# Patient Record
Sex: MALE | Race: OTHER | NOT HISPANIC OR LATINO
[De-identification: names, ages, dates, MRNs, and addresses within clinical notes are randomized per-mention and may not be internally consistent; named-entity substitution may affect disease eponyms.]

---

## 2017-08-29 PROBLEM — Z00.00 ENCOUNTER FOR PREVENTIVE HEALTH EXAMINATION: Status: ACTIVE | Noted: 2017-08-29

## 2017-09-14 ENCOUNTER — APPOINTMENT (OUTPATIENT)
Dept: CARDIOLOGY | Facility: CLINIC | Age: 62
End: 2017-09-14
Payer: MEDICAID

## 2017-09-14 ENCOUNTER — NON-APPOINTMENT (OUTPATIENT)
Age: 62
End: 2017-09-14

## 2017-09-14 VITALS
HEART RATE: 69 BPM | OXYGEN SATURATION: 97 % | WEIGHT: 218 LBS | RESPIRATION RATE: 16 BRPM | DIASTOLIC BLOOD PRESSURE: 88 MMHG | HEIGHT: 67 IN | SYSTOLIC BLOOD PRESSURE: 148 MMHG | BODY MASS INDEX: 34.21 KG/M2

## 2017-09-14 PROCEDURE — 99205 OFFICE O/P NEW HI 60 MIN: CPT

## 2017-09-14 PROCEDURE — 36415 COLL VENOUS BLD VENIPUNCTURE: CPT

## 2017-09-14 PROCEDURE — 93000 ELECTROCARDIOGRAM COMPLETE: CPT

## 2017-09-14 RX ORDER — OMEPRAZOLE 40 MG/1
40 CAPSULE, DELAYED RELEASE ORAL
Refills: 0 | Status: ACTIVE | COMMUNITY
Start: 2017-09-14

## 2017-09-14 RX ORDER — LOSARTAN POTASSIUM 100 MG/1
100 TABLET, FILM COATED ORAL DAILY
Refills: 0 | Status: ACTIVE | COMMUNITY
Start: 2017-09-14

## 2017-09-14 RX ORDER — AMLODIPINE BESYLATE 10 MG/1
10 TABLET ORAL DAILY
Refills: 0 | Status: ACTIVE | COMMUNITY
Start: 2017-09-14

## 2017-09-15 LAB
ALBUMIN SERPL ELPH-MCNC: 4.3 G/DL
ALP BLD-CCNC: 88 U/L
ALT SERPL-CCNC: 20 U/L
ANION GAP SERPL CALC-SCNC: 20 MMOL/L
AST SERPL-CCNC: 14 U/L
BASOPHILS # BLD AUTO: 0.02 K/UL
BASOPHILS NFR BLD AUTO: 0.3 %
BILIRUB SERPL-MCNC: 0.4 MG/DL
BUN SERPL-MCNC: 23 MG/DL
CALCIUM SERPL-MCNC: 10.7 MG/DL
CHLORIDE SERPL-SCNC: 95 MMOL/L
CHOLEST SERPL-MCNC: 243 MG/DL
CHOLEST/HDLC SERPL: 5.2 RATIO
CO2 SERPL-SCNC: 23 MMOL/L
CREAT SERPL-MCNC: 1.03 MG/DL
EOSINOPHIL # BLD AUTO: 0.4 K/UL
EOSINOPHIL NFR BLD AUTO: 5.6 %
GLUCOSE SERPL-MCNC: 269 MG/DL
HCT VFR BLD CALC: 39 %
HDLC SERPL-MCNC: 47 MG/DL
HGB BLD-MCNC: 12.6 G/DL
IMM GRANULOCYTES NFR BLD AUTO: 0 %
LDLC SERPL CALC-MCNC: 152 MG/DL
LYMPHOCYTES # BLD AUTO: 2.11 K/UL
LYMPHOCYTES NFR BLD AUTO: 29.6 %
MAGNESIUM SERPL-MCNC: 1.8 MG/DL
MAN DIFF?: NORMAL
MCHC RBC-ENTMCNC: 27.1 PG
MCHC RBC-ENTMCNC: 32.3 GM/DL
MCV RBC AUTO: 83.9 FL
MONOCYTES # BLD AUTO: 0.51 K/UL
MONOCYTES NFR BLD AUTO: 7.2 %
NEUTROPHILS # BLD AUTO: 4.08 K/UL
NEUTROPHILS NFR BLD AUTO: 57.3 %
NT-PROBNP SERPL-MCNC: <5 PG/ML
PLATELET # BLD AUTO: 308 K/UL
POTASSIUM SERPL-SCNC: 3.9 MMOL/L
PROT SERPL-MCNC: 8.9 G/DL
RBC # BLD: 4.65 M/UL
RBC # FLD: 13.2 %
SODIUM SERPL-SCNC: 138 MMOL/L
TRIGL SERPL-MCNC: 218 MG/DL
WBC # FLD AUTO: 7.12 K/UL

## 2017-09-22 ENCOUNTER — OTHER (OUTPATIENT)
Age: 62
End: 2017-09-22

## 2017-09-27 ENCOUNTER — OTHER (OUTPATIENT)
Age: 62
End: 2017-09-27

## 2017-09-29 ENCOUNTER — APPOINTMENT (OUTPATIENT)
Dept: CV DIAGNOSTICS | Facility: HOSPITAL | Age: 62
End: 2017-09-29

## 2017-09-29 ENCOUNTER — APPOINTMENT (OUTPATIENT)
Dept: CV DIAGNOSITCS | Facility: HOSPITAL | Age: 62
End: 2017-09-29

## 2017-10-18 ENCOUNTER — OUTPATIENT (OUTPATIENT)
Dept: OUTPATIENT SERVICES | Facility: HOSPITAL | Age: 62
LOS: 1 days | End: 2017-10-18

## 2017-10-18 ENCOUNTER — APPOINTMENT (OUTPATIENT)
Dept: CV DIAGNOSITCS | Facility: HOSPITAL | Age: 62
End: 2017-10-18

## 2017-10-18 DIAGNOSIS — I10 ESSENTIAL (PRIMARY) HYPERTENSION: ICD-10-CM

## 2017-10-19 ENCOUNTER — APPOINTMENT (OUTPATIENT)
Dept: CARDIOLOGY | Facility: CLINIC | Age: 62
End: 2017-10-19
Payer: MEDICAID

## 2017-10-19 ENCOUNTER — NON-APPOINTMENT (OUTPATIENT)
Age: 62
End: 2017-10-19

## 2017-10-19 VITALS
WEIGHT: 209 LBS | BODY MASS INDEX: 32.8 KG/M2 | HEIGHT: 67 IN | OXYGEN SATURATION: 99 % | DIASTOLIC BLOOD PRESSURE: 86 MMHG | SYSTOLIC BLOOD PRESSURE: 146 MMHG | RESPIRATION RATE: 16 BRPM | HEART RATE: 64 BPM

## 2017-10-19 PROCEDURE — 93000 ELECTROCARDIOGRAM COMPLETE: CPT

## 2017-10-19 PROCEDURE — 99214 OFFICE O/P EST MOD 30 MIN: CPT

## 2017-12-18 ENCOUNTER — APPOINTMENT (OUTPATIENT)
Dept: CARDIOLOGY | Facility: CLINIC | Age: 62
End: 2017-12-18
Payer: MEDICAID

## 2017-12-18 ENCOUNTER — NON-APPOINTMENT (OUTPATIENT)
Age: 62
End: 2017-12-18

## 2017-12-18 VITALS
WEIGHT: 212 LBS | RESPIRATION RATE: 16 BRPM | HEIGHT: 67 IN | HEART RATE: 60 BPM | OXYGEN SATURATION: 99 % | BODY MASS INDEX: 33.27 KG/M2 | DIASTOLIC BLOOD PRESSURE: 77 MMHG | SYSTOLIC BLOOD PRESSURE: 134 MMHG

## 2017-12-18 VITALS — WEIGHT: 212 LBS | BODY MASS INDEX: 33.2 KG/M2

## 2017-12-18 PROCEDURE — 99214 OFFICE O/P EST MOD 30 MIN: CPT

## 2017-12-18 PROCEDURE — 93000 ELECTROCARDIOGRAM COMPLETE: CPT

## 2017-12-18 RX ORDER — HYDROCHLOROTHIAZIDE 25 MG/1
25 TABLET ORAL DAILY
Refills: 0 | Status: DISCONTINUED | COMMUNITY
Start: 2017-09-14 | End: 2017-12-18

## 2017-12-18 RX ORDER — ASPIRIN ENTERIC COATED TABLETS 81 MG 81 MG/1
81 TABLET, DELAYED RELEASE ORAL
Qty: 30 | Refills: 1 | Status: ACTIVE | COMMUNITY
Start: 2017-12-18

## 2018-03-01 ENCOUNTER — OUTPATIENT (OUTPATIENT)
Dept: OUTPATIENT SERVICES | Facility: HOSPITAL | Age: 63
LOS: 1 days | End: 2018-03-01

## 2018-03-27 DIAGNOSIS — R69 ILLNESS, UNSPECIFIED: ICD-10-CM

## 2018-08-02 ENCOUNTER — APPOINTMENT (OUTPATIENT)
Dept: CARDIOLOGY | Facility: CLINIC | Age: 63
End: 2018-08-02
Payer: MEDICAID

## 2018-08-02 ENCOUNTER — NON-APPOINTMENT (OUTPATIENT)
Age: 63
End: 2018-08-02

## 2018-08-02 VITALS
OXYGEN SATURATION: 99 % | RESPIRATION RATE: 16 BRPM | DIASTOLIC BLOOD PRESSURE: 81 MMHG | HEART RATE: 76 BPM | SYSTOLIC BLOOD PRESSURE: 151 MMHG

## 2018-08-02 VITALS — DIASTOLIC BLOOD PRESSURE: 77 MMHG | BODY MASS INDEX: 31.64 KG/M2 | SYSTOLIC BLOOD PRESSURE: 130 MMHG | WEIGHT: 202 LBS

## 2018-08-02 DIAGNOSIS — E78.5 HYPERLIPIDEMIA, UNSPECIFIED: ICD-10-CM

## 2018-08-02 DIAGNOSIS — I10 ESSENTIAL (PRIMARY) HYPERTENSION: ICD-10-CM

## 2018-08-02 PROCEDURE — 99214 OFFICE O/P EST MOD 30 MIN: CPT

## 2018-08-02 PROCEDURE — 93000 ELECTROCARDIOGRAM COMPLETE: CPT

## 2018-08-02 RX ORDER — ATORVASTATIN CALCIUM 10 MG/1
10 TABLET, FILM COATED ORAL
Qty: 1 | Refills: 3 | Status: ACTIVE | COMMUNITY
Start: 2017-09-22 | End: 1900-01-01

## 2018-08-02 RX ORDER — HYDRALAZINE HYDROCHLORIDE 50 MG/1
50 TABLET ORAL
Qty: 60 | Refills: 4 | Status: ACTIVE | COMMUNITY
Start: 2017-09-14 | End: 1900-01-01

## 2018-08-02 RX ORDER — CYCLOBENZAPRINE HYDROCHLORIDE 5 MG/1
5 TABLET, FILM COATED ORAL
Refills: 0 | Status: ACTIVE | COMMUNITY
Start: 2018-08-02

## 2018-10-01 ENCOUNTER — OUTPATIENT (OUTPATIENT)
Dept: OUTPATIENT SERVICES | Facility: HOSPITAL | Age: 63
LOS: 1 days | End: 2018-10-01
Payer: MEDICAID

## 2018-10-01 PROCEDURE — G9001: CPT

## 2018-10-12 DIAGNOSIS — Z71.89 OTHER SPECIFIED COUNSELING: ICD-10-CM

## 2019-02-07 ENCOUNTER — APPOINTMENT (OUTPATIENT)
Dept: CARDIOLOGY | Facility: CLINIC | Age: 64
End: 2019-02-07

## 2021-06-03 ENCOUNTER — NON-APPOINTMENT (OUTPATIENT)
Age: 66
End: 2021-06-03

## 2021-06-03 ENCOUNTER — APPOINTMENT (OUTPATIENT)
Dept: OPHTHALMOLOGY | Facility: CLINIC | Age: 66
End: 2021-06-03
Payer: MEDICARE

## 2021-06-03 PROCEDURE — 92004 COMPRE OPH EXAM NEW PT 1/>: CPT

## 2021-06-03 PROCEDURE — 92133 CPTRZD OPH DX IMG PST SGM ON: CPT

## 2021-06-03 PROCEDURE — 92136 OPHTHALMIC BIOMETRY: CPT

## 2021-06-15 ENCOUNTER — APPOINTMENT (OUTPATIENT)
Dept: OPHTHALMOLOGY | Facility: AMBULATORY SURGERY CENTER | Age: 66
End: 2021-06-15
Payer: MEDICARE

## 2021-06-15 ENCOUNTER — NON-APPOINTMENT (OUTPATIENT)
Age: 66
End: 2021-06-15

## 2021-06-15 PROCEDURE — V2787: CPT

## 2021-06-15 PROCEDURE — 66984 XCAPSL CTRC RMVL W/O ECP: CPT | Mod: LT

## 2021-06-16 ENCOUNTER — APPOINTMENT (OUTPATIENT)
Dept: OPHTHALMOLOGY | Facility: CLINIC | Age: 66
End: 2021-06-16
Payer: MEDICARE

## 2021-06-16 ENCOUNTER — NON-APPOINTMENT (OUTPATIENT)
Age: 66
End: 2021-06-16

## 2021-06-16 PROCEDURE — 99024 POSTOP FOLLOW-UP VISIT: CPT

## 2021-06-22 ENCOUNTER — NON-APPOINTMENT (OUTPATIENT)
Age: 66
End: 2021-06-22

## 2021-06-22 ENCOUNTER — APPOINTMENT (OUTPATIENT)
Dept: OPHTHALMOLOGY | Facility: CLINIC | Age: 66
End: 2021-06-22
Payer: MEDICARE

## 2021-06-22 PROCEDURE — 99024 POSTOP FOLLOW-UP VISIT: CPT

## 2021-06-28 ENCOUNTER — NON-APPOINTMENT (OUTPATIENT)
Age: 66
End: 2021-06-28

## 2021-06-29 ENCOUNTER — NON-APPOINTMENT (OUTPATIENT)
Age: 66
End: 2021-06-29

## 2021-06-29 ENCOUNTER — APPOINTMENT (OUTPATIENT)
Dept: OPHTHALMOLOGY | Facility: AMBULATORY SURGERY CENTER | Age: 66
End: 2021-06-29
Payer: MEDICARE

## 2021-06-29 PROCEDURE — 66984 XCAPSL CTRC RMVL W/O ECP: CPT | Mod: RT,79

## 2021-06-30 ENCOUNTER — APPOINTMENT (OUTPATIENT)
Dept: OPHTHALMOLOGY | Facility: CLINIC | Age: 66
End: 2021-06-30
Payer: MEDICARE

## 2021-06-30 ENCOUNTER — NON-APPOINTMENT (OUTPATIENT)
Age: 66
End: 2021-06-30

## 2021-06-30 PROCEDURE — 99024 POSTOP FOLLOW-UP VISIT: CPT

## 2021-07-08 ENCOUNTER — APPOINTMENT (OUTPATIENT)
Dept: OPHTHALMOLOGY | Facility: CLINIC | Age: 66
End: 2021-07-08
Payer: MEDICARE

## 2021-07-08 ENCOUNTER — NON-APPOINTMENT (OUTPATIENT)
Age: 66
End: 2021-07-08

## 2021-07-08 PROCEDURE — 99024 POSTOP FOLLOW-UP VISIT: CPT

## 2021-08-10 ENCOUNTER — NON-APPOINTMENT (OUTPATIENT)
Age: 66
End: 2021-08-10

## 2021-08-10 ENCOUNTER — APPOINTMENT (OUTPATIENT)
Dept: OPHTHALMOLOGY | Facility: CLINIC | Age: 66
End: 2021-08-10
Payer: MEDICARE

## 2021-08-10 PROCEDURE — 92250 FUNDUS PHOTOGRAPHY W/I&R: CPT

## 2021-08-10 PROCEDURE — 99024 POSTOP FOLLOW-UP VISIT: CPT

## 2021-10-06 PROBLEM — I10 ESSENTIAL HYPERTENSION: Status: ACTIVE | Noted: 2017-09-14

## 2021-12-28 ENCOUNTER — NON-APPOINTMENT (OUTPATIENT)
Age: 66
End: 2021-12-28

## 2021-12-28 ENCOUNTER — APPOINTMENT (OUTPATIENT)
Dept: OPHTHALMOLOGY | Facility: CLINIC | Age: 66
End: 2021-12-28
Payer: COMMERCIAL

## 2021-12-28 PROCEDURE — 92012 INTRM OPH EXAM EST PATIENT: CPT

## 2021-12-28 PROCEDURE — 92133 CPTRZD OPH DX IMG PST SGM ON: CPT

## 2021-12-28 PROCEDURE — 92083 EXTENDED VISUAL FIELD XM: CPT

## 2022-05-10 ENCOUNTER — APPOINTMENT (OUTPATIENT)
Dept: OPHTHALMOLOGY | Facility: CLINIC | Age: 67
End: 2022-05-10

## 2022-10-27 ENCOUNTER — NON-APPOINTMENT (OUTPATIENT)
Age: 67
End: 2022-10-27

## 2022-10-27 ENCOUNTER — APPOINTMENT (OUTPATIENT)
Dept: OPHTHALMOLOGY | Facility: CLINIC | Age: 67
End: 2022-10-27

## 2022-10-27 PROCEDURE — 92012 INTRM OPH EXAM EST PATIENT: CPT

## 2022-10-27 PROCEDURE — 92083 EXTENDED VISUAL FIELD XM: CPT

## 2022-10-27 PROCEDURE — 92133 CPTRZD OPH DX IMG PST SGM ON: CPT

## 2023-01-30 ENCOUNTER — APPOINTMENT (OUTPATIENT)
Dept: OPHTHALMOLOGY | Facility: CLINIC | Age: 68
End: 2023-01-30

## 2023-02-09 ENCOUNTER — NON-APPOINTMENT (OUTPATIENT)
Age: 68
End: 2023-02-09

## 2023-02-09 ENCOUNTER — APPOINTMENT (OUTPATIENT)
Dept: OPHTHALMOLOGY | Facility: CLINIC | Age: 68
End: 2023-02-09
Payer: MEDICARE

## 2023-02-09 PROCEDURE — 92083 EXTENDED VISUAL FIELD XM: CPT

## 2023-02-09 PROCEDURE — 92012 INTRM OPH EXAM EST PATIENT: CPT

## 2023-02-13 ENCOUNTER — APPOINTMENT (OUTPATIENT)
Dept: OPHTHALMOLOGY | Facility: CLINIC | Age: 68
End: 2023-02-13

## 2023-02-24 ENCOUNTER — APPOINTMENT (OUTPATIENT)
Dept: OPHTHALMOLOGY | Facility: CLINIC | Age: 68
End: 2023-02-24
Payer: MEDICARE

## 2023-02-24 ENCOUNTER — NON-APPOINTMENT (OUTPATIENT)
Age: 68
End: 2023-02-24

## 2023-02-24 PROCEDURE — 92134 CPTRZ OPH DX IMG PST SGM RTA: CPT

## 2023-02-24 PROCEDURE — 92014 COMPRE OPH EXAM EST PT 1/>: CPT

## 2023-04-18 ENCOUNTER — APPOINTMENT (OUTPATIENT)
Dept: PULMONOLOGY | Facility: CLINIC | Age: 68
End: 2023-04-18
Payer: MEDICARE

## 2023-04-18 VITALS — HEART RATE: 77 BPM | SYSTOLIC BLOOD PRESSURE: 169 MMHG | OXYGEN SATURATION: 98 % | DIASTOLIC BLOOD PRESSURE: 90 MMHG

## 2023-04-18 PROCEDURE — 99203 OFFICE O/P NEW LOW 30 MIN: CPT

## 2023-04-18 NOTE — ASSESSMENT
[FreeTextEntry1] : confirm rachel diagnosis with HST will then order new cpap if appropriate\par will use old cpap in the meantime (not during HST)

## 2023-04-18 NOTE — HISTORY OF PRESENT ILLNESS
[TextBox_4] : 67M with DM, HTN, TAO on cpap\par \par current cpap he believes is set at 14, hx of severe TAO\par cpap is now about 7 years old\par has been using cpap for 20 years\par reports good sleep with current machine but believes he is due for a new one.

## 2023-05-18 ENCOUNTER — APPOINTMENT (OUTPATIENT)
Dept: PULMONOLOGY | Facility: CLINIC | Age: 68
End: 2023-05-18

## 2023-05-25 ENCOUNTER — APPOINTMENT (OUTPATIENT)
Dept: PULMONOLOGY | Facility: CLINIC | Age: 68
End: 2023-05-25
Payer: MEDICARE

## 2023-05-25 PROCEDURE — 95800 SLP STDY UNATTENDED: CPT

## 2023-05-28 PROCEDURE — 95800 SLP STDY UNATTENDED: CPT

## 2023-06-21 ENCOUNTER — APPOINTMENT (OUTPATIENT)
Dept: PULMONOLOGY | Facility: CLINIC | Age: 68
End: 2023-06-21
Payer: MEDICARE

## 2023-06-21 VITALS
HEART RATE: 91 BPM | SYSTOLIC BLOOD PRESSURE: 146 MMHG | OXYGEN SATURATION: 95 % | WEIGHT: 197 LBS | RESPIRATION RATE: 16 BRPM | DIASTOLIC BLOOD PRESSURE: 83 MMHG | BODY MASS INDEX: 30.85 KG/M2

## 2023-06-21 DIAGNOSIS — Z99.89 OBSTRUCTIVE SLEEP APNEA (ADULT) (PEDIATRIC): ICD-10-CM

## 2023-06-21 DIAGNOSIS — G47.33 OBSTRUCTIVE SLEEP APNEA (ADULT) (PEDIATRIC): ICD-10-CM

## 2023-06-21 PROCEDURE — 99213 OFFICE O/P EST LOW 20 MIN: CPT

## 2023-06-21 NOTE — ASSESSMENT
[FreeTextEntry1] : pt believes prior machine was a bipap\par will get in lab titration study to be sure.

## 2023-07-29 ENCOUNTER — INPATIENT (INPATIENT)
Facility: HOSPITAL | Age: 68
LOS: 12 days | Discharge: HOME | End: 2023-08-11
Attending: INTERNAL MEDICINE | Admitting: INTERNAL MEDICINE
Payer: MEDICARE

## 2023-07-29 VITALS
HEART RATE: 96 BPM | OXYGEN SATURATION: 100 % | DIASTOLIC BLOOD PRESSURE: 89 MMHG | RESPIRATION RATE: 14 BRPM | TEMPERATURE: 98 F | SYSTOLIC BLOOD PRESSURE: 191 MMHG

## 2023-07-29 DIAGNOSIS — R79.89 OTHER SPECIFIED ABNORMAL FINDINGS OF BLOOD CHEMISTRY: ICD-10-CM

## 2023-07-29 DIAGNOSIS — I10 ESSENTIAL (PRIMARY) HYPERTENSION: ICD-10-CM

## 2023-07-29 DIAGNOSIS — R00.0 TACHYCARDIA, UNSPECIFIED: ICD-10-CM

## 2023-07-29 DIAGNOSIS — E11.9 TYPE 2 DIABETES MELLITUS WITHOUT COMPLICATIONS: ICD-10-CM

## 2023-07-29 DIAGNOSIS — I77.1 STRICTURE OF ARTERY: ICD-10-CM

## 2023-07-29 DIAGNOSIS — E78.5 HYPERLIPIDEMIA, UNSPECIFIED: ICD-10-CM

## 2023-07-29 DIAGNOSIS — N28.89 OTHER SPECIFIED DISORDERS OF KIDNEY AND URETER: ICD-10-CM

## 2023-07-29 DIAGNOSIS — I51.7 CARDIOMEGALY: ICD-10-CM

## 2023-07-29 DIAGNOSIS — E87.20 ACIDOSIS, UNSPECIFIED: ICD-10-CM

## 2023-07-29 LAB
ALBUMIN SERPL ELPH-MCNC: 4.3 G/DL — SIGNIFICANT CHANGE UP (ref 3.3–5)
ALP SERPL-CCNC: 90 U/L — SIGNIFICANT CHANGE UP (ref 40–120)
ALT FLD-CCNC: 17 U/L — SIGNIFICANT CHANGE UP (ref 4–41)
ANION GAP SERPL CALC-SCNC: 23 MMOL/L — HIGH (ref 7–14)
APPEARANCE UR: CLEAR — SIGNIFICANT CHANGE UP
APTT BLD: 27.1 SEC — SIGNIFICANT CHANGE UP (ref 24.5–35.6)
APTT BLD: 31.8 SEC — SIGNIFICANT CHANGE UP (ref 24.5–35.6)
AST SERPL-CCNC: 14 U/L — SIGNIFICANT CHANGE UP (ref 4–40)
B-OH-BUTYR SERPL-SCNC: 0.2 MMOL/L — SIGNIFICANT CHANGE UP (ref 0–0.4)
BACTERIA # UR AUTO: NEGATIVE /HPF — SIGNIFICANT CHANGE UP
BASE EXCESS BLDV CALC-SCNC: -0.7 MMOL/L — SIGNIFICANT CHANGE UP (ref -2–3)
BASE EXCESS BLDV CALC-SCNC: -1.5 MMOL/L — SIGNIFICANT CHANGE UP (ref -2–3)
BASOPHILS # BLD AUTO: 0.04 K/UL — SIGNIFICANT CHANGE UP (ref 0–0.2)
BASOPHILS NFR BLD AUTO: 0.3 % — SIGNIFICANT CHANGE UP (ref 0–2)
BILIRUB SERPL-MCNC: 0.5 MG/DL — SIGNIFICANT CHANGE UP (ref 0.2–1.2)
BILIRUB UR-MCNC: NEGATIVE — SIGNIFICANT CHANGE UP
BLD GP AB SCN SERPL QL: NEGATIVE — SIGNIFICANT CHANGE UP
BLOOD GAS VENOUS COMPREHENSIVE RESULT: SIGNIFICANT CHANGE UP
BLOOD GAS VENOUS COMPREHENSIVE RESULT: SIGNIFICANT CHANGE UP
BUN SERPL-MCNC: 21 MG/DL — SIGNIFICANT CHANGE UP (ref 7–23)
CALCIUM SERPL-MCNC: 9.8 MG/DL — SIGNIFICANT CHANGE UP (ref 8.4–10.5)
CAST: 2 /LPF — SIGNIFICANT CHANGE UP (ref 0–4)
CHLORIDE BLDV-SCNC: 100 MMOL/L — SIGNIFICANT CHANGE UP (ref 96–108)
CHLORIDE BLDV-SCNC: 99 MMOL/L — SIGNIFICANT CHANGE UP (ref 96–108)
CHLORIDE SERPL-SCNC: 94 MMOL/L — LOW (ref 98–107)
CO2 BLDV-SCNC: 27.2 MMOL/L — HIGH (ref 22–26)
CO2 BLDV-SCNC: 27.8 MMOL/L — HIGH (ref 22–26)
CO2 SERPL-SCNC: 22 MMOL/L — SIGNIFICANT CHANGE UP (ref 22–31)
COLOR SPEC: YELLOW — SIGNIFICANT CHANGE UP
CREAT SERPL-MCNC: 0.83 MG/DL — SIGNIFICANT CHANGE UP (ref 0.5–1.3)
DIFF PNL FLD: NEGATIVE — SIGNIFICANT CHANGE UP
EGFR: 95 ML/MIN/1.73M2 — SIGNIFICANT CHANGE UP
EOSINOPHIL # BLD AUTO: 0.28 K/UL — SIGNIFICANT CHANGE UP (ref 0–0.5)
EOSINOPHIL NFR BLD AUTO: 2.2 % — SIGNIFICANT CHANGE UP (ref 0–6)
GAS PNL BLDV: 134 MMOL/L — LOW (ref 136–145)
GAS PNL BLDV: 134 MMOL/L — LOW (ref 136–145)
GAS PNL BLDV: SIGNIFICANT CHANGE UP
GLUCOSE BLDC GLUCOMTR-MCNC: 296 MG/DL — HIGH (ref 70–99)
GLUCOSE BLDV-MCNC: 297 MG/DL — HIGH (ref 70–99)
GLUCOSE BLDV-MCNC: 364 MG/DL — HIGH (ref 70–99)
GLUCOSE SERPL-MCNC: 329 MG/DL — HIGH (ref 70–99)
GLUCOSE UR QL: >=1000 MG/DL
HCO3 BLDV-SCNC: 26 MMOL/L — SIGNIFICANT CHANGE UP (ref 22–29)
HCO3 BLDV-SCNC: 26 MMOL/L — SIGNIFICANT CHANGE UP (ref 22–29)
HCT VFR BLD CALC: 40.4 % — SIGNIFICANT CHANGE UP (ref 39–50)
HCT VFR BLDA CALC: 37 % — LOW (ref 39–51)
HCT VFR BLDA CALC: 40 % — SIGNIFICANT CHANGE UP (ref 39–51)
HGB BLD CALC-MCNC: 12.4 G/DL — LOW (ref 12.6–17.4)
HGB BLD CALC-MCNC: 13.3 G/DL — SIGNIFICANT CHANGE UP (ref 12.6–17.4)
HGB BLD-MCNC: 12.9 G/DL — LOW (ref 13–17)
IANC: 6.84 K/UL — SIGNIFICANT CHANGE UP (ref 1.8–7.4)
IMM GRANULOCYTES NFR BLD AUTO: 0.5 % — SIGNIFICANT CHANGE UP (ref 0–0.9)
INR BLD: 0.91 RATIO — SIGNIFICANT CHANGE UP (ref 0.85–1.18)
INR BLD: 1 RATIO — SIGNIFICANT CHANGE UP (ref 0.85–1.18)
KETONES UR-MCNC: 15 MG/DL
LACTATE BLDV-MCNC: 3.8 MMOL/L — HIGH (ref 0.5–2)
LACTATE BLDV-MCNC: 4.2 MMOL/L — CRITICAL HIGH (ref 0.5–2)
LEUKOCYTE ESTERASE UR-ACNC: NEGATIVE — SIGNIFICANT CHANGE UP
LYMPHOCYTES # BLD AUTO: 33.3 % — SIGNIFICANT CHANGE UP (ref 13–44)
LYMPHOCYTES # BLD AUTO: 4.17 K/UL — HIGH (ref 1–3.3)
MCHC RBC-ENTMCNC: 28.5 PG — SIGNIFICANT CHANGE UP (ref 27–34)
MCHC RBC-ENTMCNC: 31.9 GM/DL — LOW (ref 32–36)
MCV RBC AUTO: 89.2 FL — SIGNIFICANT CHANGE UP (ref 80–100)
MONOCYTES # BLD AUTO: 1.14 K/UL — HIGH (ref 0–0.9)
MONOCYTES NFR BLD AUTO: 9.1 % — SIGNIFICANT CHANGE UP (ref 2–14)
NEUTROPHILS # BLD AUTO: 6.84 K/UL — SIGNIFICANT CHANGE UP (ref 1.8–7.4)
NEUTROPHILS NFR BLD AUTO: 54.6 % — SIGNIFICANT CHANGE UP (ref 43–77)
NITRITE UR-MCNC: NEGATIVE — SIGNIFICANT CHANGE UP
NRBC # BLD: 0 /100 WBCS — SIGNIFICANT CHANGE UP (ref 0–0)
NRBC # FLD: 0 K/UL — SIGNIFICANT CHANGE UP (ref 0–0)
PCO2 BLDV: 52 MMHG — SIGNIFICANT CHANGE UP (ref 42–55)
PCO2 BLDV: 52 MMHG — SIGNIFICANT CHANGE UP (ref 42–55)
PH BLDV: 7.3 — LOW (ref 7.32–7.43)
PH BLDV: 7.31 — LOW (ref 7.32–7.43)
PH UR: 6 — SIGNIFICANT CHANGE UP (ref 5–8)
PLATELET # BLD AUTO: 401 K/UL — HIGH (ref 150–400)
PO2 BLDV: 48 MMHG — HIGH (ref 25–45)
PO2 BLDV: 51 MMHG — HIGH (ref 25–45)
POTASSIUM BLDV-SCNC: 3.9 MMOL/L — SIGNIFICANT CHANGE UP (ref 3.5–5.1)
POTASSIUM BLDV-SCNC: 4.3 MMOL/L — SIGNIFICANT CHANGE UP (ref 3.5–5.1)
POTASSIUM SERPL-MCNC: 4 MMOL/L — SIGNIFICANT CHANGE UP (ref 3.5–5.3)
POTASSIUM SERPL-SCNC: 4 MMOL/L — SIGNIFICANT CHANGE UP (ref 3.5–5.3)
PROT SERPL-MCNC: 8.4 G/DL — HIGH (ref 6–8.3)
PROT UR-MCNC: 30 MG/DL
PROTHROM AB SERPL-ACNC: 10.2 SEC — SIGNIFICANT CHANGE UP (ref 9.5–13)
PROTHROM AB SERPL-ACNC: 11.2 SEC — SIGNIFICANT CHANGE UP (ref 9.5–13)
RBC # BLD: 4.53 M/UL — SIGNIFICANT CHANGE UP (ref 4.2–5.8)
RBC # FLD: 12.4 % — SIGNIFICANT CHANGE UP (ref 10.3–14.5)
RBC CASTS # UR COMP ASSIST: 1 /HPF — SIGNIFICANT CHANGE UP (ref 0–4)
RH IG SCN BLD-IMP: NEGATIVE — SIGNIFICANT CHANGE UP
SAO2 % BLDV: 75.9 % — SIGNIFICANT CHANGE UP (ref 67–88)
SAO2 % BLDV: 76.5 % — SIGNIFICANT CHANGE UP (ref 67–88)
SODIUM SERPL-SCNC: 139 MMOL/L — SIGNIFICANT CHANGE UP (ref 135–145)
SP GR SPEC: 1.06 — HIGH (ref 1–1.03)
SQUAMOUS # UR AUTO: 0 /HPF — SIGNIFICANT CHANGE UP (ref 0–5)
TROPONIN T, HIGH SENSITIVITY RESULT: 10 NG/L — SIGNIFICANT CHANGE UP
TROPONIN T, HIGH SENSITIVITY RESULT: 11 NG/L — SIGNIFICANT CHANGE UP
UROBILINOGEN FLD QL: 0.2 MG/DL — SIGNIFICANT CHANGE UP (ref 0.2–1)
WBC # BLD: 12.53 K/UL — HIGH (ref 3.8–10.5)
WBC # FLD AUTO: 12.53 K/UL — HIGH (ref 3.8–10.5)
WBC UR QL: 0 /HPF — SIGNIFICANT CHANGE UP (ref 0–5)

## 2023-07-29 PROCEDURE — 99497 ADVNCD CARE PLAN 30 MIN: CPT | Mod: 25

## 2023-07-29 PROCEDURE — 99285 EMERGENCY DEPT VISIT HI MDM: CPT

## 2023-07-29 PROCEDURE — 71045 X-RAY EXAM CHEST 1 VIEW: CPT | Mod: 26

## 2023-07-29 PROCEDURE — 74174 CTA ABD&PLVS W/CONTRAST: CPT | Mod: 26,MA

## 2023-07-29 PROCEDURE — 71275 CT ANGIOGRAPHY CHEST: CPT | Mod: 26,MA

## 2023-07-29 PROCEDURE — 99223 1ST HOSP IP/OBS HIGH 75: CPT

## 2023-07-29 PROCEDURE — 93970 EXTREMITY STUDY: CPT | Mod: 26

## 2023-07-29 RX ORDER — ONDANSETRON 8 MG/1
4 TABLET, FILM COATED ORAL EVERY 8 HOURS
Refills: 0 | Status: DISCONTINUED | OUTPATIENT
Start: 2023-07-29 | End: 2023-08-02

## 2023-07-29 RX ORDER — SODIUM CHLORIDE 9 MG/ML
1000 INJECTION, SOLUTION INTRAVENOUS
Refills: 0 | Status: DISCONTINUED | OUTPATIENT
Start: 2023-07-29 | End: 2023-08-02

## 2023-07-29 RX ORDER — LANOLIN ALCOHOL/MO/W.PET/CERES
3 CREAM (GRAM) TOPICAL AT BEDTIME
Refills: 0 | Status: DISCONTINUED | OUTPATIENT
Start: 2023-07-29 | End: 2023-07-29

## 2023-07-29 RX ORDER — DEXTROSE 50 % IN WATER 50 %
15 SYRINGE (ML) INTRAVENOUS ONCE
Refills: 0 | Status: DISCONTINUED | OUTPATIENT
Start: 2023-07-29 | End: 2023-08-02

## 2023-07-29 RX ORDER — GLUCAGON INJECTION, SOLUTION 0.5 MG/.1ML
1 INJECTION, SOLUTION SUBCUTANEOUS ONCE
Refills: 0 | Status: DISCONTINUED | OUTPATIENT
Start: 2023-07-29 | End: 2023-08-02

## 2023-07-29 RX ORDER — HEPARIN SODIUM 5000 [USP'U]/ML
3500 INJECTION INTRAVENOUS; SUBCUTANEOUS EVERY 6 HOURS
Refills: 0 | Status: DISCONTINUED | OUTPATIENT
Start: 2023-07-29 | End: 2023-07-30

## 2023-07-29 RX ORDER — SODIUM CHLORIDE 9 MG/ML
500 INJECTION, SOLUTION INTRAVENOUS ONCE
Refills: 0 | Status: COMPLETED | OUTPATIENT
Start: 2023-07-29 | End: 2023-07-29

## 2023-07-29 RX ORDER — LANOLIN ALCOHOL/MO/W.PET/CERES
9 CREAM (GRAM) TOPICAL AT BEDTIME
Refills: 0 | Status: DISCONTINUED | OUTPATIENT
Start: 2023-07-29 | End: 2023-08-11

## 2023-07-29 RX ORDER — MORPHINE SULFATE 50 MG/1
4 CAPSULE, EXTENDED RELEASE ORAL ONCE
Refills: 0 | Status: DISCONTINUED | OUTPATIENT
Start: 2023-07-29 | End: 2023-07-29

## 2023-07-29 RX ORDER — DEXTROSE 50 % IN WATER 50 %
25 SYRINGE (ML) INTRAVENOUS ONCE
Refills: 0 | Status: DISCONTINUED | OUTPATIENT
Start: 2023-07-29 | End: 2023-08-11

## 2023-07-29 RX ORDER — HEPARIN SODIUM 5000 [USP'U]/ML
INJECTION INTRAVENOUS; SUBCUTANEOUS
Qty: 25000 | Refills: 0 | Status: DISCONTINUED | OUTPATIENT
Start: 2023-07-29 | End: 2023-07-30

## 2023-07-29 RX ORDER — HYDROMORPHONE HYDROCHLORIDE 2 MG/ML
0.5 INJECTION INTRAMUSCULAR; INTRAVENOUS; SUBCUTANEOUS EVERY 4 HOURS
Refills: 0 | Status: DISCONTINUED | OUTPATIENT
Start: 2023-07-29 | End: 2023-08-02

## 2023-07-29 RX ORDER — HYDRALAZINE HCL 50 MG
50 TABLET ORAL
Refills: 0 | Status: DISCONTINUED | OUTPATIENT
Start: 2023-07-29 | End: 2023-08-02

## 2023-07-29 RX ORDER — FENTANYL CITRATE 50 UG/ML
50 INJECTION INTRAVENOUS ONCE
Refills: 0 | Status: DISCONTINUED | OUTPATIENT
Start: 2023-07-29 | End: 2023-07-29

## 2023-07-29 RX ORDER — DEXTROSE 50 % IN WATER 50 %
25 SYRINGE (ML) INTRAVENOUS ONCE
Refills: 0 | Status: DISCONTINUED | OUTPATIENT
Start: 2023-07-29 | End: 2023-08-02

## 2023-07-29 RX ORDER — MORPHINE SULFATE 50 MG/1
6 CAPSULE, EXTENDED RELEASE ORAL ONCE
Refills: 0 | Status: DISCONTINUED | OUTPATIENT
Start: 2023-07-29 | End: 2023-07-29

## 2023-07-29 RX ORDER — ACETAMINOPHEN 500 MG
650 TABLET ORAL EVERY 6 HOURS
Refills: 0 | Status: DISCONTINUED | OUTPATIENT
Start: 2023-07-29 | End: 2023-08-11

## 2023-07-29 RX ORDER — SODIUM CHLORIDE 9 MG/ML
1000 INJECTION, SOLUTION INTRAVENOUS ONCE
Refills: 0 | Status: COMPLETED | OUTPATIENT
Start: 2023-07-29 | End: 2023-07-29

## 2023-07-29 RX ORDER — ATORVASTATIN CALCIUM 80 MG/1
10 TABLET, FILM COATED ORAL AT BEDTIME
Refills: 0 | Status: DISCONTINUED | OUTPATIENT
Start: 2023-07-29 | End: 2023-08-11

## 2023-07-29 RX ORDER — ASPIRIN/CALCIUM CARB/MAGNESIUM 324 MG
162 TABLET ORAL ONCE
Refills: 0 | Status: COMPLETED | OUTPATIENT
Start: 2023-07-29 | End: 2023-07-29

## 2023-07-29 RX ORDER — HEPARIN SODIUM 5000 [USP'U]/ML
7000 INJECTION INTRAVENOUS; SUBCUTANEOUS EVERY 6 HOURS
Refills: 0 | Status: DISCONTINUED | OUTPATIENT
Start: 2023-07-29 | End: 2023-07-30

## 2023-07-29 RX ORDER — INSULIN LISPRO 100/ML
VIAL (ML) SUBCUTANEOUS
Refills: 0 | Status: DISCONTINUED | OUTPATIENT
Start: 2023-07-29 | End: 2023-08-03

## 2023-07-29 RX ORDER — ASPIRIN/CALCIUM CARB/MAGNESIUM 324 MG
81 TABLET ORAL DAILY
Refills: 0 | Status: DISCONTINUED | OUTPATIENT
Start: 2023-07-29 | End: 2023-08-11

## 2023-07-29 RX ORDER — LOSARTAN POTASSIUM 100 MG/1
100 TABLET, FILM COATED ORAL DAILY
Refills: 0 | Status: DISCONTINUED | OUTPATIENT
Start: 2023-07-29 | End: 2023-08-02

## 2023-07-29 RX ORDER — HYDROMORPHONE HYDROCHLORIDE 2 MG/ML
0.5 INJECTION INTRAMUSCULAR; INTRAVENOUS; SUBCUTANEOUS ONCE
Refills: 0 | Status: DISCONTINUED | OUTPATIENT
Start: 2023-07-29 | End: 2023-07-29

## 2023-07-29 RX ORDER — DEXTROSE 50 % IN WATER 50 %
12.5 SYRINGE (ML) INTRAVENOUS ONCE
Refills: 0 | Status: DISCONTINUED | OUTPATIENT
Start: 2023-07-29 | End: 2023-08-02

## 2023-07-29 RX ORDER — AMLODIPINE BESYLATE 2.5 MG/1
5 TABLET ORAL DAILY
Refills: 0 | Status: DISCONTINUED | OUTPATIENT
Start: 2023-07-29 | End: 2023-08-02

## 2023-07-29 RX ORDER — FAMOTIDINE 10 MG/ML
20 INJECTION INTRAVENOUS ONCE
Refills: 0 | Status: COMPLETED | OUTPATIENT
Start: 2023-07-29 | End: 2023-07-29

## 2023-07-29 RX ORDER — INSULIN LISPRO 100/ML
VIAL (ML) SUBCUTANEOUS AT BEDTIME
Refills: 0 | Status: DISCONTINUED | OUTPATIENT
Start: 2023-07-29 | End: 2023-08-03

## 2023-07-29 RX ORDER — CYCLOBENZAPRINE HYDROCHLORIDE 10 MG/1
10 TABLET, FILM COATED ORAL AT BEDTIME
Refills: 0 | Status: DISCONTINUED | OUTPATIENT
Start: 2023-07-29 | End: 2023-08-11

## 2023-07-29 RX ORDER — HYDROMORPHONE HYDROCHLORIDE 2 MG/ML
1 INJECTION INTRAMUSCULAR; INTRAVENOUS; SUBCUTANEOUS ONCE
Refills: 0 | Status: DISCONTINUED | OUTPATIENT
Start: 2023-07-29 | End: 2023-07-29

## 2023-07-29 RX ORDER — NITROGLYCERIN 6.5 MG
0.4 CAPSULE, EXTENDED RELEASE ORAL
Refills: 0 | Status: DISCONTINUED | OUTPATIENT
Start: 2023-07-29 | End: 2023-08-02

## 2023-07-29 RX ADMIN — MORPHINE SULFATE 4 MILLIGRAM(S): 50 CAPSULE, EXTENDED RELEASE ORAL at 10:40

## 2023-07-29 RX ADMIN — Medication 650 MILLIGRAM(S): at 20:57

## 2023-07-29 RX ADMIN — Medication 9 MILLIGRAM(S): at 22:03

## 2023-07-29 RX ADMIN — MORPHINE SULFATE 6 MILLIGRAM(S): 50 CAPSULE, EXTENDED RELEASE ORAL at 11:48

## 2023-07-29 RX ADMIN — HEPARIN SODIUM 1600 UNIT(S)/HR: 5000 INJECTION INTRAVENOUS; SUBCUTANEOUS at 18:21

## 2023-07-29 RX ADMIN — Medication 162 MILLIGRAM(S): at 10:46

## 2023-07-29 RX ADMIN — FAMOTIDINE 20 MILLIGRAM(S): 10 INJECTION INTRAVENOUS at 12:38

## 2023-07-29 RX ADMIN — Medication 0.4 MILLIGRAM(S): at 10:00

## 2023-07-29 RX ADMIN — CYCLOBENZAPRINE HYDROCHLORIDE 10 MILLIGRAM(S): 10 TABLET, FILM COATED ORAL at 22:01

## 2023-07-29 RX ADMIN — HYDROMORPHONE HYDROCHLORIDE 0.5 MILLIGRAM(S): 2 INJECTION INTRAMUSCULAR; INTRAVENOUS; SUBCUTANEOUS at 17:33

## 2023-07-29 RX ADMIN — MORPHINE SULFATE 4 MILLIGRAM(S): 50 CAPSULE, EXTENDED RELEASE ORAL at 10:55

## 2023-07-29 RX ADMIN — SODIUM CHLORIDE 500 MILLILITER(S): 9 INJECTION, SOLUTION INTRAVENOUS at 13:55

## 2023-07-29 RX ADMIN — SODIUM CHLORIDE 1000 MILLILITER(S): 9 INJECTION, SOLUTION INTRAVENOUS at 12:39

## 2023-07-29 RX ADMIN — MORPHINE SULFATE 6 MILLIGRAM(S): 50 CAPSULE, EXTENDED RELEASE ORAL at 12:00

## 2023-07-29 RX ADMIN — Medication 30 MILLILITER(S): at 12:38

## 2023-07-29 RX ADMIN — SODIUM CHLORIDE 1000 MILLILITER(S): 9 INJECTION, SOLUTION INTRAVENOUS at 13:55

## 2023-07-29 RX ADMIN — SODIUM CHLORIDE 500 MILLILITER(S): 9 INJECTION, SOLUTION INTRAVENOUS at 11:49

## 2023-07-29 RX ADMIN — FENTANYL CITRATE 50 MICROGRAM(S): 50 INJECTION INTRAVENOUS at 11:05

## 2023-07-29 RX ADMIN — ATORVASTATIN CALCIUM 10 MILLIGRAM(S): 80 TABLET, FILM COATED ORAL at 22:01

## 2023-07-29 RX ADMIN — FENTANYL CITRATE 50 MICROGRAM(S): 50 INJECTION INTRAVENOUS at 12:00

## 2023-07-29 RX ADMIN — HYDROMORPHONE HYDROCHLORIDE 0.5 MILLIGRAM(S): 2 INJECTION INTRAMUSCULAR; INTRAVENOUS; SUBCUTANEOUS at 19:00

## 2023-07-29 NOTE — H&P ADULT - NSHPLABSRESULTS_GEN_ALL_CORE
12.9   12.53 )-----------( 401      ( 2023 10:49 )             40.4     139  |  94<L>  |  21  ----------------------------<  329<H>       4.0   |  22  |  0.83    Ca    9.8      2023 10:49    TPro  8.4<H>  /  Alb  4.3  /  TBili  0.5  /  DBili  x   /  AST  14  /  ALT  17  /  AlkPhos  90      PT/INR: 11.2/1.00 (23 @ 14:00)  PTT: 27.1 (23 @ 14:00)  PT/INR: 10.2/0.91 (23 @ 10:53)  PTT: 31.8 (23 @ 10:53)      14:00 - VBG - pH: 7.31  | pCO2: 52    | pO2: 48    | Lactate: 3.8    10:53 - VBG - pH: 7.30  | pCO2: 52    | pO2: 51    | Lactate: 4.2            hs Troponin, T - 10 ng/L (23 @ 14:59)  hs Troponin, T - 11 ng/L (23 @ 10:49)      Pro-BNP: <36 (23 @ 10:49)          Urinalysis Basic - ( 2023 13:15 )  Color: Yellow / Appearance: Clear / S.059 / pH: 6.0  Gluc: >=1000 mg/dL / Ketone: 15 mg/dL  / Bili: Negative / Urobili: 0.2 mg/dL   Blood: Negative / Protein: 30 mg/dL / Nitrite: Negative   Leuk Esterase: Negative / RBC: 1 /HPF / WBC 0 /HPF   Sq Epi: x / Non Sq Epi: x / Bacteria: Negative /HPF        EKG interpreted by myself: NSR  images interpreted by radiology:   Bilateral lower extremity doppler: No evidence of deep venous thrombosis in either lower extremity  CT C/A/P: 1. No aortic dissection or aortic intramural hematoma.  2. 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  3. Small left-sided pleural effusion with compressive atelectasis of the basilar segments of the left lower lobe. Patchy groundglass opacities within the left lower lobe and inferior segment of the lingula and developing pneumonia versus atelectasis cannot be excluded. Central airways are patent. Follow-up imaging to confirm resolution.  4. Mild dilatation of the main pulmonary artery and pulmonary hypertension cannot be excluded. Echocardiographic correlation.  5. Solid exophytic left renal mass and primary renal neoplasm cannot be excluded. Additional indeterminate right renal lesion measuring 1.8 x 1.4 cm. Recommend correlation with prior imaging and additional imaging to include a contrast-enhanced MRI and or CT utilizing renal mass protocol suggested for more complete characterization.  6. Cardiomegaly with left ventricular dilatation with some concentric hypertrophy. Echocardiographic correlation.

## 2023-07-29 NOTE — H&P ADULT - PROBLEM SELECTOR PLAN 4
New  Imaging: Cardiomegaly with left ventricular dilatation with some concentric hypertrophy.   ECHO ordered

## 2023-07-29 NOTE — H&P ADULT - PROBLEM SELECTOR PLAN 1
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 Steven Carlos outpatient for MALS evaluation   Contacted Surgery to see if can d/c heparin drip-> no need for heparin drip at this time  Pain regimen: percocet 1 tab for moderate pain, 2 tab for severe pain, dilaudid 0.5MG IV for breakthrough pain

## 2023-07-29 NOTE — CONSULT NOTE ADULT - ASSESSMENT
Patient is a 67 year old M w/ PMHx of HTN, HLD, DM, TAO who presents to the ED with acute onset severe, sharp, stabbing, intermittent substernal chest pain with radiation to the left shoulder and back starting last evening. In the ED, patient initially hypertensive to 190s, tachycardic to 120s. Labs remarkable for 4.2. W/u unremarkable for ACS. CTa C/A/P w/ focal high-grade stenosis at the proximal celiac axis, adjacent to the median arcuate ligament. Surgery consulted for evaluation.     Recommendations:   - No acute surgical intervention   - Patient w/ celiac artery stenosis, c/f median arcuate ligament syndrome      - Low suspicion for mesenteric ischemia. No food fear. Distal celiac artery patent. SMA and BEN patent.   - Please have patient follow up with Dr. Steven Carlos outpatient for MALS evaluation   - PO challenge  - Dispo per ED    Plan discussed with Dr. Evan PIERRE Team Surgery   [id number]

## 2023-07-29 NOTE — H&P ADULT - ASSESSMENT
67 yr old male presenting with celiac artery stenosis, c/f median arcuate ligament syndrome; renal mass

## 2023-07-29 NOTE — ED PROVIDER NOTE - ATTENDING CONTRIBUTION TO CARE
67-year-old male with past medical's of hypertension, hyperlipidemia, diabetes, presented with complaints of sudden onset, severe, sharp, chest pain with associated diaphoresis and shortness of breath that woke him from sleep around 2 AM.  Pain has been 10 out of 10.  Patient is noted to be diaphoretic.  EKG without signs of STEMI, EKG was repeated with no acute changes.  Patient states that pain report radiates to his back.  Blood pressures with greater than 20 difference diastolic bilaterally.  Pulses intact. No abdominal ttp. Lungs ctab.  Denies any numbness, tingling or focal weakness.  Plan for CTA chest, abdomen, pelvis, to assess if dissection.  CTA is negative for dissection, shows stenosis of celiac artery surgery consulted.  Of note, patient also had recent travel following.  Pain is not implored, continued shortness of breath, chest pain, tachycardia, will obtain DVT study.  Likely start heparin and admit, consider repeat CTA or VQ scan while inpatient.  Differential includes ACS, PE or dissection, PE, mesenteric ischemia.

## 2023-07-29 NOTE — ED ADULT NURSE REASSESSMENT NOTE - NS ED NURSE REASSESS COMMENT FT1
pt 68y/o male c/o left sided CP radiating left rig upper abd, left upper back x last night, pt AAOx4, pt sinus tachycardic on cardiac monitoring, abd rounded firm moving all extremities no pedal edema pt diaphoretic appears uncomfortable c/o pain 10/10 at this time pt medicated morphine 4mg, nitro .4 sublingual, fentanyl 50mcgs, pt on fall precautions oxygen nasal cannula 2L, pt had CT r/o disection, CXR EKG pt pending re eval. as per pt and wife pt drinks everyday small drink as per wife, hx HTN, HLD, DM, KNA. F Jennifer rn

## 2023-07-29 NOTE — H&P ADULT - PROBLEM SELECTOR PLAN 5
Chronic moderate exacerbation  /88  Continue losartan 100mg daily, amlodipine 5mg daily, hydralazine 50mg BID  Monitor

## 2023-07-29 NOTE — CONSULT NOTE ADULT - SUBJECTIVE AND OBJECTIVE BOX
GENERAL SURGERY CONSULT NOTE    HPI:  Patient is a 67 year old M w/ PMHx of HTN, HLD, DM, TAO who presents to the ED with acute onset severe, sharp, stabbing, intermittent substernal chest pain with radiation to the left shoulder and back starting last evening.  Patient reports he was sleeping when he was awoken by pain.  Reports 10 out of 10 pain.  Also endorsing diaphoresis.  Patient on aspirin at home but did not take any today.  Reporting pain associated shortness of breath.  Denies fever or chills.  Denies NVD.  No LOC or syncopal episodes.  Reports no changes in urination or bowel movements.  Reports pain has been constant since this morning without any relief.  Denies any recent travel, or recent sick contacts. Patient reports pain is worse with movement.  Denies any history of MIs or stents.    In the ED, patient initially hypertensive to 190s, tachycardic to 120s. Labs remarkable for 4.2. CTa C/A/P w/ focal high-grade stenosis at the proximal celiac axis, adjacent to the median arcuate ligament. Surgery consulted for evaluation.       PAST MEDICAL HISTORY:  Hypertension    Diabetes mellitus    HLD (hyperlipidemia)        PAST SURGICAL HISTORY:  No significant past surgical history        MEDICATIONS:  nitroglycerin   SubLingual 0.4 milliGRAM(s) SubLingual every 5 minutes PRN      ALLERGIES:  No Known Allergies      VITALS & I/Os:  Vital Signs Last 24 Hrs  T(C): 37.1 (2023 13:12), Max: 37.7 (2023 12:00)  T(F): 98.8 (2023 13:12), Max: 99.9 (2023 12:00)  HR: 122 (2023 13:12) (96 - 123)  BP: 144/76 (2023 13:12) (103/66 - 191/89)  BP(mean): --  RR: 23 (2023 13:12) (14 - 28)  SpO2: 97% (2023 13:12) (93% - 100%)    Parameters below as of 2023 13:12  Patient On (Oxygen Delivery Method): nasal cannula        I&O's Summary      PHYSICAL EXAM:  General: No acute distress  Respiratory: on NC  Cardiovascular: RRR  Abdominal: Distended (baseline). Soft, nontender. No rebound or guarding. No organomegaly, no palpable mass.  Extremities: Warm    LABS:                        12.9   12.53 )-----------( 401      ( 2023 10:49 )             40.4         139  |  94<L>  |  21  ----------------------------<  329<H>  4.0   |  22  |  0.83    Ca    9.8      2023 10:49    TPro  8.4<H>  /  Alb  4.3  /  TBili  0.5  /  DBili  x   /  AST  14  /  ALT  17  /  AlkPhos  90      Lactate:   @ 14:00  3.8   @ 10:53  4.2    PT/INR - ( 2023 14:00 )   PT: 11.2 sec;   INR: 1.00 ratio         PTT - ( 2023 14:00 )  PTT:27.1 sec          Urinalysis Basic - ( 2023 13:15 )    Color: Yellow / Appearance: Clear / S.059 / pH: x  Gluc: x / Ketone: 15 mg/dL  / Bili: Negative / Urobili: 0.2 mg/dL   Blood: x / Protein: 30 mg/dL / Nitrite: Negative   Leuk Esterase: Negative / RBC: 1 /HPF / WBC 0 /HPF   Sq Epi: x / Non Sq Epi: 0 /HPF / Bacteria: Negative /HPF        IMAGING:

## 2023-07-29 NOTE — H&P ADULT - PROBLEM SELECTOR PLAN 2
New  CT A/P: 5. Solid exophytic left renal mass and primary renal neoplasm cannot be excluded. Additional indeterminate right renal lesion measuring 1.8 x 1.4 cm.   MRI abdomen with IV contrast ordered

## 2023-07-29 NOTE — ED ADULT NURSE REASSESSMENT NOTE - NS ED NURSE REASSESS COMMENT FT1
pt in room 16. A&Ox4, amb at baseline. Vitally stable except for mild oral temp. Pt aware and to provide pt with PO tylenol. Endorses symptom improvement r/t chest pain and breathing while on 2L NC. Denies pain, n/v, headache, dizziness, numbness/tignling to hands/feet. resp even unlabored, abd soft, pedal pulses 2+ bilaterally safety maintained pt in room 16. A&Ox4, amb at baseline. Vitally stable except for mild oral temp. Pt aware and to provide pt with PO tylenol. Endorses symptom improvement r/t chest pain and breathing while on 2L NC. Heparin drip running at 16ml/hr. Denies issues with IV site related to heparin drip. Denies pain, n/v, headache, dizziness, numbness/tignling to hands/feet. resp even unlabored, abd soft, pedal pulses 2+ bilaterally safety maintained

## 2023-07-29 NOTE — ED PROVIDER NOTE - CARE PLAN
1 Principal Discharge DX:	Stenosis of celiac artery  Assessment and plan of treatment:	cannot rule out PE

## 2023-07-29 NOTE — ED PROVIDER NOTE - PHYSICAL EXAMINATION
GEN: Patient awake alert. +acute distress, diaphoresis, writhing with intermittent pain.  HEENT: normocephalic, atraumatic, EOMI, no scleral icterus, moist MM  CARDIAC: sinus tach, S1, S2, no murmur, rubs or gallops. No peripheral edema. Pulses equal b/l.   PULM: Decreased breath sounds over left base. Remaining lung exam clear to auscultation b/l in other fields. +tachypnea. No retractions or accessory muscle use.   ABD: +distended and tense. Nontender. No rebound no guarding, no CVA tenderness. +BS x 4. Tympanitic on percussion in LUQ, dull in RUQ.   MSK: Moving all extremities, no edema. 5/5 strength and full ROM in all extremities.     NEURO: A&Ox3, no focal neurological deficits/weakness, CN 2-12 grossly intact.  SKIN: warm, dry, no rash. No jaundice.

## 2023-07-29 NOTE — ED PROVIDER NOTE - PROGRESS NOTE DETAILS
CT angio demonstrates no acute aortic dissection or developing hematoma.  Patient with a lactate greater than 4 getting fluids.  CT also demonstrating high-grade stenosis of the proximal celiac axis concerning for acute mesenteric ischemia.  Surgery consulted for eval.  Incidental finding of a left renal mass cannot rule out renal neoplasm.  Patient reassessed after pain control with 10 mg of   morphine and 50 of fentanyl, stable and content not requiring further analgesia.  Given lactate and temperature sepsis work-up sent;  possible evolving pneumonia source.    Darin Rush MD, PGY1 Repeat trop with decrease from prior, no notable positive delta. Surgery saw patient will f/u for final recs. Likely TBA to [facility]. Will start heparin in setting of inability to fully rule out PE. Likely warrants V/Q scan when inpatient.    Darin Rush MD, PGY1 Patient endorsing worsening pain, given 0.5mg of dilauded. Based on hx cannot rule out PE; pain pleuritic in nature and presenting in sinus tach. Given higher early suspicion for aortic dissection unable to order dedicated CTA PE study given previous contrast load. Will treat empirically with heparin gtt and admit.    Darin Rush MD, PGY1

## 2023-07-29 NOTE — ED ADULT NURSE REASSESSMENT NOTE - NS ED NURSE REASSESS COMMENT FT1
pt remains in ED NPO labs send blood cultures send pt had CT pt remains on cardiac monitoring fall precautions oxygen nasal cannula pain is better 5/10 pt pending re eval

## 2023-07-29 NOTE — ED PROVIDER NOTE - CLINICAL SUMMARY MEDICAL DECISION MAKING FREE TEXT BOX
67-year-old male past medical history of hypertension, DM on metformin, hyperlipidemia presenting with acute onset severe, sharp, stabbing substernal chest pain with radiation to the left shoulder and back. 67-year-old male past medical history of hypertension, DM on metformin, hyperlipidemia presenting with acute onset severe, sharp, stabbing substernal chest pain with radiation to the left shoulder. Patient did not take aspirin at home.  Initial presentation in the ED hypertensive to 190 systolic and tachycardic in acute distress.  Exam notable for decreased breath sounds at the left base; otherwise equal b/l no rales or wheeze.  Neurologically intact, normal motor and sensory in the upper and lower extremities.  Pulses equal bilaterally.  Differential includes ACS, aortic dissection, pneumonia, pancreatitis, PE.  Patient borderline febrile with a rectal to 99.9.  Cannot rule out infectious etiology.  EKG shows sinus tach.  No obvious ST/T ischemic changes.  Stat chest x-ray showed possible widened mediastinum.  Will obtain stat CBC, CMP, troponin, lipase, BNP, coags, CT angio for dissection rule out.  To reassess; disposition likely admit.

## 2023-07-29 NOTE — H&P ADULT - NSHPPHYSICALEXAM_GEN_ALL_CORE
PHYSICAL EXAM:  GENERAL: NAD, comfortable at bedside   HEAD:  Atraumatic, Normocephalic  EYES: EOMI, PERRL, conjunctiva and sclera clear  NECK: Supple, No JVD  CHEST/LUNG: Clear to auscultation bilaterally; No wheezes, rales or rhonchi  HEART: Regular rate and rhythm; No murmurs, rubs, or gallops, (+)S1, S2  ABDOMEN: Soft, tenderness to palpation of LUQ, Nondistended; Normal Bowel sounds   MSK: tenderness to palpation of left axilla, left trapezius and left scapula   EXTREMITIES:  2+ Peripheral Pulses, No clubbing, cyanosis, or edema  PSYCH: normal mood and affect  NEUROLOGY: AAOx3, non-focal  SKIN: No rashes or lesions

## 2023-07-29 NOTE — H&P ADULT - PROBLEM SELECTOR PLAN 3
New  LA 4.2->3.8  Specific gravity 1.059  Pt likely slightly dry  s/p 1.5L INF  Encourage PO intake  Hold metformin

## 2023-07-29 NOTE — ED PROVIDER NOTE - IV ALTEPLASE EXCL REL HIDDEN
"[facility] Note     Patient Name: Adrian Salamanca  Encounter Date: 6/28/23    Have you been cared for by a [place] [address] Dermatologist in the last 3 years and, if so, which description applies to you? NO  I am considered a \"new\" patient and must complete all patient intake questions  I am FEMALE/of child-bearing potential     REVIEW OF SYSTEMS:  Have you recently had or currently have any of the following? · Recent fever or chills? No  · Any non-healing wound? No  · Are you pregnant or planning to become pregnant? No  · Are you currently or planning to be nursing or breast feeding? No   PAST MEDICAL HISTORY:  Have you personally ever had or currently have any of the following? If \"YES,\" then please provide more detail  · Skin cancer (such as Melanoma, Basal Cell Carcinoma, Squamous Cell Carcinoma? No  · Tuberculosis, HIV/AIDS, Hepatitis B or C: No  · Systemic Immunosuppression such as Diabetes, Biologic or Immunotherapy, Chemotherapy, Organ Transplantation, Bone Marrow Transplantation No  · Radiation Treatment No   FAMILY HISTORY:  Any \"first degree relatives\" (parent, brother, sister, or child) with the following? • Skin Cancer, Pancreatic or Other Cancer? YES, Dad had skin cancer, type unknown, Mom had skin cancer and cervical cancer   PATIENT EXPERIENCE:    • Do you want the Dermatologist to perform a COMPLETE skin exam today including a clinical examination under the \"bra and underwear\" areas? Yes  • If necessary, do we have your permission to call and leave a detailed message on your Preferred Phone number that includes your specific medical information?   Yes      No Known Allergies   Current Outpatient Medications:   •  aspirin 81 MG tablet, Take 1 tablet by mouth daily for 30 days, Disp: 30 tablet, Rfl: 0  •  betamethasone valerate (VALISONE) 0 1 % cream, Apply topically 2 (two) times a day, Disp: 30 g, Rfl: 3  •  buPROPion (WELLBUTRIN XL) 150 mg 24 hr tablet, Take 1 tablet (150 mg " total) by mouth every morning, Disp: 90 tablet, Rfl: 3  •  calcium citrate-vitamin D (CITRACAL+D) 315-200 MG-UNIT per tablet, Take by mouth daily , Disp: , Rfl:   •  eszopiclone (LUNESTA) 3 MG tablet, TAKE 1 TABLET BY MOUTH AT BEDTIME, Disp: 90 tablet, Rfl: 3  •  levothyroxine 112 mcg tablet, Take 1 tablet (112 mcg total) by mouth daily, Disp: 90 tablet, Rfl: 3  •  lisinopril (ZESTRIL) 10 mg tablet, TAKE 1 TABLET DAILY, Disp: 90 tablet, Rfl: 3  •  mometasone (ELOCON) 0 1 % ointment, Apply nightly to affected area 2-3x a week, Disp: 45 g, Rfl: 1  •  Multiple Vitamins-Minerals (CENTRUM WOMEN) TABS, Take by mouth Goes between bariatric multi and Centrum, Disp: , Rfl:   •  PARoxetine (PAXIL) 10 mg tablet, Take 1 tablet (10 mg total) by mouth daily, Disp: 90 tablet, Rfl: 3  •  PARoxetine (PAXIL) 20 mg tablet, TAKE 1 TABLET DAILY, Disp: 90 tablet, Rfl: 3  •  rizatriptan (MAXALT) 10 mg tablet, TAKE 1 TABLET DAILY AS NEEDED FOR MIGRAINE, Disp: 18 tablet, Rfl: 7  •  simvastatin (ZOCOR) 40 mg tablet, TAKE 1 TABLET EVERY EVENING, Disp: 90 tablet, Rfl: 3          • Whom besides the patient is providing clinical information about today's encounter?   o NO ADDITIONAL HISTORIAN (patient alone provided history)    Physical Exam and Assessment/Plan by Diagnosis:    SCREENING FOR SKIN CANCER     Physical Exam:  • Anatomic Location Affected:  Upper and lower extremities, trunk, and back  • Morphological Description:  Normal skin appearance  The patient does have scattered monomorphic waxy grayish-tan epidermal papules/very small plaques but I saw no nevi or suspicious skin lesions  • Pertinent Positives:  • Pertinent Negatives: Additional History of Present Condition:  New patient, 75year old female, here for a general skin exam  Patient has no personal history of skin cancer, she is positive for a family history of skin cancer from both mom and dad   She does have a history or uterus cancer, whom she follows up with oncology for  Her oncologist noticed a few moles on her back and was referred to our office for examination       Assessment and Plan:  Based on a thorough discussion of this condition and the management approach to it (including a comprehensive discussion of the known risks, side effects and potential benefits of treatment), the patient (family) agrees to implement the following specific plan:  • SPF 30+ when outside with reapplication every 2-3 hours  • Skin checks recommended annually  • Please follow up sooner with any new or changing skin issues  • Normal skin appearance today      Scribe Attestation    I,:  Bull Rockwell am acting as a scribe while in the presence of the attending physician :       I,:  Murtaza Matos MD personally performed the services described in this documentation    as scribed in my presence : show

## 2023-07-29 NOTE — ED PROVIDER NOTE - PATIENT'S SEXUAL ORIENTATION
Follow up with breast clinic. Return if discharge from breast or if pain not controlled with oral medications. Follow up with Dr. Shivam Shi [phone], [address] [place] [id number]. Bring your records from [facility] with you. Return if discharge from breast or if pain not controlled with oral medications. Heterosexual

## 2023-07-29 NOTE — H&P ADULT - NSHPREVIEWOFSYSTEMS_GEN_ALL_CORE
REVIEW OF SYSTEMS:    CONSTITUTIONAL: No weakness, fevers or chills  EYES/ENT: No visual changes;  No dysphagia; No sore throat; No rhinorrhea; No sinus pain/pressure  NECK: No pain or stiffness  RESPIRATORY: No cough, wheezing, hemoptysis; No shortness of breath  CARDIOVASCULAR: No chest pain or palpitations; No lower extremity edema  GASTROINTESTINAL: +left sided abdominal and left axillary/back pain. No nausea, vomiting, or hematemesis; No diarrhea or constipation. No melena or hematochezia.  GENITOURINARY: No dysuria, frequency or hematuria  NEUROLOGICAL: No numbness or weakness  MSK: ambulates without assistance   SKIN: No itching, burning, rashes, or lesions   All other review of systems is negative unless indicated above.

## 2023-07-29 NOTE — H&P ADULT - PROBLEM SELECTOR PLAN 6
Chronic moderate exacerbation    Hold metformin 1g BID  LDCS with diabetic diet  A1c and lipid panel in AM

## 2023-07-29 NOTE — ED ADULT NURSE REASSESSMENT NOTE - PERIPHERAL VASCULAR
- - -
I will START or STAY ON the medications listed below when I get home from the hospital:    ora-gel  -- 1  mucous membrane , As Needed  -- Indication: For Pain Mouth    acetaminophen 325 mg oral tablet  -- 2 tab(s) by mouth every 6 hours, As needed, Temp greater or equal to 38C (100.4F), Mild Pain (1 - 3)  -- Indication: For Mod pain    aspirin 81 mg oral delayed release tablet  -- 1 tab(s) by mouth once a day  -- Indication: For Cardio-Protective    sertraline 50 mg oral tablet  -- 1 tab(s) by mouth once a day  -- Indication: For Mood    metoprolol tartrate 50 mg oral tablet  -- 1 tab(s) by mouth 2 times a day  -- Indication: For HTN    hydrocortisone 1% topical cream  -- 1 application on skin 2 times a day  -- Indication: For Rash    Multiple Vitamins oral tablet  -- 1 tab(s) by mouth once a day  -- Indication: For Supplement    folic acid 1 mg oral tablet  -- 1 tab(s) by mouth once a day  -- Indication: For Supplement    thiamine 100 mg oral tablet  -- 1 tab(s) by mouth once a day  -- Indication: For Supplement

## 2023-07-29 NOTE — ED PROVIDER NOTE - SEVERE SEPSIS CRITERIA MET YN (MLM)
Health Maintenance Due   Topic Date Due   • Pneumococcal Vaccine 65+ (2 of 2 - PPSV23) 08/01/2019       Patient is due for topics as listed above but is not proceeding with Immunization(s) Pneumococcal at this time.            Sepsis Criteria were met:

## 2023-07-29 NOTE — H&P ADULT - HISTORY OF PRESENT ILLNESS
67 yr old male with a pmh of HTN, HLD, T2DM on metformin, TAO on BiPAP who presents to the ED with acute, 10/10, sharp, intermittent stabbing left sided abdominal and left axillary/back pain. Pt reports the pain woke him up from his sleep and he took some melatonin to go back to sleep. When he woke up in the morning he still had the pain and it was not easing therefore he decided to present to the ED for further evaluation.  Reports difficulty in catching his breathe 2/2 pain.   Denies  headache, dizziness, chest pain, palpitations, SOB, joint pain, diarrhea/constipation, urinary symptoms.    Vitals: T 99.9, , /88, RR 24 satting 98% RA

## 2023-07-29 NOTE — ED ADULT TRIAGE NOTE - CHIEF COMPLAINT QUOTE
pt c/o left arm/ chest pain , started last night, pain relieved with Meloxicam, awoke with worsening pain. + sob, pain 10/10, hx htn, dm

## 2023-07-29 NOTE — ED PROVIDER NOTE - OBJECTIVE STATEMENT
67-year-old male past medical history of hypertension, DM on metformin, hyperlipidemia presenting with acute onset severe, sharp, stabbing substernal chest pain with radiation to the left shoulder and back.  Patient reports he was sleeping when he was awoken by pain.  Reports 10 out of 10 pain.  Also endorsing diaphoresis.  Patient on aspirin at home but did not take any today.  Reporting pain associated shortness of breath.  Denies fever or chills.  Denies NVD.  No LOC or syncopal episodes.  Reports no changes in urination or bowel movements.  Reports pain has been constant since this morning without any relief.  Denies any recent travel, or recent sick contacts. Patient reports pain is worse with movement.  Denies any history of MIs or stents.

## 2023-07-29 NOTE — ED ADULT NURSE NOTE - NSFALLUNIVINTERV_ED_ALL_ED
Bed/Stretcher in lowest position, wheels locked, appropriate side rails in place/Call bell, personal items and telephone in reach/Instruct patient to call for assistance before getting out of bed/chair/stretcher/Non-slip footwear applied when patient is off stretcher/Antoine to call system/Physically safe environment - no spills, clutter or unnecessary equipment/Purposeful proactive rounding/Room/bathroom lighting operational, light cord in reach

## 2023-07-29 NOTE — ED ADULT NURSE REASSESSMENT NOTE - NS ED NURSE REASSESS COMMENT FT1
Facilitator RN- Patient received in stretcher. Unable to sit still due to pain. AOX4. Respirations even and unlabored. Spontaneous movement of all extremities noted. Presents to ER c/o CP starting last night worsening this AM. Patient noted to be diaphoretic rating 10/10 L sided radiating to back CP. IV placed. Labs drawn. Medicated by primary RN MD at bedside for eval. Placed on cardiac monitor. Pending further interventions. Comfort and safety maintained. All current care needs met. Care plan continued Thomas MARSHALL

## 2023-07-30 LAB
A1C WITH ESTIMATED AVERAGE GLUCOSE RESULT: 7.8 % — HIGH (ref 4–5.6)
ANION GAP SERPL CALC-SCNC: 13 MMOL/L — SIGNIFICANT CHANGE UP (ref 7–14)
APTT BLD: 55.1 SEC — HIGH (ref 24.5–35.6)
BASOPHILS # BLD AUTO: 0.02 K/UL — SIGNIFICANT CHANGE UP (ref 0–0.2)
BASOPHILS NFR BLD AUTO: 0.1 % — SIGNIFICANT CHANGE UP (ref 0–2)
BUN SERPL-MCNC: 18 MG/DL — SIGNIFICANT CHANGE UP (ref 7–23)
CALCIUM SERPL-MCNC: 9 MG/DL — SIGNIFICANT CHANGE UP (ref 8.4–10.5)
CHLORIDE SERPL-SCNC: 97 MMOL/L — LOW (ref 98–107)
CHOLEST SERPL-MCNC: 101 MG/DL — SIGNIFICANT CHANGE UP
CO2 SERPL-SCNC: 22 MMOL/L — SIGNIFICANT CHANGE UP (ref 22–31)
CREAT SERPL-MCNC: 0.73 MG/DL — SIGNIFICANT CHANGE UP (ref 0.5–1.3)
EGFR: 99 ML/MIN/1.73M2 — SIGNIFICANT CHANGE UP
EOSINOPHIL # BLD AUTO: 0.03 K/UL — SIGNIFICANT CHANGE UP (ref 0–0.5)
EOSINOPHIL NFR BLD AUTO: 0.2 % — SIGNIFICANT CHANGE UP (ref 0–6)
ESTIMATED AVERAGE GLUCOSE: 177 — SIGNIFICANT CHANGE UP
GLUCOSE BLDC GLUCOMTR-MCNC: 228 MG/DL — HIGH (ref 70–99)
GLUCOSE BLDC GLUCOMTR-MCNC: 246 MG/DL — HIGH (ref 70–99)
GLUCOSE BLDC GLUCOMTR-MCNC: 252 MG/DL — HIGH (ref 70–99)
GLUCOSE BLDC GLUCOMTR-MCNC: 263 MG/DL — HIGH (ref 70–99)
GLUCOSE BLDC GLUCOMTR-MCNC: 282 MG/DL — HIGH (ref 70–99)
GLUCOSE BLDC GLUCOMTR-MCNC: 319 MG/DL — HIGH (ref 70–99)
GLUCOSE SERPL-MCNC: 255 MG/DL — HIGH (ref 70–99)
HCT VFR BLD CALC: 36 % — LOW (ref 39–50)
HCT VFR BLD CALC: 39.4 % — SIGNIFICANT CHANGE UP (ref 39–50)
HCV AB S/CO SERPL IA: 0.05 S/CO — SIGNIFICANT CHANGE UP (ref 0–0.99)
HCV AB SERPL-IMP: SIGNIFICANT CHANGE UP
HDLC SERPL-MCNC: 50 MG/DL — SIGNIFICANT CHANGE UP
HGB BLD-MCNC: 11.6 G/DL — LOW (ref 13–17)
HGB BLD-MCNC: 12.5 G/DL — LOW (ref 13–17)
IANC: 13.73 K/UL — HIGH (ref 1.8–7.4)
IMM GRANULOCYTES NFR BLD AUTO: 0.4 % — SIGNIFICANT CHANGE UP (ref 0–0.9)
LIPID PNL WITH DIRECT LDL SERPL: 27 MG/DL — SIGNIFICANT CHANGE UP
LYMPHOCYTES # BLD AUTO: 0.84 K/UL — LOW (ref 1–3.3)
LYMPHOCYTES # BLD AUTO: 5 % — LOW (ref 13–44)
MCHC RBC-ENTMCNC: 28.3 PG — SIGNIFICANT CHANGE UP (ref 27–34)
MCHC RBC-ENTMCNC: 28.3 PG — SIGNIFICANT CHANGE UP (ref 27–34)
MCHC RBC-ENTMCNC: 31.7 GM/DL — LOW (ref 32–36)
MCHC RBC-ENTMCNC: 32.2 GM/DL — SIGNIFICANT CHANGE UP (ref 32–36)
MCV RBC AUTO: 87.8 FL — SIGNIFICANT CHANGE UP (ref 80–100)
MCV RBC AUTO: 89.3 FL — SIGNIFICANT CHANGE UP (ref 80–100)
MONOCYTES # BLD AUTO: 1.96 K/UL — HIGH (ref 0–0.9)
MONOCYTES NFR BLD AUTO: 11.8 % — SIGNIFICANT CHANGE UP (ref 2–14)
NEUTROPHILS # BLD AUTO: 13.73 K/UL — HIGH (ref 1.8–7.4)
NEUTROPHILS NFR BLD AUTO: 82.5 % — HIGH (ref 43–77)
NON HDL CHOLESTEROL: 51 MG/DL — SIGNIFICANT CHANGE UP
NRBC # BLD: 0 /100 WBCS — SIGNIFICANT CHANGE UP (ref 0–0)
NRBC # BLD: 0 /100 WBCS — SIGNIFICANT CHANGE UP (ref 0–0)
NRBC # FLD: 0 K/UL — SIGNIFICANT CHANGE UP (ref 0–0)
NRBC # FLD: 0 K/UL — SIGNIFICANT CHANGE UP (ref 0–0)
PLATELET # BLD AUTO: 313 K/UL — SIGNIFICANT CHANGE UP (ref 150–400)
PLATELET # BLD AUTO: 332 K/UL — SIGNIFICANT CHANGE UP (ref 150–400)
POTASSIUM SERPL-MCNC: 4.2 MMOL/L — SIGNIFICANT CHANGE UP (ref 3.5–5.3)
POTASSIUM SERPL-SCNC: 4.2 MMOL/L — SIGNIFICANT CHANGE UP (ref 3.5–5.3)
RBC # BLD: 4.1 M/UL — LOW (ref 4.2–5.8)
RBC # BLD: 4.41 M/UL — SIGNIFICANT CHANGE UP (ref 4.2–5.8)
RBC # FLD: 12.5 % — SIGNIFICANT CHANGE UP (ref 10.3–14.5)
RBC # FLD: 12.6 % — SIGNIFICANT CHANGE UP (ref 10.3–14.5)
SODIUM SERPL-SCNC: 132 MMOL/L — LOW (ref 135–145)
TRIGL SERPL-MCNC: 122 MG/DL — SIGNIFICANT CHANGE UP
WBC # BLD: 12.9 K/UL — HIGH (ref 3.8–10.5)
WBC # BLD: 16.64 K/UL — HIGH (ref 3.8–10.5)
WBC # FLD AUTO: 12.9 K/UL — HIGH (ref 3.8–10.5)
WBC # FLD AUTO: 16.64 K/UL — HIGH (ref 3.8–10.5)

## 2023-07-30 PROCEDURE — 71045 X-RAY EXAM CHEST 1 VIEW: CPT | Mod: 26

## 2023-07-30 RX ORDER — LEVALBUTEROL 1.25 MG/.5ML
0.63 SOLUTION, CONCENTRATE RESPIRATORY (INHALATION) EVERY 6 HOURS
Refills: 0 | Status: DISCONTINUED | OUTPATIENT
Start: 2023-07-30 | End: 2023-08-03

## 2023-07-30 RX ORDER — LATANOPROST 0.05 MG/ML
1 SOLUTION/ DROPS OPHTHALMIC; TOPICAL AT BEDTIME
Refills: 0 | Status: DISCONTINUED | OUTPATIENT
Start: 2023-07-30 | End: 2023-08-11

## 2023-07-30 RX ORDER — AZITHROMYCIN 500 MG/1
500 TABLET, FILM COATED ORAL DAILY
Refills: 0 | Status: DISCONTINUED | OUTPATIENT
Start: 2023-07-30 | End: 2023-07-31

## 2023-07-30 RX ORDER — CEFTRIAXONE 500 MG/1
1000 INJECTION, POWDER, FOR SOLUTION INTRAMUSCULAR; INTRAVENOUS EVERY 24 HOURS
Refills: 0 | Status: DISCONTINUED | OUTPATIENT
Start: 2023-07-30 | End: 2023-07-31

## 2023-07-30 RX ORDER — SENNA PLUS 8.6 MG/1
2 TABLET ORAL AT BEDTIME
Refills: 0 | Status: DISCONTINUED | OUTPATIENT
Start: 2023-07-30 | End: 2023-08-11

## 2023-07-30 RX ORDER — ACETAMINOPHEN 500 MG
1000 TABLET ORAL ONCE
Refills: 0 | Status: COMPLETED | OUTPATIENT
Start: 2023-07-30 | End: 2023-07-30

## 2023-07-30 RX ADMIN — AMLODIPINE BESYLATE 5 MILLIGRAM(S): 2.5 TABLET ORAL at 05:20

## 2023-07-30 RX ADMIN — SENNA PLUS 2 TABLET(S): 8.6 TABLET ORAL at 23:15

## 2023-07-30 RX ADMIN — Medication 3: at 17:24

## 2023-07-30 RX ADMIN — Medication 9 MILLIGRAM(S): at 23:14

## 2023-07-30 RX ADMIN — Medication 400 MILLIGRAM(S): at 20:20

## 2023-07-30 RX ADMIN — LEVALBUTEROL 0.63 MILLIGRAM(S): 1.25 SOLUTION, CONCENTRATE RESPIRATORY (INHALATION) at 21:42

## 2023-07-30 RX ADMIN — Medication 50 MILLIGRAM(S): at 05:20

## 2023-07-30 RX ADMIN — Medication 3: at 12:10

## 2023-07-30 RX ADMIN — Medication 50 MILLIGRAM(S): at 17:23

## 2023-07-30 RX ADMIN — LATANOPROST 1 DROP(S): 0.05 SOLUTION/ DROPS OPHTHALMIC; TOPICAL at 23:24

## 2023-07-30 RX ADMIN — Medication 1000 MILLIGRAM(S): at 20:35

## 2023-07-30 RX ADMIN — Medication 3: at 09:03

## 2023-07-30 RX ADMIN — CYCLOBENZAPRINE HYDROCHLORIDE 10 MILLIGRAM(S): 10 TABLET, FILM COATED ORAL at 23:15

## 2023-07-30 RX ADMIN — LOSARTAN POTASSIUM 100 MILLIGRAM(S): 100 TABLET, FILM COATED ORAL at 05:21

## 2023-07-30 RX ADMIN — CEFTRIAXONE 100 MILLIGRAM(S): 500 INJECTION, POWDER, FOR SOLUTION INTRAMUSCULAR; INTRAVENOUS at 23:20

## 2023-07-30 RX ADMIN — ATORVASTATIN CALCIUM 10 MILLIGRAM(S): 80 TABLET, FILM COATED ORAL at 23:14

## 2023-07-30 RX ADMIN — Medication 100 MILLIGRAM(S): at 23:17

## 2023-07-30 RX ADMIN — Medication 81 MILLIGRAM(S): at 11:10

## 2023-07-30 NOTE — PATIENT PROFILE ADULT - OVER THE PAST TWO WEEKS HAVE YOU FELT DOWN, DEPRESSED OR HOPELESS?
Pt would like to speak with you regarding her upcoming tilt table procedure on 10/27/17. Pt can be reached at [phone].       Thank you,  Virginie Viera no

## 2023-07-30 NOTE — CONSULT NOTE ADULT - ASSESSMENT
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

## 2023-07-30 NOTE — CONSULT NOTE ADULT - SUBJECTIVE AND OBJECTIVE BOX
Didier Cotton MD  Interventional Cardiology / Advance Heart Failure and Cardiac Transplant Specialist  [place] Office : [address] [place] [address] [place] [address]  Tel:   [place] Office : [address] [facility] [place] [address]  Tel: [phone]         HISTORY OF PRESENTING ILLNESS:  HPI:  67 yr old male with a pmh of HTN, HLD, T2DM on metformin, TAO on BiPAP who presents to the ED with acute, 10/10, sharp, intermittent stabbing left sided abdominal and left axillary/back pain. Pt reports the pain woke him up from his sleep and he took some melatonin to go back to sleep. When he woke up in the morning he still had the pain and it was not easing therefore he decided to present to the ED for further evaluation.  Reports difficulty in catching his breathe 2/2 pain.   Denies  headache, dizziness, chest pain, palpitations, SOB, joint pain, diarrhea/constipation, urinary symptoms.    Vitals: T 99.9, , /88, RR 24 satting 98% RA  Pt denies any h/o heart disease, complains of epigastric and left flank pain when he moves or bends, no SOB     PAST MEDICAL & SURGICAL HISTORY:  Hypertension      Diabetes mellitus      HLD (hyperlipidemia)      No significant past surgical history          SOCIAL HISTORY: Substance Use (street drugs): ( x ) never used  (  ) other:    FAMILY HISTORY:  FH: breast cancer (Sibling)       MEDICATIONS:  amLODIPine   Tablet 5 milliGRAM(s) Oral daily  aspirin  chewable 81 milliGRAM(s) Oral daily  hydrALAZINE 50 milliGRAM(s) Oral two times a day  losartan 100 milliGRAM(s) Oral daily  nitroglycerin     SubLingual 0.4 milliGRAM(s) SubLingual every 5 minutes PRN  acetaminophen     Tablet .. 650 milliGRAM(s) Oral every 6 hours PRN  cyclobenzaprine 10 milliGRAM(s) Oral at bedtime  HYDROmorphone  Injectable 0.5 milliGRAM(s) IV Push every 4 hours PRN  melatonin 9 milliGRAM(s) Oral at bedtime  ondansetron Injectable 4 milliGRAM(s) IV Push every 8 hours PRN  oxycodone    5 mG/acetaminophen 325 mG 1 Tablet(s) Oral every 6 hours PRN  oxycodone    5 mG/acetaminophen 325 mG 2 Tablet(s) Oral every 4 hours PRN    aluminum hydroxide/magnesium hydroxide/simethicone Suspension 30 milliLiter(s) Oral every 4 hours PRN    atorvastatin 10 milliGRAM(s) Oral at bedtime  dextrose 50% Injectable 12.5 Gram(s) IV Push once  dextrose 50% Injectable 25 Gram(s) IV Push once  dextrose 50% Injectable 25 Gram(s) IV Push once  dextrose Oral Gel 15 Gram(s) Oral once PRN  glucagon  Injectable 1 milliGRAM(s) IntraMuscular once  insulin lispro (ADMELOG) corrective regimen sliding scale   SubCutaneous three times a day before meals  insulin lispro (ADMELOG) corrective regimen sliding scale   SubCutaneous at bedtime    dextrose 5%. 1000 milliLiter(s) IV Continuous <Continuous>  dextrose 5%. 1000 milliLiter(s) IV Continuous <Continuous>      FAMILY HISTORY:  FH: breast cancer (Sibling)          Allergies    No Known Allergies    Intolerances    	      PHYSICAL EXAM:  T(C): 37.2 (07-30-23 @ 05:17), Max: 37.6 (07-29-23 @ 20:41)  HR: 91 (07-30-23 @ 07:45) (88 - 122)  BP: 117/75 (07-30-23 @ 05:17) (116/78 - 149/88)  RR: 20 (07-30-23 @ 05:17) (18 - 25)  SpO2: 96% (07-30-23 @ 07:45) (96% - 100%)  Wt(kg): --  I&O's Summary      GENERAL: NAD   EYES: EOMI, PERRLA, conjunctiva and sclera clear  ENMT: No tonsillar erythema, exudates, or enlargement; Moist mucous membranes, Good dentition, No lesions  Cardiovascular: Normal S1 S2, No JVD, No murmurs, No edema  Respiratory: b/l wheezing   Gastrointestinal:  Soft, Non-tender, + BS	  Extremities: no edema      LABS:	 	    CARDIAC MARKERS:                                  12.5   16.64 )-----------( 332      ( 30 Jul 2023 07:16 )             39.4     07-30    132<L>  |  97<L>  |  18  ----------------------------<  255<H>  4.2   |  22  |  0.73    Ca    9.0      30 Jul 2023 07:16    TPro  8.4<H>  /  Alb  4.3  /  TBili  0.5  /  DBili  x   /  AST  14  /  ALT  17  /  AlkPhos  90  07-29    proBNP:   Lipid Profile:   HgA1c:   TSH:     Consultant(s) Notes Reviewed:  [x ] YES  [ ] NO    Care Discussed with Consultants/Other Providers [ x] YES  [ ] NO    Imaging Personally Reviewed independently:  [x] YES  [ ] NO    All labs, radiologic studies, vitals, orders and medications list reviewed. Patient is seen and examined at bedside. Case discussed with medical team.    ASSESSMENT/PLAN:

## 2023-07-30 NOTE — PATIENT PROFILE ADULT - NSPROMEDSADMININFO_GEN_A_NUR
no concerns Advancement Flap (Double) Text: The defect edges were debeveled with a #15 scalpel blade.  Given the location of the defect and the proximity to free margins a double advancement flap was deemed most appropriate.  Using a sterile surgical marker, the appropriate advancement flaps were drawn incorporating the defect and placing the expected incisions within the relaxed skin tension lines where possible.    The area thus outlined was incised deep to adipose tissue with a #15 scalpel blade.  The skin margins were undermined to an appropriate distance in all directions utilizing iris scissors.

## 2023-07-30 NOTE — PATIENT PROFILE ADULT - FALL HARM RISK - HARM RISK INTERVENTIONS

## 2023-07-30 NOTE — CHART NOTE - NSCHARTNOTEFT_GEN_A_CORE
Notified by RN for pts HR sustaining in 130s on tele monitor. Patient seen and assessed at  bedside, family at bedside as well. Pt appears comfortable, NAD noted, c/o productive cough, whitish- greening phlegm, also c/o Left sided / LUQ pain ( not new). HR increases on tele monitor with coughing spells. Tele monitor reviewed ST upto 130-140s, On lung exam diffused bilateral wheezing L> R, with diminished BS wali. Stat Nebulizer Xopenex ordered, placed on 2LNC prn, CXR ordered, already started on Abx AZX and CTX per Attg for possible PNA given leukocytosis, prn Tylenol, Robitussin, Nebs ordered, will c/t monitor closely.  Vital Signs Last 24 Hrs  T(F): 97.1 (30 Jul 2023 17:00), Max: 99 (30 Jul 2023 00:50)  HR: 127 (30 Jul 2023 21:42) (88 - 130)  BP: 132/87 (30 Jul 2023 17:00) (117/75 - 142/96)  RR: 18 (30 Jul 2023 17:00) (18 - 25)  SpO2: 94% (30 Jul 2023 21:42) (94% - 99%) Notified by RN for pts HR sustaining in 130s on tele monitor. Patient seen and assessed at  bedside, family at bedside as well. Pt appears comfortable, NAD noted, c/o productive cough, whitish- greening phlegm, also c/o Left sided / LUQ pain ( not new). HR increases on tele monitor with coughing spells. Tele monitor reviewed ST upto 130-140s, On lung exam diffused bilateral wheezing L> R, with diminished BS wali. Stat Nebulizer Xopenex ordered, placed on 2LNC prn, CXR ordered, already started on Abx AZX and CTX per Attg for possible PNA given leukocytosis, prn Tylenol, Robitussin, Nebs ordered, pulm to consult in am,  will c/t monitor closely.  Vital Signs Last 24 Hrs  T(F): 97.1 (30 Jul 2023 17:00), Max: 99 (30 Jul 2023 00:50)  HR: 127 (30 Jul 2023 21:42) (88 - 130)  BP: 132/87 (30 Jul 2023 17:00) (117/75 - 142/96)  RR: 18 (30 Jul 2023 17:00) (18 - 25)  SpO2: 94% (30 Jul 2023 21:42) (94% - 99%) Notified by RN for pts HR sustaining in 130s on tele monitor. Patient seen and assessed at  bedside, family at bedside as well. Pt appears comfortable, NAD noted, c/o productive cough, whitish- greening phlegm, also c/o Left sided / LUQ pain ( not new). HR increases on tele monitor with coughing spells. Tele monitor reviewed ST upto 130-140s, On lung exam diffused bilateral wheezing L> R, with diminished BS wali. Stat Nebulizer Xopenex ordered, placed on 2LNC prn, CXR ordered, already started on Abx AZX and CTX per Attg for possible PNA given leukocytosis, prn Tylenol, Robitussin, Nebs ordered, pulm to consult in am,  will c/t monitor closely.  Vital Signs Last 24 Hrs  T(F): 97.1 (30 Jul 2023 17:00), Max: 99 (30 Jul 2023 00:50)  HR: 127 (30 Jul 2023 21:42) (88 - 130)  BP: 132/87 (30 Jul 2023 17:00) (117/75 - 142/96)  RR: 18 (30 Jul 2023 17:00) (18 - 25)  SpO2: 94% (30 Jul 2023 21:42) (94% - 99%)    Addendum:    CXR - prelim ;  complete white out of the left hemithorax likely  secondary to worsening left pleural effusion.  - case d/w attg - rec pulm to be called for eval - Pulm fellow on call notified of above, recommend to c/t nebs treatment, Chest PT, incentive spirometer, and will see pt in am.  Acapella prn w/ nebs, aggressive chest PT, c/t monitor.

## 2023-07-30 NOTE — PROGRESS NOTE ADULT - SUBJECTIVE AND OBJECTIVE BOX
SARAH FRIAS  67y  Male      Patient is a 67y old  Male who presents with a chief complaint of celiac artery stenosis, c/f median arcuate ligament syndrome; renal mass (30 Jul 2023 11:48)  Patient was seen and examined.chart reviewed,.Admitted with cough,chest discomfort,abs painx 1-2 days,  low grade temp?  no sob,no cp at present  abd  pain mainly LLQ/ AREA? CT Angio abd result noted    REVIEW OF SYSTEMS:  CONSTITUTIONAL: No fever  RESPIRATORY: No cough, hemoptysis or shortness of breath  CARDIOVASCULAR: No chest pain, palpitations, dizziness, or leg swelling  GASTROINTESTINAL: No abdominal pain. nausea, vomiting, hematemesis  GENITOURINARY: No dysuria, frequency, hematuria   NEUROLOGICAL: No headaches, no dizziness  MUSCULOSKELETAL: No joint pain or swelling;     INTERVAL HPI/OVERNIGHT EVENTS:  T(C): 36.2 (07-30-23 @ 17:00), Max: 37.6 (07-29-23 @ 20:41)  HR: 112 (07-30-23 @ 17:00) (88 - 112)  BP: 132/87 (07-30-23 @ 17:00) (116/78 - 142/96)  RR: 18 (07-30-23 @ 17:00) (18 - 25)  SpO2: 97% (07-30-23 @ 17:00) (96% - 99%)  Wt(kg): --  I&O's Summary    T(C): 36.2 (07-30-23 @ 17:00), Max: 37.6 (07-29-23 @ 20:41)  HR: 112 (07-30-23 @ 17:00) (88 - 112)  BP: 132/87 (07-30-23 @ 17:00) (116/78 - 142/96)  RR: 18 (07-30-23 @ 17:00) (18 - 25)  SpO2: 97% (07-30-23 @ 17:00) (96% - 99%)  Wt(kg): --Vital Signs Last 24 Hrs  T(C): 36.2 (30 Jul 2023 17:00), Max: 37.6 (29 Jul 2023 20:41)  T(F): 97.1 (30 Jul 2023 17:00), Max: 99.6 (29 Jul 2023 20:41)  HR: 112 (30 Jul 2023 17:00) (88 - 112)  BP: 132/87 (30 Jul 2023 17:00) (116/78 - 142/96)  BP(mean): --  RR: 18 (30 Jul 2023 17:00) (18 - 25)  SpO2: 97% (30 Jul 2023 17:00) (96% - 99%)    Parameters below as of 30 Jul 2023 17:00  Patient On (Oxygen Delivery Method): nasal cannula  O2 Flow (L/min): 2      LABS:                        12.5   16.64 )-----------( 332      ( 30 Jul 2023 07:16 )             39.4     07-30    132<L>  |  97<L>  |  18  ----------------------------<  255<H>  4.2   |  22  |  0.73    Ca    9.0      30 Jul 2023 07:16    TPro  8.4<H>  /  Alb  4.3  /  TBili  0.5  /  DBili  x   /  AST  14  /  ALT  17  /  AlkPhos  90  07-29    PT/INR - ( 29 Jul 2023 14:00 )   PT: 11.2 sec;   INR: 1.00 ratio         PTT - ( 30 Jul 2023 00:05 )  PTT:55.1 sec  Urinalysis Basic - ( 30 Jul 2023 07:16 )    Color: x / Appearance: x / SG: x / pH: x  Gluc: 255 mg/dL / Ketone: x  / Bili: x / Urobili: x   Blood: x / Protein: x / Nitrite: x   Leuk Esterase: x / RBC: x / WBC x   Sq Epi: x / Non Sq Epi: x / Bacteria: x      CAPILLARY BLOOD GLUCOSE      POCT Blood Glucose.: 252 mg/dL (30 Jul 2023 16:59)  POCT Blood Glucose.: 282 mg/dL (30 Jul 2023 11:48)  POCT Blood Glucose.: 263 mg/dL (30 Jul 2023 09:02)  POCT Blood Glucose.: 228 mg/dL (30 Jul 2023 07:32)  POCT Blood Glucose.: 319 mg/dL (30 Jul 2023 00:55)  POCT Blood Glucose.: 296 mg/dL (29 Jul 2023 20:21)        Urinalysis Basic - ( 30 Jul 2023 07:16 )    Color: x / Appearance: x / SG: x / pH: x  Gluc: 255 mg/dL / Ketone: x  / Bili: x / Urobili: x   Blood: x / Protein: x / Nitrite: x   Leuk Esterase: x / RBC: x / WBC x   Sq Epi: x / Non Sq Epi: x / Bacteria: x        PAST MEDICAL & SURGICAL HISTORY:  Hypertension      Diabetes mellitus      HLD (hyperlipidemia)      No significant past surgical history          MEDICATIONS  (STANDING):  amLODIPine   Tablet 5 milliGRAM(s) Oral daily  aspirin  chewable 81 milliGRAM(s) Oral daily  atorvastatin 10 milliGRAM(s) Oral at bedtime  cyclobenzaprine 10 milliGRAM(s) Oral at bedtime  dextrose 5%. 1000 milliLiter(s) (50 mL/Hr) IV Continuous <Continuous>  dextrose 5%. 1000 milliLiter(s) (100 mL/Hr) IV Continuous <Continuous>  dextrose 50% Injectable 12.5 Gram(s) IV Push once  dextrose 50% Injectable 25 Gram(s) IV Push once  dextrose 50% Injectable 25 Gram(s) IV Push once  glucagon  Injectable 1 milliGRAM(s) IntraMuscular once  hydrALAZINE 50 milliGRAM(s) Oral two times a day  insulin lispro (ADMELOG) corrective regimen sliding scale   SubCutaneous three times a day before meals  insulin lispro (ADMELOG) corrective regimen sliding scale   SubCutaneous at bedtime  latanoprost 0.005% Ophthalmic Solution 1 Drop(s) Both EYES at bedtime  losartan 100 milliGRAM(s) Oral daily  melatonin 9 milliGRAM(s) Oral at bedtime    MEDICATIONS  (PRN):  acetaminophen     Tablet .. 650 milliGRAM(s) Oral every 6 hours PRN Temp greater or equal to 38C (100.4F), Mild Pain (1 - 3)  aluminum hydroxide/magnesium hydroxide/simethicone Suspension 30 milliLiter(s) Oral every 4 hours PRN Dyspepsia  dextrose Oral Gel 15 Gram(s) Oral once PRN Blood Glucose LESS THAN 70 milliGRAM(s)/deciliter  HYDROmorphone  Injectable 0.5 milliGRAM(s) IV Push every 4 hours PRN breakthrough pain  nitroglycerin     SubLingual 0.4 milliGRAM(s) SubLingual every 5 minutes PRN Chest Pain  ondansetron Injectable 4 milliGRAM(s) IV Push every 8 hours PRN Nausea and/or Vomiting  oxycodone    5 mG/acetaminophen 325 mG 1 Tablet(s) Oral every 6 hours PRN Moderate Pain (4 - 6)  oxycodone    5 mG/acetaminophen 325 mG 2 Tablet(s) Oral every 4 hours PRN Severe Pain (7 - 10)        RADIOLOGY & ADDITIONAL TESTS:    Imaging Personally Reviewed:  [ ] YES  [ ] NO    Consultant(s) Notes Reviewed:  [x ] YES  [ ] NO    PHYSICAL EXAM:  GENERAL: Alert and awake lying in bed in no distress  HEAD:  Atraumatic, Normocephalic  EYES: EOMI, MADALYN, conjunctiva and sclera clear  NECK: Supple, No JVD, Normal thyroid  NERVOUS SYSTEM:  Alert & Oriented X3, Motor and sensory systems are intact,   CHEST/LUNG: Bilateral clear breath sounds, no rhochi, no wheezing, no crepitations,  HEART: Regular rate and rhythm; No murmurs, rubs, or gallops  ABDOMEN: Soft, Nontender, Nondistended; Bowel sounds present  EXTREMITIES:   Peripheral Pulses are palpable, no  edema        Care Discussed with Consultants/Other Providers [x ] YES  [ ] NO      Code Status: [] Full Code [] DNR [] DNI [] Goals of Care:   Disposition: [] ICU [] Stroke Unit [] RCU []PCU []Floor [] Discharge Home         [id number] HighP

## 2023-07-31 DIAGNOSIS — J90 PLEURAL EFFUSION, NOT ELSEWHERE CLASSIFIED: ICD-10-CM

## 2023-07-31 LAB
ANION GAP SERPL CALC-SCNC: 15 MMOL/L — HIGH (ref 7–14)
B PERT DNA SPEC QL NAA+PROBE: SIGNIFICANT CHANGE UP
B PERT+PARAPERT DNA PNL SPEC NAA+PROBE: SIGNIFICANT CHANGE UP
BORDETELLA PARAPERTUSSIS (RAPRVP): SIGNIFICANT CHANGE UP
BUN SERPL-MCNC: 24 MG/DL — HIGH (ref 7–23)
C PNEUM DNA SPEC QL NAA+PROBE: SIGNIFICANT CHANGE UP
CALCIUM SERPL-MCNC: 9.2 MG/DL — SIGNIFICANT CHANGE UP (ref 8.4–10.5)
CHLORIDE SERPL-SCNC: 95 MMOL/L — LOW (ref 98–107)
CO2 SERPL-SCNC: 20 MMOL/L — LOW (ref 22–31)
CREAT SERPL-MCNC: 0.96 MG/DL — SIGNIFICANT CHANGE UP (ref 0.5–1.3)
EGFR: 86 ML/MIN/1.73M2 — SIGNIFICANT CHANGE UP
FLUAV SUBTYP SPEC NAA+PROBE: SIGNIFICANT CHANGE UP
FLUBV RNA SPEC QL NAA+PROBE: SIGNIFICANT CHANGE UP
GLUCOSE BLDC GLUCOMTR-MCNC: 285 MG/DL — HIGH (ref 70–99)
GLUCOSE BLDC GLUCOMTR-MCNC: 303 MG/DL — HIGH (ref 70–99)
GLUCOSE BLDC GLUCOMTR-MCNC: 311 MG/DL — HIGH (ref 70–99)
GLUCOSE BLDC GLUCOMTR-MCNC: 325 MG/DL — HIGH (ref 70–99)
GLUCOSE BLDC GLUCOMTR-MCNC: 347 MG/DL — HIGH (ref 70–99)
GLUCOSE BLDC GLUCOMTR-MCNC: 360 MG/DL — HIGH (ref 70–99)
GLUCOSE SERPL-MCNC: 297 MG/DL — HIGH (ref 70–99)
HADV DNA SPEC QL NAA+PROBE: SIGNIFICANT CHANGE UP
HCOV 229E RNA SPEC QL NAA+PROBE: SIGNIFICANT CHANGE UP
HCOV HKU1 RNA SPEC QL NAA+PROBE: SIGNIFICANT CHANGE UP
HCOV NL63 RNA SPEC QL NAA+PROBE: SIGNIFICANT CHANGE UP
HCOV OC43 RNA SPEC QL NAA+PROBE: SIGNIFICANT CHANGE UP
HCT VFR BLD CALC: 38.2 % — LOW (ref 39–50)
HGB BLD-MCNC: 12.3 G/DL — LOW (ref 13–17)
HMPV RNA SPEC QL NAA+PROBE: SIGNIFICANT CHANGE UP
HPIV1 RNA SPEC QL NAA+PROBE: SIGNIFICANT CHANGE UP
HPIV2 RNA SPEC QL NAA+PROBE: SIGNIFICANT CHANGE UP
HPIV3 RNA SPEC QL NAA+PROBE: SIGNIFICANT CHANGE UP
HPIV4 RNA SPEC QL NAA+PROBE: SIGNIFICANT CHANGE UP
LACTATE SERPL-SCNC: 4.3 MMOL/L — CRITICAL HIGH (ref 0.5–2)
M PNEUMO DNA SPEC QL NAA+PROBE: SIGNIFICANT CHANGE UP
MAGNESIUM SERPL-MCNC: 2 MG/DL — SIGNIFICANT CHANGE UP (ref 1.6–2.6)
MCHC RBC-ENTMCNC: 28.8 PG — SIGNIFICANT CHANGE UP (ref 27–34)
MCHC RBC-ENTMCNC: 32.2 GM/DL — SIGNIFICANT CHANGE UP (ref 32–36)
MCV RBC AUTO: 89.5 FL — SIGNIFICANT CHANGE UP (ref 80–100)
NRBC # BLD: 0 /100 WBCS — SIGNIFICANT CHANGE UP (ref 0–0)
NRBC # FLD: 0 K/UL — SIGNIFICANT CHANGE UP (ref 0–0)
PHOSPHATE SERPL-MCNC: 3.3 MG/DL — SIGNIFICANT CHANGE UP (ref 2.5–4.5)
PLATELET # BLD AUTO: 370 K/UL — SIGNIFICANT CHANGE UP (ref 150–400)
POTASSIUM SERPL-MCNC: 4.3 MMOL/L — SIGNIFICANT CHANGE UP (ref 3.5–5.3)
POTASSIUM SERPL-SCNC: 4.3 MMOL/L — SIGNIFICANT CHANGE UP (ref 3.5–5.3)
RAPID RVP RESULT: SIGNIFICANT CHANGE UP
RBC # BLD: 4.27 M/UL — SIGNIFICANT CHANGE UP (ref 4.2–5.8)
RBC # FLD: 13.1 % — SIGNIFICANT CHANGE UP (ref 10.3–14.5)
RSV RNA SPEC QL NAA+PROBE: SIGNIFICANT CHANGE UP
RV+EV RNA SPEC QL NAA+PROBE: SIGNIFICANT CHANGE UP
SARS-COV-2 RNA SPEC QL NAA+PROBE: SIGNIFICANT CHANGE UP
SODIUM SERPL-SCNC: 130 MMOL/L — LOW (ref 135–145)
WBC # BLD: 21.26 K/UL — HIGH (ref 3.8–10.5)
WBC # FLD AUTO: 21.26 K/UL — HIGH (ref 3.8–10.5)

## 2023-07-31 PROCEDURE — 71250 CT THORAX DX C-: CPT | Mod: 26

## 2023-07-31 PROCEDURE — 99223 1ST HOSP IP/OBS HIGH 75: CPT | Mod: GC

## 2023-07-31 PROCEDURE — 93306 TTE W/DOPPLER COMPLETE: CPT | Mod: 26

## 2023-07-31 PROCEDURE — 99223 1ST HOSP IP/OBS HIGH 75: CPT

## 2023-07-31 PROCEDURE — 76604 US EXAM CHEST: CPT | Mod: 26,GC

## 2023-07-31 RX ORDER — PIPERACILLIN AND TAZOBACTAM 4; .5 G/20ML; G/20ML
3.38 INJECTION, POWDER, LYOPHILIZED, FOR SOLUTION INTRAVENOUS ONCE
Refills: 0 | Status: COMPLETED | OUTPATIENT
Start: 2023-07-31 | End: 2023-07-31

## 2023-07-31 RX ORDER — AZITHROMYCIN 500 MG/1
500 TABLET, FILM COATED ORAL EVERY 24 HOURS
Refills: 0 | Status: DISCONTINUED | OUTPATIENT
Start: 2023-07-31 | End: 2023-08-02

## 2023-07-31 RX ORDER — PIPERACILLIN AND TAZOBACTAM 4; .5 G/20ML; G/20ML
3.38 INJECTION, POWDER, LYOPHILIZED, FOR SOLUTION INTRAVENOUS EVERY 8 HOURS
Refills: 0 | Status: DISCONTINUED | OUTPATIENT
Start: 2023-07-31 | End: 2023-08-02

## 2023-07-31 RX ADMIN — LEVALBUTEROL 0.63 MILLIGRAM(S): 1.25 SOLUTION, CONCENTRATE RESPIRATORY (INHALATION) at 10:29

## 2023-07-31 RX ADMIN — LOSARTAN POTASSIUM 100 MILLIGRAM(S): 100 TABLET, FILM COATED ORAL at 06:26

## 2023-07-31 RX ADMIN — ATORVASTATIN CALCIUM 10 MILLIGRAM(S): 80 TABLET, FILM COATED ORAL at 21:33

## 2023-07-31 RX ADMIN — AZITHROMYCIN 255 MILLIGRAM(S): 500 TABLET, FILM COATED ORAL at 17:26

## 2023-07-31 RX ADMIN — Medication 9 MILLIGRAM(S): at 21:32

## 2023-07-31 RX ADMIN — LEVALBUTEROL 0.63 MILLIGRAM(S): 1.25 SOLUTION, CONCENTRATE RESPIRATORY (INHALATION) at 23:07

## 2023-07-31 RX ADMIN — PIPERACILLIN AND TAZOBACTAM 200 GRAM(S): 4; .5 INJECTION, POWDER, LYOPHILIZED, FOR SOLUTION INTRAVENOUS at 11:38

## 2023-07-31 RX ADMIN — Medication 4: at 17:26

## 2023-07-31 RX ADMIN — SENNA PLUS 2 TABLET(S): 8.6 TABLET ORAL at 21:33

## 2023-07-31 RX ADMIN — Medication 100 MILLIGRAM(S): at 06:25

## 2023-07-31 RX ADMIN — LATANOPROST 1 DROP(S): 0.05 SOLUTION/ DROPS OPHTHALMIC; TOPICAL at 21:33

## 2023-07-31 RX ADMIN — Medication 3: at 22:06

## 2023-07-31 RX ADMIN — Medication 4: at 11:38

## 2023-07-31 RX ADMIN — PIPERACILLIN AND TAZOBACTAM 25 GRAM(S): 4; .5 INJECTION, POWDER, LYOPHILIZED, FOR SOLUTION INTRAVENOUS at 17:26

## 2023-07-31 RX ADMIN — Medication 50 MILLIGRAM(S): at 17:27

## 2023-07-31 RX ADMIN — HYDROMORPHONE HYDROCHLORIDE 0.5 MILLIGRAM(S): 2 INJECTION INTRAMUSCULAR; INTRAVENOUS; SUBCUTANEOUS at 14:35

## 2023-07-31 RX ADMIN — CYCLOBENZAPRINE HYDROCHLORIDE 10 MILLIGRAM(S): 10 TABLET, FILM COATED ORAL at 21:32

## 2023-07-31 RX ADMIN — AMLODIPINE BESYLATE 5 MILLIGRAM(S): 2.5 TABLET ORAL at 06:26

## 2023-07-31 RX ADMIN — Medication 50 MILLIGRAM(S): at 06:26

## 2023-07-31 RX ADMIN — HYDROMORPHONE HYDROCHLORIDE 0.5 MILLIGRAM(S): 2 INJECTION INTRAMUSCULAR; INTRAVENOUS; SUBCUTANEOUS at 14:50

## 2023-07-31 RX ADMIN — Medication 100 MILLIGRAM(S): at 20:57

## 2023-07-31 RX ADMIN — Medication 3: at 07:44

## 2023-07-31 RX ADMIN — Medication 81 MILLIGRAM(S): at 17:27

## 2023-07-31 NOTE — PROGRESS NOTE ADULT - SUBJECTIVE AND OBJECTIVE BOX
iDdier Cotton MD  Interventional Cardiology / Advance Heart Failure and Cardiac Transplant Specialist  [place] Office : [address] [place] [id number]  Tel:   [place] Office : [address] [facility] [address]  Tel: [phone]      Subjective/Overnight events: Pt is lying in bed no acute distress	  MEDICATIONS:  amLODIPine   Tablet 5 milliGRAM(s) Oral daily  aspirin  chewable 81 milliGRAM(s) Oral daily  hydrALAZINE 50 milliGRAM(s) Oral two times a day  losartan 100 milliGRAM(s) Oral daily  nitroglycerin     SubLingual 0.4 milliGRAM(s) SubLingual every 5 minutes PRN    piperacillin/tazobactam IVPB. 3.375 Gram(s) IV Intermittent once  piperacillin/tazobactam IVPB.- 3.375 Gram(s) IV Intermittent once  piperacillin/tazobactam IVPB.. 3.375 Gram(s) IV Intermittent every 8 hours    guaiFENesin Oral Liquid (Sugar-Free) 100 milliGRAM(s) Oral every 6 hours PRN  levalbuterol Inhalation 0.63 milliGRAM(s) Inhalation every 6 hours PRN    acetaminophen     Tablet .. 650 milliGRAM(s) Oral every 6 hours PRN  cyclobenzaprine 10 milliGRAM(s) Oral at bedtime  HYDROmorphone  Injectable 0.5 milliGRAM(s) IV Push every 4 hours PRN  melatonin 9 milliGRAM(s) Oral at bedtime  ondansetron Injectable 4 milliGRAM(s) IV Push every 8 hours PRN  oxycodone    5 mG/acetaminophen 325 mG 1 Tablet(s) Oral every 6 hours PRN  oxycodone    5 mG/acetaminophen 325 mG 2 Tablet(s) Oral every 4 hours PRN    aluminum hydroxide/magnesium hydroxide/simethicone Suspension 30 milliLiter(s) Oral every 4 hours PRN  senna 2 Tablet(s) Oral at bedtime    atorvastatin 10 milliGRAM(s) Oral at bedtime  dextrose 50% Injectable 12.5 Gram(s) IV Push once  dextrose 50% Injectable 25 Gram(s) IV Push once  dextrose 50% Injectable 25 Gram(s) IV Push once  dextrose Oral Gel 15 Gram(s) Oral once PRN  glucagon  Injectable 1 milliGRAM(s) IntraMuscular once  insulin lispro (ADMELOG) corrective regimen sliding scale   SubCutaneous three times a day before meals  insulin lispro (ADMELOG) corrective regimen sliding scale   SubCutaneous at bedtime    dextrose 5%. 1000 milliLiter(s) IV Continuous <Continuous>  dextrose 5%. 1000 milliLiter(s) IV Continuous <Continuous>  latanoprost 0.005% Ophthalmic Solution 1 Drop(s) Both EYES at bedtime      PAST MEDICAL/SURGICAL HISTORY  PAST MEDICAL & SURGICAL HISTORY:  Hypertension      Diabetes mellitus      HLD (hyperlipidemia)      No significant past surgical history          SOCIAL HISTORY: Substance Use (street drugs): ( x ) never used  (  ) other:    FAMILY HISTORY:  FH: breast cancer (Sibling)      PHYSICAL EXAM:  T(C): 37 (07-31-23 @ 06:20), Max: 37 (07-31-23 @ 06:20)  HR: 118 (07-31-23 @ 10:36) (92 - 135)  BP: 140/89 (07-31-23 @ 06:20) (128/86 - 140/89)  RR: 18 (07-31-23 @ 06:20) (18 - 19)  SpO2: 94% (07-31-23 @ 10:36) (94% - 97%)  Wt(kg): --  I&O's Summary        GENERAL: NAD   EYES: conjunctiva and sclera clear  ENMT: No tonsillar erythema, exudates, or enlargement;  Cardiovascular: Normal S1 S2, No JVD, No murmurs, No edema  Respiratory: b/l wheezing   Gastrointestinal:  Soft, Non-tender, + BS	  Extremities: no edema                                12.3   21.26 )-----------( 370      ( 31 Jul 2023 07:10 )             38.2     07-31    130<L>  |  95<L>  |  24<H>  ----------------------------<  297<H>  4.3   |  20<L>  |  0.96    Ca    9.2      31 Jul 2023 07:10  Phos  3.3     07-31  Mg     2.00     07-31      proBNP:   Lipid Profile:   HgA1c:   TSH:     Consultant(s) Notes Reviewed:  [x ] YES  [ ] NO    Care Discussed with Consultants/Other Providers [ x] YES  [ ] NO    Imaging Personally Reviewed independently:  [x] YES  [ ] NO    All labs, radiologic studies, vitals, orders and medications list reviewed. Patient is seen and examined at bedside. Case discussed with medical team.         no

## 2023-07-31 NOTE — CHART NOTE - NSCHARTNOTEFT_GEN_A_CORE
: Inez Leary    INDICATION: Pleural effusion    PROCEDURE:  [ ] LIMITED ECHO  [X ] LIMITED CHEST  [ ] LIMITED RETROPERITONEAL  [ ] LIMITED ABDOMINAL  [ ] LIMITED DVT  [ ] NEEDLE GUIDANCE VASCULAR  [ ] NEEDLE GUIDANCE THORACENTESIS  [ ] NEEDLE GUIDANCE PARACENTESIS  [ ] NEEDLE GUIDANCE PERICARDIOCENTESIS  [ ] OTHER    FINDINGS:  Loculated left sided pleural effusion with largest pockets posterior and anterior. Multiseptated with heterogenous sediment.    INTERPRETATION:  Complex pleural effusion.    Images stored on [employer]. : Inez Leary    INDICATION: Pleural effusion, left    PROCEDURE:  [X] LIMITED CHEST    FINDINGS:  Loculated left sided pleural effusion with largest pockets posterior and anterior. Multiseptated with heterogenous sediment.    INTERPRETATION:  Complex pleural effusion.    Images stored on [employer].    Attending Attestation:    I was present during the key portions of the procedure and immediately available during the entire procedure.    Estuardo Leary MD  Attending  Pulmonary & Critical Care Medicine

## 2023-07-31 NOTE — PROGRESS NOTE ADULT - ASSESSMENT
67 yr old male with a pmh of HTN, HLD, T2DM on metformin, TAO on BiPAP who presents to the ED with acute, 10/10, sharp, intermittent stabbing left sided abdominal and left axillary/back pain.    EKG - NSR poor Rwave progression     1) Epigastric pain   - EKG shows NSR poor R wave progression   -ct chest 7/29 showed Cardiomegaly with left ventricular dilatation with some concentric   hypertrophy. 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

## 2023-07-31 NOTE — CONSULT NOTE ADULT - SUBJECTIVE AND OBJECTIVE BOX
HPI:  67 yr old male with a pmh of HTN, HLD, T2DM on metformin, TAO on BiPAP who presents to the ED with acute, 10/10, sharp, intermittent stabbing left sided abdominal and left axillary/back pain.     The pain woke him up on Friday night into Saturday. . When he woke up in the morning he still had the pain and it was not easing therefore he decided to present to the ED for further evaluation.  Reports difficulty in catching his breathe 2/2 pain. He noted some cough.     He had leukocytosis.     A CT showed a small left effusion, a ? renal mass, and a stricture of celiac artery.         PAST MEDICAL & SURGICAL HISTORY:  Hypertension      Diabetes mellitus      HLD (hyperlipidemia)      No significant past surgical history          Allergies    No Known Allergies    Intolerances        ANTIMICROBIALS:  piperacillin/tazobactam IVPB.- 3.375 once  piperacillin/tazobactam IVPB.. 3.375 every 8 hours      OTHER MEDS:  acetaminophen     Tablet .. 650 milliGRAM(s) Oral every 6 hours PRN  aluminum hydroxide/magnesium hydroxide/simethicone Suspension 30 milliLiter(s) Oral every 4 hours PRN  amLODIPine   Tablet 5 milliGRAM(s) Oral daily  aspirin  chewable 81 milliGRAM(s) Oral daily  atorvastatin 10 milliGRAM(s) Oral at bedtime  cyclobenzaprine 10 milliGRAM(s) Oral at bedtime  dextrose 5%. 1000 milliLiter(s) IV Continuous <Continuous>  dextrose 5%. 1000 milliLiter(s) IV Continuous <Continuous>  dextrose 50% Injectable 12.5 Gram(s) IV Push once  dextrose 50% Injectable 25 Gram(s) IV Push once  dextrose 50% Injectable 25 Gram(s) IV Push once  dextrose Oral Gel 15 Gram(s) Oral once PRN  glucagon  Injectable 1 milliGRAM(s) IntraMuscular once  guaiFENesin Oral Liquid (Sugar-Free) 100 milliGRAM(s) Oral every 6 hours PRN  hydrALAZINE 50 milliGRAM(s) Oral two times a day  HYDROmorphone  Injectable 0.5 milliGRAM(s) IV Push every 4 hours PRN  insulin lispro (ADMELOG) corrective regimen sliding scale   SubCutaneous three times a day before meals  insulin lispro (ADMELOG) corrective regimen sliding scale   SubCutaneous at bedtime  latanoprost 0.005% Ophthalmic Solution 1 Drop(s) Both EYES at bedtime  levalbuterol Inhalation 0.63 milliGRAM(s) Inhalation every 6 hours PRN  losartan 100 milliGRAM(s) Oral daily  melatonin 9 milliGRAM(s) Oral at bedtime  nitroglycerin     SubLingual 0.4 milliGRAM(s) SubLingual every 5 minutes PRN  ondansetron Injectable 4 milliGRAM(s) IV Push every 8 hours PRN  oxycodone    5 mG/acetaminophen 325 mG 1 Tablet(s) Oral every 6 hours PRN  oxycodone    5 mG/acetaminophen 325 mG 2 Tablet(s) Oral every 4 hours PRN  senna 2 Tablet(s) Oral at bedtime      SOCIAL HISTORY:    FAMILY HISTORY:  FH: breast cancer (Sibling)        REVIEW OF SYSTEMS  [  ] ROS unobtainable because:    [  ] All other systems negative except as noted below:	    Constitutional:  [ ] fever [ ] chills  [ ] weight loss  [ ] weakness  Skin:  [ ] rash [ ] phlebitis	  Eyes: [ ] icterus [ ] pain  [ ] discharge	  ENMT: [ ] sore throat  [ ] thrush [ ] ulcers [ ] exudates  Respiratory: [ ] dyspnea [ ] hemoptysis [ ] cough [ ] sputum	  Cardiovascular:  [ ] chest pain [ ] palpitations [ ] edema	  Gastrointestinal:  [ ] nausea [ ] vomiting [ ] diarrhea [ ] constipation [ ] pain	  Genitourinary:  [ ] dysuria [ ] frequency [ ] hematuria [ ] discharge [ ] flank pain  [ ] incontinence  Musculoskeletal:  [ ] myalgias [ ] arthralgias [ ] arthritis  [ ] back pain  Neurological:  [ ] headache [ ] seizures  [ ] confusion/altered mental status  Psychiatric:  [ ] anxiety [ ] depression	  Hematology/Lymphatics:  [ ] lymphadenopathy  Endocrine:  [ ] adrenal [ ] thyroid  Allergic/Immunologic:	 [ ] transplant [ ] seasonal    PHYSICAL EXAM:  General: [ ] non-toxic  HEAD/EYES: [ ] PERRL [ ] white sclera [ ] icterus  ENT:  [ ] normal [ ] supple [ ] thrush [ ] pharyngeal exudate  Cardiovascular:   [ ] murmur [ ] normal [ ] PPM/AICD  Respiratory:  [ ] clear to ausculation bilaterally  GI:  [ ] soft, non-tender, normal bowel sounds  :  [ ] horne [ ] no CVA tenderness   Musculoskeletal:  [ ] no synovitis  Neurologic:  [ ] non-focal exam   Skin:  [ ] no rash  Lymph: [ ] no lymphadenopathy  Psychiatric:  [ ] appropriate affect [ ] alert & oriented  Lines:  [ ] no phlebitis [ ] central line          Drug Dosing Weight  Height (cm): 170.2 (29 Jul 2023 15:00)  Weight (kg): 90.492 (29 Jul 2023 15:00)  BMI (kg/m2): 31.2 (29 Jul 2023 15:00)  BSA (m2): 2.02 (29 Jul 2023 15:00)    Vital Signs Last 24 Hrs  T(F): 98.6 (07-31-23 @ 06:20), Max: 99.9 (07-29-23 @ 12:00)    Vital Signs Last 24 Hrs  HR: 118 (07-31-23 @ 10:36) (105 - 135)  BP: 140/89 (07-31-23 @ 06:20) (128/86 - 140/89)  RR: 18 (07-31-23 @ 06:20)  SpO2: 94% (07-31-23 @ 10:36) (94% - 97%)  Wt(kg): --                          12.3   21.26 )-----------( 370      ( 31 Jul 2023 07:10 )             38.2       07-31    130<L>  |  95<L>  |  24<H>  ----------------------------<  297<H>  4.3   |  20<L>  |  0.96    Ca    9.2      31 Jul 2023 07:10  Phos  3.3     07-31  Mg     2.00     07-31        Urinalysis Basic - ( 31 Jul 2023 07:10 )    Color: x / Appearance: x / SG: x / pH: x  Gluc: 297 mg/dL / Ketone: x  / Bili: x / Urobili: x   Blood: x / Protein: x / Nitrite: x   Leuk Esterase: x / RBC: x / WBC x   Sq Epi: x / Non Sq Epi: x / Bacteria: x        MICROBIOLOGY:    RADIOLOGY:  < from: CT Angio Abdomen and Pelvis w/ IV Cont (07.29.23 @ 11:13) >    ACC: [id number] EXAM:  CT ANGIO ABD PELV (W)AW IC   ORDERED BY: NEETA BOWEN     ACC: [id number] EXAM:  CT ANGIO CHEST AORTA WAWIC   ORDERED BY: NEETA BOWEN     PROCEDURE DATE:  07/29/2023          INTERPRETATION:  INDICATION: Evaluate for dissection. Left chest and arm   pain starting last night. Shortness of breath and chest pain.    TECHNIQUE: CT angiogram of the chest was obtained after the intravenous   administration of 90 cc administered mL of Omnipaque 350 (accession   [id number]), IV contrast documented in unlinked concurrent exam (accession   [id number]), 10 cc discarded discarded. Three-dimensional maximum intensity   projection images were generated.    COMPARISON: None.    PROCEDURE:  CT Angiography of the Chest, Abdomen and Pelvis.  Precontrast imaging was performed through the chest followed by arterial   phase imaging of the chest, abdomen and pelvis.  Sagittal and coronal reformats were performed as well as 3D (MIP)   reconstructions.    FINDINGS:  AORTIC ANGIOGRAPHY:  On the precontrast series, there is no intramural hematoma or penetrating   atherosclerotic ulcer in the thoracic aorta. Following IV contrast   injection, there is no dissection flap identified. The aorta is normal in   size. There is a 3 vessel aortic arch. Focal high-grade stenosis at the   proximal celiac axis, adjacent to the median arcuate ligament (602:82).   The SMA, bilateral renal arteries and BEN are patent. The bilateral   common iliac arteries and their branches are patent. Atherosclerotic   disease noted within the aorta.    CHEST:  LUNGS/PLEURA AND LARGE AIRWAYS: No endobronchial lesion. Small left   pleural effusion and partial atelectasis of the left lower lobe. Central   airways are patent.  HEART: Cardiomegaly with left ventricular dilatation and concentric   hypertrophy. No pericardial effusion.  MEDIASTINUM AND ELIAN: No lymphadenopathy. There is mild dilatation of the   main pulmonary artery measuring 3.5 cm. Mild pulmonary hypertension   cannot be excluded.  CHEST WALL AND LOWERNECK: Mild symmetric gynecomastia    ABDOMEN AND PELVIS:  LIVER: Within normal limits.  BILE DUCTS: Normal caliber.  GALLBLADDER: Within normal limits.  SPLEEN: Within normal limits.  PANCREAS: Within normal limits.  ADRENALS: Within normal limits.  KIDNEYS/URETERS: No hydronephrosis. Intracortex mid to lower pole   exophytic solid lesion measures 7.3 x 5.6 cm which which demonstrates   heterogeneous more lower central attenuation possibly centrally necrotic.   Indeterminate right lower pole lateral cortex renal low-attenuation   structure measures 1.8 x 1.4 cm (302:155). Additional subcentimeter   hypodensities bilaterally that are too small to characterize.    BLADDER: Minimally distended.  REPRODUCTIVE ORGANS: Prostate is enlarged.    BOWEL: No bowel obstruction. Appendix is not visualized. No evidence of   inflammation in the pericecal region. Colonic diverticulosis, without   diverticulitis.  PERITONEUM: No ascites.  RETROPERITONEUM/LYMPH NODES: No lymphadenopathy.  ABDOMINAL WALL: Postsurgical changes. Small fat-containing bilateral   inguinal hernias.  BONES: Degenerative changes.    IMPRESSION:    1. No aortic dissection or aortic intramural hematoma.      2. 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    3. Small left-sided pleural effusion with compressive atelectasis of the   basilar segments of the left lower lobe. Patchy groundglass opacities   within the left lower lobe and inferior segment of the lingula and   developing pneumonia versus atelectasis cannot be excluded. Central   airways are patent. Follow-up imaging to confirm resolution.    4. Mild dilatation of the main pulmonary artery and pulmonary   hypertension cannot be excluded. Echocardiographic correlation.      5. Solid exophytic left renal mass and primary renal neoplasm cannot be   excluded. Additional indeterminate right renal lesion measuring 1.8 x 1.4   cm. Recommend correlation with prior imaging and additional imaging to   include a contrast-enhanced MRI and or CT utilizing renal mass protocol   suggested for more complete characterization.      6. Cardiomegaly with left ventricular dilatation with some concentric   hypertrophy. Echocardiographic correlation.    < end of copied text >     HPI:  67 yr old male with a pmh of HTN, HLD, T2DM on metformin, TAO on BiPAP who presents to the ED with acute, 10/10, sharp, intermittent stabbing left sided abdominal and left axillary/back pain.     The pain woke him up on Friday night into Saturday. . When he woke up in the morning he still had the pain and it was not easing therefore he decided to present to the ED for further evaluation.  Reports difficulty in catching his breathe 2/2 pain. He noted some cough.     He had leukocytosis.     A CT showed a small left effusion, a ? renal mass, and a stricture of celiac artery.   He is currenty on 3 l NC.    He feels sob, notes increased cough      PAST MEDICAL & SURGICAL HISTORY:  Hypertension      Diabetes mellitus      HLD (hyperlipidemia)      No significant past surgical history          Allergies    No Known Allergies    Intolerances        ANTIMICROBIALS:  piperacillin/tazobactam IVPB.- 3.375 once  piperacillin/tazobactam IVPB.. 3.375 every 8 hours      OTHER MEDS:  acetaminophen     Tablet .. 650 milliGRAM(s) Oral every 6 hours PRN  aluminum hydroxide/magnesium hydroxide/simethicone Suspension 30 milliLiter(s) Oral every 4 hours PRN  amLODIPine   Tablet 5 milliGRAM(s) Oral daily  aspirin  chewable 81 milliGRAM(s) Oral daily  atorvastatin 10 milliGRAM(s) Oral at bedtime  cyclobenzaprine 10 milliGRAM(s) Oral at bedtime  dextrose 5%. 1000 milliLiter(s) IV Continuous <Continuous>  dextrose 5%. 1000 milliLiter(s) IV Continuous <Continuous>  dextrose 50% Injectable 12.5 Gram(s) IV Push once  dextrose 50% Injectable 25 Gram(s) IV Push once  dextrose 50% Injectable 25 Gram(s) IV Push once  dextrose Oral Gel 15 Gram(s) Oral once PRN  glucagon  Injectable 1 milliGRAM(s) IntraMuscular once  guaiFENesin Oral Liquid (Sugar-Free) 100 milliGRAM(s) Oral every 6 hours PRN  hydrALAZINE 50 milliGRAM(s) Oral two times a day  HYDROmorphone  Injectable 0.5 milliGRAM(s) IV Push every 4 hours PRN  insulin lispro (ADMELOG) corrective regimen sliding scale   SubCutaneous three times a day before meals  insulin lispro (ADMELOG) corrective regimen sliding scale   SubCutaneous at bedtime  latanoprost 0.005% Ophthalmic Solution 1 Drop(s) Both EYES at bedtime  levalbuterol Inhalation 0.63 milliGRAM(s) Inhalation every 6 hours PRN  losartan 100 milliGRAM(s) Oral daily  melatonin 9 milliGRAM(s) Oral at bedtime  nitroglycerin     SubLingual 0.4 milliGRAM(s) SubLingual every 5 minutes PRN  ondansetron Injectable 4 milliGRAM(s) IV Push every 8 hours PRN  oxycodone    5 mG/acetaminophen 325 mG 1 Tablet(s) Oral every 6 hours PRN  oxycodone    5 mG/acetaminophen 325 mG 2 Tablet(s) Oral every 4 hours PRN  senna 2 Tablet(s) Oral at bedtime      SOCIAL HISTORY:  Born in [facility]  Moved to [place] at 10 years old  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  no tobacco    FAMILY HISTORY:  FH: breast cancer (Sibling)        REVIEW OF SYSTEMS  [  ] ROS unobtainable because:    [x  ] All other systems negative except as noted below:	    Constitutional:  [ ] fever [ ] chills  [ ] weight loss  [x ] weakness  Skin:  [ ] rash [ ] phlebitis	  Eyes: [ ] icterus [ ] pain  [ ] discharge	  ENMT: [ ] sore throat  [ ] thrush [ ] ulcers [ ] exudates  Respiratory: [x ] dyspnea [ ] hemoptysis [x ] cough [ ] sputum	  Cardiovascular:  [x ] chest pain [ ] palpitations [ ] edema	  Gastrointestinal:  [ ] nausea [ ] vomiting [ ] diarrhea [ ] constipation [ ] pain	  Genitourinary:  [ ] dysuria [ ] frequency [ ] hematuria [ ] discharge [ ] flank pain  [ ] incontinence  Musculoskeletal:  [ ] myalgias [ ] arthralgias [ ] arthritis  [ ] back pain  Neurological:  [ ] headache [ ] seizures  [ ] confusion/altered mental status  Psychiatric:  [ ] anxiety [ ] depression	  Hematology/Lymphatics:  [ ] lymphadenopathy  Endocrine:  [ ] adrenal [ ] thyroid  Allergic/Immunologic:	 [ ] transplant [ ] seasonal    PHYSICAL EXAM:  General: [x ] non-toxic  HEAD/EYES: [ ] PERRL [x ] white sclera [ ] icterus  ENT:  [ ] normal [x ] supple [ ] thrush [ ] pharyngeal exudate  Cardiovascular:   [ ] murmur [x ] normal [ ] PPM/AICD  Respiratory:  [x ] decreased BS left side  GI:  [x ] soft, non-tender, normal bowel sounds  :  [ ] horne [ x] no CVA tenderness   Musculoskeletal:  [ ] no synovitis  Neurologic:  [x ] non-focal exam   Skin:  [x ] no rash  Lymph: x[ ] no lymphadenopathy  Psychiatric:  [x ] appropriate affect [ ] alert & oriented  Lines:  [ x] no phlebitis [ ] central line          Drug Dosing Weight  Height (cm): 170.2 (29 Jul 2023 15:00)  Weight (kg): 90.492 (29 Jul 2023 15:00)  BMI (kg/m2): 31.2 (29 Jul 2023 15:00)  BSA (m2): 2.02 (29 Jul 2023 15:00)    Vital Signs Last 24 Hrs  T(F): 98.6 (07-31-23 @ 06:20), Max: 99.9 (07-29-23 @ 12:00)    Vital Signs Last 24 Hrs  HR: 118 (07-31-23 @ 10:36) (105 - 135)  BP: 140/89 (07-31-23 @ 06:20) (128/86 - 140/89)  RR: 18 (07-31-23 @ 06:20)  SpO2: 94% (07-31-23 @ 10:36) (94% - 97%)  Wt(kg): --                          12.3   21.26 )-----------( 370      ( 31 Jul 2023 07:10 )             38.2       07-31    130<L>  |  95<L>  |  24<H>  ----------------------------<  297<H>  4.3   |  20<L>  |  0.96    Ca    9.2      31 Jul 2023 07:10  Phos  3.3     07-31  Mg     2.00     07-31        Urinalysis Basic - ( 31 Jul 2023 07:10 )    Color: x / Appearance: x / SG: x / pH: x  Gluc: 297 mg/dL / Ketone: x  / Bili: x / Urobili: x   Blood: x / Protein: x / Nitrite: x   Leuk Esterase: x / RBC: x / WBC x   Sq Epi: x / Non Sq Epi: x / Bacteria: x        MICROBIOLOGY:    RADIOLOGY:  < from: CT Angio Abdomen and Pelvis w/ IV Cont (07.29.23 @ 11:13) >    ACC: [id number] EXAM:  CT ANGIO ABD PELV (W)AW IC   ORDERED BY: NEETA BOWEN     ACC: [id number] EXAM:  CT ANGIO CHEST AORTA WAWIC   ORDERED BY: NEETA BOWEN     PROCEDURE DATE:  07/29/2023          INTERPRETATION:  INDICATION: Evaluate for dissection. Left chest and arm   pain starting last night. Shortness of breath and chest pain.    TECHNIQUE: CT angiogram of the chest was obtained after the intravenous   administration of 90 cc administered mL of Omnipaque 350 (accession   [id number]), IV contrast documented in unlinked concurrent exam (accession   [id number]), 10 cc discarded discarded. Three-dimensional maximum intensity   projection images were generated.    COMPARISON: None.    PROCEDURE:  CT Angiography of the Chest, Abdomen and Pelvis.  Precontrast imaging was performed through the chest followed by arterial   phase imaging of the chest, abdomen and pelvis.  Sagittal and coronal reformats were performed as well as 3D (MIP)   reconstructions.    FINDINGS:  AORTIC ANGIOGRAPHY:  On the precontrast series, there is no intramural hematoma or penetrating   atherosclerotic ulcer in the thoracic aorta. Following IV contrast   injection, there is no dissection flap identified. The aorta is normal in   size. There is a 3 vessel aortic arch. Focal high-grade stenosis at the   proximal celiac axis, adjacent to the median arcuate ligament (602:82).   The SMA, bilateral renal arteries and BEN are patent. The bilateral   common iliac arteries and their branches are patent. Atherosclerotic   disease noted within the aorta.    CHEST:  LUNGS/PLEURA AND LARGE AIRWAYS: No endobronchial lesion. Small left   pleural effusion and partial atelectasis of the left lower lobe. Central   airways are patent.  HEART: Cardiomegaly with left ventricular dilatation and concentric   hypertrophy. No pericardial effusion.  MEDIASTINUM AND ELIAN: No lymphadenopathy. There is mild dilatation of the   main pulmonary artery measuring 3.5 cm. Mild pulmonary hypertension   cannot be excluded.  CHEST WALL AND LOWERNECK: Mild symmetric gynecomastia    ABDOMEN AND PELVIS:  LIVER: Within normal limits.  BILE DUCTS: Normal caliber.  GALLBLADDER: Within normal limits.  SPLEEN: Within normal limits.  PANCREAS: Within normal limits.  ADRENALS: Within normal limits.  KIDNEYS/URETERS: No hydronephrosis. Intracortex mid to lower pole   exophytic solid lesion measures 7.3 x 5.6 cm which which demonstrates   heterogeneous more lower central attenuation possibly centrally necrotic.   Indeterminate right lower pole lateral cortex renal low-attenuation   structure measures 1.8 x 1.4 cm (302:155). Additional subcentimeter   hypodensities bilaterally that are too small to characterize.    BLADDER: Minimally distended.  REPRODUCTIVE ORGANS: Prostate is enlarged.    BOWEL: No bowel obstruction. Appendix is not visualized. No evidence of   inflammation in the pericecal region. Colonic diverticulosis, without   diverticulitis.  PERITONEUM: No ascites.  RETROPERITONEUM/LYMPH NODES: No lymphadenopathy.  ABDOMINAL WALL: Postsurgical changes. Small fat-containing bilateral   inguinal hernias.  BONES: Degenerative changes.    IMPRESSION:    1. No aortic dissection or aortic intramural hematoma.      2. 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    3. Small left-sided pleural effusion with compressive atelectasis of the   basilar segments of the left lower lobe. Patchy groundglass opacities   within the left lower lobe and inferior segment of the lingula and   developing pneumonia versus atelectasis cannot be excluded. Central   airways are patent. Follow-up imaging to confirm resolution.    4. Mild dilatation of the main pulmonary artery and pulmonary   hypertension cannot be excluded. Echocardiographic correlation.      5. Solid exophytic left renal mass and primary renal neoplasm cannot be   excluded. Additional indeterminate right renal lesion measuring 1.8 x 1.4   cm. Recommend correlation with prior imaging and additional imaging to   include a contrast-enhanced MRI and or CT utilizing renal mass protocol   suggested for more complete characterization.      6. Cardiomegaly with left ventricular dilatation with some concentric   hypertrophy. Echocardiographic correlation.    < end of copied text >    < from: Xray Chest 1 View- PORTABLE-Urgent (Xray Chest 1 View- PORTABLE-Urgent .) (07.30.23 @ 20:40) >  ACC: [id number] EXAM:  XR CHEST PORTABLE URGENT 1V   ORDERED BY: JORDYN VILLARREAL     PROCEDURE DATE:  07/30/2023          INTERPRETATION:  EXAMINATION: XR CHEST URGENT    CLINICAL INDICATION: wheezing    TECHNIQUE: Single frontal, portable view ofthe chest was obtained.    COMPARISON: Chest CT/x-ray 7/29/2023.    FINDINGS:    The heart is not accurately assessed in this AP projection.  There is complete white out of the left hemithorax, likely secondary to   worsening left-sided pleural effusion.  No pneumothorax.  The right lung is clear.  No acute bony abnormality.    IMPRESSION:  Complete white out of the left hemithorax, likely secondary to worsening   left-sided pleural effusion.    < end of copied text >

## 2023-07-31 NOTE — CONSULT NOTE ADULT - ASSESSMENT
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 Interval CXR from admission showing worsening opacification of left hemithorax.    #Left pleural effusion:  - bedside POCUS showing complex loculated left sided pleural effusion with septations and sediment  - may represent emypema vs malignant effusion?  - likely cause of patients left sided pleuritic chest pain    Recommendations:  - would recommend at least diagnostic thoracentesis for further evaluation of effusion; if appears grossly infected/purulent on initial tap, would place chest tube for drainage and possible MIST protocol  - patient refusing any intervention at this time--> spoke at length with patient & wife regarding importance of diagnostic evaluation and risks associated with untreated complex effusion; also spoke at length with patient's family member who is a neurologist who agrees with pursuit of thoracentesis   - patient would like for MR abdomen to be performed prior to evaluation of effusion; discussed that if there is a renal mass which appears cancerous, will likely need tap as well for further staging  (although effusion is rare in RCC)  - c/w abx for now  - will reassess tomorrow for intervention    Discussed with Dr. 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 Interval CXR from admission showing worsening opacification of left hemithorax.    #Left pleural effusion:  - bedside POCUS showing complex loculated left sided pleural effusion with septations and sediment  - may represent infectious process vs malignant effusion  - likely cause of patients left sided pleuritic chest pain    Recommendations:  - would recommend at least diagnostic thoracentesis for further evaluation of effusion; if appears grossly infected/purulent on initial tap, would place chest tube for drainage and possible MIST protocol  - patient refusing any intervention at this time--> spoke at length with patient & wife regarding importance of diagnostic evaluation and risks associated with untreated complex effusion; also spoke at length with patient's family member who is a neurologist who agrees with pursuit of thoracentesis   - c/w abx for now  - will reassess tomorrow for intervention    Discussed with Dr. Leary

## 2023-07-31 NOTE — PROGRESS NOTE ADULT - SUBJECTIVE AND OBJECTIVE BOX
SARAH FRIAS  Vital Signs Last 24 Hrs  T(C): 36.7 (31 Jul 2023 15:16), Max: 37 (31 Jul 2023 06:20)  T(F): 98 (31 Jul 2023 15:16), Max: 98.6 (31 Jul 2023 06:20)  HR: 115 (31 Jul 2023 15:31) (105 - 135)  BP: 133/91 (31 Jul 2023 15:16) (128/86 - 140/89)  BP(mean): --  RR: 17 (31 Jul 2023 15:16) (17 - 19)  SpO2: 98% (31 Jul 2023 15:31) (94% - 98%)    Parameters below as of 31 Jul 2023 15:31  Patient On (Oxygen Delivery Method): nasal cannula  O2 Flow (L/min): 2  67y  Male    DATE OF SERVICE: 07-31-23 @ 17:53  Patient is a 67y old  Male who presents with a chief complaint of celiac artery stenosis, c/f median arcuate ligament syndrome; renal mass (30 Jul 2023 11:48)  Patient was seen and examined.chart reviewed,.Admitted with cough,chest discomfort,abs painx 1-2 days,  Left sided CP and dyspnea reported. Chest X Ray shows worsening in left sided effusion.    REVIEW OF SYSTEMS:  CONSTITUTIONAL: No fever  RESPIRATORY: No cough, hemoptysis +shortness of breath  CARDIOVASCULAR: No palpitations, dizziness, or leg swelling  GASTROINTESTINAL: No nausea, vomiting, hematemesis  GENITOURINARY: No dysuria, frequency, hematuria   NEUROLOGICAL: No headaches, no dizziness  MUSCULOSKELETAL: No joint pain or swelling;     INTERVAL HPI/OVERNIGHT EVENTS:      LABS:                        12.5   16.64 )-----------( 332      ( 30 Jul 2023 07:16 )             39.4     07-30    132<L>  |  97<L>  |  18  ----------------------------<  255<H>  4.2   |  22  |  0.73    Ca    9.0      30 Jul 2023 07:16    TPro  8.4<H>  /  Alb  4.3  /  TBili  0.5  /  DBili  x   /  AST  14  /  ALT  17  /  AlkPhos  90  07-29    PT/INR - ( 29 Jul 2023 14:00 )   PT: 11.2 sec;   INR: 1.00 ratio         PTT - ( 30 Jul 2023 00:05 )  PTT:55.1 sec  Urinalysis Basic - ( 30 Jul 2023 07:16 )    Color: x / Appearance: x / SG: x / pH: x  Gluc: 255 mg/dL / Ketone: x  / Bili: x / Urobili: x   Blood: x / Protein: x / Nitrite: x   Leuk Esterase: x / RBC: x / WBC x   Sq Epi: x / Non Sq Epi: x / Bacteria: x      CAPILLARY BLOOD GLUCOSE      POCT Blood Glucose.: 252 mg/dL (30 Jul 2023 16:59)  POCT Blood Glucose.: 282 mg/dL (30 Jul 2023 11:48)  POCT Blood Glucose.: 263 mg/dL (30 Jul 2023 09:02)  POCT Blood Glucose.: 228 mg/dL (30 Jul 2023 07:32)  POCT Blood Glucose.: 319 mg/dL (30 Jul 2023 00:55)  POCT Blood Glucose.: 296 mg/dL (29 Jul 2023 20:21)        Urinalysis Basic - ( 30 Jul 2023 07:16 )    Color: x / Appearance: x / SG: x / pH: x  Gluc: 255 mg/dL / Ketone: x  / Bili: x / Urobili: x   Blood: x / Protein: x / Nitrite: x   Leuk Esterase: x / RBC: x / WBC x   Sq Epi: x / Non Sq Epi: x / Bacteria: x        PAST MEDICAL & SURGICAL HISTORY:  Hypertension      Diabetes mellitus      HLD (hyperlipidemia)      No significant past surgical history          MEDICATIONS  (STANDING):  amLODIPine   Tablet 5 milliGRAM(s) Oral daily  aspirin  chewable 81 milliGRAM(s) Oral daily  atorvastatin 10 milliGRAM(s) Oral at bedtime  cyclobenzaprine 10 milliGRAM(s) Oral at bedtime  dextrose 5%. 1000 milliLiter(s) (50 mL/Hr) IV Continuous <Continuous>  dextrose 5%. 1000 milliLiter(s) (100 mL/Hr) IV Continuous <Continuous>  dextrose 50% Injectable 12.5 Gram(s) IV Push once  dextrose 50% Injectable 25 Gram(s) IV Push once  dextrose 50% Injectable 25 Gram(s) IV Push once  glucagon  Injectable 1 milliGRAM(s) IntraMuscular once  hydrALAZINE 50 milliGRAM(s) Oral two times a day  insulin lispro (ADMELOG) corrective regimen sliding scale   SubCutaneous three times a day before meals  insulin lispro (ADMELOG) corrective regimen sliding scale   SubCutaneous at bedtime  latanoprost 0.005% Ophthalmic Solution 1 Drop(s) Both EYES at bedtime  losartan 100 milliGRAM(s) Oral daily  melatonin 9 milliGRAM(s) Oral at bedtime    MEDICATIONS  (PRN):  acetaminophen     Tablet .. 650 milliGRAM(s) Oral every 6 hours PRN Temp greater or equal to 38C (100.4F), Mild Pain (1 - 3)  aluminum hydroxide/magnesium hydroxide/simethicone Suspension 30 milliLiter(s) Oral every 4 hours PRN Dyspepsia  dextrose Oral Gel 15 Gram(s) Oral once PRN Blood Glucose LESS THAN 70 milliGRAM(s)/deciliter  HYDROmorphone  Injectable 0.5 milliGRAM(s) IV Push every 4 hours PRN breakthrough pain  nitroglycerin     SubLingual 0.4 milliGRAM(s) SubLingual every 5 minutes PRN Chest Pain  ondansetron Injectable 4 milliGRAM(s) IV Push every 8 hours PRN Nausea and/or Vomiting  oxycodone    5 mG/acetaminophen 325 mG 1 Tablet(s) Oral every 6 hours PRN Moderate Pain (4 - 6)  oxycodone    5 mG/acetaminophen 325 mG 2 Tablet(s) Oral every 4 hours PRN Severe Pain (7 - 10)        RADIOLOGY & ADDITIONAL TESTS:    Imaging Personally Reviewed:  [ ] YES  [ ] NO    Consultant(s) Notes Reviewed:  [x ] YES  [ ] NO    PHYSICAL EXAM:  GENERAL: Alert and awake lying in bed in no distress  HEAD:  Atraumatic, Normocephalic  EYES: EOMI, MADALYN, conjunctiva and sclera clear  NECK: Supple, No JVD, Normal thyroid  NERVOUS SYSTEM:  Alert & Oriented X3, Motor and sensory systems are intact,   CHEST/LUNG: Bilateral clear breath sounds, no rhochi, no wheezing, no crepitations,  HEART: Regular rate and rhythm; No murmurs, rubs, or gallops  ABDOMEN: Soft, Nontender, Nondistended; Bowel sounds present  EXTREMITIES:   Peripheral Pulses are palpable, no  edema        Care Discussed with Consultants/Other Providers [x ] YES  [ ] NO      Code Status: [] Full Code [] DNR [] DNI [] Goals of Care:   Disposition: [] ICU [] Stroke Unit [] RCU []PCU []Floor [] Discharge Home         [id number] HighP

## 2023-07-31 NOTE — CHART NOTE - NSCHARTNOTEFT_GEN_A_CORE
Pulmonary consulted for worsening cough, wheezing, and CXR findings of L lung whiteout. Patient appears clinically stable at this time on chart review, saturating well on 2L NC. Tachycardic only with coughing spells.     Recommend:  - chest PT  - duonebs prn  - antibiotics  - c/w nocturnal PAP for TAO/OHS  - will need pocus to eval if this represents rapid worsening of pleural effusion vs mucus plugging/atelectasis  - repeat CT chest when able  - patient will be seen as a full consult tomorrow

## 2023-07-31 NOTE — CONSULT NOTE ADULT - SUBJECTIVE AND OBJECTIVE BOX
CHIEF COMPLAINT:    HPI:  67M with PMHx of HTN, HLD, T2DM on metformin, TAO on BiPAP who presents to the ED with acute, 10/10, sharp, intermittent stabbing left sided abdominal and left axillary/back pain. Pt reports the pain woke him up from his sleep and he took some melatonin to go back to sleep. When he woke up in the morning he still had the pain and it was not easing therefore he decided to present to the ED for further evaluation.  Reports difficulty in catching his breathe 2/2 pain.   Denies  headache, dizziness, chest pain, palpitations, SOB, joint pain, diarrhea/constipation, urinary symptoms.    Vitals: T 99.9, , /88, RR 24 satting 98% RA    PAST MEDICAL & SURGICAL HISTORY:  Hypertension      Diabetes mellitus      HLD (hyperlipidemia)      No significant past surgical history          FAMILY HISTORY:  FH: breast cancer (Sibling)        SOCIAL HISTORY:  Smoking: [ ] Never Smoked [ X] Former Smoker (few years during college)[ ] Current Smoker  (__ packs x ___ years)  Substance Use: [ ] Never Used [ ] Used ____  EtOH Use:  Marital Status: [ ] Single [ ]  [ ]  [ ]   Sexual History:   Occupation:  Recent Travel:  Country of Birth:  Advance Directives:    Allergies    No Known Allergies    Intolerances        HOME MEDICATIONS:    REVIEW OF SYSTEMS:  Constitutional: [X ] negative [ ] fevers [ ] chills [ ] weight loss [ ] weight gain  HEENT: [ X] negative [ ] dry eyes [ ] eye irritation [ ] postnasal drip [ ] nasal congestion  CV: [X ] negative  [ ] chest pain [ ] orthopnea [ ] palpitations [ ] murmur  Resp: [ ] negative [ ] cough [ X] shortness of breath [ ] dyspnea [ ] wheezing [ ] sputum [ ] hemoptysis  GI: [ X] negative [ ] nausea [ ] vomiting [ ] diarrhea [ ] constipation [ ] abd pain [ ] dysphagia   : [X ] negative [ ] dysuria [ ] nocturia [ ] hematuria [ ] increased urinary frequency  Musculoskeletal: [ ] negative [ ] back pain [ ] myalgias [ ] arthralgias [ ] fracture  Skin: [ ] negative [ ] rash [ ] itch  Neurological: [ ] negative [ ] headache [ ] dizziness [ ] syncope [ ] weakness [ ] numbness  Psychiatric: [ ] negative [ ] anxiety [ ] depression  Endocrine: [ ] negative [ ] diabetes [ ] thyroid problem  Hematologic/Lymphatic: [ ] negative [ ] anemia [ ] bleeding problem  Allergic/Immunologic: [ ] negative [ ] itchy eyes [ ] nasal discharge [ ] hives [ ] angioedema  [ ] All other systems negative  [ ] Unable to assess ROS because ________    OBJECTIVE:  ICU Vital Signs Last 24 Hrs  T(C): 36.7 (31 Jul 2023 15:16), Max: 37 (31 Jul 2023 06:20)  T(F): 98 (31 Jul 2023 15:16), Max: 98.6 (31 Jul 2023 06:20)  HR: 115 (31 Jul 2023 15:31) (105 - 135)  BP: 133/91 (31 Jul 2023 15:16) (128/86 - 140/89)  BP(mean): --  ABP: --  ABP(mean): --  RR: 17 (31 Jul 2023 15:16) (17 - 19)  SpO2: 98% (31 Jul 2023 15:31) (94% - 98%)    O2 Parameters below as of 31 Jul 2023 15:31  Patient On (Oxygen Delivery Method): nasal cannula  O2 Flow (L/min): 2            CAPILLARY BLOOD GLUCOSE      POCT Blood Glucose.: 311 mg/dL (31 Jul 2023 17:02)      PHYSICAL EXAM:  General: well appearing NAD  HEENT: no icterus  Lymph Nodes: no palpable cervical las  Neck: supple  Respiratory: clear to auscultation bilaterally left sided chest pain on inspiration   Cardiovascular: s1/s2,rrr  Abdomen: soft, nontender  Extremities: no LE edema  Skin: no rashes  Neurological: AxOx3  Psychiatry: normal mood and affect    HOSPITAL MEDICATIONS:  aspirin  chewable 81 milliGRAM(s) Oral daily    azithromycin  IVPB 500 milliGRAM(s) IV Intermittent every 24 hours  piperacillin/tazobactam IVPB.. 3.375 Gram(s) IV Intermittent every 8 hours    amLODIPine   Tablet 5 milliGRAM(s) Oral daily  hydrALAZINE 50 milliGRAM(s) Oral two times a day  losartan 100 milliGRAM(s) Oral daily  nitroglycerin     SubLingual 0.4 milliGRAM(s) SubLingual every 5 minutes PRN    atorvastatin 10 milliGRAM(s) Oral at bedtime  dextrose 50% Injectable 12.5 Gram(s) IV Push once  dextrose 50% Injectable 25 Gram(s) IV Push once  dextrose 50% Injectable 25 Gram(s) IV Push once  dextrose Oral Gel 15 Gram(s) Oral once PRN  glucagon  Injectable 1 milliGRAM(s) IntraMuscular once  insulin lispro (ADMELOG) corrective regimen sliding scale   SubCutaneous three times a day before meals  insulin lispro (ADMELOG) corrective regimen sliding scale   SubCutaneous at bedtime    guaiFENesin Oral Liquid (Sugar-Free) 100 milliGRAM(s) Oral every 6 hours PRN  levalbuterol Inhalation 0.63 milliGRAM(s) Inhalation every 6 hours PRN    acetaminophen     Tablet .. 650 milliGRAM(s) Oral every 6 hours PRN  cyclobenzaprine 10 milliGRAM(s) Oral at bedtime  HYDROmorphone  Injectable 0.5 milliGRAM(s) IV Push every 4 hours PRN  melatonin 9 milliGRAM(s) Oral at bedtime  ondansetron Injectable 4 milliGRAM(s) IV Push every 8 hours PRN  oxycodone    5 mG/acetaminophen 325 mG 1 Tablet(s) Oral every 6 hours PRN  oxycodone    5 mG/acetaminophen 325 mG 2 Tablet(s) Oral every 4 hours PRN    aluminum hydroxide/magnesium hydroxide/simethicone Suspension 30 milliLiter(s) Oral every 4 hours PRN  senna 2 Tablet(s) Oral at bedtime        dextrose 5%. 1000 milliLiter(s) IV Continuous <Continuous>  dextrose 5%. 1000 milliLiter(s) IV Continuous <Continuous>      latanoprost 0.005% Ophthalmic Solution 1 Drop(s) Both EYES at bedtime        LABS:                        12.3   21.26 )-----------( 370      ( 31 Jul 2023 07:10 )             38.2     Hgb Trend: 12.3<--, 12.5<--, 11.6<--, 12.9<--  07-31    130<L>  |  95<L>  |  24<H>  ----------------------------<  297<H>  4.3   |  20<L>  |  0.96    Ca    9.2      31 Jul 2023 07:10  Phos  3.3     07-31  Mg     2.00     07-31      Creatinine Trend: 0.96<--, 0.73<--, 0.83<--  PTT - ( 30 Jul 2023 00:05 )  PTT:55.1 sec  Urinalysis Basic - ( 31 Jul 2023 07:10 )    Color: x / Appearance: x / SG: x / pH: x  Gluc: 297 mg/dL / Ketone: x  / Bili: x / Urobili: x   Blood: x / Protein: x / Nitrite: x   Leuk Esterase: x / RBC: x / WBC x   Sq Epi: x / Non Sq Epi: x / Bacteria: x            MICROBIOLOGY:     RADIOLOGY:  [ ] Reviewed and interpreted by me    PULMONARY FUNCTION TESTS:    EKG: CHIEF COMPLAINT:  Abdominal and back pain    HPI:  67M with PMHx of HTN, HLD, T2DM on metformin, TAO on BiPAP who presents to the ED with acute, 10/10, sharp, intermittent stabbing left sided abdominal and left axillary/back pain. Pt reports the pain woke him up from his sleep and he took some melatonin to go back to sleep. When he woke up in the morning he still had the pain and it was not easing therefore he decided to present to the ED for further evaluation. Reports difficulty in catching his breathe 2/2 pain. Denies headache, dizziness, chest pain, palpitations, SOB, joint pain, diarrhea/constipation, urinary symptoms.      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   Recent Travel:  Country of Birth:  Advance Directives:    Allergies    No Known Allergies    Intolerances        HOME MEDICATIONS:  amLODIPine 5 mg oral tablet: 1 tab(s) orally once a day (29 Jul 2023 22:00)  aspirin 81 mg oral capsule: 1 tab(s) orally once a day (29 Jul 2023 21:47)  atorvastatin 10 mg oral tablet: 1 tab(s) orally once a day (29 Jul 2023 21:47)  cyclobenzaprine 10 mg oral tablet: 1 tab(s) orally once a day (at bedtime) (29 Jul 2023 21:47)  hydrALAZINE 50 mg oral tablet: 1 tab(s) orally 2 times a day (29 Jul 2023 21:47)  losartan 100 mg oral tablet: 1 tab(s) orally once a day (29 Jul 2023 21:47)  melatonin 10 mg oral tablet: 1 tab(s) orally once a day (at bedtime) (29 Jul 2023 21:47)  metFORMIN 1000 mg oral tablet: 1 tab(s) orally 2 times a day (29 Jul 2023 21:47)      REVIEW OF SYSTEMS:  Constitutional: [X ] negative [ ] fevers [ ] chills [ ] weight loss [ ] weight gain  HEENT: [ X] negative [ ] dry eyes [ ] eye irritation [ ] postnasal drip [ ] nasal congestion  CV: [X ] negative  [ ] chest pain [ ] orthopnea [ ] palpitations [ ] murmur  Resp: [ ] negative [ ] cough [ X] shortness of breath [ ] dyspnea [ ] wheezing [ ] sputum [ ] hemoptysis  GI: [ X] negative [ ] nausea [ ] vomiting [ ] diarrhea [ ] constipation [ ] abd pain [ ] dysphagia   : [X ] negative [ ] dysuria [ ] nocturia [ ] hematuria [ ] increased urinary frequency  Musculoskeletal: [ ] negative [x] back pain [ ] myalgias [ ] arthralgias [ ] fracture  Skin: [x] negative [ ] rash [ ] itch  Neurological: [x] negative [ ] headache [ ] dizziness [ ] syncope [ ] weakness [ ] numbness  Psychiatric: [x] negative [ ] anxiety [ ] depression  Endocrine: [ ] negative [x] diabetes [ ] thyroid problem  Hematologic/Lymphatic: [x] negative [ ] anemia [ ] bleeding problem  Allergic/Immunologic: [x] negative [ ] itchy eyes [ ] nasal discharge [ ] hives [ ] angioedema  [ ] All other systems negative  [ ] Unable to assess ROS because ________    OBJECTIVE:  ICU Vital Signs Last 24 Hrs  T(C): 36.7 (31 Jul 2023 15:16), Max: 37 (31 Jul 2023 06:20)  T(F): 98 (31 Jul 2023 15:16), Max: 98.6 (31 Jul 2023 06:20)  HR: 115 (31 Jul 2023 15:31) (105 - 135)  BP: 133/91 (31 Jul 2023 15:16) (128/86 - 140/89)  BP(mean): --  ABP: --  ABP(mean): --  RR: 17 (31 Jul 2023 15:16) (17 - 19)  SpO2: 98% (31 Jul 2023 15:31) (94% - 98%)    O2 Parameters below as of 31 Jul 2023 15:31  Patient On (Oxygen Delivery Method): nasal cannula  O2 Flow (L/min): 2            CAPILLARY BLOOD GLUCOSE      POCT Blood Glucose.: 311 mg/dL (31 Jul 2023 17:02)      PHYSICAL EXAM:  General: well appearing NAD  HEENT: no icterus  Lymph Nodes: no palpable cervical las  Neck: supple  Respiratory: Absent BS left. Clear on right. No wheezing. No use off accessory muscles.  Cardiovascular: S1/S2, no murmur, no edema  Abdomen: soft, nontender  Extremities: no LE edema  Skin: no rashes  Neurological: AxOx3  Psychiatry: normal mood and affect    HOSPITAL MEDICATIONS:  aspirin  chewable 81 milliGRAM(s) Oral daily    azithromycin  IVPB 500 milliGRAM(s) IV Intermittent every 24 hours  piperacillin/tazobactam IVPB.. 3.375 Gram(s) IV Intermittent every 8 hours    amLODIPine   Tablet 5 milliGRAM(s) Oral daily  hydrALAZINE 50 milliGRAM(s) Oral two times a day  losartan 100 milliGRAM(s) Oral daily  nitroglycerin     SubLingual 0.4 milliGRAM(s) SubLingual every 5 minutes PRN    atorvastatin 10 milliGRAM(s) Oral at bedtime  dextrose 50% Injectable 12.5 Gram(s) IV Push once  dextrose 50% Injectable 25 Gram(s) IV Push once  dextrose 50% Injectable 25 Gram(s) IV Push once  dextrose Oral Gel 15 Gram(s) Oral once PRN  glucagon  Injectable 1 milliGRAM(s) IntraMuscular once  insulin lispro (ADMELOG) corrective regimen sliding scale   SubCutaneous three times a day before meals  insulin lispro (ADMELOG) corrective regimen sliding scale   SubCutaneous at bedtime    guaiFENesin Oral Liquid (Sugar-Free) 100 milliGRAM(s) Oral every 6 hours PRN  levalbuterol Inhalation 0.63 milliGRAM(s) Inhalation every 6 hours PRN    acetaminophen     Tablet .. 650 milliGRAM(s) Oral every 6 hours PRN  cyclobenzaprine 10 milliGRAM(s) Oral at bedtime  HYDROmorphone  Injectable 0.5 milliGRAM(s) IV Push every 4 hours PRN  melatonin 9 milliGRAM(s) Oral at bedtime  ondansetron Injectable 4 milliGRAM(s) IV Push every 8 hours PRN  oxycodone    5 mG/acetaminophen 325 mG 1 Tablet(s) Oral every 6 hours PRN  oxycodone    5 mG/acetaminophen 325 mG 2 Tablet(s) Oral every 4 hours PRN    aluminum hydroxide/magnesium hydroxide/simethicone Suspension 30 milliLiter(s) Oral every 4 hours PRN  senna 2 Tablet(s) Oral at bedtime        dextrose 5%. 1000 milliLiter(s) IV Continuous <Continuous>  dextrose 5%. 1000 milliLiter(s) IV Continuous <Continuous>      latanoprost 0.005% Ophthalmic Solution 1 Drop(s) Both EYES at bedtime        LABS:                        12.3   21.26 )-----------( 370      ( 31 Jul 2023 07:10 )             38.2     Hgb Trend: 12.3<--, 12.5<--, 11.6<--, 12.9<--  07-31    130<L>  |  95<L>  |  24<H>  ----------------------------<  297<H>  4.3   |  20<L>  |  0.96    Ca    9.2      31 Jul 2023 07:10  Phos  3.3     07-31  Mg     2.00     07-31      Creatinine Trend: 0.96<--, 0.73<--, 0.83<--  PTT - ( 30 Jul 2023 00:05 )  PTT:55.1 sec  Urinalysis Basic - ( 31 Jul 2023 07:10 )    Color: x / Appearance: x / SG: x / pH: x  Gluc: 297 mg/dL / Ketone: x  / Bili: x / Urobili: x   Blood: x / Protein: x / Nitrite: x   Leuk Esterase: x / RBC: x / WBC x   Sq Epi: x / Non Sq Epi: x / Bacteria: x            MICROBIOLOGY:     RADIOLOGY:  [x] Reviewed and interpreted by me  CT Chest 7/21/23 - complete collapse of left lung, possible post-obstructive process. left pleural effusion.    PULMONARY FUNCTION TESTS:    EKG:

## 2023-07-31 NOTE — CONSULT NOTE ADULT - ASSESSMENT
1) Shortness of breath with cough  Ct with left sided effusion and possible pneumonia  Repeat Chest X ray with white out of left lung       2) Renal Mass-  Solid exophytic left renal mass and primary renal neoplasm cannot be   excluded. Additional indeterminate right renal lesion measuring 1.8 x 1.4   cm. Urology evaluation .     3) Leukocytosis-   Concern for left sided Pulmonary infection    4) Elevated lactate   67 year old male with htn and DM presented with left sided chest pain.  Imaging with left sided opacities and ? worsening left sided effusion.  Associated leukocytosis.     1) Shortness of breath with cough  Ct with left sided effusion and possible pneumonia  Repeat Chest X ray with white out of left lung     Continue zosyn to cover pneumonia and effusion    Pulmonary evaluation of white out/ ? enlarging effusion in progress  Resume azithromycin pending urine legionella.       2) Renal Mass-  Solid exophytic left renal mass and primary renal neoplasm cannot be   excluded. Additional indeterminate right renal lesion measuring 1.8 x 1.4   cm. Urology evaluation .     3) Leukocytosis-   Concern for left sided Pulmonary infection    4) Elevated lactate  blood cultures testing  continue to trend    5) Abnormal imaging  stenosis of celiac axis  Surgery following

## 2023-08-01 ENCOUNTER — TRANSCRIPTION ENCOUNTER (OUTPATIENT)
Age: 68
End: 2023-08-01

## 2023-08-01 DIAGNOSIS — J90 PLEURAL EFFUSION, NOT ELSEWHERE CLASSIFIED: ICD-10-CM

## 2023-08-01 LAB
ALBUMIN FLD-MCNC: 3.1 G/DL — SIGNIFICANT CHANGE UP
ALBUMIN SERPL ELPH-MCNC: 3.3 G/DL — SIGNIFICANT CHANGE UP (ref 3.3–5)
ALP SERPL-CCNC: 84 U/L — SIGNIFICANT CHANGE UP (ref 40–120)
ALT FLD-CCNC: 24 U/L — SIGNIFICANT CHANGE UP (ref 4–41)
ANION GAP SERPL CALC-SCNC: 16 MMOL/L — HIGH (ref 7–14)
AST SERPL-CCNC: 17 U/L — SIGNIFICANT CHANGE UP (ref 4–40)
B PERT IGG+IGM PNL SER: ABNORMAL
BILIRUB SERPL-MCNC: 0.9 MG/DL — SIGNIFICANT CHANGE UP (ref 0.2–1.2)
BUN SERPL-MCNC: 31 MG/DL — HIGH (ref 7–23)
CALCIUM SERPL-MCNC: 9.2 MG/DL — SIGNIFICANT CHANGE UP (ref 8.4–10.5)
CHLORIDE SERPL-SCNC: 93 MMOL/L — LOW (ref 98–107)
CHOLEST FLD-MCNC: 92 MG/DL — SIGNIFICANT CHANGE UP
CO2 SERPL-SCNC: 21 MMOL/L — LOW (ref 22–31)
COLOR FLD: YELLOW
CREAT SERPL-MCNC: 0.97 MG/DL — SIGNIFICANT CHANGE UP (ref 0.5–1.3)
EGFR: 85 ML/MIN/1.73M2 — SIGNIFICANT CHANGE UP
EOSINOPHIL # FLD: 0 % — SIGNIFICANT CHANGE UP
FLUID INTAKE SUBSTANCE CLASS: SIGNIFICANT CHANGE UP
FOLATE+VIT B12 SERBLD-IMP: 0 % — SIGNIFICANT CHANGE UP
GLUCOSE BLDC GLUCOMTR-MCNC: 238 MG/DL — HIGH (ref 70–99)
GLUCOSE BLDC GLUCOMTR-MCNC: 258 MG/DL — HIGH (ref 70–99)
GLUCOSE BLDC GLUCOMTR-MCNC: 262 MG/DL — HIGH (ref 70–99)
GLUCOSE BLDC GLUCOMTR-MCNC: 293 MG/DL — HIGH (ref 70–99)
GLUCOSE FLD-MCNC: 19 MG/DL — SIGNIFICANT CHANGE UP
GLUCOSE SERPL-MCNC: 274 MG/DL — HIGH (ref 70–99)
GRAM STN FLD: SIGNIFICANT CHANGE UP
HCT VFR BLD CALC: 36.3 % — LOW (ref 39–50)
HGB BLD-MCNC: 11.6 G/DL — LOW (ref 13–17)
LACTATE SERPL-SCNC: 2.3 MMOL/L — HIGH (ref 0.5–2)
LDH SERPL L TO P-CCNC: 1671 U/L — SIGNIFICANT CHANGE UP
LYMPHOCYTES # FLD: 12 % — SIGNIFICANT CHANGE UP
MCHC RBC-ENTMCNC: 28.5 PG — SIGNIFICANT CHANGE UP (ref 27–34)
MCHC RBC-ENTMCNC: 32 GM/DL — SIGNIFICANT CHANGE UP (ref 32–36)
MCV RBC AUTO: 89.2 FL — SIGNIFICANT CHANGE UP (ref 80–100)
MESOTHL CELL # FLD: 0 % — SIGNIFICANT CHANGE UP
MONOS+MACROS # FLD: 5 % — SIGNIFICANT CHANGE UP
NEUTROPHILS-BODY FLUID: 83 % — SIGNIFICANT CHANGE UP
NRBC # BLD: 0 /100 WBCS — SIGNIFICANT CHANGE UP (ref 0–0)
NRBC # FLD: 0 K/UL — SIGNIFICANT CHANGE UP (ref 0–0)
OTHER CELLS FLD MANUAL: 0 % — SIGNIFICANT CHANGE UP
PH FLD: 6.2 — SIGNIFICANT CHANGE UP
PLATELET # BLD AUTO: 367 K/UL — SIGNIFICANT CHANGE UP (ref 150–400)
POTASSIUM SERPL-MCNC: 3.9 MMOL/L — SIGNIFICANT CHANGE UP (ref 3.5–5.3)
POTASSIUM SERPL-SCNC: 3.9 MMOL/L — SIGNIFICANT CHANGE UP (ref 3.5–5.3)
PROT FLD-MCNC: 5.8 G/DL — SIGNIFICANT CHANGE UP
PROT SERPL-MCNC: 7.8 G/DL — SIGNIFICANT CHANGE UP (ref 6–8.3)
RBC # BLD: 4.07 M/UL — LOW (ref 4.2–5.8)
RBC # FLD: 13 % — SIGNIFICANT CHANGE UP (ref 10.3–14.5)
RCV VOL RI: 9000 CELLS/UL — HIGH (ref 0–5)
SODIUM SERPL-SCNC: 130 MMOL/L — LOW (ref 135–145)
SPECIMEN SOURCE: SIGNIFICANT CHANGE UP
TOTAL CELLS COUNTED, BODY FLUID: 100 CELLS — SIGNIFICANT CHANGE UP
TOTAL NUCLEATED CELL COUNT, BODY FLUID: HIGH CELLS/UL (ref 0–5)
TRIGL FLD-MCNC: 96 MG/DL — SIGNIFICANT CHANGE UP
TUBE TYPE: SIGNIFICANT CHANGE UP
WBC # BLD: 23.66 K/UL — HIGH (ref 3.8–10.5)
WBC # FLD AUTO: 23.66 K/UL — HIGH (ref 3.8–10.5)

## 2023-08-01 PROCEDURE — 76604 US EXAM CHEST: CPT | Mod: 26,GC

## 2023-08-01 PROCEDURE — 88305 TISSUE EXAM BY PATHOLOGIST: CPT | Mod: 26

## 2023-08-01 PROCEDURE — 71045 X-RAY EXAM CHEST 1 VIEW: CPT | Mod: 26

## 2023-08-01 PROCEDURE — 88112 CYTOPATH CELL ENHANCE TECH: CPT | Mod: 26

## 2023-08-01 PROCEDURE — 99232 SBSQ HOSP IP/OBS MODERATE 35: CPT

## 2023-08-01 PROCEDURE — 99233 SBSQ HOSP IP/OBS HIGH 50: CPT | Mod: GC,25

## 2023-08-01 PROCEDURE — 32555 ASPIRATE PLEURA W/ IMAGING: CPT | Mod: LT,GC

## 2023-08-01 PROCEDURE — 71045 X-RAY EXAM CHEST 1 VIEW: CPT | Mod: 26,77

## 2023-08-01 RX ORDER — INSULIN GLARGINE 100 [IU]/ML
8 INJECTION, SOLUTION SUBCUTANEOUS AT BEDTIME
Refills: 0 | Status: DISCONTINUED | OUTPATIENT
Start: 2023-08-01 | End: 2023-08-01

## 2023-08-01 RX ORDER — POLYETHYLENE GLYCOL 3350 17 G/17G
17 POWDER, FOR SOLUTION ORAL ONCE
Refills: 0 | Status: COMPLETED | OUTPATIENT
Start: 2023-08-01 | End: 2023-08-01

## 2023-08-01 RX ORDER — INSULIN GLARGINE 100 [IU]/ML
6 INJECTION, SOLUTION SUBCUTANEOUS AT BEDTIME
Refills: 0 | Status: DISCONTINUED | OUTPATIENT
Start: 2023-08-01 | End: 2023-08-03

## 2023-08-01 RX ADMIN — Medication 50 MILLIGRAM(S): at 18:02

## 2023-08-01 RX ADMIN — POLYETHYLENE GLYCOL 3350 17 GRAM(S): 17 POWDER, FOR SOLUTION ORAL at 18:01

## 2023-08-01 RX ADMIN — Medication 50 MILLIGRAM(S): at 05:01

## 2023-08-01 RX ADMIN — INSULIN GLARGINE 6 UNIT(S): 100 INJECTION, SOLUTION SUBCUTANEOUS at 22:19

## 2023-08-01 RX ADMIN — ATORVASTATIN CALCIUM 10 MILLIGRAM(S): 80 TABLET, FILM COATED ORAL at 22:05

## 2023-08-01 RX ADMIN — Medication 2: at 18:00

## 2023-08-01 RX ADMIN — Medication 81 MILLIGRAM(S): at 12:16

## 2023-08-01 RX ADMIN — PIPERACILLIN AND TAZOBACTAM 25 GRAM(S): 4; .5 INJECTION, POWDER, LYOPHILIZED, FOR SOLUTION INTRAVENOUS at 17:57

## 2023-08-01 RX ADMIN — Medication 9 MILLIGRAM(S): at 22:06

## 2023-08-01 RX ADMIN — Medication 1: at 22:20

## 2023-08-01 RX ADMIN — HYDROMORPHONE HYDROCHLORIDE 0.5 MILLIGRAM(S): 2 INJECTION INTRAMUSCULAR; INTRAVENOUS; SUBCUTANEOUS at 10:30

## 2023-08-01 RX ADMIN — PIPERACILLIN AND TAZOBACTAM 25 GRAM(S): 4; .5 INJECTION, POWDER, LYOPHILIZED, FOR SOLUTION INTRAVENOUS at 01:57

## 2023-08-01 RX ADMIN — CYCLOBENZAPRINE HYDROCHLORIDE 10 MILLIGRAM(S): 10 TABLET, FILM COATED ORAL at 22:04

## 2023-08-01 RX ADMIN — LATANOPROST 1 DROP(S): 0.05 SOLUTION/ DROPS OPHTHALMIC; TOPICAL at 22:18

## 2023-08-01 RX ADMIN — AMLODIPINE BESYLATE 5 MILLIGRAM(S): 2.5 TABLET ORAL at 05:01

## 2023-08-01 RX ADMIN — Medication 3: at 12:15

## 2023-08-01 RX ADMIN — PIPERACILLIN AND TAZOBACTAM 25 GRAM(S): 4; .5 INJECTION, POWDER, LYOPHILIZED, FOR SOLUTION INTRAVENOUS at 08:34

## 2023-08-01 RX ADMIN — LOSARTAN POTASSIUM 100 MILLIGRAM(S): 100 TABLET, FILM COATED ORAL at 05:01

## 2023-08-01 RX ADMIN — Medication 3: at 08:33

## 2023-08-01 RX ADMIN — AZITHROMYCIN 255 MILLIGRAM(S): 500 TABLET, FILM COATED ORAL at 18:01

## 2023-08-01 RX ADMIN — HYDROMORPHONE HYDROCHLORIDE 0.5 MILLIGRAM(S): 2 INJECTION INTRAMUSCULAR; INTRAVENOUS; SUBCUTANEOUS at 10:45

## 2023-08-01 NOTE — PROVIDER CONTACT NOTE (CRITICAL VALUE NOTIFICATION) - ASSESSMENT
Afebrile resting in bed
Pt is AAOx4. Expiratory wheezing on 2L nasal cannula. Denies chest pain, lightheadedness, dizziness at this time. Sinus tachycardia on telemetry monitoring. Infrequent productive cough with white, clear sputum noted.

## 2023-08-01 NOTE — PROVIDER CONTACT NOTE (CRITICAL VALUE NOTIFICATION) - BACKGROUND
67 yr old male presenting with celiac artery stenosis, c/f median arcuate ligament syndrome; renal mass and increased SOB.

## 2023-08-01 NOTE — CONSULT NOTE ADULT - TIME BILLING
high complexity of this case
chart and data review, clinical assessment, and coordination of care. This excludes any time spent on separate procedures or teaching.

## 2023-08-01 NOTE — PROVIDER CONTACT NOTE (CRITICAL VALUE NOTIFICATION) - TEST AND RESULT REPORTED:
Body Fluid Culture- gram stain shows polymorphonuclear leukocytes seen in gram positive cocci impairs seen by cyto centrifuge
Lactate 4.3

## 2023-08-01 NOTE — PROGRESS NOTE ADULT - SUBJECTIVE AND OBJECTIVE BOX
Follow Up:      Inverval History/ROS: Patient is a 67y old  Male who presents with a chief complaint of celiac artery stenosis, c/f median arcuate ligament syndrome; renal mass (01 Aug 2023 15:50  No fever  S/p thoracentesis  Plan for vats    Allergies    No Known Allergies    Intolerances        ANTIMICROBIALS:  azithromycin  IVPB 500 every 24 hours  piperacillin/tazobactam IVPB.. 3.375 every 8 hours      OTHER MEDS:  acetaminophen     Tablet .. 650 milliGRAM(s) Oral every 6 hours PRN  aluminum hydroxide/magnesium hydroxide/simethicone Suspension 30 milliLiter(s) Oral every 4 hours PRN  amLODIPine   Tablet 5 milliGRAM(s) Oral daily  aspirin  chewable 81 milliGRAM(s) Oral daily  atorvastatin 10 milliGRAM(s) Oral at bedtime  bisacodyl 5 milliGRAM(s) Oral every 12 hours PRN  cyclobenzaprine 10 milliGRAM(s) Oral at bedtime  dextrose 5%. 1000 milliLiter(s) IV Continuous <Continuous>  dextrose 5%. 1000 milliLiter(s) IV Continuous <Continuous>  dextrose 50% Injectable 25 Gram(s) IV Push once  dextrose 50% Injectable 12.5 Gram(s) IV Push once  dextrose 50% Injectable 25 Gram(s) IV Push once  dextrose Oral Gel 15 Gram(s) Oral once PRN  glucagon  Injectable 1 milliGRAM(s) IntraMuscular once  guaiFENesin Oral Liquid (Sugar-Free) 100 milliGRAM(s) Oral every 6 hours PRN  hydrALAZINE 50 milliGRAM(s) Oral two times a day  HYDROmorphone  Injectable 0.5 milliGRAM(s) IV Push every 4 hours PRN  insulin glargine Injectable (LANTUS) 6 Unit(s) SubCutaneous at bedtime  insulin lispro (ADMELOG) corrective regimen sliding scale   SubCutaneous three times a day before meals  insulin lispro (ADMELOG) corrective regimen sliding scale   SubCutaneous at bedtime  latanoprost 0.005% Ophthalmic Solution 1 Drop(s) Both EYES at bedtime  levalbuterol Inhalation 0.63 milliGRAM(s) Inhalation every 6 hours PRN  losartan 100 milliGRAM(s) Oral daily  melatonin 9 milliGRAM(s) Oral at bedtime  nitroglycerin     SubLingual 0.4 milliGRAM(s) SubLingual every 5 minutes PRN  ondansetron Injectable 4 milliGRAM(s) IV Push every 8 hours PRN  oxycodone    5 mG/acetaminophen 325 mG 1 Tablet(s) Oral every 6 hours PRN  oxycodone    5 mG/acetaminophen 325 mG 2 Tablet(s) Oral every 4 hours PRN  senna 2 Tablet(s) Oral at bedtime      Vital Signs Last 24 Hrs  T(C): 37.1 (01 Aug 2023 11:52), Max: 37.1 (01 Aug 2023 11:52)  T(F): 98.7 (01 Aug 2023 11:52), Max: 98.7 (01 Aug 2023 11:52)  HR: 102 (01 Aug 2023 18:00) (101 - 120)  BP: 135/72 (01 Aug 2023 18:00) (106/68 - 135/72)  BP(mean): --  RR: 18 (01 Aug 2023 18:00) (18 - 19)  SpO2: 97% (01 Aug 2023 18:00) (95% - 98%)    Parameters below as of 01 Aug 2023 18:00  Patient On (Oxygen Delivery Method): nasal cannula  O2 Flow (L/min): 2      PHYSICAL EXAM:  General: [x ] non-toxic  HEAD/EYES: [ ] PERRL [x ] white sclera [ ] icterus  ENT:  [ ] normal [x ] supple [ ] thrush [ ] pharyngeal exudate  Cardiovascular:   [ ] murmur [x ] normal [ ] PPM/AICD  Respiratory:  [x ] decrease bs left  GI:  [ xx] soft, non-tender, normal bowel sounds  :  [ ] horne [ ] no CVA tenderness   Musculoskeletal:  [ ] no synovitis  Neurologic:  [ ] non-focal exam   Skin:  [ x] no rash  Lymph: [ ] no lymphadenopathy  Psychiatric:  [ ] appropriate affect [ ] alert & oriented  Lines:  [x ] no phlebitis [ ] central line                                11.6   23.66 )-----------( 367      ( 01 Aug 2023 06:36 )             36.3       08-01    130<L>  |  93<L>  |  31<H>  ----------------------------<  274<H>  3.9   |  21<L>  |  0.97    Ca    9.2      01 Aug 2023 06:36  Phos  3.3     07-31  Mg     2.00     07-31    TPro  7.8  /  Alb  3.3  /  TBili  0.9  /  DBili  x   /  AST  17  /  ALT  24  /  AlkPhos  84  08-01      Urinalysis Basic - ( 01 Aug 2023 06:36 )    Color: x / Appearance: x / SG: x / pH: x  Gluc: 274 mg/dL / Ketone: x  / Bili: x / Urobili: x   Blood: x / Protein: x / Nitrite: x   Leuk Esterase: x / RBC: x / WBC x   Sq Epi: x / Non Sq Epi: x / Bacteria: x        MICROBIOLOGY:Culture Results:   No growth at 24 hours (07-31-23 @ 10:57)  Culture Results:   No growth at 24 hours (07-31-23 @ 07:10)  Culture Results:   No growth at 72 Hours (07-29-23 @ 13:15)  Culture Results:   No growth at 72 Hours (07-29-23 @ 13:00)      RADIOLOGY:

## 2023-08-01 NOTE — CHART NOTE - NSCHARTNOTEFT_GEN_A_CORE
Pigtail Catheter Attempt     Site and location was prepped and draped. Ultrasound was used for guidance. Site was numbed using 1% lidocaine. Pigtail catheter needle was introduced into likely location of effusion without withdrawal of fluid. Attempts were made to aspirate pleural fluid, but were unsuccessful. The procedure was aborted.     Plan for flexible bronchoscopy, LVATS, drainage of effusion/empyema tomorrow (8/2) with Dr. Orellana.

## 2023-08-01 NOTE — PROGRESS NOTE ADULT - ASSESSMENT
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 Interval CXR from admission showing worsening opacification of left hemithorax.    #Left pleural effusion:  - bedside POCUS showing complex loculated left sided pleural effusion with septations and sediment  - may represent infectious process vs malignant effusion  - likely cause of patients left sided pleuritic chest pain    Recommendations:  - s/p thoracentesis on left with serous opaque yellow fluid aspirated--> only able to remove 300 cc of fluid  - there remains additional loculated effusion with anterior pocket very medial to the sternum--> given location of anterior pocket, recommend CT surgery eval for intervention (chest tube vs vats)  - prelim studies with low glucose and high neutrophil count concerning for empyema (with possible component of underlying chronic effusion); pending gram stain and culture  - c/w abx for now   - patient with repeat CT showing near complete left sided atelectasis suspect from mucous plug  - may require eventual bronchoscopic evaluation    Discussed with Dr. Leary

## 2023-08-01 NOTE — PROGRESS NOTE ADULT - SUBJECTIVE AND OBJECTIVE BOX
DATE OF SERVICE: 08-01-23 @ 22:23    Patient is a 67y old  Male who presents with a chief complaint of celiac artery stenosis, c/f median arcuate ligament syndrome; renal mass (30 Jul 2023 11:48)  Patient was seen and examined.chart reviewed,.Admitted with cough,chest discomfort,abs painx 1-2 days,  Left sided CP and dyspnea reported. Chest X Ray shows worsening in left sided effusion. Awaiting thoracocentesis    REVIEW OF SYSTEMS:  CONSTITUTIONAL: No fever  RESPIRATORY: No cough, hemoptysis +shortness of breath  CARDIOVASCULAR: No palpitations, dizziness, or leg swelling  GASTROINTESTINAL: No nausea, vomiting, hematemesis  GENITOURINARY: No dysuria, frequency, hematuria   NEUROLOGICAL: No headaches, no dizziness  MUSCULOSKELETAL: No joint pain or swelling;     INTERVAL HPI/OVERNIGHT EVENTS:      LABS:                        12.5   16.64 )-----------( 332      ( 30 Jul 2023 07:16 )             39.4     07-30    132<L>  |  97<L>  |  18  ----------------------------<  255<H>  4.2   |  22  |  0.73    Ca    9.0      30 Jul 2023 07:16    TPro  8.4<H>  /  Alb  4.3  /  TBili  0.5  /  DBili  x   /  AST  14  /  ALT  17  /  AlkPhos  90  07-29    PT/INR - ( 29 Jul 2023 14:00 )   PT: 11.2 sec;   INR: 1.00 ratio         PTT - ( 30 Jul 2023 00:05 )  PTT:55.1 sec  Urinalysis Basic - ( 30 Jul 2023 07:16 )    Color: x / Appearance: x / SG: x / pH: x  Gluc: 255 mg/dL / Ketone: x  / Bili: x / Urobili: x   Blood: x / Protein: x / Nitrite: x   Leuk Esterase: x / RBC: x / WBC x   Sq Epi: x / Non Sq Epi: x / Bacteria: x      CAPILLARY BLOOD GLUCOSE      POCT Blood Glucose.: 252 mg/dL (30 Jul 2023 16:59)  POCT Blood Glucose.: 282 mg/dL (30 Jul 2023 11:48)  POCT Blood Glucose.: 263 mg/dL (30 Jul 2023 09:02)  POCT Blood Glucose.: 228 mg/dL (30 Jul 2023 07:32)  POCT Blood Glucose.: 319 mg/dL (30 Jul 2023 00:55)  POCT Blood Glucose.: 296 mg/dL (29 Jul 2023 20:21)        Urinalysis Basic - ( 30 Jul 2023 07:16 )    Color: x / Appearance: x / SG: x / pH: x  Gluc: 255 mg/dL / Ketone: x  / Bili: x / Urobili: x   Blood: x / Protein: x / Nitrite: x   Leuk Esterase: x / RBC: x / WBC x   Sq Epi: x / Non Sq Epi: x / Bacteria: x        PAST MEDICAL & SURGICAL HISTORY:  Hypertension      Diabetes mellitus      HLD (hyperlipidemia)      No significant past surgical history          MEDICATIONS  (STANDING):  amLODIPine   Tablet 5 milliGRAM(s) Oral daily  aspirin  chewable 81 milliGRAM(s) Oral daily  atorvastatin 10 milliGRAM(s) Oral at bedtime  cyclobenzaprine 10 milliGRAM(s) Oral at bedtime  dextrose 5%. 1000 milliLiter(s) (50 mL/Hr) IV Continuous <Continuous>  dextrose 5%. 1000 milliLiter(s) (100 mL/Hr) IV Continuous <Continuous>  dextrose 50% Injectable 12.5 Gram(s) IV Push once  dextrose 50% Injectable 25 Gram(s) IV Push once  dextrose 50% Injectable 25 Gram(s) IV Push once  glucagon  Injectable 1 milliGRAM(s) IntraMuscular once  hydrALAZINE 50 milliGRAM(s) Oral two times a day  insulin lispro (ADMELOG) corrective regimen sliding scale   SubCutaneous three times a day before meals  insulin lispro (ADMELOG) corrective regimen sliding scale   SubCutaneous at bedtime  latanoprost 0.005% Ophthalmic Solution 1 Drop(s) Both EYES at bedtime  losartan 100 milliGRAM(s) Oral daily  melatonin 9 milliGRAM(s) Oral at bedtime    MEDICATIONS  (PRN):  acetaminophen     Tablet .. 650 milliGRAM(s) Oral every 6 hours PRN Temp greater or equal to 38C (100.4F), Mild Pain (1 - 3)  aluminum hydroxide/magnesium hydroxide/simethicone Suspension 30 milliLiter(s) Oral every 4 hours PRN Dyspepsia  dextrose Oral Gel 15 Gram(s) Oral once PRN Blood Glucose LESS THAN 70 milliGRAM(s)/deciliter  HYDROmorphone  Injectable 0.5 milliGRAM(s) IV Push every 4 hours PRN breakthrough pain  nitroglycerin     SubLingual 0.4 milliGRAM(s) SubLingual every 5 minutes PRN Chest Pain  ondansetron Injectable 4 milliGRAM(s) IV Push every 8 hours PRN Nausea and/or Vomiting  oxycodone    5 mG/acetaminophen 325 mG 1 Tablet(s) Oral every 6 hours PRN Moderate Pain (4 - 6)  oxycodone    5 mG/acetaminophen 325 mG 2 Tablet(s) Oral every 4 hours PRN Severe Pain (7 - 10)        RADIOLOGY & ADDITIONAL TESTS:    Imaging Personally Reviewed:  [ ] YES  [ ] NO    Consultant(s) Notes Reviewed:  [x ] YES  [ ] NO    PHYSICAL EXAM:  GENERAL: Alert and awake lying in bed in no distress  HEAD:  Atraumatic, Normocephalic  EYES: EOMI, MADALYN, conjunctiva and sclera clear  NECK: Supple, No JVD, Normal thyroid  NERVOUS SYSTEM:  Alert & Oriented X3, Motor and sensory systems are intact,   CHEST/LUNG: Bilateral clear breath sounds, no rhochi, no wheezing, no crepitations,  HEART: Regular rate and rhythm; No murmurs, rubs, or gallops  ABDOMEN: Soft, Nontender, Nondistended; Bowel sounds present  EXTREMITIES:   Peripheral Pulses are palpable, no  edema        Care Discussed with Consultants/Other Providers [x ] YES  [ ] NO      Code Status: [] Full Code [] DNR [] DNI [] Goals of Care:   Disposition: [] ICU [] Stroke Unit [] RCU []PCU []Floor [] Discharge Home         [id number] HighP

## 2023-08-01 NOTE — CHART NOTE - NSCHARTNOTEFT_GEN_A_CORE
attempted L PTC placement at bedside, procedure aborted - refer to procedure note  urgent portable CXR ordered now, will follow  d/w Dr Orellana  pt booked for FB, LVATS, drainage of empyema tomorrow  please document medical optimization to proceed to OR  NPO p MN  valid type and screen  valid coagulation profile    May MARRERO [id number]

## 2023-08-01 NOTE — PROGRESS NOTE ADULT - ATTENDING COMMENTS
67 year old male with DM2, HTN, TAO on BiPAP here with new left pleural effusion and left renal mass. Developed complete opacification of left hemithorax, not present on initial CT. Suspect possible post-obstructive process contributing. There is complexity within the left pleural space on bedside ultrasound. Differential includes infectious process vs malignant effusion.     s/p thoracentesis at bedside today. only able to drain approx 300 mL.  seen by CT surgery. plan for bronchoscopy and VATS tomorrow.  follow up pleural fluid studies  continue empiric antibiotics  continue BiPAP qHS for TAO 67 year old male with DM2, HTN, TAO on BiPAP here with new left pleural effusion and left renal mass. Developed complete opacification of left hemithorax, not present on initial CT. Suspect possible post-obstructive process contributing. There is complexity within the left pleural space on bedside ultrasound. Differential include empyema vs malignant effusion.     s/p thoracentesis at bedside today. only able to drain approx 300 mL.  seen by CT surgery. plan for bronchoscopy and VATS tomorrow.  follow up pleural fluid studies  continue empiric antibiotics  continue BiPAP qHS for TAO

## 2023-08-01 NOTE — CONSULT NOTE ADULT - ATTENDING COMMENTS
Patient with chest pain, cta identified stenotic celiac axis adjacent to median arcuate ligament. Current symptoms unlikely related to celiac axis stenosis likely incidental finding for workup of chest pain. Celiac is open with SMA and BEN open, no signs of abdominal ischemia.  recommend  f/u as outpatient for workup for MALS  no acute surgical intervention    I have personally interviewed and examined this patient, reviewed pertinent labs and imaging, and discussed the case with colleagues, residents, and physician assistants on B Team rounds.    The active care issues are:  1. r/o MALS    The Acute Care Surgery (B Team) Attending Group Practice:  Dr. China Rubio, Dr. Mark Reyes, Dr. Michael Manzo, Dr. Humble Gordon,     urgent issues - spectra [id number]  nonurgent issues - [phone]  patient appointments or afterhours - [phone]
patient with loculated left pleural effusion.    1. needs drainage.    2. if unsuccessful percutaneously would suggest decortication in the OR.   3. NPO after MN  4. iv abx
67 year old male with DM2, HTN, TAO on CPAP here with new left pleural effusion and left renal mass. Developed complete opacification of left hemithorax, not present on initial CT. Suspect post-obstructive process contributing. There is complexity within the left pleural space on bedside ultrasound. Differential includes infectious process vs malignant effusion.     Discussed diagnostic and therapeutic drainage however patient refused despite extensive discussion. He agreed to re-visit the issue tomorrow.   Airway clearance. Bonchodilators. Add 3% nebulized saline Q12H.   Antibiotics as per ID.  May require bronchoscopy if no improvement.   Repeat CXR in AM.   Will continue to follow.

## 2023-08-01 NOTE — PROGRESS NOTE ADULT - ASSESSMENT
67 year old male with htn and DM presented with left sided chest pain.  Imaging with left sided opacities and ? worsening left sided effusion.  Associated leukocytosis.     1) Shortness of breath with cough  S/p thoracentesis  Concern for empyema/underlying pneumonia    Thoracic surgery evaluating- plan for vats  Continue zosyn/ azithromycin  Stop azithromycin if urine legionella is negative      2) Renal Mass-  Solid exophytic left renal mass and primary renal neoplasm cannot be   excluded. Additional indeterminate right renal lesion measuring 1.8 x 1.4   cm. Urology evaluation .     3) Leukocytosis-   Concern for left sided Pulmonary infection    4) Elevated lactate  blood cultures without growth to date  continue to trend    5) Abnormal imaging  stenosis of celiac axis  Surgery following

## 2023-08-01 NOTE — CHART NOTE - NSCHARTNOTEFT_GEN_A_CORE
: Long    INDICATION: Hyp    PROCEDURE:  [ ] LIMITED ECHO  [ ] LIMITED CHEST  [ ] LIMITED RETROPERITONEAL  [ ] LIMITED ABDOMINAL  [ ] LIMITED DVT  [ ] NEEDLE GUIDANCE VASCULAR  [ ] NEEDLE GUIDANCE THORACENTESIS  [ ] NEEDLE GUIDANCE PARACENTESIS  [ ] NEEDLE GUIDANCE PERICARDIOCENTESIS  [ ] OTHER    FINDINGS:      INTERPRETATION:    Images stored on [employer]. : Inez Leary    INDICATION: Pleural effusions    PROCEDURE:  [ ] LIMITED ECHO  [ X] LIMITED CHEST  [ ] LIMITED RETROPERITONEAL  [ ] LIMITED ABDOMINAL  [ ] LIMITED DVT  [ ] NEEDLE GUIDANCE VASCULAR  [ ] NEEDLE GUIDANCE THORACENTESIS  [ ] NEEDLE GUIDANCE PARACENTESIS  [ ] NEEDLE GUIDANCE PERICARDIOCENTESIS  [ ] OTHER    FINDINGS:  Complex left sided pleural effusion with loculation in the posterior and anterior chest. S/p drainage of posterior pocket with anterior pocket remaining with septations.    INTERPRETATION:  S/p thoracentesis of posterior pocket. CT eval for anterior pocket as location proxmial to midline and apex of the lung.    Images stored on [employer]. : Jayson    INDICATION: Pleural effusion, left    PROCEDURE:    [X] LIMITED CHEST      FINDINGS:  Complex left sided pleural effusion with loculation anteriorly and posteriorly. a  Following drainage of posterior pocket with thoracentesis, anterior pocket persists  Anterior pleural space appears complex with multiple septations and debris present.    INTERPRETATION:  Complex left sided effusion. Differential includes empyema and malignant effusion.    Images stored on [employer].    Attending Attestation:    I was present during the key portions of the procedure and immediately available during the entire procedure.    Estuardo Leary MD  Attending  Pulmonary & Critical Care Medicine

## 2023-08-01 NOTE — PROVIDER CONTACT NOTE (CRITICAL VALUE NOTIFICATION) - ACTION/TREATMENT ORDERED:
Blood cultures sents, VS monitored
MARYSOL Connolly made aware. No interventions given at this time.

## 2023-08-01 NOTE — PROGRESS NOTE ADULT - ASSESSMENT
67 yr old male with a pmh of HTN, HLD, T2DM on metformin, TAO on BiPAP who presents to the ED with acute, 10/10, sharp, intermittent stabbing left sided abdominal and left axillary/back pain.    EKG - NSR poor Rwave progression     1) Epigastric pain   - EKG shows NSR poor R wave progression   -ct chest 7/29 showed Cardiomegaly with left ventricular dilatation with some concentric   hypertrophy. would get 2d echo to assess LV function  -CT shows celiac artery stenosis and renal mass, pt to get MRI     2) HTN   - cont hydral, losartan and amlodipine     3) Cough/SOB  - RVP-  neg  - CXR  complete white out of the left hemithorax likely secondary to worsening left pleural effusion  -started on abx  -CT Chest The lobar bronchi of the left lung are opacified. There is complete atelectasis of the left lung with a large left pleural effusion. Redemonstrated solid exophytic mass of left kidney. thoracic surgery on board, planned for pigtail placement   -f/u pulm recs

## 2023-08-01 NOTE — CONSULT NOTE ADULT - SUBJECTIVE AND OBJECTIVE BOX
HPI:  67 yr old male with a pmh of HTN, HLD, T2DM on metformin, TAO on BiPAP who presents to the ED with acute, 10/10, sharp, intermittent stabbing left sided abdominal and left axillary/back pain. Pt reports the pain woke him up from his sleep and he took some melatonin to go back to sleep. When he woke up in the morning he still had the pain and it was not easing therefore he decided to present to the ED for further evaluation. Reports difficulty in catching his breathe 2/2 pain. Denies  headache, dizziness, chest pain, palpitations, SOB, joint pain, diarrhea/constipation, urinary symptoms. CT scan on the 29th showed small pleural effusion. Repeat yesterday showed large effusion with complete atelectasis of the "L" lung and mediastinal shift. Pulmonary did a POCUS showing loculated effusion with multiseptations. They completed a thoracentesis (8/1) withdrawing 200cc of fluid which came back low glucose and high LDH.     Vitals: T 99.9, , /88, RR 24 satting 98% on 3L NC (29 Jul 2023 21:52)    PAST MEDICAL & SURGICAL HISTORY:  Hypertension  Diabetes mellitus  HLD (hyperlipidemia)  No significant past surgical history    REVIEW OF SYSTEMS (negative except noted in HPI)    MEDICATIONS  (STANDING):  amLODIPine   Tablet 5 milliGRAM(s) Oral daily  aspirin  chewable 81 milliGRAM(s) Oral daily  atorvastatin 10 milliGRAM(s) Oral at bedtime  azithromycin  IVPB 500 milliGRAM(s) IV Intermittent every 24 hours  cyclobenzaprine 10 milliGRAM(s) Oral at bedtime  dextrose 5%. 1000 milliLiter(s) (50 mL/Hr) IV Continuous <Continuous>  dextrose 5%. 1000 milliLiter(s) (100 mL/Hr) IV Continuous <Continuous>  dextrose 50% Injectable 12.5 Gram(s) IV Push once  dextrose 50% Injectable 25 Gram(s) IV Push once  dextrose 50% Injectable 25 Gram(s) IV Push once  glucagon  Injectable 1 milliGRAM(s) IntraMuscular once  hydrALAZINE 50 milliGRAM(s) Oral two times a day  insulin glargine Injectable (LANTUS) 8 Unit(s) SubCutaneous at bedtime  insulin lispro (ADMELOG) corrective regimen sliding scale   SubCutaneous three times a day before meals  insulin lispro (ADMELOG) corrective regimen sliding scale   SubCutaneous at bedtime  latanoprost 0.005% Ophthalmic Solution 1 Drop(s) Both EYES at bedtime  losartan 100 milliGRAM(s) Oral daily  melatonin 9 milliGRAM(s) Oral at bedtime  piperacillin/tazobactam IVPB.. 3.375 Gram(s) IV Intermittent every 8 hours  polyethylene glycol 3350 17 Gram(s) Oral once  senna 2 Tablet(s) Oral at bedtime    MEDICATIONS  (PRN):  acetaminophen     Tablet .. 650 milliGRAM(s) Oral every 6 hours PRN Temp greater or equal to 38C (100.4F), Mild Pain (1 - 3)  aluminum hydroxide/magnesium hydroxide/simethicone Suspension 30 milliLiter(s) Oral every 4 hours PRN Dyspepsia  bisacodyl 5 milliGRAM(s) Oral every 12 hours PRN Constipation  dextrose Oral Gel 15 Gram(s) Oral once PRN Blood Glucose LESS THAN 70 milliGRAM(s)/deciliter  guaiFENesin Oral Liquid (Sugar-Free) 100 milliGRAM(s) Oral every 6 hours PRN Cough  HYDROmorphone  Injectable 0.5 milliGRAM(s) IV Push every 4 hours PRN breakthrough pain  levalbuterol Inhalation 0.63 milliGRAM(s) Inhalation every 6 hours PRN SOB/Wheezing  nitroglycerin     SubLingual 0.4 milliGRAM(s) SubLingual every 5 minutes PRN Chest Pain  ondansetron Injectable 4 milliGRAM(s) IV Push every 8 hours PRN Nausea and/or Vomiting  oxycodone    5 mG/acetaminophen 325 mG 1 Tablet(s) Oral every 6 hours PRN Moderate Pain (4 - 6)  oxycodone    5 mG/acetaminophen 325 mG 2 Tablet(s) Oral every 4 hours PRN Severe Pain (7 - 10)      Allergies  No Known Allergies    SOCIAL HISTORY:  Smoking Hx: denies  Etoh Hx: denies  IVDA Hx: denies    FAMILY HISTORY:  FH: breast cancer (Sibling)    Vital Signs Last 24 Hrs  T(C): 37.1 (01 Aug 2023 11:52), Max: 37.1 (01 Aug 2023 11:52)  T(F): 98.7 (01 Aug 2023 11:52), Max: 98.7 (01 Aug 2023 11:52)  HR: 105 (01 Aug 2023 11:52) (105 - 120)  BP: 106/78 (01 Aug 2023 11:52) (106/68 - 133/91)  BP(mean): --  RR: 19 (01 Aug 2023 11:52) (17 - 19)  SpO2: 96% (01 Aug 2023 11:52) (94% - 98%)  Parameters below as of 01 Aug 2023 11:52  Patient On (Oxygen Delivery Method): nasal cannula  O2 Flow (L/min): 2    General: WN/WD NAD  Neurology: Awake, nonfocal, COPPOLA x 4  Eyes: Scleras clear, Gross vision intact  ENT: Gross hearing intact, grossly patent pharynx, no stridor  Neck: Neck supple, trachea midline, No JVD,   Respiratory: Respirations unlabored. Bilateral chest rise.   CV: RRR  Abdominal: Soft, NT, ND   Extremities: No edema  Skin: No Rashes, Hematoma, Ecchymosis  Psych: Oriented x 3, normal affect    LABS:                        11.6   23.66 )-----------( 367      ( 01 Aug 2023 06:36 )             36.3     08-01    130<L>  |  93<L>  |  31<H>  ----------------------------<  274<H>  3.9   |  21<L>  |  0.97    Ca    9.2      01 Aug 2023 06:36  Phos  3.3     07-31  Mg     2.00     07-31    TPro  7.8  /  Alb  3.3  /  TBili  0.9  /  DBili  x   /  AST  17  /  ALT  24  /  AlkPhos  84  08-01      Urinalysis Basic - ( 01 Aug 2023 06:36 )    Color: x / Appearance: x / SG: x / pH: x  Gluc: 274 mg/dL / Ketone: x  / Bili: x / Urobili: x   Blood: x / Protein: x / Nitrite: x   Leuk Esterase: x / RBC: x / WBC x   Sq Epi: x / Non Sq Epi: x / Bacteria: x    RADIOLOGY & ADDITIONAL STUDIES:    ASSESSMENT:   67 yr old male with a pmh of HTN, HLD, T2DM on metformin, TAO on BiPAP who presents to the ED with acute, 10/10, sharp, intermittent stabbing left sided abdominal and left axillary/back pain. Pt reports the pain woke him up from his sleep and he took some melatonin to go back to sleep. When he woke up in the morning he still had the pain and it was not easing therefore he decided to present to the ED for further evaluation. Reports difficulty in catching his breathe 2/2 pain. Denies  headache, dizziness, chest pain, palpitations, SOB, joint pain, diarrhea/constipation, urinary symptoms. CT scan on the 29th showed small pleural effusion. Repeat yesterday showed large effusion with complete atelectasis of the "L" lung and mediastinal shift. Patient also tachycardic with elevated wbc count and increased O2 requirements. Pulmonary did a POCUS showing loculated effusion with multiseptations. They completed a thoracentesis (8/1) withdrawing 200cc of fluid which came back low glucose and high LDH.     We were consulted for management of "L" pleural effusion    Plan:   -Pigtail catheter placement for drainage of effusion  -CT to waterseal    -Monitor fluid output  -Will follow     Plan discussed with Dr. Orellana

## 2023-08-01 NOTE — PROCEDURAL SAFETY CHECKLIST WITH OR WITHOUT SEDATION - NSPTSPECIFCONCERNSD_GEN_ALL_CORE
no
no
Complex Repair And Bilobe Flap Text: The defect edges were debeveled with a #15 scalpel blade.  The primary defect was closed partially with a complex linear closure.  Given the location of the remaining defect, shape of the defect and the proximity to free margins a bilobe flap was deemed most appropriate for complete closure of the defect.  Using a sterile surgical marker, an appropriate advancement flap was drawn incorporating the defect and placing the expected incisions within the relaxed skin tension lines where possible.    The area thus outlined was incised deep to adipose tissue with a #15 scalpel blade.  The skin margins were undermined to an appropriate distance in all directions utilizing iris scissors.

## 2023-08-01 NOTE — PROGRESS NOTE ADULT - SUBJECTIVE AND OBJECTIVE BOX
Didier Cotton MD  Interventional Cardiology / Advance Heart Failure and Cardiac Transplant Specialist  [place] Office : [phone] [place] [id number]  Tel:   [place] Office : [address] [facility] [address]  Tel: [phone]      Subjective/Overnight events: Pt is lying in bed not in distress  	  MEDICATIONS:  amLODIPine   Tablet 5 milliGRAM(s) Oral daily  aspirin  chewable 81 milliGRAM(s) Oral daily  hydrALAZINE 50 milliGRAM(s) Oral two times a day  losartan 100 milliGRAM(s) Oral daily  nitroglycerin     SubLingual 0.4 milliGRAM(s) SubLingual every 5 minutes PRN    azithromycin  IVPB 500 milliGRAM(s) IV Intermittent every 24 hours  piperacillin/tazobactam IVPB.. 3.375 Gram(s) IV Intermittent every 8 hours    guaiFENesin Oral Liquid (Sugar-Free) 100 milliGRAM(s) Oral every 6 hours PRN  levalbuterol Inhalation 0.63 milliGRAM(s) Inhalation every 6 hours PRN    acetaminophen     Tablet .. 650 milliGRAM(s) Oral every 6 hours PRN  cyclobenzaprine 10 milliGRAM(s) Oral at bedtime  HYDROmorphone  Injectable 0.5 milliGRAM(s) IV Push every 4 hours PRN  melatonin 9 milliGRAM(s) Oral at bedtime  ondansetron Injectable 4 milliGRAM(s) IV Push every 8 hours PRN  oxycodone    5 mG/acetaminophen 325 mG 1 Tablet(s) Oral every 6 hours PRN  oxycodone    5 mG/acetaminophen 325 mG 2 Tablet(s) Oral every 4 hours PRN    aluminum hydroxide/magnesium hydroxide/simethicone Suspension 30 milliLiter(s) Oral every 4 hours PRN  bisacodyl 5 milliGRAM(s) Oral every 12 hours PRN  polyethylene glycol 3350 17 Gram(s) Oral once  senna 2 Tablet(s) Oral at bedtime    atorvastatin 10 milliGRAM(s) Oral at bedtime  dextrose 50% Injectable 12.5 Gram(s) IV Push once  dextrose 50% Injectable 25 Gram(s) IV Push once  dextrose 50% Injectable 25 Gram(s) IV Push once  dextrose Oral Gel 15 Gram(s) Oral once PRN  glucagon  Injectable 1 milliGRAM(s) IntraMuscular once  insulin glargine Injectable (LANTUS) 8 Unit(s) SubCutaneous at bedtime  insulin lispro (ADMELOG) corrective regimen sliding scale   SubCutaneous three times a day before meals  insulin lispro (ADMELOG) corrective regimen sliding scale   SubCutaneous at bedtime    dextrose 5%. 1000 milliLiter(s) IV Continuous <Continuous>  dextrose 5%. 1000 milliLiter(s) IV Continuous <Continuous>  latanoprost 0.005% Ophthalmic Solution 1 Drop(s) Both EYES at bedtime      PAST MEDICAL/SURGICAL HISTORY  PAST MEDICAL & SURGICAL HISTORY:  Hypertension      Diabetes mellitus      HLD (hyperlipidemia)      No significant past surgical history          SOCIAL HISTORY: Substance Use (street drugs): ( x ) never used  (  ) other:    FAMILY HISTORY:  FH: breast cancer (Sibling)        PHYSICAL EXAM:  T(C): 37.1 (08-01-23 @ 11:52), Max: 37.1 (08-01-23 @ 11:52)  HR: 101 (08-01-23 @ 15:41) (101 - 120)  BP: 106/78 (08-01-23 @ 11:52) (106/68 - 127/93)  RR: 19 (08-01-23 @ 11:52) (18 - 19)  SpO2: 95% (08-01-23 @ 15:41) (95% - 98%)  Wt(kg): --  I&O's Summary        GENERAL: NAD   EYES: conjunctiva and sclera clear  ENMT: No tonsillar erythema, exudates, or enlargement;  Cardiovascular: Normal S1 S2, No JVD, No murmurs, No edema  Respiratory: diminished  Gastrointestinal:  Soft, Non-tender, + BS	  Extremities: no edema                 11.6   23.66 )-----------( 367      ( 01 Aug 2023 06:36 )             36.3     08-01    130<L>  |  93<L>  |  31<H>  ----------------------------<  274<H>  3.9   |  21<L>  |  0.97    Ca    9.2      01 Aug 2023 06:36  Phos  3.3     07-31  Mg     2.00     07-31    TPro  7.8  /  Alb  3.3  /  TBili  0.9  /  DBili  x   /  AST  17  /  ALT  24  /  AlkPhos  84  08-01    proBNP:   Lipid Profile:   HgA1c:   TSH:     Consultant(s) Notes Reviewed:  [x ] YES  [ ] NO    Care Discussed with Consultants/Other Providers [ x] YES  [ ] NO    Imaging Personally Reviewed independently:  [x] YES  [ ] NO    All labs, radiologic studies, vitals, orders and medications list reviewed. Patient is seen and examined at bedside. Case discussed with medical team.

## 2023-08-01 NOTE — PROGRESS NOTE ADULT - SUBJECTIVE AND OBJECTIVE BOX
CHIEF COMPLAINT: ches tpain    Interval Events:    REVIEW OF SYSTEMS:  Constitutional: [ ] negative [ ] fevers [ ] chills [ ] weight loss [ ] weight gain  HEENT: [ ] negative [ ] dry eyes [ ] eye irritation [ ] postnasal drip [ ] nasal congestion  CV: [ ] negative  [ ] chest pain [ ] orthopnea [ ] palpitations [ ] murmur  Resp: [ ] negative [ ] cough [ ] shortness of breath [ ] dyspnea [ ] wheezing [ ] sputum [ ] hemoptysis  GI: [ ] negative [ ] nausea [ ] vomiting [ ] diarrhea [ ] constipation [ ] abd pain [ ] dysphagia   : [ ] negative [ ] dysuria [ ] nocturia [ ] hematuria [ ] increased urinary frequency  Musculoskeletal: [ ] negative [ ] back pain [ ] myalgias [ ] arthralgias [ ] fracture  Skin: [ ] negative [ ] rash [ ] itch  Neurological: [ ] negative [ ] headache [ ] dizziness [ ] syncope [ ] weakness [ ] numbness  Psychiatric: [ ] negative [ ] anxiety [ ] depression  Endocrine: [ ] negative [ ] diabetes [ ] thyroid problem  Hematologic/Lymphatic: [ ] negative [ ] anemia [ ] bleeding problem  Allergic/Immunologic: [ ] negative [ ] itchy eyes [ ] nasal discharge [ ] hives [ ] angioedema  [ ] All other systems negative  [ ] Unable to assess ROS because ________    OBJECTIVE:  ICU Vital Signs Last 24 Hrs  T(C): 36.4 (01 Aug 2023 05:00), Max: 36.7 (31 Jul 2023 14:35)  T(F): 97.6 (01 Aug 2023 05:00), Max: 98 (31 Jul 2023 14:35)  HR: 111 (01 Aug 2023 05:00) (110 - 120)  BP: 127/93 (01 Aug 2023 05:00) (106/68 - 138/94)  BP(mean): --  ABP: --  ABP(mean): --  RR: 18 (01 Aug 2023 05:00) (17 - 19)  SpO2: 98% (01 Aug 2023 05:00) (94% - 98%)    O2 Parameters below as of 01 Aug 2023 05:00  Patient On (Oxygen Delivery Method): nasal cannula  O2 Flow (L/min): 2            CAPILLARY BLOOD GLUCOSE      POCT Blood Glucose.: 360 mg/dL (31 Jul 2023 21:53)      PHYSICAL EXAM:  General:   HEENT:   Lymph Nodes:  Neck:   Respiratory:   Cardiovascular:   Abdomen:   Extremities:   Skin:   Neurological:  Psychiatry:    HOSPITAL MEDICATIONS:  aspirin  chewable 81 milliGRAM(s) Oral daily    azithromycin  IVPB 500 milliGRAM(s) IV Intermittent every 24 hours  piperacillin/tazobactam IVPB.. 3.375 Gram(s) IV Intermittent every 8 hours    amLODIPine   Tablet 5 milliGRAM(s) Oral daily  hydrALAZINE 50 milliGRAM(s) Oral two times a day  losartan 100 milliGRAM(s) Oral daily  nitroglycerin     SubLingual 0.4 milliGRAM(s) SubLingual every 5 minutes PRN    atorvastatin 10 milliGRAM(s) Oral at bedtime  dextrose 50% Injectable 25 Gram(s) IV Push once  dextrose 50% Injectable 12.5 Gram(s) IV Push once  dextrose 50% Injectable 25 Gram(s) IV Push once  dextrose Oral Gel 15 Gram(s) Oral once PRN  glucagon  Injectable 1 milliGRAM(s) IntraMuscular once  insulin lispro (ADMELOG) corrective regimen sliding scale   SubCutaneous three times a day before meals  insulin lispro (ADMELOG) corrective regimen sliding scale   SubCutaneous at bedtime    guaiFENesin Oral Liquid (Sugar-Free) 100 milliGRAM(s) Oral every 6 hours PRN  levalbuterol Inhalation 0.63 milliGRAM(s) Inhalation every 6 hours PRN    acetaminophen     Tablet .. 650 milliGRAM(s) Oral every 6 hours PRN  cyclobenzaprine 10 milliGRAM(s) Oral at bedtime  HYDROmorphone  Injectable 0.5 milliGRAM(s) IV Push every 4 hours PRN  melatonin 9 milliGRAM(s) Oral at bedtime  ondansetron Injectable 4 milliGRAM(s) IV Push every 8 hours PRN  oxycodone    5 mG/acetaminophen 325 mG 1 Tablet(s) Oral every 6 hours PRN  oxycodone    5 mG/acetaminophen 325 mG 2 Tablet(s) Oral every 4 hours PRN    aluminum hydroxide/magnesium hydroxide/simethicone Suspension 30 milliLiter(s) Oral every 4 hours PRN  senna 2 Tablet(s) Oral at bedtime        dextrose 5%. 1000 milliLiter(s) IV Continuous <Continuous>  dextrose 5%. 1000 milliLiter(s) IV Continuous <Continuous>      latanoprost 0.005% Ophthalmic Solution 1 Drop(s) Both EYES at bedtime        LABS:                        12.3   21.26 )-----------( 370      ( 31 Jul 2023 07:10 )             38.2     Hgb Trend: 12.3<--, 12.5<--, 11.6<--, 12.9<--  07-31    130<L>  |  95<L>  |  24<H>  ----------------------------<  297<H>  4.3   |  20<L>  |  0.96    Ca    9.2      31 Jul 2023 07:10  Phos  3.3     07-31  Mg     2.00     07-31      Creatinine Trend: 0.96<--, 0.73<--, 0.83<--    Urinalysis Basic - ( 31 Jul 2023 07:10 )    Color: x / Appearance: x / SG: x / pH: x  Gluc: 297 mg/dL / Ketone: x  / Bili: x / Urobili: x   Blood: x / Protein: x / Nitrite: x   Leuk Esterase: x / RBC: x / WBC x   Sq Epi: x / Non Sq Epi: x / Bacteria: x            MICROBIOLOGY:     RADIOLOGY:  [ ] Reviewed and interpreted by me    PULMONARY FUNCTION TESTS:    EKG: CHIEF COMPLAINT: chest pain    Interval Events: Patient seen and examined at bedside. Amenable to thoracentesis and possible pigtail catheter placement today. Reports his pain has improved.     REVIEW OF SYSTEMS:  Constitutional: X[ ] negative [ ] fevers [ ] chills [ ] weight loss [ ] weight gain  HEENT: [X ] negative [ ] dry eyes [ ] eye irritation [ ] postnasal drip [ ] nasal congestion  CV: X[ ] negative  [ ] chest pain [ ] orthopnea [ ] palpitations [ ] murmur  Resp: [ ] negative [ X] cough [ ] shortness of breath [ ] dyspnea [ ] wheezing [ ] sputum [ ] hemoptysis  GI: [ X] negative [ ] nausea [ ] vomiting [ ] diarrhea [ ] constipation [ ] abd pain [ ] dysphagia   : [X ] negative [ ] dysuria [ ] nocturia [ ] hematuria [ ] increased urinary frequency  Musculoskeletal: [X ] negative [ ] back pain [ ] myalgias [ ] arthralgias [ ] fracture  Skin: [ ] negative [ ] rash [ ] itch  Neurological: [ ] negative [ ] headache [ ] dizziness [ ] syncope [ ] weakness [ ] numbness  Psychiatric: [ ] negative [ ] anxiety [ ] depression  Endocrine: [ ] negative [ ] diabetes [ ] thyroid problem  Hematologic/Lymphatic: [ ] negative [ ] anemia [ ] bleeding problem  Allergic/Immunologic: [ ] negative [ ] itchy eyes [ ] nasal discharge [ ] hives [ ] angioedema  [ ] All other systems negative  [ ] Unable to assess ROS because ________    OBJECTIVE:  ICU Vital Signs Last 24 Hrs  T(C): 36.4 (01 Aug 2023 05:00), Max: 36.7 (31 Jul 2023 14:35)  T(F): 97.6 (01 Aug 2023 05:00), Max: 98 (31 Jul 2023 14:35)  HR: 111 (01 Aug 2023 05:00) (110 - 120)  BP: 127/93 (01 Aug 2023 05:00) (106/68 - 138/94)  BP(mean): --  ABP: --  ABP(mean): --  RR: 18 (01 Aug 2023 05:00) (17 - 19)  SpO2: 98% (01 Aug 2023 05:00) (94% - 98%)    O2 Parameters below as of 01 Aug 2023 05:00  Patient On (Oxygen Delivery Method): nasal cannula  O2 Flow (L/min): 2            CAPILLARY BLOOD GLUCOSE      POCT Blood Glucose.: 360 mg/dL (31 Jul 2023 21:53)      PHYSICAL EXAM:  General: well appearing NAD  HEENT: no icterus  Neck: supple  Respiratory: Absent BS left. Clear on right. No wheezing. No use off accessory muscles.  Cardiovascular: S1/S2, no murmur, no edema  Abdomen: soft, nontender  Extremities: no LE edema  Skin: no rashes  Neurological: AxOx3  Psychiatry: normal mood and affect    HOSPITAL MEDICATIONS:  aspirin  chewable 81 milliGRAM(s) Oral daily    azithromycin  IVPB 500 milliGRAM(s) IV Intermittent every 24 hours  piperacillin/tazobactam IVPB.. 3.375 Gram(s) IV Intermittent every 8 hours    amLODIPine   Tablet 5 milliGRAM(s) Oral daily  hydrALAZINE 50 milliGRAM(s) Oral two times a day  losartan 100 milliGRAM(s) Oral daily  nitroglycerin     SubLingual 0.4 milliGRAM(s) SubLingual every 5 minutes PRN    atorvastatin 10 milliGRAM(s) Oral at bedtime  dextrose 50% Injectable 25 Gram(s) IV Push once  dextrose 50% Injectable 12.5 Gram(s) IV Push once  dextrose 50% Injectable 25 Gram(s) IV Push once  dextrose Oral Gel 15 Gram(s) Oral once PRN  glucagon  Injectable 1 milliGRAM(s) IntraMuscular once  insulin lispro (ADMELOG) corrective regimen sliding scale   SubCutaneous three times a day before meals  insulin lispro (ADMELOG) corrective regimen sliding scale   SubCutaneous at bedtime    guaiFENesin Oral Liquid (Sugar-Free) 100 milliGRAM(s) Oral every 6 hours PRN  levalbuterol Inhalation 0.63 milliGRAM(s) Inhalation every 6 hours PRN    acetaminophen     Tablet .. 650 milliGRAM(s) Oral every 6 hours PRN  cyclobenzaprine 10 milliGRAM(s) Oral at bedtime  HYDROmorphone  Injectable 0.5 milliGRAM(s) IV Push every 4 hours PRN  melatonin 9 milliGRAM(s) Oral at bedtime  ondansetron Injectable 4 milliGRAM(s) IV Push every 8 hours PRN  oxycodone    5 mG/acetaminophen 325 mG 1 Tablet(s) Oral every 6 hours PRN  oxycodone    5 mG/acetaminophen 325 mG 2 Tablet(s) Oral every 4 hours PRN    aluminum hydroxide/magnesium hydroxide/simethicone Suspension 30 milliLiter(s) Oral every 4 hours PRN  senna 2 Tablet(s) Oral at bedtime        dextrose 5%. 1000 milliLiter(s) IV Continuous <Continuous>  dextrose 5%. 1000 milliLiter(s) IV Continuous <Continuous>      latanoprost 0.005% Ophthalmic Solution 1 Drop(s) Both EYES at bedtime        LABS:                        12.3   21.26 )-----------( 370      ( 31 Jul 2023 07:10 )             38.2     Hgb Trend: 12.3<--, 12.5<--, 11.6<--, 12.9<--  07-31    130<L>  |  95<L>  |  24<H>  ----------------------------<  297<H>  4.3   |  20<L>  |  0.96    Ca    9.2      31 Jul 2023 07:10  Phos  3.3     07-31  Mg     2.00     07-31      Creatinine Trend: 0.96<--, 0.73<--, 0.83<--    Urinalysis Basic - ( 31 Jul 2023 07:10 )    Color: x / Appearance: x / SG: x / pH: x  Gluc: 297 mg/dL / Ketone: x  / Bili: x / Urobili: x   Blood: x / Protein: x / Nitrite: x   Leuk Esterase: x / RBC: x / WBC x   Sq Epi: x / Non Sq Epi: x / Bacteria: x            MICROBIOLOGY:     RADIOLOGY:  [ ] Reviewed and interpreted by me    PULMONARY FUNCTION TESTS:    EKG: CHIEF COMPLAINT: chest pain    Interval Events: Patient seen and examined at bedside. Amenable to thoracentesis and possible pigtail catheter placement today. Reports his pain has improved.     REVIEW OF SYSTEMS:  Constitutional: X[ ] negative [ ] fevers [ ] chills [ ] weight loss [ ] weight gain  HEENT: [X ] negative [ ] dry eyes [ ] eye irritation [ ] postnasal drip [ ] nasal congestion  CV: [X ] negative  [ ] chest pain [ ] orthopnea [ ] palpitations [ ] murmur  Resp: [ ] negative [ X] cough [ ] shortness of breath [ ] dyspnea [ ] wheezing [ ] sputum [ ] hemoptysis  GI: [ X] negative [ ] nausea [ ] vomiting [ ] diarrhea [ ] constipation [ ] abd pain [ ] dysphagia   : [X ] negative [ ] dysuria [ ] nocturia [ ] hematuria [ ] increased urinary frequency  Musculoskeletal: [X ] negative [ ] back pain [ ] myalgias [ ] arthralgias [ ] fracture  Skin: [ ] negative [ ] rash [ ] itch  Neurological: [ ] negative [ ] headache [ ] dizziness [ ] syncope [ ] weakness [ ] numbness  Psychiatric: [ ] negative [ ] anxiety [ ] depression  Endocrine: [ ] negative [ ] diabetes [ ] thyroid problem  Hematologic/Lymphatic: [ ] negative [ ] anemia [ ] bleeding problem  Allergic/Immunologic: [ ] negative [ ] itchy eyes [ ] nasal discharge [ ] hives [ ] angioedema  [ ] All other systems negative  [ ] Unable to assess ROS because ________    OBJECTIVE:  ICU Vital Signs Last 24 Hrs  T(C): 36.4 (01 Aug 2023 05:00), Max: 36.7 (31 Jul 2023 14:35)  T(F): 97.6 (01 Aug 2023 05:00), Max: 98 (31 Jul 2023 14:35)  HR: 111 (01 Aug 2023 05:00) (110 - 120)  BP: 127/93 (01 Aug 2023 05:00) (106/68 - 138/94)  BP(mean): --  ABP: --  ABP(mean): --  RR: 18 (01 Aug 2023 05:00) (17 - 19)  SpO2: 98% (01 Aug 2023 05:00) (94% - 98%)    O2 Parameters below as of 01 Aug 2023 05:00  Patient On (Oxygen Delivery Method): nasal cannula  O2 Flow (L/min): 2            CAPILLARY BLOOD GLUCOSE      POCT Blood Glucose.: 360 mg/dL (31 Jul 2023 21:53)      PHYSICAL EXAM:  General: well appearing NAD  HEENT: no icterus  Neck: supple  Respiratory: Absent BS left. Clear on right. No wheezing. No use off accessory muscles.  Cardiovascular: S1/S2, no murmur, no edema  Abdomen: soft, nontender  Extremities: no LE edema  Skin: no rashes  Neurological: AxOx3  Psychiatry: normal mood and affect    HOSPITAL MEDICATIONS:  aspirin  chewable 81 milliGRAM(s) Oral daily    azithromycin  IVPB 500 milliGRAM(s) IV Intermittent every 24 hours  piperacillin/tazobactam IVPB.. 3.375 Gram(s) IV Intermittent every 8 hours    amLODIPine   Tablet 5 milliGRAM(s) Oral daily  hydrALAZINE 50 milliGRAM(s) Oral two times a day  losartan 100 milliGRAM(s) Oral daily  nitroglycerin     SubLingual 0.4 milliGRAM(s) SubLingual every 5 minutes PRN    atorvastatin 10 milliGRAM(s) Oral at bedtime  dextrose 50% Injectable 25 Gram(s) IV Push once  dextrose 50% Injectable 12.5 Gram(s) IV Push once  dextrose 50% Injectable 25 Gram(s) IV Push once  dextrose Oral Gel 15 Gram(s) Oral once PRN  glucagon  Injectable 1 milliGRAM(s) IntraMuscular once  insulin lispro (ADMELOG) corrective regimen sliding scale   SubCutaneous three times a day before meals  insulin lispro (ADMELOG) corrective regimen sliding scale   SubCutaneous at bedtime    guaiFENesin Oral Liquid (Sugar-Free) 100 milliGRAM(s) Oral every 6 hours PRN  levalbuterol Inhalation 0.63 milliGRAM(s) Inhalation every 6 hours PRN    acetaminophen     Tablet .. 650 milliGRAM(s) Oral every 6 hours PRN  cyclobenzaprine 10 milliGRAM(s) Oral at bedtime  HYDROmorphone  Injectable 0.5 milliGRAM(s) IV Push every 4 hours PRN  melatonin 9 milliGRAM(s) Oral at bedtime  ondansetron Injectable 4 milliGRAM(s) IV Push every 8 hours PRN  oxycodone    5 mG/acetaminophen 325 mG 1 Tablet(s) Oral every 6 hours PRN  oxycodone    5 mG/acetaminophen 325 mG 2 Tablet(s) Oral every 4 hours PRN    aluminum hydroxide/magnesium hydroxide/simethicone Suspension 30 milliLiter(s) Oral every 4 hours PRN  senna 2 Tablet(s) Oral at bedtime        dextrose 5%. 1000 milliLiter(s) IV Continuous <Continuous>  dextrose 5%. 1000 milliLiter(s) IV Continuous <Continuous>      latanoprost 0.005% Ophthalmic Solution 1 Drop(s) Both EYES at bedtime        LABS:                        12.3   21.26 )-----------( 370      ( 31 Jul 2023 07:10 )             38.2     Hgb Trend: 12.3<--, 12.5<--, 11.6<--, 12.9<--  07-31    130<L>  |  95<L>  |  24<H>  ----------------------------<  297<H>  4.3   |  20<L>  |  0.96    Ca    9.2      31 Jul 2023 07:10  Phos  3.3     07-31  Mg     2.00     07-31      Creatinine Trend: 0.96<--, 0.73<--, 0.83<--    Urinalysis Basic - ( 31 Jul 2023 07:10 )    Color: x / Appearance: x / SG: x / pH: x  Gluc: 297 mg/dL / Ketone: x  / Bili: x / Urobili: x   Blood: x / Protein: x / Nitrite: x   Leuk Esterase: x / RBC: x / WBC x   Sq Epi: x / Non Sq Epi: x / Bacteria: x            MICROBIOLOGY:     RADIOLOGY:  [x] Reviewed and interpreted by me  CXR 8/1/23 persistent white out of left hemithorax

## 2023-08-02 LAB
ANION GAP SERPL CALC-SCNC: 14 MMOL/L — SIGNIFICANT CHANGE UP (ref 7–14)
APTT BLD: 32.2 SEC — SIGNIFICANT CHANGE UP (ref 24.5–35.6)
BLD GP AB SCN SERPL QL: NEGATIVE — SIGNIFICANT CHANGE UP
BUN SERPL-MCNC: 28 MG/DL — HIGH (ref 7–23)
CALCIUM SERPL-MCNC: 9.2 MG/DL — SIGNIFICANT CHANGE UP (ref 8.4–10.5)
CHLORIDE SERPL-SCNC: 90 MMOL/L — LOW (ref 98–107)
CHLORIDE UR-SCNC: <20 MMOL/L — SIGNIFICANT CHANGE UP
CO2 SERPL-SCNC: 24 MMOL/L — SIGNIFICANT CHANGE UP (ref 22–31)
CREAT SERPL-MCNC: 0.84 MG/DL — SIGNIFICANT CHANGE UP (ref 0.5–1.3)
EGFR: 95 ML/MIN/1.73M2 — SIGNIFICANT CHANGE UP
GLUCOSE BLDC GLUCOMTR-MCNC: 201 MG/DL — HIGH (ref 70–99)
GLUCOSE BLDC GLUCOMTR-MCNC: 208 MG/DL — HIGH (ref 70–99)
GLUCOSE BLDC GLUCOMTR-MCNC: 252 MG/DL — HIGH (ref 70–99)
GLUCOSE BLDC GLUCOMTR-MCNC: 272 MG/DL — HIGH (ref 70–99)
GLUCOSE BLDC GLUCOMTR-MCNC: 387 MG/DL — HIGH (ref 70–99)
GLUCOSE SERPL-MCNC: 202 MG/DL — HIGH (ref 70–99)
HCT VFR BLD CALC: 37.4 % — LOW (ref 39–50)
HGB BLD-MCNC: 11.8 G/DL — LOW (ref 13–17)
INR BLD: 1.07 RATIO — SIGNIFICANT CHANGE UP (ref 0.85–1.18)
LACTATE SERPL-SCNC: 1.6 MMOL/L — SIGNIFICANT CHANGE UP (ref 0.5–2)
MAGNESIUM SERPL-MCNC: 2.6 MG/DL — SIGNIFICANT CHANGE UP (ref 1.6–2.6)
MCHC RBC-ENTMCNC: 28.7 PG — SIGNIFICANT CHANGE UP (ref 27–34)
MCHC RBC-ENTMCNC: 31.6 GM/DL — LOW (ref 32–36)
MCV RBC AUTO: 91 FL — SIGNIFICANT CHANGE UP (ref 80–100)
NRBC # BLD: 0 /100 WBCS — SIGNIFICANT CHANGE UP (ref 0–0)
NRBC # FLD: 0 K/UL — SIGNIFICANT CHANGE UP (ref 0–0)
PHOSPHATE SERPL-MCNC: 2.9 MG/DL — SIGNIFICANT CHANGE UP (ref 2.5–4.5)
PLATELET # BLD AUTO: 348 K/UL — SIGNIFICANT CHANGE UP (ref 150–400)
POTASSIUM SERPL-MCNC: 4 MMOL/L — SIGNIFICANT CHANGE UP (ref 3.5–5.3)
POTASSIUM SERPL-SCNC: 4 MMOL/L — SIGNIFICANT CHANGE UP (ref 3.5–5.3)
POTASSIUM UR-SCNC: 40.5 MMOL/L — SIGNIFICANT CHANGE UP
PROTHROM AB SERPL-ACNC: 12 SEC — SIGNIFICANT CHANGE UP (ref 9.5–13)
RBC # BLD: 4.11 M/UL — LOW (ref 4.2–5.8)
RBC # FLD: 13.3 % — SIGNIFICANT CHANGE UP (ref 10.3–14.5)
RH IG SCN BLD-IMP: NEGATIVE — SIGNIFICANT CHANGE UP
SODIUM SERPL-SCNC: 128 MMOL/L — LOW (ref 135–145)
SODIUM UR-SCNC: <20 MMOL/L — SIGNIFICANT CHANGE UP
WBC # BLD: 17.01 K/UL — HIGH (ref 3.8–10.5)
WBC # FLD AUTO: 17.01 K/UL — HIGH (ref 3.8–10.5)

## 2023-08-02 PROCEDURE — 88312 SPECIAL STAINS GROUP 1: CPT | Mod: 26

## 2023-08-02 PROCEDURE — 71045 X-RAY EXAM CHEST 1 VIEW: CPT | Mod: 26

## 2023-08-02 PROCEDURE — 99232 SBSQ HOSP IP/OBS MODERATE 35: CPT

## 2023-08-02 PROCEDURE — 88112 CYTOPATH CELL ENHANCE TECH: CPT | Mod: 26

## 2023-08-02 PROCEDURE — 88305 TISSUE EXAM BY PATHOLOGIST: CPT | Mod: 26,59

## 2023-08-02 PROCEDURE — 88305 TISSUE EXAM BY PATHOLOGIST: CPT | Mod: 26

## 2023-08-02 PROCEDURE — 99233 SBSQ HOSP IP/OBS HIGH 50: CPT

## 2023-08-02 PROCEDURE — 74182 MRI ABDOMEN W/CONTRAST: CPT | Mod: 26

## 2023-08-02 PROCEDURE — 99223 1ST HOSP IP/OBS HIGH 75: CPT | Mod: 57

## 2023-08-02 PROCEDURE — 88312 SPECIAL STAINS GROUP 1: CPT | Mod: 26,59

## 2023-08-02 PROCEDURE — 99232 SBSQ HOSP IP/OBS MODERATE 35: CPT | Mod: GC

## 2023-08-02 PROCEDURE — 32652 THORACOSCOPY REM TOTL CORTEX: CPT | Mod: LT

## 2023-08-02 DEVICE — CHEST DRAIN THORACIC ARGYLE PVC 24FR STRAIGHT: Type: IMPLANTABLE DEVICE | Site: LEFT | Status: FUNCTIONAL

## 2023-08-02 DEVICE — CHEST DRAIN THORACIC ARGYLE PVC 24FR RIGHT ANGLE: Type: IMPLANTABLE DEVICE | Site: LEFT | Status: FUNCTIONAL

## 2023-08-02 RX ORDER — HYDROMORPHONE HYDROCHLORIDE 2 MG/ML
0.5 INJECTION INTRAMUSCULAR; INTRAVENOUS; SUBCUTANEOUS ONCE
Refills: 0 | Status: DISCONTINUED | OUTPATIENT
Start: 2023-08-02 | End: 2023-08-02

## 2023-08-02 RX ORDER — METRONIDAZOLE 500 MG
500 TABLET ORAL
Refills: 0 | Status: DISCONTINUED | OUTPATIENT
Start: 2023-08-02 | End: 2023-08-10

## 2023-08-02 RX ORDER — SODIUM CHLORIDE 9 MG/ML
500 INJECTION, SOLUTION INTRAVENOUS ONCE
Refills: 0 | Status: COMPLETED | OUTPATIENT
Start: 2023-08-02 | End: 2023-08-02

## 2023-08-02 RX ORDER — SODIUM CHLORIDE 9 MG/ML
1000 INJECTION INTRAMUSCULAR; INTRAVENOUS; SUBCUTANEOUS
Refills: 0 | Status: DISCONTINUED | OUTPATIENT
Start: 2023-08-02 | End: 2023-08-02

## 2023-08-02 RX ORDER — HYDROMORPHONE HYDROCHLORIDE 2 MG/ML
30 INJECTION INTRAMUSCULAR; INTRAVENOUS; SUBCUTANEOUS
Refills: 0 | Status: DISCONTINUED | OUTPATIENT
Start: 2023-08-02 | End: 2023-08-05

## 2023-08-02 RX ORDER — NALOXONE HYDROCHLORIDE 4 MG/.1ML
0.1 SPRAY NASAL
Refills: 0 | Status: DISCONTINUED | OUTPATIENT
Start: 2023-08-02 | End: 2023-08-11

## 2023-08-02 RX ORDER — HEPARIN SODIUM 5000 [USP'U]/ML
5000 INJECTION INTRAVENOUS; SUBCUTANEOUS EVERY 8 HOURS
Refills: 0 | Status: DISCONTINUED | OUTPATIENT
Start: 2023-08-02 | End: 2023-08-11

## 2023-08-02 RX ORDER — POLYETHYLENE GLYCOL 3350 17 G/17G
17 POWDER, FOR SOLUTION ORAL DAILY
Refills: 0 | Status: DISCONTINUED | OUTPATIENT
Start: 2023-08-02 | End: 2023-08-04

## 2023-08-02 RX ORDER — CEFTRIAXONE 500 MG/1
2000 INJECTION, POWDER, FOR SOLUTION INTRAMUSCULAR; INTRAVENOUS EVERY 24 HOURS
Refills: 0 | Status: DISCONTINUED | OUTPATIENT
Start: 2023-08-02 | End: 2023-08-10

## 2023-08-02 RX ORDER — SODIUM CHLORIDE 9 MG/ML
1000 INJECTION INTRAMUSCULAR; INTRAVENOUS; SUBCUTANEOUS
Refills: 0 | Status: DISCONTINUED | OUTPATIENT
Start: 2023-08-02 | End: 2023-08-05

## 2023-08-02 RX ORDER — HYDRALAZINE HCL 50 MG
50 TABLET ORAL
Refills: 0 | Status: DISCONTINUED | OUTPATIENT
Start: 2023-08-02 | End: 2023-08-02

## 2023-08-02 RX ORDER — ONDANSETRON 8 MG/1
4 TABLET, FILM COATED ORAL EVERY 6 HOURS
Refills: 0 | Status: DISCONTINUED | OUTPATIENT
Start: 2023-08-02 | End: 2023-08-11

## 2023-08-02 RX ORDER — FENTANYL CITRATE 50 UG/ML
50 INJECTION INTRAVENOUS ONCE
Refills: 0 | Status: DISCONTINUED | OUTPATIENT
Start: 2023-08-02 | End: 2023-08-02

## 2023-08-02 RX ADMIN — PIPERACILLIN AND TAZOBACTAM 25 GRAM(S): 4; .5 INJECTION, POWDER, LYOPHILIZED, FOR SOLUTION INTRAVENOUS at 08:34

## 2023-08-02 RX ADMIN — Medication 9 MILLIGRAM(S): at 23:10

## 2023-08-02 RX ADMIN — Medication 50 MILLIGRAM(S): at 05:43

## 2023-08-02 RX ADMIN — FENTANYL CITRATE 50 MICROGRAM(S): 50 INJECTION INTRAVENOUS at 19:50

## 2023-08-02 RX ADMIN — LOSARTAN POTASSIUM 100 MILLIGRAM(S): 100 TABLET, FILM COATED ORAL at 05:43

## 2023-08-02 RX ADMIN — Medication 81 MILLIGRAM(S): at 11:48

## 2023-08-02 RX ADMIN — FENTANYL CITRATE 50 MICROGRAM(S): 50 INJECTION INTRAVENOUS at 20:00

## 2023-08-02 RX ADMIN — Medication 500 MILLIGRAM(S): at 23:10

## 2023-08-02 RX ADMIN — Medication 1: at 22:35

## 2023-08-02 RX ADMIN — Medication 2: at 08:31

## 2023-08-02 RX ADMIN — HYDROMORPHONE HYDROCHLORIDE 30 MILLILITER(S): 2 INJECTION INTRAMUSCULAR; INTRAVENOUS; SUBCUTANEOUS at 23:18

## 2023-08-02 RX ADMIN — HYDROMORPHONE HYDROCHLORIDE 0.5 MILLIGRAM(S): 2 INJECTION INTRAMUSCULAR; INTRAVENOUS; SUBCUTANEOUS at 21:00

## 2023-08-02 RX ADMIN — Medication 3: at 11:48

## 2023-08-02 RX ADMIN — SODIUM CHLORIDE 40 MILLILITER(S): 9 INJECTION INTRAMUSCULAR; INTRAVENOUS; SUBCUTANEOUS at 12:51

## 2023-08-02 RX ADMIN — HYDROMORPHONE HYDROCHLORIDE 30 MILLILITER(S): 2 INJECTION INTRAMUSCULAR; INTRAVENOUS; SUBCUTANEOUS at 21:11

## 2023-08-02 RX ADMIN — SODIUM CHLORIDE 500 MILLILITER(S): 9 INJECTION, SOLUTION INTRAVENOUS at 19:53

## 2023-08-02 RX ADMIN — CEFTRIAXONE 100 MILLIGRAM(S): 500 INJECTION, POWDER, FOR SOLUTION INTRAMUSCULAR; INTRAVENOUS at 09:29

## 2023-08-02 RX ADMIN — PIPERACILLIN AND TAZOBACTAM 25 GRAM(S): 4; .5 INJECTION, POWDER, LYOPHILIZED, FOR SOLUTION INTRAVENOUS at 01:18

## 2023-08-02 RX ADMIN — AMLODIPINE BESYLATE 5 MILLIGRAM(S): 2.5 TABLET ORAL at 05:42

## 2023-08-02 RX ADMIN — ATORVASTATIN CALCIUM 10 MILLIGRAM(S): 80 TABLET, FILM COATED ORAL at 23:11

## 2023-08-02 RX ADMIN — CYCLOBENZAPRINE HYDROCHLORIDE 10 MILLIGRAM(S): 10 TABLET, FILM COATED ORAL at 22:36

## 2023-08-02 RX ADMIN — HEPARIN SODIUM 5000 UNIT(S): 5000 INJECTION INTRAVENOUS; SUBCUTANEOUS at 22:36

## 2023-08-02 RX ADMIN — HYDROMORPHONE HYDROCHLORIDE 0.5 MILLIGRAM(S): 2 INJECTION INTRAMUSCULAR; INTRAVENOUS; SUBCUTANEOUS at 20:19

## 2023-08-02 NOTE — PRE-OP CHECKLIST - SELECT TESTS ORDERED
BMP/CBC/PT/PTT/INR/Type and Cross/Type and Screen/EKG 208 @1443/BMP/CBC/PT/PTT/INR/Type and Cross/Type and Screen/EKG/POCT Blood Glucose

## 2023-08-02 NOTE — PROGRESS NOTE ADULT - SUBJECTIVE AND OBJECTIVE BOX
Didier Cotton MD  Interventional Cardiology / Advance Heart Failure and Cardiac Transplant Specialist  [place] Office : [phone] [place] [id number]  Tel:   [place] Office : [address] [facility] [address]  Tel: [phone]      Subjective/Overnight events: Pt is lying in bed not in distress  	  MEDICATIONS:  amLODIPine   Tablet 5 milliGRAM(s) Oral daily  aspirin  chewable 81 milliGRAM(s) Oral daily  hydrALAZINE 50 milliGRAM(s) Oral two times a day  losartan 100 milliGRAM(s) Oral daily  nitroglycerin     SubLingual 0.4 milliGRAM(s) SubLingual every 5 minutes PRN    cefTRIAXone   IVPB 2000 milliGRAM(s) IV Intermittent every 24 hours  metroNIDAZOLE    Tablet 500 milliGRAM(s) Oral two times a day    guaiFENesin Oral Liquid (Sugar-Free) 100 milliGRAM(s) Oral every 6 hours PRN  levalbuterol Inhalation 0.63 milliGRAM(s) Inhalation every 6 hours PRN    acetaminophen     Tablet .. 650 milliGRAM(s) Oral every 6 hours PRN  cyclobenzaprine 10 milliGRAM(s) Oral at bedtime  HYDROmorphone  Injectable 0.5 milliGRAM(s) IV Push every 4 hours PRN  melatonin 9 milliGRAM(s) Oral at bedtime  ondansetron Injectable 4 milliGRAM(s) IV Push every 8 hours PRN  oxycodone    5 mG/acetaminophen 325 mG 1 Tablet(s) Oral every 6 hours PRN  oxycodone    5 mG/acetaminophen 325 mG 2 Tablet(s) Oral every 4 hours PRN    aluminum hydroxide/magnesium hydroxide/simethicone Suspension 30 milliLiter(s) Oral every 4 hours PRN  bisacodyl 5 milliGRAM(s) Oral every 12 hours PRN  senna 2 Tablet(s) Oral at bedtime    atorvastatin 10 milliGRAM(s) Oral at bedtime  dextrose 50% Injectable 12.5 Gram(s) IV Push once  dextrose 50% Injectable 25 Gram(s) IV Push once  dextrose 50% Injectable 25 Gram(s) IV Push once  dextrose Oral Gel 15 Gram(s) Oral once PRN  glucagon  Injectable 1 milliGRAM(s) IntraMuscular once  insulin glargine Injectable (LANTUS) 6 Unit(s) SubCutaneous at bedtime  insulin lispro (ADMELOG) corrective regimen sliding scale   SubCutaneous three times a day before meals  insulin lispro (ADMELOG) corrective regimen sliding scale   SubCutaneous at bedtime    dextrose 5%. 1000 milliLiter(s) IV Continuous <Continuous>  dextrose 5%. 1000 milliLiter(s) IV Continuous <Continuous>  latanoprost 0.005% Ophthalmic Solution 1 Drop(s) Both EYES at bedtime      PAST MEDICAL/SURGICAL HISTORY  PAST MEDICAL & SURGICAL HISTORY:  Hypertension      Diabetes mellitus      HLD (hyperlipidemia)      No significant past surgical history          SOCIAL HISTORY: Substance Use (street drugs): ( x ) never used  (  ) other:    FAMILY HISTORY:  FH: breast cancer (Sibling)          PHYSICAL EXAM:  T(C): 36.6 (08-02-23 @ 05:40), Max: 37.1 (08-01-23 @ 11:52)  HR: 100 (08-02-23 @ 07:52) (93 - 111)  BP: 138/79 (08-02-23 @ 05:40) (106/78 - 138/79)  RR: 18 (08-02-23 @ 05:40) (17 - 19)  SpO2: 95% (08-02-23 @ 07:52) (94% - 97%)  Wt(kg): --  I&O's Summary          GENERAL: NAD   EYES: conjunctiva and sclera clear  ENMT: No tonsillar erythema, exudates, or enlargement;  Cardiovascular: Normal S1 S2, No JVD, No murmurs, No edema  Respiratory: diminished  Gastrointestinal:  Soft, Non-tender, + BS	  Extremities: no edema                              11.8   17.01 )-----------( 348      ( 02 Aug 2023 06:22 )             37.4     08-02    128<L>  |  90<L>  |  28<H>  ----------------------------<  202<H>  4.0   |  24  |  0.84    Ca    9.2      02 Aug 2023 06:22  Phos  2.9     08-02  Mg     2.60     08-02    TPro  7.8  /  Alb  3.3  /  TBili  0.9  /  DBili  x   /  AST  17  /  ALT  24  /  AlkPhos  84  08-01    proBNP:   Lipid Profile:   HgA1c:   TSH:     Consultant(s) Notes Reviewed:  [x ] YES  [ ] NO    Care Discussed with Consultants/Other Providers [ x] YES  [ ] NO    Imaging Personally Reviewed independently:  [x] YES  [ ] NO    All labs, radiologic studies, vitals, orders and medications list reviewed. Patient is seen and examined at bedside. Case discussed with medical team.

## 2023-08-02 NOTE — BRIEF OPERATIVE NOTE - NSICDXBRIEFPROCEDURE_GEN_ALL_CORE_FT
PROCEDURES:  Bronchoscopy, flexible, adult 02-Aug-2023 19:03:54  Bijal Woody  Drainage of empyema of left lung with video-assisted thoracoscopic surgery (VATS) 02-Aug-2023 19:04:10  Bijal Woody  Bronchoscopy with decortication of left lung using video-assisted thoracoscopic surgery (VATS) 02-Aug-2023 19:04:20  Bijal Woody

## 2023-08-02 NOTE — PROGRESS NOTE ADULT - SUBJECTIVE AND OBJECTIVE BOX
DATE OF SERVICE: 08-02-23 @ 14:15    Patient is a 67y old  Male who presents with a chief complaint of celiac artery stenosis, c/f median arcuate ligament syndrome; renal mass   Left sided CP and dyspnea reported. Chest X Ray shows worsening in left sided effusion. AwaitingVATS    REVIEW OF SYSTEMS:  CONSTITUTIONAL: No fever  RESPIRATORY: No cough, hemoptysis +shortness of breath  CARDIOVASCULAR: No palpitations, dizziness, or leg swelling  GASTROINTESTINAL: No nausea, vomiting, hematemesis  GENITOURINARY: No dysuria, frequency, hematuria   NEUROLOGICAL: No headaches, no dizziness  MUSCULOSKELETAL: No joint pain or swelling;     INTERVAL HPI/OVERNIGHT EVENTS:      LABS:                        12.5   16.64 )-----------( 332      ( 30 Jul 2023 07:16 )             39.4     07-30    132<L>  |  97<L>  |  18  ----------------------------<  255<H>  4.2   |  22  |  0.73    Ca    9.0      30 Jul 2023 07:16    TPro  8.4<H>  /  Alb  4.3  /  TBili  0.5  /  DBili  x   /  AST  14  /  ALT  17  /  AlkPhos  90  07-29    PT/INR - ( 29 Jul 2023 14:00 )   PT: 11.2 sec;   INR: 1.00 ratio         PTT - ( 30 Jul 2023 00:05 )  PTT:55.1 sec  Urinalysis Basic - ( 30 Jul 2023 07:16 )    Color: x / Appearance: x / SG: x / pH: x  Gluc: 255 mg/dL / Ketone: x  / Bili: x / Urobili: x   Blood: x / Protein: x / Nitrite: x   Leuk Esterase: x / RBC: x / WBC x   Sq Epi: x / Non Sq Epi: x / Bacteria: x      CAPILLARY BLOOD GLUCOSE      POCT Blood Glucose.: 252 mg/dL (30 Jul 2023 16:59)  POCT Blood Glucose.: 282 mg/dL (30 Jul 2023 11:48)  POCT Blood Glucose.: 263 mg/dL (30 Jul 2023 09:02)  POCT Blood Glucose.: 228 mg/dL (30 Jul 2023 07:32)  POCT Blood Glucose.: 319 mg/dL (30 Jul 2023 00:55)  POCT Blood Glucose.: 296 mg/dL (29 Jul 2023 20:21)        Urinalysis Basic - ( 30 Jul 2023 07:16 )    Color: x / Appearance: x / SG: x / pH: x  Gluc: 255 mg/dL / Ketone: x  / Bili: x / Urobili: x   Blood: x / Protein: x / Nitrite: x   Leuk Esterase: x / RBC: x / WBC x   Sq Epi: x / Non Sq Epi: x / Bacteria: x        PAST MEDICAL & SURGICAL HISTORY:  Hypertension      Diabetes mellitus      HLD (hyperlipidemia)      No significant past surgical history          MEDICATIONS  (STANDING):  amLODIPine   Tablet 5 milliGRAM(s) Oral daily  aspirin  chewable 81 milliGRAM(s) Oral daily  atorvastatin 10 milliGRAM(s) Oral at bedtime  cyclobenzaprine 10 milliGRAM(s) Oral at bedtime  dextrose 5%. 1000 milliLiter(s) (50 mL/Hr) IV Continuous <Continuous>  dextrose 5%. 1000 milliLiter(s) (100 mL/Hr) IV Continuous <Continuous>  dextrose 50% Injectable 12.5 Gram(s) IV Push once  dextrose 50% Injectable 25 Gram(s) IV Push once  dextrose 50% Injectable 25 Gram(s) IV Push once  glucagon  Injectable 1 milliGRAM(s) IntraMuscular once  hydrALAZINE 50 milliGRAM(s) Oral two times a day  insulin lispro (ADMELOG) corrective regimen sliding scale   SubCutaneous three times a day before meals  insulin lispro (ADMELOG) corrective regimen sliding scale   SubCutaneous at bedtime  latanoprost 0.005% Ophthalmic Solution 1 Drop(s) Both EYES at bedtime  losartan 100 milliGRAM(s) Oral daily  melatonin 9 milliGRAM(s) Oral at bedtime    MEDICATIONS  (PRN):  acetaminophen     Tablet .. 650 milliGRAM(s) Oral every 6 hours PRN Temp greater or equal to 38C (100.4F), Mild Pain (1 - 3)  aluminum hydroxide/magnesium hydroxide/simethicone Suspension 30 milliLiter(s) Oral every 4 hours PRN Dyspepsia  dextrose Oral Gel 15 Gram(s) Oral once PRN Blood Glucose LESS THAN 70 milliGRAM(s)/deciliter  HYDROmorphone  Injectable 0.5 milliGRAM(s) IV Push every 4 hours PRN breakthrough pain  nitroglycerin     SubLingual 0.4 milliGRAM(s) SubLingual every 5 minutes PRN Chest Pain  ondansetron Injectable 4 milliGRAM(s) IV Push every 8 hours PRN Nausea and/or Vomiting  oxycodone    5 mG/acetaminophen 325 mG 1 Tablet(s) Oral every 6 hours PRN Moderate Pain (4 - 6)  oxycodone    5 mG/acetaminophen 325 mG 2 Tablet(s) Oral every 4 hours PRN Severe Pain (7 - 10)        RADIOLOGY & ADDITIONAL TESTS:    Imaging Personally Reviewed:  [ ] YES  [ ] NO    Consultant(s) Notes Reviewed:  [x ] YES  [ ] NO    PHYSICAL EXAM:  GENERAL: Alert and awake lying in bed in no distress  HEAD:  Atraumatic, Normocephalic  EYES: EOMI, MADALYN, conjunctiva and sclera clear  NECK: Supple, No JVD, Normal thyroid  NERVOUS SYSTEM:  Alert & Oriented X3, Motor and sensory systems are intact,   CHEST/LUNG: Bilateral clear breath sounds, no rhochi, no wheezing, no crepitations,  HEART: Regular rate and rhythm; No murmurs, rubs, or gallops  ABDOMEN: Soft, Nontender, Nondistended; Bowel sounds present  EXTREMITIES:   Peripheral Pulses are palpable, no  edema        Care Discussed with Consultants/Other Providers [x ] YES  [ ] NO      Code Status: [] Full Code [] DNR [] DNI [] Goals of Care:   Disposition: [] ICU [] Stroke Unit [] RCU []PCU []Floor [] Discharge Home         JANETT HighFACP

## 2023-08-02 NOTE — CHART NOTE - NSCHARTNOTEFT_GEN_A_CORE
Vital Signs Last 24 Hrs  T(C): 37.2 (02 Aug 2023 12:11), Max: 37.2 (02 Aug 2023 12:11)  T(F): 98.9 (02 Aug 2023 12:11), Max: 98.9 (02 Aug 2023 12:11)  HR: 96 (02 Aug 2023 12:11) (93 - 111)  BP: 118/67 (02 Aug 2023 12:11) (118/67 - 138/79)  BP(mean): --  RR: 18 (02 Aug 2023 12:11) (17 - 18)  SpO2: 100% (02 Aug 2023 12:11) (94% - 100%)    Parameters below as of 02 Aug 2023 05:40  Patient On (Oxygen Delivery Method): nasal cannula  O2 Flow (L/min): 2                        11.8   17.01 )-----------( 348      ( 02 Aug 2023 06:22 )             37.4     MRI Abdomen:  IMPRESSION:  A left renal solid exophytic lesion with MR findings suggestive of   papillary renal cell carcinoma. No evidence of vascular, hilar, or   adjacent organ invasion.  A right renal interpolar 0.2 cm proteinaceous/hemorrhagic cyst.  No lymphadenopathy.  Mild duodenitis.  A 0.3 cm pancreatic tail cystic lesion without pancreatic ductal dilatation.  Partially visualized large left pleural effusion.    Above lab/MRI results and case discussed with Dr. Cantu, hold off GI consult, as discussed writer called Urology consult and spoke with Resident Sami for evaluation. Gentle IVF ordered, urine studies ordered.  Spoke with Thoraric surgery PA> plan for OR later this afternoon (add-on case), discussed plan of  care with patient and RN

## 2023-08-02 NOTE — PROGRESS NOTE ADULT - ASSESSMENT
67 year old male with htn and DM presented with left sided chest pain.  Imaging with left sided opacities and ? worsening left sided effusion.  Associated leukocytosis.     1) Shortness of breath with cough  S/p thoracentesis  Concern for empyema/underlying pneumonia    Thoracic surgery evaluating- plan for vats  Gram stain with GPC- liekly strep species  Change zosyn to ceftriaxone/ flagyl (better coverage for strep)       2) Renal Mass-  Solid exophytic left renal mass and primary renal neoplasm cannot be   excluded. Additional indeterminate right renal lesion measuring 1.8 x 1.4   cm. Urology evaluation .     3) Leukocytosis-   Concern for left sided Pulmonary infection    4) Elevated lactate  blood cultures without growth to date  continue to trend    5) Abnormal imaging  stenosis of celiac axis  Surgery following   no

## 2023-08-02 NOTE — PROGRESS NOTE ADULT - SUBJECTIVE AND OBJECTIVE BOX
Follow Up:      Inverval History/ROS: Patient is a 67y old  Male who presents with a chief complaint of celiac artery stenosis, c/f median arcuate ligament syndrome; renal mass (02 Aug 2023 14:14)    No fever  Plan for OR today    Allergies    No Known Allergies    Intolerances        ANTIMICROBIALS:  cefTRIAXone   IVPB 2000 every 24 hours  metroNIDAZOLE    Tablet 500 two times a day      OTHER MEDS:  acetaminophen     Tablet .. 650 milliGRAM(s) Oral every 6 hours PRN  aluminum hydroxide/magnesium hydroxide/simethicone Suspension 30 milliLiter(s) Oral every 4 hours PRN  amLODIPine   Tablet 5 milliGRAM(s) Oral daily  aspirin  chewable 81 milliGRAM(s) Oral daily  atorvastatin 10 milliGRAM(s) Oral at bedtime  bisacodyl 5 milliGRAM(s) Oral every 12 hours PRN  cyclobenzaprine 10 milliGRAM(s) Oral at bedtime  dextrose 5%. 1000 milliLiter(s) IV Continuous <Continuous>  dextrose 5%. 1000 milliLiter(s) IV Continuous <Continuous>  dextrose 50% Injectable 25 Gram(s) IV Push once  dextrose 50% Injectable 12.5 Gram(s) IV Push once  dextrose 50% Injectable 25 Gram(s) IV Push once  dextrose Oral Gel 15 Gram(s) Oral once PRN  glucagon  Injectable 1 milliGRAM(s) IntraMuscular once  guaiFENesin Oral Liquid (Sugar-Free) 100 milliGRAM(s) Oral every 6 hours PRN  hydrALAZINE 50 milliGRAM(s) Oral two times a day  HYDROmorphone  Injectable 0.5 milliGRAM(s) IV Push every 4 hours PRN  insulin glargine Injectable (LANTUS) 6 Unit(s) SubCutaneous at bedtime  insulin lispro (ADMELOG) corrective regimen sliding scale   SubCutaneous three times a day before meals  insulin lispro (ADMELOG) corrective regimen sliding scale   SubCutaneous at bedtime  latanoprost 0.005% Ophthalmic Solution 1 Drop(s) Both EYES at bedtime  levalbuterol Inhalation 0.63 milliGRAM(s) Inhalation every 6 hours PRN  losartan 100 milliGRAM(s) Oral daily  melatonin 9 milliGRAM(s) Oral at bedtime  nitroglycerin     SubLingual 0.4 milliGRAM(s) SubLingual every 5 minutes PRN  ondansetron Injectable 4 milliGRAM(s) IV Push every 8 hours PRN  oxycodone    5 mG/acetaminophen 325 mG 1 Tablet(s) Oral every 6 hours PRN  oxycodone    5 mG/acetaminophen 325 mG 2 Tablet(s) Oral every 4 hours PRN  senna 2 Tablet(s) Oral at bedtime  sodium chloride 0.9%. 1000 milliLiter(s) IV Continuous <Continuous>      Vital Signs Last 24 Hrs  T(C): 36.8 (02 Aug 2023 15:14), Max: 37.2 (02 Aug 2023 12:11)  T(F): 98.3 (02 Aug 2023 15:14), Max: 98.9 (02 Aug 2023 12:11)  HR: 99 (02 Aug 2023 15:14) (93 - 111)  BP: 130/78 (02 Aug 2023 15:14) (118/67 - 138/79)  BP(mean): --  RR: 20 (02 Aug 2023 15:14) (17 - 20)  SpO2: 100% (02 Aug 2023 15:14) (94% - 100%)    Parameters below as of 02 Aug 2023 15:14    O2 Flow (L/min): 2      PHYSICAL EXAM:  General: [x ] non-toxic  HEAD/EYES: [ ] PERRL [x ] white sclera [ ] icterus  ENT:  [ ] normal [x] supple [ ] thrush [ ] pharyngeal exudate  Cardiovascular:   [ ] murmur [ x] normal [ ] PPM/AICD  Respiratory:  [x ] decreased BS left  GI:  [x] soft, non-tender, normal bowel sounds  :  [ ] horne [x ] no CVA tenderness   Musculoskeletal:  [ ] no synovitis  Neurologic:  [ ] non-focal exam   Skin:  [x ] no rash  Lymph: [x ] no lymphadenopathy  Psychiatric:  [ ] appropriate affect [ ] alert & oriented  Lines:  x[ ] no phlebitis [ ] central line                                11.8   17.01 )-----------( 348      ( 02 Aug 2023 06:22 )             37.4       08-02    128<L>  |  90<L>  |  28<H>  ----------------------------<  202<H>  4.0   |  24  |  0.84    Ca    9.2      02 Aug 2023 06:22  Phos  2.9     08-02  Mg     2.60     08-02    TPro  7.8  /  Alb  3.3  /  TBili  0.9  /  DBili  x   /  AST  17  /  ALT  24  /  AlkPhos  84  08-01      Urinalysis Basic - ( 02 Aug 2023 06:22 )    Color: x / Appearance: x / SG: x / pH: x  Gluc: 202 mg/dL / Ketone: x  / Bili: x / Urobili: x   Blood: x / Protein: x / Nitrite: x   Leuk Esterase: x / RBC: x / WBC x   Sq Epi: x / Non Sq Epi: x / Bacteria: x        MICROBIOLOGY:Culture Results:   No growth to date. Culture reincubated for full 48 hours (08-01-23 @ 11:42)  Culture Results:   Testing in progress (08-01-23 @ 11:42)  Culture Results:   No growth at 24 hours (07-31-23 @ 10:57)  Culture Results:   No growth at 48 Hours (07-31-23 @ 07:10)  Culture Results:   No growth at 72 Hours (07-29-23 @ 13:15)  Culture Results:   No growth at 72 Hours (07-29-23 @ 13:00)      RADIOLOGY:

## 2023-08-02 NOTE — PROGRESS NOTE ADULT - SUBJECTIVE AND OBJECTIVE BOX
CHIEF COMPLAINT: FOLLOW UP IN ICU FOR POSTOPERATIVE CARE OF PATIENT WHO IS S/P DECORTICATION      PROCEDURES: Left uniport VATS empyema drainage; partial decortication; fluid irrigation and drainage 02-Aug-2023       ISSUES:   Gram positive bacteria empyema  Hyponatremia  L Kidney mass  Post op pain  Chest tube in place  HTN  HLD  TAO on CPAP 14  DM2  Obesity (BMI 31)    INTERVAL EVENTS:   OR today. Extubated in OR. Transferred to CTICU.      HISTORY:   Patient reports moderate pain at chest wall incision sites which is worse with coughing and deep breathing without associated fever or dyspnea. Pain is improved with use of pain meds.     PHYSICAL EXAM:   Gen: Comfortable, No acute distress  Eyes: Sclera white, Conjunctiva normal, Eyelids normal, Pupils symmetrical   ENT: Mucous membranes moist,  ,  ,    Neck: Trachea midline,  ,  ,  ,  ,  ,    CV: Rate regular, Rhythm regular,  ,  ,    Resp: Breath sounds clear, No accessory muscles use, Two L chest tubes,  ,    Abd: Soft, Non-distended, Non-tender,   ,  ,  ,    Skin: Warm, No peripheral edema of lower extremities,  ,    : No horne  Neuro: Moving all 4 extremities,    Psych: A&Ox3      ASSESSMENT AND PLAN:     NEURO:  Post-operative Pain - Stable. Pain control with PCA and Tylenol IV PRN.          RESPIRATORY:  Hypoxia - Wean nasal cannula for goal O2sat above 92. Obtain CXR. Incentive spirometry. Chest PT and frequent suctioning. Continue bronchodilators. OOB to chair & ambulate w/ assistance. Continuous pulse oximetry for support & to prevent decompensation.         TAO - stable. Continue CPAP. Monitor nocturnal O2sat.         CARDIOVASCULAR:  Hemodynamically stable - Not on pressors. Continue hemodynamic monitoring.  Telemetry (medical test) - Reviewed by me today independently. Normal sinus rhythm.  HTN - stable. Hold home antihypertensives for now.         RENAL:  Stable - Monitor IOs and electrolytes. Keep K above 4.0 and Mg above 2.0.     Hyponatremia - Secondary to hypovolemia based off urine chemistries. Worsening.   - Give NS IVF. Monitor Na.   - Avoid overcorrection. Goal to correct no more than 10 every 24 hours.        GASTROINTESTINAL:  GI prophylaxis not indicated  Zofran and Reglan IV PRN for nausea  Regular consistency diet             HEMATOLOGIC:  No signs of active bleeding. Monitor Hgb in CBC in AM  DVT prophylaxis with heparin subQ and SCDs.               INFECTIOUS DISEASE:  Gram positive Bacteria Left Empyema - improved s/p decortication.   - Continue Ceftriaxone and Flagyl.  - F/U OR cultures   - Chest tube – Pleurevac regulated suctioning. Monitor chest tube output.  - ID consult recs    ENDOCRINE:  DM2 – Stable. Monitor glucose fingersticks for goal 120-180. Insulin sliding scale. Carb control diet.         ONCOLOGY:  L Renal mass - stable. outpatient oncology appointment.         Pertinent clinical, laboratory, radiographic, hemodynamic, echocardiographic, respiratory data, microbiologic data and chart were reviewed by myself and analyzed frequently throughout the course of the day and night by myself.    Plan discussed at length with the CTICU staff and Attending CT Surgeon -   Dr Marko Orellana.      Patient's status was discussed with patient and family at bedside.       	       ________________________________________________      _________________________  VITAL SIGNS:  Vital Signs Last 24 Hrs  T(C): 36.6 (02 Aug 2023 23:30), Max: 37.2 (02 Aug 2023 12:11)  T(F): 97.8 (02 Aug 2023 23:30), Max: 98.9 (02 Aug 2023 12:11)  HR: 92 (03 Aug 2023 01:00) (89 - 110)  BP: 122/87 (03 Aug 2023 01:00) (86/53 - 138/79)  BP(mean): 96 (03 Aug 2023 01:00) (56 - 98)  RR: 20 (03 Aug 2023 01:00) (18 - 29)  SpO2: 96% (03 Aug 2023 01:00) (94% - 100%)    Parameters below as of 03 Aug 2023 01:00  Patient On (Oxygen Delivery Method): nasal cannula  O2 Flow (L/min): 2    I/Os:   I&O's Detail    02 Aug 2023 07:01  -  03 Aug 2023 01:12  --------------------------------------------------------  IN:    Lactated Ringers Bolus: 500 mL    Oral Fluid: 250 mL    sodium chloride 0.9%: 30 mL  Total IN: 780 mL    OUT:    Chest Tube (mL): 15 mL    Voided (mL): 300 mL  Total OUT: 315 mL    Total NET: 465 mL              MEDICATIONS:  MEDICATIONS  (STANDING):  aspirin  chewable 81 milliGRAM(s) Oral daily  atorvastatin 10 milliGRAM(s) Oral at bedtime  cefTRIAXone   IVPB 2000 milliGRAM(s) IV Intermittent every 24 hours  cyclobenzaprine 10 milliGRAM(s) Oral at bedtime  dextrose 50% Injectable 25 Gram(s) IV Push once  heparin   Injectable 5000 Unit(s) SubCutaneous every 8 hours  HYDROmorphone PCA (1 mG/mL) 30 milliLiter(s) PCA Continuous PCA Continuous  insulin glargine Injectable (LANTUS) 6 Unit(s) SubCutaneous at bedtime  insulin lispro (ADMELOG) corrective regimen sliding scale   SubCutaneous three times a day before meals  insulin lispro (ADMELOG) corrective regimen sliding scale   SubCutaneous at bedtime  latanoprost 0.005% Ophthalmic Solution 1 Drop(s) Both EYES at bedtime  melatonin 9 milliGRAM(s) Oral at bedtime  metroNIDAZOLE    Tablet 500 milliGRAM(s) Oral two times a day  polyethylene glycol 3350 17 Gram(s) Oral daily  senna 2 Tablet(s) Oral at bedtime  sodium chloride 0.9%. 1000 milliLiter(s) (30 mL/Hr) IV Continuous <Continuous>    MEDICATIONS  (PRN):  acetaminophen     Tablet .. 650 milliGRAM(s) Oral every 6 hours PRN Temp greater or equal to 38C (100.4F), Mild Pain (1 - 3)  aluminum hydroxide/magnesium hydroxide/simethicone Suspension 30 milliLiter(s) Oral every 4 hours PRN Dyspepsia  guaiFENesin Oral Liquid (Sugar-Free) 100 milliGRAM(s) Oral every 6 hours PRN Cough  levalbuterol Inhalation 0.63 milliGRAM(s) Inhalation every 6 hours PRN SOB/Wheezing  naloxone Injectable 0.1 milliGRAM(s) IV Push every 3 minutes PRN For ANY of the following changes in patient status:  A. RR LESS THAN 10 breaths per minute, B. Oxygen saturation LESS THAN 90%, C. Sedation score of 6  ondansetron Injectable 4 milliGRAM(s) IV Push every 6 hours PRN Nausea      LABS:  Laboratory data was independently reviewed by me today.                           11.8   17.01 )-----------( 348      ( 02 Aug 2023 06:22 )             37.4     08-02    128<L>  |  90<L>  |  28<H>  ----------------------------<  202<H>  4.0   |  24  |  0.84    Ca    9.2      02 Aug 2023 06:22  Phos  2.9     08-02  Mg     2.60     08-02    TPro  7.8  /  Alb  3.3  /  TBili  0.9  /  DBili  x   /  AST  17  /  ALT  24  /  AlkPhos  84  08-01    LIVER FUNCTIONS - ( 01 Aug 2023 06:36 )  Alb: 3.3 g/dL / Pro: 7.8 g/dL / ALK PHOS: 84 U/L / ALT: 24 U/L / AST: 17 U/L / GGT: x           PT/INR - ( 02 Aug 2023 06:22 )   PT: 12.0 sec;   INR: 1.07 ratio         PTT - ( 02 Aug 2023 06:22 )  PTT:32.2 sec    Urinalysis Basic - ( 02 Aug 2023 06:22 )    Color: x / Appearance: x / SG: x / pH: x  Gluc: 202 mg/dL / Ketone: x  / Bili: x / Urobili: x   Blood: x / Protein: x / Nitrite: x   Leuk Esterase: x / RBC: x / WBC x   Sq Epi: x / Non Sq Epi: x / Bacteria: x        RADIOLOGY:   Radiology images were independently reviewed by me today. Reports were reviewed by me today.    Post-op CXR 8/3/23 - Improved L pleural effusion. Chest tubes x2 in place. No pneumothorax.     Xray Chest 1 View- PORTABLE-Urgent:   ACC: [id number] EXAM:  XR CHEST PORTABLE URGENT 1V   ORDERED BY: NARESH GARCIA     PROCEDURE DATE:  08/01/2023          INTERPRETATION:  EXAMINATION: XR CHEST URGENT    CLINICAL INDICATION: attempted PTC placement    TECHNIQUE: Single frontal, portable view of the chest was obtained.    COMPARISON: Multiple radiographs of the chest, most recently same day   prior.    FINDINGS:    The heart is not accurately assessed in this AP projection.  Similar complete whiteout of the left lung secondary to pleural   effusion/atelectasis.  The right lung is clear.  There is no pneumothorax  No acute bony abnormality.    IMPRESSION:  Similar complete white out of the left lung, secondary to pleural   effusion/atelectasis, which is better characterized on CT chest 7/29/2023.    --- End of Report ---          MAMADOU CAREY MD; Resident Radiologist  This document has been electronically signed.  SAM REA MD; Attending Radiologist  This document has been electronically signed. Aug  2 2023  8:08AM (08-01-23 @ 19:11)  Xray Chest 1 View- PORTABLE-Urgent:   ACC: [id number] EXAM:  XR CHEST PORTABLE URGENT 1V   ORDERED BY: FARZAD OSHEA     PROCEDURE DATE:  08/01/2023          INTERPRETATION:  EXAMINATION: XR CHEST URGENT    CLINICAL INDICATION: r/o pneumothorax    TECHNIQUE: Single frontal, portable view of the chest was obtained.    COMPARISON: Chest CT 7/31/2023    FINDINGS:    The heart is not accurately assessed in this AP projection.  Similar complete white out of the left lung, secondary to pleural   effusion/atelectasis.  The right lung is clear.  No pneumothorax.  No acute bony abnormality.    IMPRESSION:  Similar complete white out of the left lung secondary to pleural   effusion/atelectasis, better characterized on CT, unchanged.  No pneumothorax.    --- End of Report ---          MAMADOU CAREY MD; Resident Radiologist  This document has been electronically signed.  MARIO DAMON DO; Attending Radiologist  This document has been electronically signed. Aug  1 2023  2:51PM (08-01-23 @ 13:09)  Xray Chest 1 View- PORTABLE-Urgent:   ACC: [id number] EXAM:  XR CHEST PORTABLE URGENT 1V   ORDERED BY: FARZAD OSHEA     PROCEDURE DATE:  08/01/2023          INTERPRETATION:  EXAMINATION: XR CHEST URGENT    CLINICAL INDICATION: pleural effusion    TECHNIQUE: Single frontal, portable view of the chest was obtained.    COMPARISON: Chest CT 7/31/2023 Multiple radiographs of the chest, most   recently 7/30/2023.    FINDINGS:  Chest x-ray 8/1/2023 10:00 AM  The heart is not accurately assessed in this AP projection.  Similar complete white out of the left hemithorax, likely secondary to   left-sided pleural effusion.  Right lung is clear.  No right-sided pleural effusion or pneumothorax.  No acute bony abnormality.    IMPRESSION:  Similar complete white out of the left hemithorax, secondary to   left-sided pleural effusion/atelectasis, better characterized on CT of   the prior day, unchanged.    --- End of Report ---          MAMADOU CAREY MD; Resident Radiologist  This document has been electronically signed.  MARIO DAMON DO; Attending Radiologist  This document has been electronically signed. Aug  1 2023  2:02PM (08-01-23 @ 10:32)     CHIEF COMPLAINT: FOLLOW UP IN ICU FOR POSTOPERATIVE CARE OF PATIENT WHO IS S/P DECORTICATION      PROCEDURES: Left uniport VATS empyema drainage; partial decortication; fluid irrigation and drainage 02-Aug-2023       ISSUES:   Gram positive bacteria empyema  Hyponatremia  L Kidney mass  Post op pain  Chest tube in place  HTN  HLD  TAO on CPAP 14  DM2  Obesity (BMI 31)  Hyponatremia    INTERVAL EVENTS:   OR today. Extubated in OR. Transferred to CTICU.      HISTORY:   Patient reports moderate pain at chest wall incision sites which is worse with coughing and deep breathing without associated fever or dyspnea. Pain is improved with use of pain meds.     PHYSICAL EXAM:   Gen: Comfortable, No acute distress  Eyes: Sclera white, Conjunctiva normal, Eyelids normal, Pupils symmetrical   ENT: Mucous membranes moist,  ,  ,    Neck: Trachea midline,  ,  ,  ,  ,  ,    CV: Rate regular, Rhythm regular,  ,  ,    Resp: Breath sounds clear, No accessory muscles use, Two L chest tubes,  ,    Abd: Soft, Non-distended, Non-tender,   ,  ,  ,    Skin: Warm, No peripheral edema of lower extremities,  ,    : No horne  Neuro: Moving all 4 extremities,    Psych: A&Ox3      ASSESSMENT AND PLAN:     NEURO:  Post-operative Pain - Stable. Pain control with PCA and Tylenol IV PRN.          RESPIRATORY:  Hypoxia - Wean nasal cannula for goal O2sat above 92. Obtain CXR. Incentive spirometry. Chest PT and frequent suctioning. Continue bronchodilators. OOB to chair & ambulate w/ assistance. Continuous pulse oximetry for support & to prevent decompensation.         TAO - stable. Continue CPAP. Monitor nocturnal O2sat.         CARDIOVASCULAR:  Hemodynamically stable - Not on pressors. Continue hemodynamic monitoring.  Telemetry (medical test) - Reviewed by me today independently. Normal sinus rhythm.  HTN - stable. Hold home antihypertensives for now.         RENAL:  Stable - Monitor IOs and electrolytes. Keep K above 4.0 and Mg above 2.0.     Hyponatremia - Secondary to hypovolemia based off urine chemistries. Worsening.   - Give NS IVF. Monitor Na.   - Avoid overcorrection. Goal to correct no more than 10 every 24 hours.        GASTROINTESTINAL:  GI prophylaxis not indicated  Zofran and Reglan IV PRN for nausea  Regular consistency diet             HEMATOLOGIC:  No signs of active bleeding. Monitor Hgb in CBC in AM  DVT prophylaxis with heparin subQ and SCDs.               INFECTIOUS DISEASE:  Gram positive Bacteria Left Empyema - improved s/p decortication.   - Continue Ceftriaxone and Flagyl.  - F/U OR cultures   - Chest tube – Pleurevac regulated suctioning. Monitor chest tube output.  - ID consult recs    ENDOCRINE:  DM2 – Stable. Monitor glucose fingersticks for goal 120-180. Insulin sliding scale. Carb control diet.         ONCOLOGY:  L Renal mass - stable. outpatient oncology appointment.         Pertinent clinical, laboratory, radiographic, hemodynamic, echocardiographic, respiratory data, microbiologic data and chart were reviewed by myself and analyzed frequently throughout the course of the day and night by myself.    Plan discussed at length with the CTICU staff and Attending CT Surgeon -   Dr Marko Orellana.      Patient's status was discussed with patient and family at bedside.       	       ________________________________________________      _________________________  VITAL SIGNS:  Vital Signs Last 24 Hrs  T(C): 36.6 (02 Aug 2023 23:30), Max: 37.2 (02 Aug 2023 12:11)  T(F): 97.8 (02 Aug 2023 23:30), Max: 98.9 (02 Aug 2023 12:11)  HR: 92 (03 Aug 2023 01:00) (89 - 110)  BP: 122/87 (03 Aug 2023 01:00) (86/53 - 138/79)  BP(mean): 96 (03 Aug 2023 01:00) (56 - 98)  RR: 20 (03 Aug 2023 01:00) (18 - 29)  SpO2: 96% (03 Aug 2023 01:00) (94% - 100%)    Parameters below as of 03 Aug 2023 01:00  Patient On (Oxygen Delivery Method): nasal cannula  O2 Flow (L/min): 2    I/Os:   I&O's Detail    02 Aug 2023 07:01  -  03 Aug 2023 01:12  --------------------------------------------------------  IN:    Lactated Ringers Bolus: 500 mL    Oral Fluid: 250 mL    sodium chloride 0.9%: 30 mL  Total IN: 780 mL    OUT:    Chest Tube (mL): 15 mL    Voided (mL): 300 mL  Total OUT: 315 mL    Total NET: 465 mL              MEDICATIONS:  MEDICATIONS  (STANDING):  aspirin  chewable 81 milliGRAM(s) Oral daily  atorvastatin 10 milliGRAM(s) Oral at bedtime  cefTRIAXone   IVPB 2000 milliGRAM(s) IV Intermittent every 24 hours  cyclobenzaprine 10 milliGRAM(s) Oral at bedtime  dextrose 50% Injectable 25 Gram(s) IV Push once  heparin   Injectable 5000 Unit(s) SubCutaneous every 8 hours  HYDROmorphone PCA (1 mG/mL) 30 milliLiter(s) PCA Continuous PCA Continuous  insulin glargine Injectable (LANTUS) 6 Unit(s) SubCutaneous at bedtime  insulin lispro (ADMELOG) corrective regimen sliding scale   SubCutaneous three times a day before meals  insulin lispro (ADMELOG) corrective regimen sliding scale   SubCutaneous at bedtime  latanoprost 0.005% Ophthalmic Solution 1 Drop(s) Both EYES at bedtime  melatonin 9 milliGRAM(s) Oral at bedtime  metroNIDAZOLE    Tablet 500 milliGRAM(s) Oral two times a day  polyethylene glycol 3350 17 Gram(s) Oral daily  senna 2 Tablet(s) Oral at bedtime  sodium chloride 0.9%. 1000 milliLiter(s) (30 mL/Hr) IV Continuous <Continuous>    MEDICATIONS  (PRN):  acetaminophen     Tablet .. 650 milliGRAM(s) Oral every 6 hours PRN Temp greater or equal to 38C (100.4F), Mild Pain (1 - 3)  aluminum hydroxide/magnesium hydroxide/simethicone Suspension 30 milliLiter(s) Oral every 4 hours PRN Dyspepsia  guaiFENesin Oral Liquid (Sugar-Free) 100 milliGRAM(s) Oral every 6 hours PRN Cough  levalbuterol Inhalation 0.63 milliGRAM(s) Inhalation every 6 hours PRN SOB/Wheezing  naloxone Injectable 0.1 milliGRAM(s) IV Push every 3 minutes PRN For ANY of the following changes in patient status:  A. RR LESS THAN 10 breaths per minute, B. Oxygen saturation LESS THAN 90%, C. Sedation score of 6  ondansetron Injectable 4 milliGRAM(s) IV Push every 6 hours PRN Nausea      LABS:  Laboratory data was independently reviewed by me today.                           11.8   17.01 )-----------( 348      ( 02 Aug 2023 06:22 )             37.4     08-02    128<L>  |  90<L>  |  28<H>  ----------------------------<  202<H>  4.0   |  24  |  0.84    Ca    9.2      02 Aug 2023 06:22  Phos  2.9     08-02  Mg     2.60     08-02    TPro  7.8  /  Alb  3.3  /  TBili  0.9  /  DBili  x   /  AST  17  /  ALT  24  /  AlkPhos  84  08-01    LIVER FUNCTIONS - ( 01 Aug 2023 06:36 )  Alb: 3.3 g/dL / Pro: 7.8 g/dL / ALK PHOS: 84 U/L / ALT: 24 U/L / AST: 17 U/L / GGT: x           PT/INR - ( 02 Aug 2023 06:22 )   PT: 12.0 sec;   INR: 1.07 ratio         PTT - ( 02 Aug 2023 06:22 )  PTT:32.2 sec    Urinalysis Basic - ( 02 Aug 2023 06:22 )    Color: x / Appearance: x / SG: x / pH: x  Gluc: 202 mg/dL / Ketone: x  / Bili: x / Urobili: x   Blood: x / Protein: x / Nitrite: x   Leuk Esterase: x / RBC: x / WBC x   Sq Epi: x / Non Sq Epi: x / Bacteria: x        RADIOLOGY:   Radiology images were independently reviewed by me today. Reports were reviewed by me today.    Post-op CXR 8/3/23 - Improved L pleural effusion. Chest tubes x2 in place. No pneumothorax.     Xray Chest 1 View- PORTABLE-Urgent:   ACC: [id number] EXAM:  XR CHEST PORTABLE URGENT 1V   ORDERED BY: NARESH GARCIA     PROCEDURE DATE:  08/01/2023          INTERPRETATION:  EXAMINATION: XR CHEST URGENT    CLINICAL INDICATION: attempted PTC placement    TECHNIQUE: Single frontal, portable view of the chest was obtained.    COMPARISON: Multiple radiographs of the chest, most recently same day   prior.    FINDINGS:    The heart is not accurately assessed in this AP projection.  Similar complete whiteout of the left lung secondary to pleural   effusion/atelectasis.  The right lung is clear.  There is no pneumothorax  No acute bony abnormality.    IMPRESSION:  Similar complete white out of the left lung, secondary to pleural   effusion/atelectasis, which is better characterized on CT chest 7/29/2023.    --- End of Report ---          MAMADOU CAREY MD; Resident Radiologist  This document has been electronically signed.  SAM REA MD; Attending Radiologist  This document has been electronically signed. Aug  2 2023  8:08AM (08-01-23 @ 19:11)  Xray Chest 1 View- PORTABLE-Urgent:   ACC: [id number] EXAM:  XR CHEST PORTABLE URGENT 1V   ORDERED BY: FARZAD OSHEA     PROCEDURE DATE:  08/01/2023          INTERPRETATION:  EXAMINATION: XR CHEST URGENT    CLINICAL INDICATION: r/o pneumothorax    TECHNIQUE: Single frontal, portable view of the chest was obtained.    COMPARISON: Chest CT 7/31/2023    FINDINGS:    The heart is not accurately assessed in this AP projection.  Similar complete white out of the left lung, secondary to pleural   effusion/atelectasis.  The right lung is clear.  No pneumothorax.  No acute bony abnormality.    IMPRESSION:  Similar complete white out of the left lung secondary to pleural   effusion/atelectasis, better characterized on CT, unchanged.  No pneumothorax.    --- End of Report ---          MAMADOU CAREY MD; Resident Radiologist  This document has been electronically signed.  MARIO DAMON DO; Attending Radiologist  This document has been electronically signed. Aug  1 2023  2:51PM (08-01-23 @ 13:09)  Xray Chest 1 View- PORTABLE-Urgent:   ACC: [id number] EXAM:  XR CHEST PORTABLE URGENT 1V   ORDERED BY: FARZAD OSHEA     PROCEDURE DATE:  08/01/2023          INTERPRETATION:  EXAMINATION: XR CHEST URGENT    CLINICAL INDICATION: pleural effusion    TECHNIQUE: Single frontal, portable view of the chest was obtained.    COMPARISON: Chest CT 7/31/2023 Multiple radiographs of the chest, most   recently 7/30/2023.    FINDINGS:  Chest x-ray 8/1/2023 10:00 AM  The heart is not accurately assessed in this AP projection.  Similar complete white out of the left hemithorax, likely secondary to   left-sided pleural effusion.  Right lung is clear.  No right-sided pleural effusion or pneumothorax.  No acute bony abnormality.    IMPRESSION:  Similar complete white out of the left hemithorax, secondary to   left-sided pleural effusion/atelectasis, better characterized on CT of   the prior day, unchanged.    --- End of Report ---          AMMADOU CAREY MD; Resident Radiologist  This document has been electronically signed.  MARIO DAMON DO; Attending Radiologist  This document has been electronically signed. Aug  1 2023  2:02PM (08-01-23 @ 10:32)

## 2023-08-02 NOTE — PROGRESS NOTE ADULT - SUBJECTIVE AND OBJECTIVE BOX
CHIEF COMPLAINT: chest pain    Interval Events: Patient seen and examined at bedside. Reports he feels okay today. Nervous about his procedure.     REVIEW OF SYSTEMS:  Constitutional: [X ] negative [ ] fevers [ ] chills [ ] weight loss [ ] weight gain  HEENT: [ X] negative [ ] dry eyes [ ] eye irritation [ ] postnasal drip [ ] nasal congestion  CV: [X ] negative  [ ] chest pain [ ] orthopnea [ ] palpitations [ ] murmur  Resp: [ X] negative [ ] cough [ ] shortness of breath [ ] dyspnea [ ] wheezing [ ] sputum [ ] hemoptysis  GI: [ X] negative [ ] nausea [ ] vomiting [ ] diarrhea [ ] constipation [ ] abd pain [ ] dysphagia   : [X] negative [ ] dysuria [ ] nocturia [ ] hematuria [ ] increased urinary frequency  Musculoskeletal: [ ] negative [ ] back pain [ ] myalgias [ ] arthralgias [ ] fracture  Skin: [ ] negative [ ] rash [ ] itch  Neurological: [ ] negative [ ] headache [ ] dizziness [ ] syncope [ ] weakness [ ] numbness  Psychiatric: [ ] negative [ ] anxiety [ ] depression  Endocrine: [ ] negative [ ] diabetes [ ] thyroid problem  Hematologic/Lymphatic: [ ] negative [ ] anemia [ ] bleeding problem  Allergic/Immunologic: [ ] negative [ ] itchy eyes [ ] nasal discharge [ ] hives [ ] angioedema  [ ] All other systems negative  [ ] Unable to assess ROS because ________    OBJECTIVE:  ICU Vital Signs Last 24 Hrs  T(C): 36.8 (02 Aug 2023 15:14), Max: 37.2 (02 Aug 2023 12:11)  T(F): 98.3 (02 Aug 2023 15:14), Max: 98.9 (02 Aug 2023 12:11)  HR: 100 (02 Aug 2023 15:39) (93 - 111)  BP: 130/78 (02 Aug 2023 15:14) (118/67 - 138/79)  BP(mean): --  ABP: --  ABP(mean): --  RR: 20 (02 Aug 2023 15:14) (17 - 20)  SpO2: 95% (02 Aug 2023 15:39) (94% - 100%)    O2 Parameters below as of 02 Aug 2023 15:14    O2 Flow (L/min): 2            CAPILLARY BLOOD GLUCOSE      POCT Blood Glucose.: 208 mg/dL (02 Aug 2023 14:43)      PHYSICAL EXAM:  General: well appearing NAD  HEENT: no icterus  Neck: supple  Respiratory: Absent BS left. Clear on right. No wheezing. No use off accessory muscles.  Cardiovascular: S1/S2, no murmur, no edema  Abdomen: soft, nontender  Extremities: no LE edema  Skin: no rashes  Neurological: AxOx3  Psychiatry: normal mood and affect        HOSPITAL MEDICATIONS:  aspirin  chewable 81 milliGRAM(s) Oral daily    cefTRIAXone   IVPB 2000 milliGRAM(s) IV Intermittent every 24 hours  metroNIDAZOLE    Tablet 500 milliGRAM(s) Oral two times a day    amLODIPine   Tablet 5 milliGRAM(s) Oral daily  hydrALAZINE 50 milliGRAM(s) Oral two times a day  losartan 100 milliGRAM(s) Oral daily  nitroglycerin     SubLingual 0.4 milliGRAM(s) SubLingual every 5 minutes PRN    atorvastatin 10 milliGRAM(s) Oral at bedtime  dextrose 50% Injectable 25 Gram(s) IV Push once  dextrose 50% Injectable 12.5 Gram(s) IV Push once  dextrose 50% Injectable 25 Gram(s) IV Push once  dextrose Oral Gel 15 Gram(s) Oral once PRN  glucagon  Injectable 1 milliGRAM(s) IntraMuscular once  insulin glargine Injectable (LANTUS) 6 Unit(s) SubCutaneous at bedtime  insulin lispro (ADMELOG) corrective regimen sliding scale   SubCutaneous three times a day before meals  insulin lispro (ADMELOG) corrective regimen sliding scale   SubCutaneous at bedtime    guaiFENesin Oral Liquid (Sugar-Free) 100 milliGRAM(s) Oral every 6 hours PRN  levalbuterol Inhalation 0.63 milliGRAM(s) Inhalation every 6 hours PRN    acetaminophen     Tablet .. 650 milliGRAM(s) Oral every 6 hours PRN  cyclobenzaprine 10 milliGRAM(s) Oral at bedtime  HYDROmorphone  Injectable 0.5 milliGRAM(s) IV Push every 4 hours PRN  melatonin 9 milliGRAM(s) Oral at bedtime  ondansetron Injectable 4 milliGRAM(s) IV Push every 8 hours PRN  oxycodone    5 mG/acetaminophen 325 mG 1 Tablet(s) Oral every 6 hours PRN  oxycodone    5 mG/acetaminophen 325 mG 2 Tablet(s) Oral every 4 hours PRN    aluminum hydroxide/magnesium hydroxide/simethicone Suspension 30 milliLiter(s) Oral every 4 hours PRN  bisacodyl 5 milliGRAM(s) Oral every 12 hours PRN  senna 2 Tablet(s) Oral at bedtime        dextrose 5%. 1000 milliLiter(s) IV Continuous <Continuous>  dextrose 5%. 1000 milliLiter(s) IV Continuous <Continuous>  sodium chloride 0.9%. 1000 milliLiter(s) IV Continuous <Continuous>      latanoprost 0.005% Ophthalmic Solution 1 Drop(s) Both EYES at bedtime        LABS:                        11.8   17.01 )-----------( 348      ( 02 Aug 2023 06:22 )             37.4     Hgb Trend: 11.8<--, 11.6<--, 12.3<--, 12.5<--, 11.6<--  08-02    128<L>  |  90<L>  |  28<H>  ----------------------------<  202<H>  4.0   |  24  |  0.84    Ca    9.2      02 Aug 2023 06:22  Phos  2.9     08-02  Mg     2.60     08-02    TPro  7.8  /  Alb  3.3  /  TBili  0.9  /  DBili  x   /  AST  17  /  ALT  24  /  AlkPhos  84  08-01    Creatinine Trend: 0.84<--, 0.97<--, 0.96<--, 0.73<--, 0.83<--  PT/INR - ( 02 Aug 2023 06:22 )   PT: 12.0 sec;   INR: 1.07 ratio         PTT - ( 02 Aug 2023 06:22 )  PTT:32.2 sec  Urinalysis Basic - ( 02 Aug 2023 06:22 )    Color: x / Appearance: x / SG: x / pH: x  Gluc: 202 mg/dL / Ketone: x  / Bili: x / Urobili: x   Blood: x / Protein: x / Nitrite: x   Leuk Esterase: x / RBC: x / WBC x   Sq Epi: x / Non Sq Epi: x / Bacteria: x            MICROBIOLOGY:     RADIOLOGY:  [ ] Reviewed and interpreted by me    PULMONARY FUNCTION TESTS:    EKG:

## 2023-08-02 NOTE — CONSULT NOTE ADULT - SUBJECTIVE AND OBJECTIVE BOX
HPI    67 yr old male with a pmh of HTN, HLD, T2DM on metformin, TAO on BiPAP who presents to the ED with acute, 10/10, sharp, intermittent stabbing left sided abdominal and left axillary/back pain. Pt reports the pain woke him up from his sleep and he took some melatonin to go back to sleep. When he woke up in the morning he still had the pain and it was not easing therefore he decided to present to the ED for further evaluation. Reports difficulty in catching his breathe 2/2 pain. Denies  headache, dizziness, chest pain, palpitations, SOB, joint pain, diarrhea/constipation, urinary symptoms. CT scan on the 29th showed small pleural effusion. Repeat yesterday showed large effusion with complete atelectasis of the "L" lung and mediastinal shift. Pulmonary did a POCUS showing loculated effusion with multiseptations. They completed a thoracentesis (8/1) withdrawing 200cc of fluid which came back low glucose and high LDH.     Urology consulted for incidental renal mass left exophytic 6 cm. Patient denies family hx of renal cancer. Denies flank pain and hematuria. He has not been told previously that he has a renal mass.       MRI shows  A left renal solid exophytic lesion measuring 6.4 x 6.0 cm demonstrating T1 and T2 hypointensity with central mild T1 and T2 hyperintensity. The lesion demonstrates mild enhancement and diffusion restriction. The mass demonstrates no vascular, hilar, or adjacent organ invasion.  A right interpolar 1.2 cm lesion with T2 hyperintense and T1 hyperintense signal without enhancement, representing hemorrhagic/proteinaceous cyst. Additional bilateral renal subcentimeter simple cysts. No hydronephrosis.    PAST MEDICAL & SURGICAL HISTORY:  Hypertension      Diabetes mellitus      HLD (hyperlipidemia)      No significant past surgical history          MEDICATIONS  (STANDING):  amLODIPine   Tablet 5 milliGRAM(s) Oral daily  aspirin  chewable 81 milliGRAM(s) Oral daily  atorvastatin 10 milliGRAM(s) Oral at bedtime  cefTRIAXone   IVPB 2000 milliGRAM(s) IV Intermittent every 24 hours  cyclobenzaprine 10 milliGRAM(s) Oral at bedtime  dextrose 5%. 1000 milliLiter(s) (50 mL/Hr) IV Continuous <Continuous>  dextrose 5%. 1000 milliLiter(s) (100 mL/Hr) IV Continuous <Continuous>  dextrose 50% Injectable 12.5 Gram(s) IV Push once  dextrose 50% Injectable 25 Gram(s) IV Push once  dextrose 50% Injectable 25 Gram(s) IV Push once  glucagon  Injectable 1 milliGRAM(s) IntraMuscular once  hydrALAZINE 50 milliGRAM(s) Oral two times a day  insulin glargine Injectable (LANTUS) 6 Unit(s) SubCutaneous at bedtime  insulin lispro (ADMELOG) corrective regimen sliding scale   SubCutaneous three times a day before meals  insulin lispro (ADMELOG) corrective regimen sliding scale   SubCutaneous at bedtime  latanoprost 0.005% Ophthalmic Solution 1 Drop(s) Both EYES at bedtime  losartan 100 milliGRAM(s) Oral daily  melatonin 9 milliGRAM(s) Oral at bedtime  metroNIDAZOLE    Tablet 500 milliGRAM(s) Oral two times a day  senna 2 Tablet(s) Oral at bedtime  sodium chloride 0.9%. 1000 milliLiter(s) (40 mL/Hr) IV Continuous <Continuous>    MEDICATIONS  (PRN):  acetaminophen     Tablet .. 650 milliGRAM(s) Oral every 6 hours PRN Temp greater or equal to 38C (100.4F), Mild Pain (1 - 3)  aluminum hydroxide/magnesium hydroxide/simethicone Suspension 30 milliLiter(s) Oral every 4 hours PRN Dyspepsia  bisacodyl 5 milliGRAM(s) Oral every 12 hours PRN Constipation  dextrose Oral Gel 15 Gram(s) Oral once PRN Blood Glucose LESS THAN 70 milliGRAM(s)/deciliter  guaiFENesin Oral Liquid (Sugar-Free) 100 milliGRAM(s) Oral every 6 hours PRN Cough  HYDROmorphone  Injectable 0.5 milliGRAM(s) IV Push every 4 hours PRN breakthrough pain  levalbuterol Inhalation 0.63 milliGRAM(s) Inhalation every 6 hours PRN SOB/Wheezing  nitroglycerin     SubLingual 0.4 milliGRAM(s) SubLingual every 5 minutes PRN Chest Pain  ondansetron Injectable 4 milliGRAM(s) IV Push every 8 hours PRN Nausea and/or Vomiting  oxycodone    5 mG/acetaminophen 325 mG 2 Tablet(s) Oral every 4 hours PRN Severe Pain (7 - 10)  oxycodone    5 mG/acetaminophen 325 mG 1 Tablet(s) Oral every 6 hours PRN Moderate Pain (4 - 6)      FAMILY HISTORY:  FH: breast cancer (Sibling)        Allergies    No Known Allergies    Intolerances        SOCIAL HISTORY:    REVIEW OF SYSTEMS:   Otherwise negative as stated in HPI    Physical Exam  Vital signs  T(C): 36.8 (08-02-23 @ 15:14), Max: 37.2 (08-02-23 @ 12:11)  HR: 99 (08-02-23 @ 15:14)  BP: 130/78 (08-02-23 @ 15:14)  SpO2: 100% (08-02-23 @ 15:14)  Wt(kg): --    Output      Gen:  NAD    Pulm:  No respiratory distress  	  CV:  RRR    GI:  S/ND/NT          LABS:      08-02 @ 06:22    WBC 17.01 / Hct 37.4  / SCr 0.84     08-01 @ 06:36    WBC 23.66 / Hct 36.3  / SCr 0.97     08-02    128<L>  |  90<L>  |  28<H>  ----------------------------<  202<H>  4.0   |  24  |  0.84    Ca    9.2      02 Aug 2023 06:22  Phos  2.9     08-02  Mg     2.60     08-02    TPro  7.8  /  Alb  3.3  /  TBili  0.9  /  DBili  x   /  AST  17  /  ALT  24  /  AlkPhos  84  08-01    PT/INR - ( 02 Aug 2023 06:22 )   PT: 12.0 sec;   INR: 1.07 ratio         PTT - ( 02 Aug 2023 06:22 )  PTT:32.2 sec  Urinalysis Basic - ( 02 Aug 2023 06:22 )    Color: x / Appearance: x / SG: x / pH: x  Gluc: 202 mg/dL / Ketone: x  / Bili: x / Urobili: x   Blood: x / Protein: x / Nitrite: x   Leuk Esterase: x / RBC: x / WBC x   Sq Epi: x / Non Sq Epi: x / Bacteria: x        Urine Cx:  Blood Cx:    RADIOLOGY:

## 2023-08-02 NOTE — CHART NOTE - NSCHARTNOTEFT_GEN_A_CORE
Patient added-on for OR today with Dr. Orellana.  Discussed with ACP.  Per IM note from yesterday evening, patient medically optimized.  Dr. Orellana aware. Thoracic to follow.

## 2023-08-02 NOTE — CONSULT NOTE ADULT - ASSESSMENT
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 Urology consulted for incidental left renal mass    - Imaging reviewed: 6.4 cm exophytic left renal mass   - f/up pleural fluid studies   - no urgent intervention   - will consider bx vs surgical excision pending patients hospital course and recovery     Seen and examined with Dr. Desai

## 2023-08-02 NOTE — PACU DISCHARGE NOTE - NS MD DISCHARGE NOTE DISCHARGE
"[facility]  Adult Nutrition  Progress Note    SUMMARY     Recommendations    Recommendation/Intervention:   1. Rec Cardiac diet with texture per SLP.   2. Encourage PO intake.     Goals: >85% of EEN, EPN  Nutrition Goal Status: progressing towards goal       Reason for Assessment    Reason for Assessment: RD follow-up  Diagnosis:  (Embolic stroke involving right middle cerebral artery)  Relevent Medical History: HTN, HLD, CHF, CAD   Interdisciplinary Rounds: did not attend     General Information Comments: Eating 75% on pureed diet. Texture unable to be advanced per SLP. + for substance abuse. Off unit for stress test.     Nutrition Discharge Planning: Cardiac diet    Nutrition Prescription Ordered    Current Diet Order: Pureed     Evaluation of Received Nutrients/Fluid Intake    Energy Calories Required: meeting needs  Protein Required: meeting needs  I/O: -2.3L x 24 hrs   Fluid Required: meeting needs     % Intake of Estimated Energy Needs: 75 - 100 %  % Meal Intake: 75%     Nutrition Risk Screen     Nutrition Risk Screen: no indicators present    Nutrition/Diet History    Patient Reported Diet/Restrictions/Preferences: general  Food Preferences: No cultural or Restoration preferences.  Factors Affecting Nutritional Intake: difficulty/impaired swallowing     Labs/Tests/Procedures/Meds       Pertinent Labs Reviewed: reviewed  Pertinent Labs Comments: Cr 1.5, GFR 59, Mg 1.4, Alb 3.3, t-bili 1.2  Pertinent Medications Reviewed: reviewed  Pertinent Medications Comments: statin, folic acid, heparin, furosemide, senna, thiamine     Physical Findings    Overall Physical Appearance: nourished  Oral/Mouth Cavity: WDL  Skin:  (Abrasion posterior parietal region)    Anthropometrics    Temp: 98.2 °F (36.8 °C)     Height: 5' 7.79" (172.2 cm)  Weight Method: Bed Scale  Weight: 70.3 kg (154 lb 15.7 oz)  Ideal Body Weight (IBW), Male: 152.8 lb     % Ideal Body Weight, Male (lb): 101.43 lb     BMI (Calculated): " 23.8  BMI Grade: 18.5-24.9 - normal       Estimated/Assessed Needs    Weight Used For Calorie Calculations: 70.3 kg (154 lb 15.7 oz)   Height (cm): 172.7 cm  Energy Calorie Requirements (kcal): 1880kcals  Energy Need Method: Laguna-St Jeor (x1.25(PAL))  RMR (Laguna-St. Jeor Equation): 1504.25  Weight Used For Protein Calculations: 70.3 kg (154 lb 15.7 oz)  Protein Requirements: 70-84 g (1.0-1.2g/kg)  Fluid Need Method: RDA Method (or per MD)  RDA Method (mL): 1880       Assessment and Plan    * Embolic stroke involving right middle cerebral artery    Nutrition Problem  Swallowing difficulty     Related to (etiology):   Stroke     Signs and Symptoms (as evidenced by):   SLP findings     Interventions/Recommendations (treatment strategy):  See recs     Nutrition Diagnosis Status:   Continues                 Monitor and Evaluation    Food and Nutrient Intake: food and beverage intake  Food and Nutrient Adminstration: diet order     Physical Activity and Function: nutrition-related ADLs and IADLs  Anthropometric Measurements: weight, weight change  Biochemical Data, Medical Tests and Procedures: electrolyte and renal panel, gastrointestinal profile, glucose/endocrine profile, inflammatory profile, lipid profile  Nutrition-Focused Physical Findings: overall appearance, skin    Nutrition Risk    Level of Risk:  (1x/ week)    Nutrition Follow-Up    RD Follow-up?: Yes     ICU

## 2023-08-02 NOTE — PROGRESS NOTE ADULT - TIME BILLING
chart and data review, clinical assessment, and coordination of care. This excludes any time spent on separate procedures or teaching.
chart and data review, clinical assessment, and coordination of care. This excludes any time spent on separate procedures or teaching.
-R/O PNEUMONIA-Leucocytosis,cough,low grade temp.CT anhio-small lt.pleural effusion,grounf glass opacity-Empric antibiotics,pulm.consult  -d/w ACP
-R/O PNEUMONIA-Leucocytosis,cough,low grade temp.CT anhio-small lt.pleural effusion,grounf glass opacity-Empric antibiotics,pulm.consult  -d/w ACP

## 2023-08-02 NOTE — PROGRESS NOTE ADULT - ASSESSMENT
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 Interval CXR from admission showing worsening opacification of left hemithorax.    #Left pleural effusion:  - bedside POCUS showing complex loculated left sided pleural effusion with septations and sediment  - may represent infectious process vs malignant effusion  - likely cause of patients left sided pleuritic chest pain  - s/p thoracentesis with 300 cc removal--> unable to remove further fluid loculated effusion; CT surg consulted who attempted to place pigtail but unable to at bedside and pending VATs today    Recommendations:  - fluid studies from thoracentesis consistent with empyema (exudative effusion with low glucose, low pH and gram+ cocci--> likely strep)  - abx per ID; changed to ceftriaxone and will require prolonged antibiotics  - as empyema, ultimately requires evacuation of infected space and pending VATS today with CT surgery and possible bronchoscopy as well given complete atelectasis of left lung  - follow up further fluid studies including cytopath      Discussed with Dr. Leary

## 2023-08-02 NOTE — PROGRESS NOTE ADULT - ATTENDING COMMENTS
67 year old male with DM2, HTN, TAO on BiPAP here with new left pleural effusion and left renal mass. Developed complete opacification of left hemithorax, not present on initial CT. Suspect possible post-obstructive process contributing. There is complexity within the left pleural space on bedside ultrasound. Concern for empyema vs malignant effusion.     s/p thoracentesis at bedside today. only able to drain approx 300 mL.  seen by CT surgery. underwent VATS today  follow up pleural fluid studies - gram stain positive for GPC in pairs  follow up cytopathology  continue antibiotics as per ID  continue BiPAP qHS for TAO

## 2023-08-03 LAB
ANION GAP SERPL CALC-SCNC: 10 MMOL/L — SIGNIFICANT CHANGE UP (ref 7–14)
BUN SERPL-MCNC: 27 MG/DL — HIGH (ref 7–23)
CALCIUM SERPL-MCNC: 8.3 MG/DL — LOW (ref 8.4–10.5)
CHLORIDE SERPL-SCNC: 92 MMOL/L — LOW (ref 98–107)
CO2 SERPL-SCNC: 25 MMOL/L — SIGNIFICANT CHANGE UP (ref 22–31)
CREAT SERPL-MCNC: 0.88 MG/DL — SIGNIFICANT CHANGE UP (ref 0.5–1.3)
CULTURE RESULTS: SIGNIFICANT CHANGE UP
CULTURE RESULTS: SIGNIFICANT CHANGE UP
EGFR: 94 ML/MIN/1.73M2 — SIGNIFICANT CHANGE UP
GLUCOSE BLDC GLUCOMTR-MCNC: 200 MG/DL — HIGH (ref 70–99)
GLUCOSE BLDC GLUCOMTR-MCNC: 244 MG/DL — HIGH (ref 70–99)
GLUCOSE BLDC GLUCOMTR-MCNC: 246 MG/DL — HIGH (ref 70–99)
GLUCOSE BLDC GLUCOMTR-MCNC: 256 MG/DL — HIGH (ref 70–99)
GLUCOSE BLDC GLUCOMTR-MCNC: 306 MG/DL — HIGH (ref 70–99)
GLUCOSE SERPL-MCNC: 303 MG/DL — HIGH (ref 70–99)
GRAM STN FLD: SIGNIFICANT CHANGE UP
HCT VFR BLD CALC: 29.8 % — LOW (ref 39–50)
HGB BLD-MCNC: 9.6 G/DL — LOW (ref 13–17)
MAGNESIUM SERPL-MCNC: 2.5 MG/DL — SIGNIFICANT CHANGE UP (ref 1.6–2.6)
MCHC RBC-ENTMCNC: 28.5 PG — SIGNIFICANT CHANGE UP (ref 27–34)
MCHC RBC-ENTMCNC: 32.2 GM/DL — SIGNIFICANT CHANGE UP (ref 32–36)
MCV RBC AUTO: 88.4 FL — SIGNIFICANT CHANGE UP (ref 80–100)
NIGHT BLUE STAIN TISS: SIGNIFICANT CHANGE UP
NRBC # BLD: 0 /100 WBCS — SIGNIFICANT CHANGE UP (ref 0–0)
NRBC # FLD: 0 K/UL — SIGNIFICANT CHANGE UP (ref 0–0)
PHOSPHATE SERPL-MCNC: 3.5 MG/DL — SIGNIFICANT CHANGE UP (ref 2.5–4.5)
PLATELET # BLD AUTO: 367 K/UL — SIGNIFICANT CHANGE UP (ref 150–400)
POTASSIUM SERPL-MCNC: 4.6 MMOL/L — SIGNIFICANT CHANGE UP (ref 3.5–5.3)
POTASSIUM SERPL-SCNC: 4.6 MMOL/L — SIGNIFICANT CHANGE UP (ref 3.5–5.3)
RBC # BLD: 3.37 M/UL — LOW (ref 4.2–5.8)
RBC # FLD: 13.5 % — SIGNIFICANT CHANGE UP (ref 10.3–14.5)
SODIUM SERPL-SCNC: 127 MMOL/L — LOW (ref 135–145)
SPECIMEN SOURCE: SIGNIFICANT CHANGE UP
WBC # BLD: 17.73 K/UL — HIGH (ref 3.8–10.5)
WBC # FLD AUTO: 17.73 K/UL — HIGH (ref 3.8–10.5)

## 2023-08-03 PROCEDURE — 99233 SBSQ HOSP IP/OBS HIGH 50: CPT

## 2023-08-03 PROCEDURE — 71045 X-RAY EXAM CHEST 1 VIEW: CPT | Mod: 26

## 2023-08-03 RX ORDER — INSULIN LISPRO 100/ML
VIAL (ML) SUBCUTANEOUS AT BEDTIME
Refills: 0 | Status: DISCONTINUED | OUTPATIENT
Start: 2023-08-03 | End: 2023-08-11

## 2023-08-03 RX ORDER — LACTULOSE 10 G/15ML
10 SOLUTION ORAL EVERY 8 HOURS
Refills: 0 | Status: DISCONTINUED | OUTPATIENT
Start: 2023-08-03 | End: 2023-08-07

## 2023-08-03 RX ORDER — LIDOCAINE 4 G/100G
1 CREAM TOPICAL DAILY
Refills: 0 | Status: DISCONTINUED | OUTPATIENT
Start: 2023-08-03 | End: 2023-08-11

## 2023-08-03 RX ORDER — INSULIN LISPRO 100/ML
5 VIAL (ML) SUBCUTANEOUS ONCE
Refills: 0 | Status: COMPLETED | OUTPATIENT
Start: 2023-08-03 | End: 2023-08-03

## 2023-08-03 RX ORDER — INSULIN GLARGINE 100 [IU]/ML
10 INJECTION, SOLUTION SUBCUTANEOUS AT BEDTIME
Refills: 0 | Status: DISCONTINUED | OUTPATIENT
Start: 2023-08-03 | End: 2023-08-03

## 2023-08-03 RX ORDER — SODIUM CHLORIDE 9 MG/ML
4 INJECTION INTRAMUSCULAR; INTRAVENOUS; SUBCUTANEOUS EVERY 6 HOURS
Refills: 0 | Status: DISCONTINUED | OUTPATIENT
Start: 2023-08-03 | End: 2023-08-11

## 2023-08-03 RX ORDER — SODIUM CHLORIDE 9 MG/ML
1000 INJECTION, SOLUTION INTRAVENOUS
Refills: 0 | Status: DISCONTINUED | OUTPATIENT
Start: 2023-08-03 | End: 2023-08-03

## 2023-08-03 RX ORDER — INSULIN LISPRO 100/ML
5 VIAL (ML) SUBCUTANEOUS
Refills: 0 | Status: DISCONTINUED | OUTPATIENT
Start: 2023-08-03 | End: 2023-08-04

## 2023-08-03 RX ORDER — DEXTROSE 50 % IN WATER 50 %
15 SYRINGE (ML) INTRAVENOUS ONCE
Refills: 0 | Status: DISCONTINUED | OUTPATIENT
Start: 2023-08-03 | End: 2023-08-03

## 2023-08-03 RX ORDER — ACETAMINOPHEN 500 MG
1000 TABLET ORAL ONCE
Refills: 0 | Status: DISCONTINUED | OUTPATIENT
Start: 2023-08-03 | End: 2023-08-11

## 2023-08-03 RX ORDER — GLUCAGON INJECTION, SOLUTION 0.5 MG/.1ML
1 INJECTION, SOLUTION SUBCUTANEOUS ONCE
Refills: 0 | Status: DISCONTINUED | OUTPATIENT
Start: 2023-08-03 | End: 2023-08-03

## 2023-08-03 RX ORDER — INSULIN GLARGINE 100 [IU]/ML
15 INJECTION, SOLUTION SUBCUTANEOUS AT BEDTIME
Refills: 0 | Status: DISCONTINUED | OUTPATIENT
Start: 2023-08-03 | End: 2023-08-04

## 2023-08-03 RX ORDER — INSULIN LISPRO 100/ML
VIAL (ML) SUBCUTANEOUS
Refills: 0 | Status: DISCONTINUED | OUTPATIENT
Start: 2023-08-03 | End: 2023-08-11

## 2023-08-03 RX ORDER — ALBUTEROL 90 UG/1
2.5 AEROSOL, METERED ORAL EVERY 6 HOURS
Refills: 0 | Status: DISCONTINUED | OUTPATIENT
Start: 2023-08-03 | End: 2023-08-11

## 2023-08-03 RX ORDER — DORNASE ALFA 1 MG/ML
2.5 SOLUTION RESPIRATORY (INHALATION) DAILY
Refills: 0 | Status: DISCONTINUED | OUTPATIENT
Start: 2023-08-03 | End: 2023-08-11

## 2023-08-03 RX ORDER — METHYLNALTREXONE BROMIDE 12 MG/.6ML
12 INJECTION, SOLUTION SUBCUTANEOUS EVERY OTHER DAY
Refills: 0 | Status: DISCONTINUED | OUTPATIENT
Start: 2023-08-03 | End: 2023-08-04

## 2023-08-03 RX ADMIN — HEPARIN SODIUM 5000 UNIT(S): 5000 INJECTION INTRAVENOUS; SUBCUTANEOUS at 14:15

## 2023-08-03 RX ADMIN — Medication 5 UNIT(S): at 04:36

## 2023-08-03 RX ADMIN — ALBUTEROL 2.5 MILLIGRAM(S): 90 AEROSOL, METERED ORAL at 21:27

## 2023-08-03 RX ADMIN — HEPARIN SODIUM 5000 UNIT(S): 5000 INJECTION INTRAVENOUS; SUBCUTANEOUS at 06:25

## 2023-08-03 RX ADMIN — CEFTRIAXONE 100 MILLIGRAM(S): 500 INJECTION, POWDER, FOR SOLUTION INTRAMUSCULAR; INTRAVENOUS at 14:15

## 2023-08-03 RX ADMIN — INSULIN GLARGINE 15 UNIT(S): 100 INJECTION, SOLUTION SUBCUTANEOUS at 21:16

## 2023-08-03 RX ADMIN — SODIUM CHLORIDE 4 MILLILITER(S): 9 INJECTION INTRAMUSCULAR; INTRAVENOUS; SUBCUTANEOUS at 09:10

## 2023-08-03 RX ADMIN — LACTULOSE 10 GRAM(S): 10 SOLUTION ORAL at 21:16

## 2023-08-03 RX ADMIN — ATORVASTATIN CALCIUM 10 MILLIGRAM(S): 80 TABLET, FILM COATED ORAL at 21:16

## 2023-08-03 RX ADMIN — Medication 2: at 16:56

## 2023-08-03 RX ADMIN — HYDROMORPHONE HYDROCHLORIDE 30 MILLILITER(S): 2 INJECTION INTRAMUSCULAR; INTRAVENOUS; SUBCUTANEOUS at 07:01

## 2023-08-03 RX ADMIN — HEPARIN SODIUM 5000 UNIT(S): 5000 INJECTION INTRAVENOUS; SUBCUTANEOUS at 21:16

## 2023-08-03 RX ADMIN — ALBUTEROL 2.5 MILLIGRAM(S): 90 AEROSOL, METERED ORAL at 15:31

## 2023-08-03 RX ADMIN — Medication 9 MILLIGRAM(S): at 21:16

## 2023-08-03 RX ADMIN — INSULIN GLARGINE 6 UNIT(S): 100 INJECTION, SOLUTION SUBCUTANEOUS at 00:04

## 2023-08-03 RX ADMIN — Medication 4: at 11:39

## 2023-08-03 RX ADMIN — Medication 4: at 07:56

## 2023-08-03 RX ADMIN — SODIUM CHLORIDE 4 MILLILITER(S): 9 INJECTION INTRAMUSCULAR; INTRAVENOUS; SUBCUTANEOUS at 15:31

## 2023-08-03 RX ADMIN — Medication 5 UNIT(S): at 16:57

## 2023-08-03 RX ADMIN — LIDOCAINE 1 PATCH: 4 CREAM TOPICAL at 14:16

## 2023-08-03 RX ADMIN — CYCLOBENZAPRINE HYDROCHLORIDE 10 MILLIGRAM(S): 10 TABLET, FILM COATED ORAL at 21:16

## 2023-08-03 RX ADMIN — Medication 500 MILLIGRAM(S): at 06:38

## 2023-08-03 RX ADMIN — HYDROMORPHONE HYDROCHLORIDE 30 MILLILITER(S): 2 INJECTION INTRAMUSCULAR; INTRAVENOUS; SUBCUTANEOUS at 19:03

## 2023-08-03 RX ADMIN — SODIUM CHLORIDE 4 MILLILITER(S): 9 INJECTION INTRAMUSCULAR; INTRAVENOUS; SUBCUTANEOUS at 21:28

## 2023-08-03 RX ADMIN — Medication 81 MILLIGRAM(S): at 14:15

## 2023-08-03 RX ADMIN — Medication 500 MILLIGRAM(S): at 17:15

## 2023-08-03 RX ADMIN — Medication 5 UNIT(S): at 00:49

## 2023-08-03 RX ADMIN — Medication 5 UNIT(S): at 07:56

## 2023-08-03 RX ADMIN — Medication 5 UNIT(S): at 11:39

## 2023-08-03 RX ADMIN — POLYETHYLENE GLYCOL 3350 17 GRAM(S): 17 POWDER, FOR SOLUTION ORAL at 14:16

## 2023-08-03 RX ADMIN — LIDOCAINE 1 PATCH: 4 CREAM TOPICAL at 19:50

## 2023-08-03 RX ADMIN — DORNASE ALFA 2.5 MILLIGRAM(S): 1 SOLUTION RESPIRATORY (INHALATION) at 09:21

## 2023-08-03 RX ADMIN — Medication 2: at 21:20

## 2023-08-03 RX ADMIN — SENNA PLUS 2 TABLET(S): 8.6 TABLET ORAL at 21:16

## 2023-08-03 RX ADMIN — ALBUTEROL 2.5 MILLIGRAM(S): 90 AEROSOL, METERED ORAL at 09:10

## 2023-08-03 NOTE — DIETITIAN INITIAL EVALUATION ADULT - REASON FOR ADMISSION
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

## 2023-08-03 NOTE — PROGRESS NOTE ADULT - ASSESSMENT
67 yr old male with a pmh of HTN, HLD, T2DM on metformin, TAO on BiPAP who presents to the ED with acute, 10/10, sharp, intermittent stabbing left sided abdominal and left axillary/back pain.    EKG - NSR poor Rwave progression     1) Epigastric pain   - EKG shows NSR poor R wave progression   -ct chest 7/29 showed Cardiomegaly with left ventricular dilatation with some concentric   hypertrophy. would get 2d echo to assess LV function  -CT shows celiac artery stenosis and renal mass, pt to get MRI     2) HTN   - cont hydral, losartan and amlodipine     3) Cough/SOB  - RVP-  neg  - CXR  complete white out of the left hemithorax likely secondary to worsening left pleural effusion  -started on abx  -CT Chest The lobar bronchi of the left lung are opacified. There is complete atelectasis of the left lung with a large left pleural effusion. Redemonstrated solid exophytic mass of left kidney. thoracic surgery on board, s/p  Left uniport VATS empyema drainage; partial decortication; fluid irrigation and drainage being monitored in SICU  -f/u pulm recs  -Gram positive Bacteria Left Empyema on abx

## 2023-08-03 NOTE — PROGRESS NOTE ADULT - SUBJECTIVE AND OBJECTIVE BOX
DATE OF SERVICE: 08-03-23  Seen in CTICU    s/p VATS yesterday  Reports mild to moderate left sided pleuritic chest pain. SOB is better    REVIEW OF SYSTEMS:  CONSTITUTIONAL: No fever  RESPIRATORY: No cough, hemoptysis shortness of breath  CARDIOVASCULAR: No palpitations, dizziness, or leg swelling  GASTROINTESTINAL: No nausea, vomiting, hematemesis  GENITOURINARY: No dysuria, frequency, hematuria   NEUROLOGICAL: No headaches, no dizziness  MUSCULOSKELETAL: No joint pain or swelling;     INTERVAL HPI/OVERNIGHT EVENTS:    ICU Vital Signs Last 24 Hrs  T(C): 36.9 (03 Aug 2023 08:00), Max: 36.9 (03 Aug 2023 08:00)  T(F): 98.4 (03 Aug 2023 08:00), Max: 98.4 (03 Aug 2023 08:00)  HR: 102 (03 Aug 2023 11:00) (70 - 110)  BP: 123/81 (03 Aug 2023 11:00) (86/53 - 132/80)  BP(mean): 94 (03 Aug 2023 11:00) (56 - 98)  ABP: --  ABP(mean): --  RR: 22 (03 Aug 2023 11:00) (16 - 29)  SpO2: 99% (03 Aug 2023 11:00) (95% - 100%)    O2 Parameters below as of 03 Aug 2023 10:00  Patient On (Oxygen Delivery Method): nasal cannula  O2 Flow (L/min): 2        LABS:                        12.5   16.64 )-----------( 332      ( 30 Jul 2023 07:16 )             39.4     07-30    132<L>  |  97<L>  |  18  ----------------------------<  255<H>  4.2   |  22  |  0.73    Ca    9.0      30 Jul 2023 07:16    TPro  8.4<H>  /  Alb  4.3  /  TBili  0.5  /  DBili  x   /  AST  14  /  ALT  17  /  AlkPhos  90  07-29    PT/INR - ( 29 Jul 2023 14:00 )   PT: 11.2 sec;   INR: 1.00 ratio         PTT - ( 30 Jul 2023 00:05 )  PTT:55.1 sec  Urinalysis Basic - ( 30 Jul 2023 07:16 )    Color: x / Appearance: x / SG: x / pH: x  Gluc: 255 mg/dL / Ketone: x  / Bili: x / Urobili: x   Blood: x / Protein: x / Nitrite: x   Leuk Esterase: x / RBC: x / WBC x   Sq Epi: x / Non Sq Epi: x / Bacteria: x      CAPILLARY BLOOD GLUCOSE      POCT Blood Glucose.: 252 mg/dL (30 Jul 2023 16:59)  POCT Blood Glucose.: 282 mg/dL (30 Jul 2023 11:48)  POCT Blood Glucose.: 263 mg/dL (30 Jul 2023 09:02)  POCT Blood Glucose.: 228 mg/dL (30 Jul 2023 07:32)  POCT Blood Glucose.: 319 mg/dL (30 Jul 2023 00:55)  POCT Blood Glucose.: 296 mg/dL (29 Jul 2023 20:21)        Urinalysis Basic - ( 30 Jul 2023 07:16 )    Color: x / Appearance: x / SG: x / pH: x  Gluc: 255 mg/dL / Ketone: x  / Bili: x / Urobili: x   Blood: x / Protein: x / Nitrite: x   Leuk Esterase: x / RBC: x / WBC x   Sq Epi: x / Non Sq Epi: x / Bacteria: x        PAST MEDICAL & SURGICAL HISTORY:  Hypertension      Diabetes mellitus      HLD (hyperlipidemia)      No significant past surgical history          MEDICATIONS  (STANDING):  amLODIPine   Tablet 5 milliGRAM(s) Oral daily  aspirin  chewable 81 milliGRAM(s) Oral daily  atorvastatin 10 milliGRAM(s) Oral at bedtime  cyclobenzaprine 10 milliGRAM(s) Oral at bedtime  dextrose 5%. 1000 milliLiter(s) (50 mL/Hr) IV Continuous <Continuous>  dextrose 5%. 1000 milliLiter(s) (100 mL/Hr) IV Continuous <Continuous>  dextrose 50% Injectable 12.5 Gram(s) IV Push once  dextrose 50% Injectable 25 Gram(s) IV Push once  dextrose 50% Injectable 25 Gram(s) IV Push once  glucagon  Injectable 1 milliGRAM(s) IntraMuscular once  hydrALAZINE 50 milliGRAM(s) Oral two times a day  insulin lispro (ADMELOG) corrective regimen sliding scale   SubCutaneous three times a day before meals  insulin lispro (ADMELOG) corrective regimen sliding scale   SubCutaneous at bedtime  latanoprost 0.005% Ophthalmic Solution 1 Drop(s) Both EYES at bedtime  losartan 100 milliGRAM(s) Oral daily  melatonin 9 milliGRAM(s) Oral at bedtime    MEDICATIONS  (PRN):  acetaminophen     Tablet .. 650 milliGRAM(s) Oral every 6 hours PRN Temp greater or equal to 38C (100.4F), Mild Pain (1 - 3)  aluminum hydroxide/magnesium hydroxide/simethicone Suspension 30 milliLiter(s) Oral every 4 hours PRN Dyspepsia  dextrose Oral Gel 15 Gram(s) Oral once PRN Blood Glucose LESS THAN 70 milliGRAM(s)/deciliter  HYDROmorphone  Injectable 0.5 milliGRAM(s) IV Push every 4 hours PRN breakthrough pain  nitroglycerin     SubLingual 0.4 milliGRAM(s) SubLingual every 5 minutes PRN Chest Pain  ondansetron Injectable 4 milliGRAM(s) IV Push every 8 hours PRN Nausea and/or Vomiting  oxycodone    5 mG/acetaminophen 325 mG 1 Tablet(s) Oral every 6 hours PRN Moderate Pain (4 - 6)  oxycodone    5 mG/acetaminophen 325 mG 2 Tablet(s) Oral every 4 hours PRN Severe Pain (7 - 10)        RADIOLOGY & ADDITIONAL TESTS:    Imaging Personally Reviewed:  [ ] YES  [ ] NO    Consultant(s) Notes Reviewed:  [x ] YES  [ ] NO    PHYSICAL EXAM:  GENERAL: Alert and awake lying in bed in no distress  HEAD:  Atraumatic, Normocephalic  EYES: EOMI, MADALYN, conjunctiva and sclera clear  NECK: Supple, No JVD, Normal thyroid  NERVOUS SYSTEM:  Alert & Oriented X3, Motor and sensory systems are intact,   CHEST/LUNG: Left sided chest tubes +  HEART: Regular rate and rhythm; No murmurs, rubs, or gallops  ABDOMEN: Soft, Nontender, Nondistended; Bowel sounds present  EXTREMITIES:   Peripheral Pulses are palpable, no  edema        Care Discussed with Consultants/Other Providers [x ] YES  [ ] NO      Code Status: [] Full Code [] DNR [] DNI [] Goals of Care:   Disposition: [] ICU [] Stroke Unit [] RCU []PCU []Floor [] Discharge Home         JANETT HighFACP

## 2023-08-03 NOTE — PROGRESS NOTE ADULT - SUBJECTIVE AND OBJECTIVE BOX
CHIEF COMPLAINT: FOLLOW UP IN ICU FOR POSTOPERATIVE CARE OF PATIENT WHO IS S/P DECORTICATION      PROCEDURES: Left uniport VATS empyema drainage; partial decortication; fluid irrigation and drainage 02-Aug-2023       ISSUES:   Gram positive bacteria empyema  Hyponatremia  L Kidney mass  Post op pain  Chest tube in place  HTN  HLD  TAO on CPAP 14  DM2  Obesity (BMI 31)    INTERVAL EVENTS:   OR today. Extubated in OR. Transferred to CTICU.      HISTORY:   Patient reports moderate pain at chest wall incision sites which is worse with coughing and deep breathing without associated fever or dyspnea. Pain is improved with use of pain meds.     PHYSICAL EXAM:   Gen: Comfortable, No acute distress  Eyes: Sclera white, Conjunctiva normal, Eyelids normal, Pupils symmetrical   ENT: Mucous membranes moist,  ,  ,    Neck: Trachea midline,  ,  ,  ,  ,  ,    CV: Rate regular, Rhythm regular,  ,  ,    Resp: Breath sounds clear, No accessory muscles use, Two L chest tubes,  ,    Abd: Soft, Non-distended, Non-tender,   ,  ,  ,    Skin: Warm, No peripheral edema of lower extremities,  ,    : No horne  Neuro: Moving all 4 extremities,    Psych: A&Ox3      ASSESSMENT AND PLAN:     NEURO:  Post-operative Pain - Stable. Pain control with PCA and Tylenol IV PRN.          RESPIRATORY:  Hypoxia - Wean nasal cannula for goal O2sat above 92. Obtain CXR. Incentive spirometry. Chest PT and frequent suctioning. Continue bronchodilators. OOB to chair & ambulate w/ assistance. Continuous pulse oximetry for support & to prevent decompensation.         TAO - stable. Continue CPAP. Monitor nocturnal O2sat.         CARDIOVASCULAR:  Hemodynamically stable - Not on pressors. Continue hemodynamic monitoring.  Telemetry (medical test) - Reviewed by me today independently. Normal sinus rhythm.  HTN - stable. Hold home antihypertensives for now.         RENAL:  Stable - Monitor IOs and electrolytes. Keep K above 4.0 and Mg above 2.0.     Hyponatremia - Secondary to hypovolemia based off urine chemistries. Worsening.   - Give NS IVF. Monitor Na.   - Avoid overcorrection. Goal to correct no more than 10 every 24 hours.        GASTROINTESTINAL:  GI prophylaxis not indicated  Zofran and Reglan IV PRN for nausea  Regular consistency diet             HEMATOLOGIC:  No signs of active bleeding. Monitor Hgb in CBC in AM  DVT prophylaxis with heparin subQ and SCDs.               INFECTIOUS DISEASE:  Gram positive Bacteria Left Empyema - improved s/p decortication.   - Continue Ceftriaxone and Flagyl.  - F/U OR cultures   - Chest tube – Pleurevac regulated suctioning. Monitor chest tube output.  - ID consult recs    ENDOCRINE:  DM2 – Stable. Monitor glucose fingersticks for goal 120-180. Insulin sliding scale. Carb control diet.         ONCOLOGY:  L Renal mass - stable. outpatient oncology appointment.         Pertinent clinical, laboratory, radiographic, hemodynamic, echocardiographic, respiratory data, microbiologic data and chart were reviewed by myself and analyzed frequently throughout the course of the day and night by myself.    Plan discussed at length with the CTICU staff and Attending CT Surgeon -   Dr Marko Orellana.      Patient's status was discussed with patient and family at bedside.       	       ________________________________________________      _________________________  VITAL SIGNS:  Vital Signs Last 24 Hrs  T(C): 36.6 (02 Aug 2023 23:30), Max: 37.2 (02 Aug 2023 12:11)  T(F): 97.8 (02 Aug 2023 23:30), Max: 98.9 (02 Aug 2023 12:11)  HR: 92 (03 Aug 2023 01:00) (89 - 110)  BP: 122/87 (03 Aug 2023 01:00) (86/53 - 138/79)  BP(mean): 96 (03 Aug 2023 01:00) (56 - 98)  RR: 20 (03 Aug 2023 01:00) (18 - 29)  SpO2: 96% (03 Aug 2023 01:00) (94% - 100%)    Parameters below as of 03 Aug 2023 01:00  Patient On (Oxygen Delivery Method): nasal cannula  O2 Flow (L/min): 2    I/Os:   I&O's Detail    02 Aug 2023 07:01  -  03 Aug 2023 01:12  --------------------------------------------------------  IN:    Lactated Ringers Bolus: 500 mL    Oral Fluid: 250 mL    sodium chloride 0.9%: 30 mL  Total IN: 780 mL    OUT:    Chest Tube (mL): 15 mL    Voided (mL): 300 mL  Total OUT: 315 mL    Total NET: 465 mL              MEDICATIONS:  MEDICATIONS  (STANDING):  aspirin  chewable 81 milliGRAM(s) Oral daily  atorvastatin 10 milliGRAM(s) Oral at bedtime  cefTRIAXone   IVPB 2000 milliGRAM(s) IV Intermittent every 24 hours  cyclobenzaprine 10 milliGRAM(s) Oral at bedtime  dextrose 50% Injectable 25 Gram(s) IV Push once  heparin   Injectable 5000 Unit(s) SubCutaneous every 8 hours  HYDROmorphone PCA (1 mG/mL) 30 milliLiter(s) PCA Continuous PCA Continuous  insulin glargine Injectable (LANTUS) 6 Unit(s) SubCutaneous at bedtime  insulin lispro (ADMELOG) corrective regimen sliding scale   SubCutaneous three times a day before meals  insulin lispro (ADMELOG) corrective regimen sliding scale   SubCutaneous at bedtime  latanoprost 0.005% Ophthalmic Solution 1 Drop(s) Both EYES at bedtime  melatonin 9 milliGRAM(s) Oral at bedtime  metroNIDAZOLE    Tablet 500 milliGRAM(s) Oral two times a day  polyethylene glycol 3350 17 Gram(s) Oral daily  senna 2 Tablet(s) Oral at bedtime  sodium chloride 0.9%. 1000 milliLiter(s) (30 mL/Hr) IV Continuous <Continuous>    MEDICATIONS  (PRN):  acetaminophen     Tablet .. 650 milliGRAM(s) Oral every 6 hours PRN Temp greater or equal to 38C (100.4F), Mild Pain (1 - 3)  aluminum hydroxide/magnesium hydroxide/simethicone Suspension 30 milliLiter(s) Oral every 4 hours PRN Dyspepsia  guaiFENesin Oral Liquid (Sugar-Free) 100 milliGRAM(s) Oral every 6 hours PRN Cough  levalbuterol Inhalation 0.63 milliGRAM(s) Inhalation every 6 hours PRN SOB/Wheezing  naloxone Injectable 0.1 milliGRAM(s) IV Push every 3 minutes PRN For ANY of the following changes in patient status:  A. RR LESS THAN 10 breaths per minute, B. Oxygen saturation LESS THAN 90%, C. Sedation score of 6  ondansetron Injectable 4 milliGRAM(s) IV Push every 6 hours PRN Nausea      LABS:  Laboratory data was independently reviewed by me today.                           11.8   17.01 )-----------( 348      ( 02 Aug 2023 06:22 )             37.4     08-02    128<L>  |  90<L>  |  28<H>  ----------------------------<  202<H>  4.0   |  24  |  0.84    Ca    9.2      02 Aug 2023 06:22  Phos  2.9     08-02  Mg     2.60     08-02    TPro  7.8  /  Alb  3.3  /  TBili  0.9  /  DBili  x   /  AST  17  /  ALT  24  /  AlkPhos  84  08-01    LIVER FUNCTIONS - ( 01 Aug 2023 06:36 )  Alb: 3.3 g/dL / Pro: 7.8 g/dL / ALK PHOS: 84 U/L / ALT: 24 U/L / AST: 17 U/L / GGT: x           PT/INR - ( 02 Aug 2023 06:22 )   PT: 12.0 sec;   INR: 1.07 ratio         PTT - ( 02 Aug 2023 06:22 )  PTT:32.2 sec    Urinalysis Basic - ( 02 Aug 2023 06:22 )    Color: x / Appearance: x / SG: x / pH: x  Gluc: 202 mg/dL / Ketone: x  / Bili: x / Urobili: x   Blood: x / Protein: x / Nitrite: x   Leuk Esterase: x / RBC: x / WBC x   Sq Epi: x / Non Sq Epi: x / Bacteria: x        RADIOLOGY:   Radiology images were independently reviewed by me today. Reports were reviewed by me today.    Post-op CXR 8/3/23 - Improved L pleural effusion. Chest tubes x2 in place. No pneumothorax.     Xray Chest 1 View- PORTABLE-Urgent:   ACC: [id number] EXAM:  XR CHEST PORTABLE URGENT 1V   ORDERED BY: NARESH GARCIA     PROCEDURE DATE:  08/01/2023          INTERPRETATION:  EXAMINATION: XR CHEST URGENT    CLINICAL INDICATION: attempted PTC placement    TECHNIQUE: Single frontal, portable view of the chest was obtained.    COMPARISON: Multiple radiographs of the chest, most recently same day   prior.    FINDINGS:    The heart is not accurately assessed in this AP projection.  Similar complete whiteout of the left lung secondary to pleural   effusion/atelectasis.  The right lung is clear.  There is no pneumothorax  No acute bony abnormality.    IMPRESSION:  Similar complete white out of the left lung, secondary to pleural   effusion/atelectasis, which is better characterized on CT chest 7/29/2023.    --- End of Report ---          MAMADOU CAREY MD; Resident Radiologist  This document has been electronically signed.  SAM REA MD; Attending Radiologist  This document has been electronically signed. Aug  2 2023  8:08AM (08-01-23 @ 19:11)  Xray Chest 1 View- PORTABLE-Urgent:   ACC: [id number] EXAM:  XR CHEST PORTABLE URGENT 1V   ORDERED BY: FARZAD OSHEA     PROCEDURE DATE:  08/01/2023          INTERPRETATION:  EXAMINATION: XR CHEST URGENT    CLINICAL INDICATION: r/o pneumothorax    TECHNIQUE: Single frontal, portable view of the chest was obtained.    COMPARISON: Chest CT 7/31/2023    FINDINGS:    The heart is not accurately assessed in this AP projection.  Similar complete white out of the left lung, secondary to pleural   effusion/atelectasis.  The right lung is clear.  No pneumothorax.  No acute bony abnormality.    IMPRESSION:  Similar complete white out of the left lung secondary to pleural   effusion/atelectasis, better characterized on CT, unchanged.  No pneumothorax.    --- End of Report ---          MAMADOU CAREY MD; Resident Radiologist  This document has been electronically signed.  MARIO DAMON DO; Attending Radiologist  This document has been electronically signed. Aug  1 2023  2:51PM (08-01-23 @ 13:09)  Xray Chest 1 View- PORTABLE-Urgent:   ACC: [id number] EXAM:  XR CHEST PORTABLE URGENT 1V   ORDERED BY: FARZAD OSHEA     PROCEDURE DATE:  08/01/2023          INTERPRETATION:  EXAMINATION: XR CHEST URGENT    CLINICAL INDICATION: pleural effusion    TECHNIQUE: Single frontal, portable view of the chest was obtained.    COMPARISON: Chest CT 7/31/2023 Multiple radiographs of the chest, most   recently 7/30/2023.    FINDINGS:  Chest x-ray 8/1/2023 10:00 AM  The heart is not accurately assessed in this AP projection.  Similar complete white out of the left hemithorax, likely secondary to   left-sided pleural effusion.  Right lung is clear.  No right-sided pleural effusion or pneumothorax.  No acute bony abnormality.    IMPRESSION:  Similar complete white out of the left hemithorax, secondary to   left-sided pleural effusion/atelectasis, better characterized on CT of   the prior day, unchanged.    --- End of Report ---          MAMADOU CAREY MD; Resident Radiologist  This document has been electronically signed.  MARIO DAMON DO; Attending Radiologist  This document has been electronically signed. Aug  1 2023  2:02PM (08-01-23 @ 10:32)

## 2023-08-03 NOTE — PROGRESS NOTE ADULT - ASSESSMENT
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 Urology consulted for incidental left renal mass    - Imaging reviewed: 6.4 cm exophytic left renal mass   - f/up pleural fluid studies   - recommend IR consult for renal mass biopsy nonurgent    Seen and examined with Dr. Desai

## 2023-08-03 NOTE — DIETITIAN INITIAL EVALUATION ADULT - PERTINENT MEDS FT
MEDICATIONS  (STANDING):  acetaminophen   IVPB .. 1000 milliGRAM(s) IV Intermittent once  albuterol    0.083% 2.5 milliGRAM(s) Nebulizer every 6 hours  aspirin  chewable 81 milliGRAM(s) Oral daily  atorvastatin 10 milliGRAM(s) Oral at bedtime  cefTRIAXone   IVPB 2000 milliGRAM(s) IV Intermittent every 24 hours  cyclobenzaprine 10 milliGRAM(s) Oral at bedtime  dextrose 50% Injectable 25 Gram(s) IV Push once  dornase shana Solution 2.5 milliGRAM(s) Inhalation daily  heparin   Injectable 5000 Unit(s) SubCutaneous every 8 hours  HYDROmorphone PCA (1 mG/mL) 30 milliLiter(s) PCA Continuous PCA Continuous  insulin glargine Injectable (LANTUS) 15 Unit(s) SubCutaneous at bedtime  insulin lispro (ADMELOG) corrective regimen sliding scale   SubCutaneous three times a day before meals  insulin lispro (ADMELOG) corrective regimen sliding scale   SubCutaneous at bedtime  insulin lispro Injectable (ADMELOG) 5 Unit(s) SubCutaneous before breakfast  insulin lispro Injectable (ADMELOG) 5 Unit(s) SubCutaneous before lunch  insulin lispro Injectable (ADMELOG) 5 Unit(s) SubCutaneous before dinner  latanoprost 0.005% Ophthalmic Solution 1 Drop(s) Both EYES at bedtime  lidocaine   4% Patch 1 Patch Transdermal daily  melatonin 9 milliGRAM(s) Oral at bedtime  metroNIDAZOLE    Tablet 500 milliGRAM(s) Oral two times a day  polyethylene glycol 3350 17 Gram(s) Oral daily  senna 2 Tablet(s) Oral at bedtime  sodium chloride 0.9%. 1000 milliLiter(s) (30 mL/Hr) IV Continuous <Continuous>  sodium chloride 3%  Inhalation 4 milliLiter(s) Inhalation every 6 hours    MEDICATIONS  (PRN):  acetaminophen     Tablet .. 650 milliGRAM(s) Oral every 6 hours PRN Temp greater or equal to 38C (100.4F), Mild Pain (1 - 3)  aluminum hydroxide/magnesium hydroxide/simethicone Suspension 30 milliLiter(s) Oral every 4 hours PRN Dyspepsia  naloxone Injectable 0.1 milliGRAM(s) IV Push every 3 minutes PRN For ANY of the following changes in patient status:  A. RR LESS THAN 10 breaths per minute, B. Oxygen saturation LESS THAN 90%, C. Sedation score of 6  ondansetron Injectable 4 milliGRAM(s) IV Push every 6 hours PRN Nausea

## 2023-08-03 NOTE — DIETITIAN INITIAL EVALUATION ADULT - OTHER INFO
Patient reports having about 75% of breakfast today. Patient denies any nausea/vomiting/diarrhea/constipation or difficulty chewing and swallowing. Last bowel movement 7/31, on bowel regimen. RD provided the patient and wife with extensive verbal and written DM diet education; including, sources of carbohydrate, DM Myplate method, various food groups, carb counting, label reading, better meal plan, appropriate cultural foods and healthful cooking methods. All nutrition related questions and concerns addressed at this time. RD remains available, patient made aware.

## 2023-08-03 NOTE — DIETITIAN INITIAL EVALUATION ADULT - NSFNSGIIOFT_GEN_A_CORE
08-02-23 @ 07:01  -  08-03-23 @ 07:00  --------------------------------------------------------  OUT:    Chest Tube (mL): 30 mL    Chest Tube (mL): 30 mL  Total OUT: 60 mL    Total NET: -60 mL

## 2023-08-03 NOTE — DIETITIAN INITIAL EVALUATION ADULT - COLLABORATION WITH OTHER PROVIDERS
Suggest outpatient follow up with appropriate RD for the purposes of long-term nutrition evaluation and diet education.

## 2023-08-03 NOTE — DIETITIAN INITIAL EVALUATION ADULT - PROBLEM SELECTOR PLAN 1
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 Steven Carlos outpatient for MALS evaluation   Contacted Surgery to see if can d/c heparin drip-> no need for heparin drip at this time  Pain regimen: percocet 1 tab for moderate pain, 2 tab for severe pain, dilaudid 0.5MG IV for breakthrough pain

## 2023-08-03 NOTE — PROGRESS NOTE ADULT - ASSESSMENT
67 year old male with htn and DM presented with left sided chest pain.  Imaging with left sided opacities and ? worsening left sided effusion.  Associated leukocytosis.     1) Shortness of breath with cough  S/p thoracentesis  Concern for empyema/underlying pneumonia    Gram stain with GPC- likely strep species  Continue ceftriaxone/ flagyl (better coverage for strep)   Follow cultures    2) Renal Mass-  Solid exophytic left renal mass and primary renal neoplasm cannot be   excluded. Additional indeterminate right renal lesion measuring 1.8 x 1.4   cm. Urology evaluation .     3) Leukocytosis-   Concern for left sided Pulmonary infection    4) Elevated lactate  blood cultures without growth to date  continue to trend    5) Abnormal imaging  stenosis of celiac axis  Surgery following   67 year old male with htn and DM presented with left sided chest pain.  Imaging with left sided opacities and ? worsening left sided effusion.  Associated leukocytosis.     1) Shortness of breath with cough  S/p thoracentesis  Concern for empyema/underlying pneumonia    Gram stain with GPC- pleural fluid growing strep constellatus  Continue ceftriaxone/ flagyl (better coverage for strep)   Follow cultures    2) Renal Mass-  Solid exophytic left renal mass and primary renal neoplasm cannot be   excluded. Additional indeterminate right renal lesion measuring 1.8 x 1.4   cm. Urology evaluation .     3) Leukocytosis-   Concern for left sided Pulmonary infection    4) Elevated lactate  blood cultures without growth to date  continue to trend    5) Abnormal imaging  stenosis of celiac axis  Surgery following

## 2023-08-03 NOTE — PROGRESS NOTE ADULT - SUBJECTIVE AND OBJECTIVE BOX
Follow Up:      Inverval History/ROS:Patient is a 67y old  Male who presents with a chief complaint of celiac artery stenosis, c/f median arcuate ligament syndrome; renal mass (03 Aug 2023 14:53)    S/p vats   No fever      Allergies    No Known Allergies    Intolerances        ANTIMICROBIALS:  cefTRIAXone   IVPB 2000 every 24 hours  metroNIDAZOLE    Tablet 500 two times a day      OTHER MEDS:  acetaminophen     Tablet .. 650 milliGRAM(s) Oral every 6 hours PRN  acetaminophen   IVPB .. 1000 milliGRAM(s) IV Intermittent once  albuterol    0.083% 2.5 milliGRAM(s) Nebulizer every 6 hours  aluminum hydroxide/magnesium hydroxide/simethicone Suspension 30 milliLiter(s) Oral every 4 hours PRN  aspirin  chewable 81 milliGRAM(s) Oral daily  atorvastatin 10 milliGRAM(s) Oral at bedtime  cyclobenzaprine 10 milliGRAM(s) Oral at bedtime  dextrose 50% Injectable 25 Gram(s) IV Push once  dornase shana Solution 2.5 milliGRAM(s) Inhalation daily  heparin   Injectable 5000 Unit(s) SubCutaneous every 8 hours  HYDROmorphone PCA (1 mG/mL) 30 milliLiter(s) PCA Continuous PCA Continuous  insulin glargine Injectable (LANTUS) 15 Unit(s) SubCutaneous at bedtime  insulin lispro (ADMELOG) corrective regimen sliding scale   SubCutaneous three times a day before meals  insulin lispro (ADMELOG) corrective regimen sliding scale   SubCutaneous at bedtime  insulin lispro Injectable (ADMELOG) 5 Unit(s) SubCutaneous before breakfast  insulin lispro Injectable (ADMELOG) 5 Unit(s) SubCutaneous before lunch  insulin lispro Injectable (ADMELOG) 5 Unit(s) SubCutaneous before dinner  latanoprost 0.005% Ophthalmic Solution 1 Drop(s) Both EYES at bedtime  lidocaine   4% Patch 1 Patch Transdermal daily  melatonin 9 milliGRAM(s) Oral at bedtime  naloxone Injectable 0.1 milliGRAM(s) IV Push every 3 minutes PRN  ondansetron Injectable 4 milliGRAM(s) IV Push every 6 hours PRN  polyethylene glycol 3350 17 Gram(s) Oral daily  senna 2 Tablet(s) Oral at bedtime  sodium chloride 0.9%. 1000 milliLiter(s) IV Continuous <Continuous>  sodium chloride 3%  Inhalation 4 milliLiter(s) Inhalation every 6 hours      Vital Signs Last 24 Hrs  T(C): 37 (03 Aug 2023 12:00), Max: 37 (03 Aug 2023 12:00)  T(F): 98.6 (03 Aug 2023 12:00), Max: 98.6 (03 Aug 2023 12:00)  HR: 74 (03 Aug 2023 14:00) (70 - 110)  BP: 126/67 (03 Aug 2023 14:00) (86/53 - 143/70)  BP(mean): 85 (03 Aug 2023 14:00) (56 - 98)  RR: 20 (03 Aug 2023 14:00) (16 - 32)  SpO2: 100% (03 Aug 2023 14:00) (95% - 100%)    Parameters below as of 03 Aug 2023 14:00  Patient On (Oxygen Delivery Method): nasal cannula  O2 Flow (L/min): 2      PHYSICAL EXAM:  General: [x ] non-toxic  HEAD/EYES: [ ] PERRL [x ] white sclera [ ] icterus  ENT:  [ ] normal [ x] supple [ ] thrush [ ] pharyngeal exudate  Cardiovascular:   [ ] murmur [ x] normal [ ] PPM/AICD  Respiratory:  [x ] CT in place  GI:  [x ] soft, non-tender, normal bowel sounds  :  [ ] horne [x ] no CVA tenderness   Musculoskeletal:  [ ] no synovitis  Neurologic:  [ ] non-focal exam   Skin:  [ x] no rash  Lymph: [ x] no lymphadenopathy  Psychiatric:  [ ] appropriate affect [x ] alert & oriented  Lines:  [x ] no phlebitis [ ] central line                                9.6    17.73 )-----------( 367      ( 03 Aug 2023 03:00 )             29.8       08-03    127<L>  |  92<L>  |  27<H>  ----------------------------<  303<H>  4.6   |  25  |  0.88    Ca    8.3<L>      03 Aug 2023 03:00  Phos  3.5     08-03  Mg     2.50     08-03        Urinalysis Basic - ( 03 Aug 2023 03:00 )    Color: x / Appearance: x / SG: x / pH: x  Gluc: 303 mg/dL / Ketone: x  / Bili: x / Urobili: x   Blood: x / Protein: x / Nitrite: x   Leuk Esterase: x / RBC: x / WBC x   Sq Epi: x / Non Sq Epi: x / Bacteria: x        MICROBIOLOGY:Culture Results:   Testing in progress (08-02-23 @ 20:41)  Culture Results:   Testing in progress (08-02-23 @ 20:37)  Culture Results:   Testing in progress (08-02-23 @ 20:33)  Culture Results:   Testing in progress (08-02-23 @ 20:29)  Culture Results:   Testing in progress (08-02-23 @ 20:12)  Culture Results:   Testing in progress (08-02-23 @ 17:16)  Culture Results:   Few Streptococcus constellatus (08-01-23 @ 11:42)  Culture Results:   Testing in progress (08-01-23 @ 11:42)  Culture Results:   No growth at 48 Hours (07-31-23 @ 10:57)  Culture Results:   No growth at 72 Hours (07-31-23 @ 07:10)  Culture Results:   No growth at 4 days (07-29-23 @ 13:15)  Culture Results:   No growth at 4 days (07-29-23 @ 13:00)      RADIOLOGY:

## 2023-08-03 NOTE — PROGRESS NOTE ADULT - SUBJECTIVE AND OBJECTIVE BOX
Didier Cotton MD  Interventional Cardiology / Endovascular Specialist  [place] Office : [address] [place] [id number] Tel:   [place] Office : [address] [facility] [address]  Tel: [phone]      Subjective/Overnight events: Patient lying in bed comfortably. No acute distress. in CTICU   	  MEDICATIONS:  aspirin  chewable 81 milliGRAM(s) Oral daily  heparin   Injectable 5000 Unit(s) SubCutaneous every 8 hours    cefTRIAXone   IVPB 2000 milliGRAM(s) IV Intermittent every 24 hours  metroNIDAZOLE    Tablet 500 milliGRAM(s) Oral two times a day    albuterol    0.083% 2.5 milliGRAM(s) Nebulizer every 6 hours  dornase shana Solution 2.5 milliGRAM(s) Inhalation daily  sodium chloride 3%  Inhalation 4 milliLiter(s) Inhalation every 6 hours    acetaminophen     Tablet .. 650 milliGRAM(s) Oral every 6 hours PRN  acetaminophen   IVPB .. 1000 milliGRAM(s) IV Intermittent once  cyclobenzaprine 10 milliGRAM(s) Oral at bedtime  HYDROmorphone PCA (1 mG/mL) 30 milliLiter(s) PCA Continuous PCA Continuous  melatonin 9 milliGRAM(s) Oral at bedtime  ondansetron Injectable 4 milliGRAM(s) IV Push every 6 hours PRN    aluminum hydroxide/magnesium hydroxide/simethicone Suspension 30 milliLiter(s) Oral every 4 hours PRN  polyethylene glycol 3350 17 Gram(s) Oral daily  senna 2 Tablet(s) Oral at bedtime    atorvastatin 10 milliGRAM(s) Oral at bedtime  dextrose 50% Injectable 25 Gram(s) IV Push once  insulin glargine Injectable (LANTUS) 15 Unit(s) SubCutaneous at bedtime  insulin lispro (ADMELOG) corrective regimen sliding scale   SubCutaneous three times a day before meals  insulin lispro (ADMELOG) corrective regimen sliding scale   SubCutaneous at bedtime  insulin lispro Injectable (ADMELOG) 5 Unit(s) SubCutaneous before breakfast  insulin lispro Injectable (ADMELOG) 5 Unit(s) SubCutaneous before lunch  insulin lispro Injectable (ADMELOG) 5 Unit(s) SubCutaneous before dinner    latanoprost 0.005% Ophthalmic Solution 1 Drop(s) Both EYES at bedtime  lidocaine   4% Patch 1 Patch Transdermal daily  sodium chloride 0.9%. 1000 milliLiter(s) IV Continuous <Continuous>      PAST MEDICAL/SURGICAL HISTORY  PAST MEDICAL & SURGICAL HISTORY:  Hypertension      Diabetes mellitus      HLD (hyperlipidemia)      No significant past surgical history          SOCIAL HISTORY: Substance Use (street drugs): ( x ) never used  (  ) other:    FAMILY HISTORY:  FH: breast cancer (Sibling)        PHYSICAL EXAM:  T(C): 37 (08-03-23 @ 12:00), Max: 37 (08-03-23 @ 12:00)  HR: 74 (08-03-23 @ 14:00) (70 - 110)  BP: 126/67 (08-03-23 @ 14:00) (86/53 - 143/70)  RR: 20 (08-03-23 @ 14:00) (16 - 32)  SpO2: 100% (08-03-23 @ 14:00) (95% - 100%)  Wt(kg): --  I&O's Summary    02 Aug 2023 07:01  -  03 Aug 2023 07:00  --------------------------------------------------------  IN: 930 mL / OUT: 660 mL / NET: 270 mL          GENERAL: NAD  EYES: conjunctiva and sclera clear  ENMT: No tonsillar erythema, exudates, or enlargement  Cardiovascular: Normal S1 S2, No JVD, No murmurs, No edema  Respiratory: diminished s/p chest tube  Gastrointestinal:  Soft, Non-tender, + BS	  Extremities: No edema                                     9.6    17.73 )-----------( 367      ( 03 Aug 2023 03:00 )             29.8     08-03    127<L>  |  92<L>  |  27<H>  ----------------------------<  303<H>  4.6   |  25  |  0.88    Ca    8.3<L>      03 Aug 2023 03:00  Phos  3.5     08-03  Mg     2.50     08-03      proBNP:   Lipid Profile:   HgA1c:   TSH:     Consultant(s) Notes Reviewed:  [x ] YES  [ ] NO    Care Discussed with Consultants/Other Providers [ x] YES  [ ] NO    Imaging Personally Reviewed independently:  [x] YES  [ ] NO    All labs, radiologic studies, vitals, orders and medications list reviewed. Patient is seen and examined at bedside. Case discussed with medical team.

## 2023-08-03 NOTE — CONSULT NOTE ADULT - CONSULT REQUESTED DATE/TIME
02-Aug-2023 16:26
03-Aug-2023 12:46
01-Aug-2023 14:54
30-Jul-2023 11:49
29-Jul-2023 15:19
31-Jul-2023 14:58
31-Jul-2023 18:28

## 2023-08-03 NOTE — CONSULT NOTE ADULT - CONSULT REASON
Loculated Pleural Effusion
renal mass
abnormal EKG
Left renal mass biopsy
abdominal pain
pleural effusion
sob

## 2023-08-03 NOTE — PROGRESS NOTE ADULT - SUBJECTIVE AND OBJECTIVE BOX
CHIEF COMPLAINT: FOLLOW UP IN ICU FOR POSTOPERATIVE CARE OF PATIENT WHO IS S/P DECORTICATION      PROCEDURES: Left uniport VATS empyema drainage; partial decortication; fluid irrigation and drainage 02-Aug-2023       ISSUES:   Gram positive bacteria empyema  Hyponatremia  L Kidney mass  Post op pain  Chest tube in place  HTN  HLD  TAO on CPAP 14  DM2  Obesity (BMI 31)  Hyponatremia      INTERVAL EVENTS:     -On 2L NC, stable oxygenation Spo2>92%  -Afebrile, stable hemodynamics      HISTORY:   Patient reports moderate pain at chest wall incision sites which is worse with coughing and deep breathing without associated fever or dyspnea. Pain is improved with use of pain meds.     PHYSICAL EXAM:   Gen: Comfortable, No acute distress  Eyes: Sclera white, Conjunctiva normal, Eyelids normal, Pupils symmetrical   ENT: Mucous membranes moist,  ,  ,    Neck: Trachea midline,  ,  ,  ,  ,  ,    CV: Rate regular, Rhythm regular,  ,  ,    Resp: Breath sounds clear, No accessory muscles use, Two L chest tubes,  ,    Abd: Soft, Non-distended, Non-tender,   ,  ,  ,    Skin: Warm, No peripheral edema of lower extremities,  ,    : No horne  Neuro: Moving all 4 extremities,    Psych: A&Ox3      ASSESSMENT AND PLAN:     NEURO:  Post-operative Pain - Stable. Pain control with PCA and Tylenol IV PRN.        RESPIRATORY:  Hypoxia - Wean nasal cannula for goal O2sat above 92. Obtain CXR. Incentive spirometry. Chest PT and frequent suctioning. Continue bronchodilators. OOB to chair & ambulate w/ assistance. Continuous pulse oximetry for support & to prevent decompensation.    TAO - stable. Continue CPAP. Monitor nocturnal O2sat.           CARDIOVASCULAR:  Hemodynamically stable - Not on pressors. Continue hemodynamic monitoring.  Telemetry (medical test) - Reviewed by me today independently. Normal sinus rhythm.  HTN - stable. Hold home antihypertensives for now.         RENAL:  Stable - Monitor IOs and electrolytes. Keep K above 4.0 and Mg above 2.0.     Hyponatremia - Secondary to hypovolemia based off urine chemistries. Worsening.   - Give NS IVF. Monitor Na.   - Avoid overcorrection. Goal to correct no more than 10 every 24 hours.        GASTROINTESTINAL:  GI prophylaxis not indicated  Zofran and Reglan IV PRN for nausea  Regular consistency diet             HEMATOLOGIC:  No signs of active bleeding. Monitor Hgb in CBC in AM  DVT prophylaxis with heparin subQ and SCDs.               INFECTIOUS DISEASE:  Gram positive Bacteria Left Empyema - improved s/p decortication.   - Continue Ceftriaxone and Flagyl.  - F/U OR cultures   - Chest tube – Pleurevac regulated suctioning. Monitor chest tube output.  - ID consult recs      ENDOCRINE:  DM2 – Stable. Monitor glucose fingersticks for goal 120-180. Insulin sliding scale. Carb control diet.         ONCOLOGY:  L Renal mass - stable. outpatient oncology appointment.         Pertinent clinical, laboratory, radiographic, hemodynamic, echocardiographic, respiratory data, microbiologic data and chart were reviewed by myself and analyzed frequently throughout the course of the day and night by myself.    Plan discussed at length with the CTICU staff and Attending CT Surgeon -   Dr Marko Orellana.    Patient's status was discussed with patient and family at bedside.    I have spent 50 minutes of time with this patient monitoring hemodynamic status, respiratory status, and coordinating care in the ICU.    _________________________  VITAL SIGNS:  Vital Signs Last 24 Hrs  T(C): 37 (03 Aug 2023 12:00), Max: 37 (03 Aug 2023 12:00)  T(F): 98.6 (03 Aug 2023 12:00), Max: 98.6 (03 Aug 2023 12:00)  HR: 74 (03 Aug 2023 14:00) (70 - 110)  BP: 126/67 (03 Aug 2023 14:00) (86/53 - 143/70)  BP(mean): 85 (03 Aug 2023 14:00) (56 - 98)  RR: 20 (03 Aug 2023 14:00) (16 - 32)  SpO2: 100% (03 Aug 2023 14:00) (95% - 100%)    Parameters below as of 03 Aug 2023 14:00  Patient On (Oxygen Delivery Method): nasal cannula  O2 Flow (L/min): 2    I/Os:   I&O's Detail    02 Aug 2023 07:01  -  03 Aug 2023 07:00  --------------------------------------------------------  IN:    Lactated Ringers Bolus: 500 mL    Oral Fluid: 250 mL    sodium chloride 0.9%: 180 mL  Total IN: 930 mL    OUT:    Chest Tube (mL): 30 mL    Chest Tube (mL): 30 mL    Voided (mL): 600 mL  Total OUT: 660 mL    Total NET: 270 mL              MEDICATIONS:  MEDICATIONS  (STANDING):  acetaminophen   IVPB .. 1000 milliGRAM(s) IV Intermittent once  albuterol    0.083% 2.5 milliGRAM(s) Nebulizer every 6 hours  aspirin  chewable 81 milliGRAM(s) Oral daily  atorvastatin 10 milliGRAM(s) Oral at bedtime  cefTRIAXone   IVPB 2000 milliGRAM(s) IV Intermittent every 24 hours  cyclobenzaprine 10 milliGRAM(s) Oral at bedtime  dextrose 50% Injectable 25 Gram(s) IV Push once  dornase shana Solution 2.5 milliGRAM(s) Inhalation daily  heparin   Injectable 5000 Unit(s) SubCutaneous every 8 hours  HYDROmorphone PCA (1 mG/mL) 30 milliLiter(s) PCA Continuous PCA Continuous  insulin glargine Injectable (LANTUS) 15 Unit(s) SubCutaneous at bedtime  insulin lispro (ADMELOG) corrective regimen sliding scale   SubCutaneous three times a day before meals  insulin lispro (ADMELOG) corrective regimen sliding scale   SubCutaneous at bedtime  insulin lispro Injectable (ADMELOG) 5 Unit(s) SubCutaneous before breakfast  insulin lispro Injectable (ADMELOG) 5 Unit(s) SubCutaneous before lunch  insulin lispro Injectable (ADMELOG) 5 Unit(s) SubCutaneous before dinner  latanoprost 0.005% Ophthalmic Solution 1 Drop(s) Both EYES at bedtime  lidocaine   4% Patch 1 Patch Transdermal daily  melatonin 9 milliGRAM(s) Oral at bedtime  metroNIDAZOLE    Tablet 500 milliGRAM(s) Oral two times a day  polyethylene glycol 3350 17 Gram(s) Oral daily  senna 2 Tablet(s) Oral at bedtime  sodium chloride 0.9%. 1000 milliLiter(s) (30 mL/Hr) IV Continuous <Continuous>  sodium chloride 3%  Inhalation 4 milliLiter(s) Inhalation every 6 hours    MEDICATIONS  (PRN):  acetaminophen     Tablet .. 650 milliGRAM(s) Oral every 6 hours PRN Temp greater or equal to 38C (100.4F), Mild Pain (1 - 3)  aluminum hydroxide/magnesium hydroxide/simethicone Suspension 30 milliLiter(s) Oral every 4 hours PRN Dyspepsia  naloxone Injectable 0.1 milliGRAM(s) IV Push every 3 minutes PRN For ANY of the following changes in patient status:  A. RR LESS THAN 10 breaths per minute, B. Oxygen saturation LESS THAN 90%, C. Sedation score of 6  ondansetron Injectable 4 milliGRAM(s) IV Push every 6 hours PRN Nausea      LABS:  Laboratory data was independently reviewed by me today.                           9.6    17.73 )-----------( 367      ( 03 Aug 2023 03:00 )             29.8     08-03    127<L>  |  92<L>  |  27<H>  ----------------------------<  303<H>  4.6   |  25  |  0.88    Ca    8.3<L>      03 Aug 2023 03:00  Phos  3.5     08-03  Mg     2.50     08-03        PT/INR - ( 02 Aug 2023 06:22 )   PT: 12.0 sec;   INR: 1.07 ratio         PTT - ( 02 Aug 2023 06:22 )  PTT:32.2 sec    Urinalysis Basic - ( 03 Aug 2023 03:00 )    Color: x / Appearance: x / SG: x / pH: x  Gluc: 303 mg/dL / Ketone: x  / Bili: x / Urobili: x   Blood: x / Protein: x / Nitrite: x   Leuk Esterase: x / RBC: x / WBC x   Sq Epi: x / Non Sq Epi: x / Bacteria: x        RADIOLOGY:   Radiology images were independently reviewed by me today. Reports were reviewed by me today.    Xray Chest 1 View- PORTABLE-Routine:   ACC: [id number] EXAM:  XR CHEST PORTABLE ROUTINE 1V   ORDERED BY: SALVADOR RICHEY     ACC: [id number] EXAM:  XR CHEST PORTABLE URGENT 1V   ORDERED BY: ABEBA JULIEN     PROCEDURE DATE:  08/02/2023          INTERPRETATION:  EXAMINATION: XR CHEST URGENT, XR CHEST    CLINICAL INDICATION: s/p l VATS    TECHNIQUE: Single frontal, portable view of the chest was obtained.    COMPARISON: Chest x-ray 8/1/2020 3:51 PM.    FINDINGS:  Chest x-ray 8/2/2023 7:51 PM  Interval insertion of 2 left-sided chest tubes.  The heart is not accurately assessed in this AP projection.  Improved aeration of the left lung fields. Residual opacity over the left   lower lung, which may represent effusion and/or atelectasis.  The right lung is clear.  There is no pneumothorax  No acute bony abnormality.    Chest x-ray/3/23 5:23 AM  2 Left-sided chest tubes are again seen.  Similar opacity over the left lower lung field.    IMPRESSION:  Improved aeration of left lung fields after VATS and chest tube insertion.  Residualleft lower lung opacity, which may represent atelectasis and/or   pleural effusion.    --- End of Report ---          MAMADOU CAREY MD; Resident Radiologist  This document has been electronically signed.  MARIO DAMON DO; Attending Radiologist  This document has been electronically signed. Aug  3 2023  2:39PM (08-03-23 @ 06:02)  Xray Chest 1 View- PORTABLE-Urgent:   ACC: [id number] EXAM:  XR CHEST PORTABLE ROUTINE 1V   ORDERED BY: SALVADOR RICHEY     ACC: [id number] EXAM:  XR CHEST PORTABLE URGENT 1V   ORDERED BY: ABEBA JULIEN     PROCEDURE DATE:  08/02/2023          INTERPRETATION:  EXAMINATION: XR CHEST URGENT, XR CHEST    CLINICAL INDICATION: s/p l VATS    TECHNIQUE: Single frontal, portable view of the chest was obtained.    COMPARISON: Chest x-ray 8/1/2020 3:51 PM.    FINDINGS:  Chest x-ray 8/2/2023 7:51 PM  Interval insertion of 2 left-sided chest tubes.  The heart is not accurately assessed in this AP projection.  Improved aeration of the left lung fields. Residual opacity over the left   lower lung, which may represent effusion and/or atelectasis.  The right lung is clear.  There is no pneumothorax  No acute bony abnormality.    Chest x-ray/3/23 5:23 AM  2 Left-sided chest tubes are again seen.  Similar opacity over the left lower lung field.    IMPRESSION:  Improved aeration of left lung fields after VATS and chest tube insertion.  Residualleft lower lung opacity, which may represent atelectasis and/or   pleural effusion.    --- End of Report ---          MAMADOU CAREY MD; Resident Radiologist  This document has been electronically signed.  MARIO DAMON DO; Attending Radiologist  This document has been electronically signed. Aug  3 2023  2:39PM (08-02-23 @ 19:54)  Xray Chest 1 View- PORTABLE-Urgent:   ACC: [id number] EXAM:  XR CHEST PORTABLE URGENT 1V   ORDERED BY: NARESH GARCIA     PROCEDURE DATE:  08/01/2023          INTERPRETATION:  EXAMINATION: XR CHEST URGENT    CLINICAL INDICATION: attempted PTC placement    TECHNIQUE: Single frontal, portable view of the chest was obtained.    COMPARISON: Multiple radiographs of the chest, most recently same day   prior.    FINDINGS:    The heart is not accurately assessed in this AP projection.  Similar complete whiteout of the left lung secondary to pleural   effusion/atelectasis.  The right lung is clear.  There is no pneumothorax  No acute bony abnormality.    IMPRESSION:  Similar complete white out of the left lung, secondary to pleural   effusion/atelectasis, which is better characterized on CT chest 7/29/2023.    --- End of Report ---          MAMADOU CAREY MD; Resident Radiologist  This document has been electronically signed.  SAM REA MD; Attending Radiologist  This document has been electronically signed. Aug  2 2023  8:08AM (08-01-23 @ 19:11)

## 2023-08-03 NOTE — DIETITIAN INITIAL EVALUATION ADULT - PERTINENT LABORATORY DATA
08-03    127<L>  |  92<L>  |  27<H>  ----------------------------<  303<H>  4.6   |  25  |  0.88    Ca    8.3<L>      03 Aug 2023 03:00  Phos  3.5     08-03  Mg     2.50     08-03    POCT Blood Glucose.: 244 mg/dL (08-03-23 @ 07:53)  A1C with Estimated Average Glucose Result: 7.8 % (07-30-23 @ 07:16)

## 2023-08-03 NOTE — CONSULT NOTE ADULT - SUBJECTIVE AND OBJECTIVE BOX
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 Urology consulted for incidental left renal mass    Allergies: No Known Allergies    Medications (Abx/Cardiac/Anticoagulation/Blood Products)  amLODIPine   Tablet: 5 milliGRAM(s) Oral (08-02 @ 05:42)  aspirin  chewable: 81 milliGRAM(s) Oral (08-02 @ 11:48)  azithromycin  IVPB: 255 mL/Hr IV Intermittent (08-01 @ 18:01)  cefTRIAXone   IVPB: 100 mL/Hr IV Intermittent (08-02 @ 09:29)  heparin   Injectable: 5000 Unit(s) SubCutaneous (08-03 @ 06:25)  hydrALAZINE: 50 milliGRAM(s) Oral (08-02 @ 05:43)  losartan: 100 milliGRAM(s) Oral (08-02 @ 05:43)  metroNIDAZOLE    Tablet: 500 milliGRAM(s) Oral (08-03 @ 06:38)  piperacillin/tazobactam IVPB..: 25 mL/Hr IV Intermittent (08-02 @ 08:34)    Data:  168.9  89.8  T(C): 36.9  HR: 102  BP: 123/81  RR: 22  SpO2: 99%    LABS:                          9.6    17.73 )-----------( 367      ( 03 Aug 2023 03:00 )             29.8     08-03    127<L>  |  92<L>  |  27<H>  ----------------------------<  303<H>  4.6   |  25  |  0.88    Ca    8.3<L>      03 Aug 2023 03:00  Phos  3.5     08-03  Mg     2.50     08-03      PT/INR - ( 02 Aug 2023 06:22 )   PT: 12.0 sec;   INR: 1.07 ratio         PTT - ( 02 Aug 2023 06:22 )  PTT:32.2 sec    Radiology: reviewed    Assessment/Plan: 66 yo M with left renal mass, consulted for biopsy    -- Would defer left renal mass biopsy at this time until patient's acute medical conditions have improved (currently post op VATS and being treated for empyema) and has either been transferred to floor or more preferably, as an outpatient.   --Please reconsult once patient is more clinically stable or as outpatient  --Discussed with IR attending Dr. Kat Becerra MD   Interventional Radiology PGY 5  Available on AFTER-MOUSE TEAMS    For EMERGENT inquiries/questions:  IR Pager ([facility]): [phone]  IR Pager ([facility]): [phone] ; [phone]    For non-emergent consults/questions:   Please place a sunrise order "Consult- Interventional Radiology" with an appropriate callback number    For questions about scheduling during appropriate work hours, call IR :  [facility]: [phone]  [facility]: [phone]    For outpatient IR booking:  [facility]: [phone]  [facility]: [phone] Interventional Radiology    HPI: Patient is a 67 year old M w/ PMHx of HTN, HLD, DM, TAO 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 Urology consulted for incidental left renal mass.    Allergies: No Known Allergies    Medications (Abx/Cardiac/Anticoagulation/Blood Products)  amLODIPine   Tablet: 5 milliGRAM(s) Oral (08-02 @ 05:42)  aspirin  chewable: 81 milliGRAM(s) Oral (08-02 @ 11:48)  azithromycin  IVPB: 255 mL/Hr IV Intermittent (08-01 @ 18:01)  cefTRIAXone   IVPB: 100 mL/Hr IV Intermittent (08-02 @ 09:29)  heparin   Injectable: 5000 Unit(s) SubCutaneous (08-03 @ 06:25)  hydrALAZINE: 50 milliGRAM(s) Oral (08-02 @ 05:43)  losartan: 100 milliGRAM(s) Oral (08-02 @ 05:43)  metroNIDAZOLE    Tablet: 500 milliGRAM(s) Oral (08-03 @ 06:38)  piperacillin/tazobactam IVPB..: 25 mL/Hr IV Intermittent (08-02 @ 08:34)    Data:  168.9  89.8  T(C): 36.9  HR: 102  BP: 123/81  RR: 22  SpO2: 99%    LABS:                          9.6    17.73 )-----------( 367      ( 03 Aug 2023 03:00 )             29.8     08-03    127<L>  |  92<L>  |  27<H>  ----------------------------<  303<H>  4.6   |  25  |  0.88    Ca    8.3<L>      03 Aug 2023 03:00  Phos  3.5     08-03  Mg     2.50     08-03      PT/INR - ( 02 Aug 2023 06:22 )   PT: 12.0 sec;   INR: 1.07 ratio         PTT - ( 02 Aug 2023 06:22 )  PTT:32.2 sec    Radiology: reviewed    Assessment/Plan: 68 yo M with left renal mass, consulted for biopsy.    -- Would defer left renal mass biopsy at this time until patient's acute medical conditions have improved (currently post op VATS and being treated for empyema) and has either been transferred to floor or more preferably, as an outpatient.   --Please reconsult once patient is more clinically optimized or as outpatient ([phone]).      Oscar Becerra MD   Interventional Radiology PGY 5  Available on [employer] TEAMS    For EMERGENT inquiries/questions:  IR Pager ([facility]): [phone]  IR Pager ([facility]): [phone] ; [phone]    For non-emergent consults/questions:   Please place a sunrise order "Consult- Interventional Radiology" with an appropriate callback number    For questions about scheduling during appropriate work hours, call IR :  [facility]: [phone]  [facility]: [phone]    For outpatient IR booking:  [facility]: [phone]  [facility]: [phone]

## 2023-08-03 NOTE — PROGRESS NOTE ADULT - SUBJECTIVE AND OBJECTIVE BOX
ANESTHESIA POSTOP CHECK    67y Male POSTOP DAY 1 S/P   [ x] General Anesthesia  [ ] Que Anesthesia  [ ] MAC    Vital Signs Last 24 Hrs  T(C): 37 (03 Aug 2023 12:00), Max: 37 (03 Aug 2023 12:00)  T(F): 98.6 (03 Aug 2023 12:00), Max: 98.6 (03 Aug 2023 12:00)  HR: 97 (03 Aug 2023 17:00) (70 - 110)  BP: 125/82 (03 Aug 2023 17:00) (86/53 - 143/70)  BP(mean): 95 (03 Aug 2023 17:00) (56 - 109)  RR: 23 (03 Aug 2023 17:00) (16 - 32)  SpO2: 97% (03 Aug 2023 17:00) (96% - 100%)    Parameters below as of 03 Aug 2023 16:00  Patient On (Oxygen Delivery Method): nasal cannula  O2 Flow (L/min): 2    I&O's Summary    02 Aug 2023 07:01  -  03 Aug 2023 07:00  --------------------------------------------------------  IN: 930 mL / OUT: 660 mL / NET: 270 mL        [x ] NO APPARENT ANESTHESIA COMPLICATIONS      Comments:

## 2023-08-03 NOTE — CONSULT NOTE ADULT - REASON FOR ADMISSION
celiac artery stenosis, c/f median arcuate ligament syndrome; renal mass

## 2023-08-04 LAB
-  CEFTRIAXONE: SIGNIFICANT CHANGE UP
-  CLINDAMYCIN: SIGNIFICANT CHANGE UP
-  ERYTHROMYCIN: SIGNIFICANT CHANGE UP
-  LEVOFLOXACIN: SIGNIFICANT CHANGE UP
-  PENICILLIN: SIGNIFICANT CHANGE UP
-  VANCOMYCIN: SIGNIFICANT CHANGE UP
ANION GAP SERPL CALC-SCNC: 12 MMOL/L — SIGNIFICANT CHANGE UP (ref 7–14)
BUN SERPL-MCNC: 22 MG/DL — SIGNIFICANT CHANGE UP (ref 7–23)
CALCIUM SERPL-MCNC: 8.3 MG/DL — LOW (ref 8.4–10.5)
CHLORIDE SERPL-SCNC: 95 MMOL/L — LOW (ref 98–107)
CO2 SERPL-SCNC: 24 MMOL/L — SIGNIFICANT CHANGE UP (ref 22–31)
CREAT SERPL-MCNC: 0.64 MG/DL — SIGNIFICANT CHANGE UP (ref 0.5–1.3)
CULTURE RESULTS: SIGNIFICANT CHANGE UP
EGFR: 103 ML/MIN/1.73M2 — SIGNIFICANT CHANGE UP
GLUCOSE BLDC GLUCOMTR-MCNC: 224 MG/DL — HIGH (ref 70–99)
GLUCOSE BLDC GLUCOMTR-MCNC: 227 MG/DL — HIGH (ref 70–99)
GLUCOSE BLDC GLUCOMTR-MCNC: 242 MG/DL — HIGH (ref 70–99)
GLUCOSE BLDC GLUCOMTR-MCNC: 247 MG/DL — HIGH (ref 70–99)
GLUCOSE SERPL-MCNC: 270 MG/DL — HIGH (ref 70–99)
GRAM STN FLD: SIGNIFICANT CHANGE UP
HCT VFR BLD CALC: 27.2 % — LOW (ref 39–50)
HGB BLD-MCNC: 8.8 G/DL — LOW (ref 13–17)
LEGIONELLA AG UR QL: NEGATIVE — SIGNIFICANT CHANGE UP
MAGNESIUM SERPL-MCNC: 2.4 MG/DL — SIGNIFICANT CHANGE UP (ref 1.6–2.6)
MCHC RBC-ENTMCNC: 28.2 PG — SIGNIFICANT CHANGE UP (ref 27–34)
MCHC RBC-ENTMCNC: 32.4 GM/DL — SIGNIFICANT CHANGE UP (ref 32–36)
MCV RBC AUTO: 87.2 FL — SIGNIFICANT CHANGE UP (ref 80–100)
METHOD TYPE: SIGNIFICANT CHANGE UP
NON-GYNECOLOGICAL CYTOLOGY STUDY: SIGNIFICANT CHANGE UP
NRBC # BLD: 0 /100 WBCS — SIGNIFICANT CHANGE UP (ref 0–0)
NRBC # FLD: 0 K/UL — SIGNIFICANT CHANGE UP (ref 0–0)
PHOSPHATE SERPL-MCNC: 2.5 MG/DL — SIGNIFICANT CHANGE UP (ref 2.5–4.5)
PLATELET # BLD AUTO: 394 K/UL — SIGNIFICANT CHANGE UP (ref 150–400)
POTASSIUM SERPL-MCNC: 3.9 MMOL/L — SIGNIFICANT CHANGE UP (ref 3.5–5.3)
POTASSIUM SERPL-SCNC: 3.9 MMOL/L — SIGNIFICANT CHANGE UP (ref 3.5–5.3)
RBC # BLD: 3.12 M/UL — LOW (ref 4.2–5.8)
RBC # FLD: 13.5 % — SIGNIFICANT CHANGE UP (ref 10.3–14.5)
SODIUM SERPL-SCNC: 131 MMOL/L — LOW (ref 135–145)
SPECIMEN SOURCE: SIGNIFICANT CHANGE UP
SPECIMEN SOURCE: SIGNIFICANT CHANGE UP
WBC # BLD: 16.46 K/UL — HIGH (ref 3.8–10.5)
WBC # FLD AUTO: 16.46 K/UL — HIGH (ref 3.8–10.5)

## 2023-08-04 PROCEDURE — 71045 X-RAY EXAM CHEST 1 VIEW: CPT | Mod: 26

## 2023-08-04 PROCEDURE — 99233 SBSQ HOSP IP/OBS HIGH 50: CPT

## 2023-08-04 RX ORDER — INSULIN LISPRO 100/ML
8 VIAL (ML) SUBCUTANEOUS
Refills: 0 | Status: DISCONTINUED | OUTPATIENT
Start: 2023-08-04 | End: 2023-08-11

## 2023-08-04 RX ORDER — POLYETHYLENE GLYCOL 3350 17 G/17G
17 POWDER, FOR SOLUTION ORAL
Refills: 0 | Status: DISCONTINUED | OUTPATIENT
Start: 2023-08-04 | End: 2023-08-11

## 2023-08-04 RX ORDER — INSULIN GLARGINE 100 [IU]/ML
17 INJECTION, SOLUTION SUBCUTANEOUS AT BEDTIME
Refills: 0 | Status: DISCONTINUED | OUTPATIENT
Start: 2023-08-04 | End: 2023-08-11

## 2023-08-04 RX ORDER — METHYLNALTREXONE BROMIDE 12 MG/.6ML
12 INJECTION, SOLUTION SUBCUTANEOUS DAILY
Refills: 0 | Status: COMPLETED | OUTPATIENT
Start: 2023-08-04 | End: 2023-08-10

## 2023-08-04 RX ADMIN — SODIUM CHLORIDE 4 MILLILITER(S): 9 INJECTION INTRAMUSCULAR; INTRAVENOUS; SUBCUTANEOUS at 09:07

## 2023-08-04 RX ADMIN — HYDROMORPHONE HYDROCHLORIDE 30 MILLILITER(S): 2 INJECTION INTRAMUSCULAR; INTRAVENOUS; SUBCUTANEOUS at 07:04

## 2023-08-04 RX ADMIN — POLYETHYLENE GLYCOL 3350 17 GRAM(S): 17 POWDER, FOR SOLUTION ORAL at 17:31

## 2023-08-04 RX ADMIN — SODIUM CHLORIDE 4 MILLILITER(S): 9 INJECTION INTRAMUSCULAR; INTRAVENOUS; SUBCUTANEOUS at 20:52

## 2023-08-04 RX ADMIN — Medication 81 MILLIGRAM(S): at 11:52

## 2023-08-04 RX ADMIN — ALBUTEROL 2.5 MILLIGRAM(S): 90 AEROSOL, METERED ORAL at 20:50

## 2023-08-04 RX ADMIN — LACTULOSE 10 GRAM(S): 10 SOLUTION ORAL at 22:04

## 2023-08-04 RX ADMIN — Medication 5 UNIT(S): at 11:51

## 2023-08-04 RX ADMIN — ATORVASTATIN CALCIUM 10 MILLIGRAM(S): 80 TABLET, FILM COATED ORAL at 22:04

## 2023-08-04 RX ADMIN — Medication 4: at 07:52

## 2023-08-04 RX ADMIN — DORNASE ALFA 2.5 MILLIGRAM(S): 1 SOLUTION RESPIRATORY (INHALATION) at 09:05

## 2023-08-04 RX ADMIN — Medication 9 MILLIGRAM(S): at 22:05

## 2023-08-04 RX ADMIN — POLYETHYLENE GLYCOL 3350 17 GRAM(S): 17 POWDER, FOR SOLUTION ORAL at 05:52

## 2023-08-04 RX ADMIN — ALBUTEROL 2.5 MILLIGRAM(S): 90 AEROSOL, METERED ORAL at 08:52

## 2023-08-04 RX ADMIN — Medication 5 UNIT(S): at 07:52

## 2023-08-04 RX ADMIN — HYDROMORPHONE HYDROCHLORIDE 30 MILLILITER(S): 2 INJECTION INTRAMUSCULAR; INTRAVENOUS; SUBCUTANEOUS at 19:13

## 2023-08-04 RX ADMIN — LACTULOSE 10 GRAM(S): 10 SOLUTION ORAL at 05:24

## 2023-08-04 RX ADMIN — SODIUM CHLORIDE 4 MILLILITER(S): 9 INJECTION INTRAMUSCULAR; INTRAVENOUS; SUBCUTANEOUS at 02:58

## 2023-08-04 RX ADMIN — Medication 500 MILLIGRAM(S): at 05:24

## 2023-08-04 RX ADMIN — CEFTRIAXONE 100 MILLIGRAM(S): 500 INJECTION, POWDER, FOR SOLUTION INTRAMUSCULAR; INTRAVENOUS at 10:25

## 2023-08-04 RX ADMIN — HEPARIN SODIUM 5000 UNIT(S): 5000 INJECTION INTRAVENOUS; SUBCUTANEOUS at 14:11

## 2023-08-04 RX ADMIN — Medication 4: at 11:51

## 2023-08-04 RX ADMIN — HEPARIN SODIUM 5000 UNIT(S): 5000 INJECTION INTRAVENOUS; SUBCUTANEOUS at 22:05

## 2023-08-04 RX ADMIN — Medication 8 UNIT(S): at 17:30

## 2023-08-04 RX ADMIN — Medication 4: at 17:30

## 2023-08-04 RX ADMIN — CYCLOBENZAPRINE HYDROCHLORIDE 10 MILLIGRAM(S): 10 TABLET, FILM COATED ORAL at 22:04

## 2023-08-04 RX ADMIN — INSULIN GLARGINE 17 UNIT(S): 100 INJECTION, SOLUTION SUBCUTANEOUS at 22:03

## 2023-08-04 RX ADMIN — Medication 500 MILLIGRAM(S): at 17:30

## 2023-08-04 RX ADMIN — Medication 0: at 22:03

## 2023-08-04 RX ADMIN — ALBUTEROL 2.5 MILLIGRAM(S): 90 AEROSOL, METERED ORAL at 15:02

## 2023-08-04 RX ADMIN — SODIUM CHLORIDE 4 MILLILITER(S): 9 INJECTION INTRAMUSCULAR; INTRAVENOUS; SUBCUTANEOUS at 15:01

## 2023-08-04 RX ADMIN — ALBUTEROL 2.5 MILLIGRAM(S): 90 AEROSOL, METERED ORAL at 02:57

## 2023-08-04 RX ADMIN — SENNA PLUS 2 TABLET(S): 8.6 TABLET ORAL at 22:05

## 2023-08-04 RX ADMIN — HEPARIN SODIUM 5000 UNIT(S): 5000 INJECTION INTRAVENOUS; SUBCUTANEOUS at 05:30

## 2023-08-04 NOTE — PROGRESS NOTE ADULT - SUBJECTIVE AND OBJECTIVE BOX
Anesthesia Pain Management Service    SUBJECTIVE: Patient is doing well with IV PCA and no significant problems reported.    Pain Scale Score	At rest: 5/10___ 	With Activity: ___ 	[X ] Refer to charted pain scores    THERAPY:    [ ] IV PCA Morphine		[ ] 5 mg/mL	[ ] 1 mg/mL  [X ] IV PCA Hydromorphone	[ ] 5 mg/mL	[X ] 1 mg/mL  [ ] IV PCA Fentanyl		[ ] 50 micrograms/mL    Demand dose __0.2_ lockout __6_ (minutes) Continuous Rate _0__ Total: _4.8__  mg used (in past 24 hours)      MEDICATIONS  (STANDING):  acetaminophen   IVPB .. 1000 milliGRAM(s) IV Intermittent once  albuterol    0.083% 2.5 milliGRAM(s) Nebulizer every 6 hours  aspirin  chewable 81 milliGRAM(s) Oral daily  atorvastatin 10 milliGRAM(s) Oral at bedtime  bisacodyl Suppository 10 milliGRAM(s) Rectal once  cefTRIAXone   IVPB 2000 milliGRAM(s) IV Intermittent every 24 hours  cyclobenzaprine 10 milliGRAM(s) Oral at bedtime  dextrose 50% Injectable 25 Gram(s) IV Push once  dornase shana Solution 2.5 milliGRAM(s) Inhalation daily  heparin   Injectable 5000 Unit(s) SubCutaneous every 8 hours  HYDROmorphone PCA (1 mG/mL) 30 milliLiter(s) PCA Continuous PCA Continuous  insulin glargine Injectable (LANTUS) 15 Unit(s) SubCutaneous at bedtime  insulin lispro (ADMELOG) corrective regimen sliding scale   SubCutaneous three times a day before meals  insulin lispro (ADMELOG) corrective regimen sliding scale   SubCutaneous at bedtime  insulin lispro Injectable (ADMELOG) 5 Unit(s) SubCutaneous before breakfast  insulin lispro Injectable (ADMELOG) 5 Unit(s) SubCutaneous before lunch  insulin lispro Injectable (ADMELOG) 5 Unit(s) SubCutaneous before dinner  lactulose Syrup 10 Gram(s) Oral every 8 hours  latanoprost 0.005% Ophthalmic Solution 1 Drop(s) Both EYES at bedtime  lidocaine   4% Patch 1 Patch Transdermal daily  melatonin 9 milliGRAM(s) Oral at bedtime  methylnaltrexone Injectable 12 milliGRAM(s) SubCutaneous daily  metroNIDAZOLE    Tablet 500 milliGRAM(s) Oral two times a day  polyethylene glycol 3350 17 Gram(s) Oral two times a day  senna 2 Tablet(s) Oral at bedtime  sodium chloride 0.9%. 1000 milliLiter(s) (30 mL/Hr) IV Continuous <Continuous>  sodium chloride 3%  Inhalation 4 milliLiter(s) Inhalation every 6 hours    MEDICATIONS  (PRN):  acetaminophen     Tablet .. 650 milliGRAM(s) Oral every 6 hours PRN Temp greater or equal to 38C (100.4F), Mild Pain (1 - 3)  aluminum hydroxide/magnesium hydroxide/simethicone Suspension 30 milliLiter(s) Oral every 4 hours PRN Dyspepsia  naloxone Injectable 0.1 milliGRAM(s) IV Push every 3 minutes PRN For ANY of the following changes in patient status:  A. RR LESS THAN 10 breaths per minute, B. Oxygen saturation LESS THAN 90%, C. Sedation score of 6  ondansetron Injectable 4 milliGRAM(s) IV Push every 6 hours PRN Nausea      OBJECTIVE: Patient sitting up in chair. CTX2 in place.    Sedation Score:	[ X] Alert	[ ] Drowsy 	[ ] Arousable	[ ] Asleep	[ ] Unresponsive    Side Effects:	[X ] None	[ ] Nausea	[ ] Vomiting	[ ] Pruritus  		[ ] Other:    Vital Signs Last 24 Hrs  T(C): 37 (04 Aug 2023 08:00), Max: 37.2 (03 Aug 2023 19:00)  T(F): 98.6 (04 Aug 2023 08:00), Max: 99 (04 Aug 2023 00:00)  HR: 82 (04 Aug 2023 09:20) (74 - 104)  BP: 128/84 (04 Aug 2023 08:00) (118/67 - 146/86)  BP(mean): 97 (04 Aug 2023 08:00) (83 - 109)  RR: 21 (04 Aug 2023 08:00) (15 - 33)  SpO2: 100% (04 Aug 2023 09:20) (96% - 100%)    Parameters below as of 04 Aug 2023 09:20  Patient On (Oxygen Delivery Method): room air        ASSESSMENT/ PLAN    Therapy to  be:	[ X] Continue   [ ] Discontinued   [ ] Change to prn Analgesics    Documentation and Verification of current medications:   [X] Done	[ ] Not done, not elligible    Comments: Continue IV PCA. Plan discussed with primary team. Recommend non-opioid adjuvant analgesics to be used when possible and when allowed by primary surgical team.    Progress Note written now but Patient was seen earlier.

## 2023-08-04 NOTE — PROGRESS NOTE ADULT - ASSESSMENT
67 year old male with htn and DM presented with left sided chest pain.  Imaging with left sided opacities and ? worsening left sided effusion.  Associated leukocytosis.     1) Shortness of breath with cough  S/p thoracentesis  Concern for empyema/underlying pneumonia    Gram stain with GPC- pleural fluid growing strep constellatus  Continue ceftriaxone/ flagyl (better coverage for strep)   Follow cultures    2) Renal Mass-  Solid exophytic left renal mass and primary renal neoplasm cannot be   excluded. Additional indeterminate right renal lesion measuring 1.8 x 1.4   cm. Urology evaluation .     3) Leukocytosis-   Concern for left sided Pulmonary infection    4) Elevated lactate  blood cultures without growth to date  continue to trend    5) Abnormal imaging  stenosis of celiac axis  Surgery following

## 2023-08-04 NOTE — PROGRESS NOTE ADULT - SUBJECTIVE AND OBJECTIVE BOX
Didier Cotton MD  Interventional Cardiology / Endovascular Specialist  [place] Office : [phone] [place] [address] [place] [address]  Tel:   [place] Office : [phone] [facility] [place] [address]  Tel: [phone]    Subjective/Overnight events: Patient lying in bed comfortably. No acute distress. in CTICU   	  MEDICATIONS:  aspirin  chewable 81 milliGRAM(s) Oral daily  heparin   Injectable 5000 Unit(s) SubCutaneous every 8 hours    cefTRIAXone   IVPB 2000 milliGRAM(s) IV Intermittent every 24 hours  metroNIDAZOLE    Tablet 500 milliGRAM(s) Oral two times a day    albuterol    0.083% 2.5 milliGRAM(s) Nebulizer every 6 hours  dornase shana Solution 2.5 milliGRAM(s) Inhalation daily  sodium chloride 3%  Inhalation 4 milliLiter(s) Inhalation every 6 hours    acetaminophen     Tablet .. 650 milliGRAM(s) Oral every 6 hours PRN  acetaminophen   IVPB .. 1000 milliGRAM(s) IV Intermittent once  cyclobenzaprine 10 milliGRAM(s) Oral at bedtime  HYDROmorphone PCA (1 mG/mL) 30 milliLiter(s) PCA Continuous PCA Continuous  melatonin 9 milliGRAM(s) Oral at bedtime  ondansetron Injectable 4 milliGRAM(s) IV Push every 6 hours PRN    aluminum hydroxide/magnesium hydroxide/simethicone Suspension 30 milliLiter(s) Oral every 4 hours PRN  bisacodyl Suppository 10 milliGRAM(s) Rectal once  lactulose Syrup 10 Gram(s) Oral every 8 hours  methylnaltrexone Injectable 12 milliGRAM(s) SubCutaneous daily  polyethylene glycol 3350 17 Gram(s) Oral two times a day  senna 2 Tablet(s) Oral at bedtime    atorvastatin 10 milliGRAM(s) Oral at bedtime  dextrose 50% Injectable 25 Gram(s) IV Push once  insulin glargine Injectable (LANTUS) 17 Unit(s) SubCutaneous at bedtime  insulin lispro (ADMELOG) corrective regimen sliding scale   SubCutaneous three times a day before meals  insulin lispro (ADMELOG) corrective regimen sliding scale   SubCutaneous at bedtime  insulin lispro Injectable (ADMELOG) 8 Unit(s) SubCutaneous before breakfast  insulin lispro Injectable (ADMELOG) 8 Unit(s) SubCutaneous before lunch  insulin lispro Injectable (ADMELOG) 8 Unit(s) SubCutaneous before dinner    latanoprost 0.005% Ophthalmic Solution 1 Drop(s) Both EYES at bedtime  lidocaine   4% Patch 1 Patch Transdermal daily  sodium chloride 0.9%. 1000 milliLiter(s) IV Continuous <Continuous>      PAST MEDICAL/SURGICAL HISTORY  PAST MEDICAL & SURGICAL HISTORY:  Hypertension      Diabetes mellitus      HLD (hyperlipidemia)      No significant past surgical history          SOCIAL HISTORY: Substance Use (street drugs): ( x ) never used  (  ) other:    FAMILY HISTORY:  FH: breast cancer (Sibling)        REVIEW OF SYSTEMS:  CONSTITUTIONAL: No fever, weight loss, or fatigue  EYES: No eye pain, visual disturbances, or discharge  ENMT:  No difficulty hearing, tinnitus, vertigo; No sinus or throat pain  BREASTS: No pain, masses, or nipple discharge  GASTROINTESTINAL: No abdominal or epigastric pain. No nausea, vomiting, or hematemesis; No diarrhea or constipation. No melena or hematochezia.  GENITOURINARY: No dysuria, frequency, hematuria, or incontinence  NEUROLOGICAL: No headaches, memory loss, loss of strength, numbness, or tremors  ENDOCRINE: No heat or cold intolerance; No hair loss  MUSCULOSKELETAL: No joint pain or swelling; No muscle, back, or extremity pain  PSYCHIATRIC: No depression, anxiety, mood swings, or difficulty sleeping  HEME/LYMPH: No easy bruising, or bleeding gums  All others negative    PHYSICAL EXAM:  T(C): 37.1 (08-04-23 @ 20:00), Max: 37.2 (08-04-23 @ 00:00)  HR: 97 (08-04-23 @ 20:00) (80 - 97)  BP: 160/94 (08-04-23 @ 20:00) (120/74 - 160/94)  RR: 28 (08-04-23 @ 20:00) (15 - 28)  SpO2: 100% (08-04-23 @ 20:00) (96% - 100%)  Wt(kg): --  I&O's Summary    03 Aug 2023 07:01  -  04 Aug 2023 07:00  --------------------------------------------------------  IN: 930 mL / OUT: 1460 mL / NET: -530 mL    04 Aug 2023 07:01  -  04 Aug 2023 22:58  --------------------------------------------------------  IN: 960 mL / OUT: 720 mL / NET: 240 mL      GENERAL: NAD  EYES: conjunctiva and sclera clear  ENMT: No tonsillar erythema, exudates, or enlargement  Cardiovascular: Normal S1 S2, No JVD, No murmurs, No edema  Respiratory: diminished s/p chest tube  Gastrointestinal:  Soft, Non-tender, + BS	  Extremities: No edema                           8.8    16.46 )-----------( 394      ( 04 Aug 2023 02:30 )             27.2     08-04    131<L>  |  95<L>  |  22  ----------------------------<  270<H>  3.9   |  24  |  0.64    Ca    8.3<L>      04 Aug 2023 02:30  Phos  2.5     08-04  Mg     2.40     08-04      proBNP:   Lipid Profile:   HgA1c:   TSH:     Consultant(s) Notes Reviewed:  [x ] YES  [ ] NO    Care Discussed with Consultants/Other Providers [ x] YES  [ ] NO    Imaging Personally Reviewed independently:  [x] YES  [ ] NO    All labs, radiologic studies, vitals, orders and medications list reviewed. Patient is seen and examined at bedside. Case discussed with medical team.

## 2023-08-04 NOTE — PROGRESS NOTE ADULT - SUBJECTIVE AND OBJECTIVE BOX
DATE OF SERVICE: 08-04-23  Seen in CTICU    s/p VATS   Reports mild to moderate left sided pleuritic chest pain. SOB is better. CT is in place.    REVIEW OF SYSTEMS:  CONSTITUTIONAL: No fever  RESPIRATORY: No cough, hemoptysis shortness of breath  CARDIOVASCULAR: No palpitations, dizziness, or leg swelling  GASTROINTESTINAL: No nausea, vomiting, hematemesis  GENITOURINARY: No dysuria, frequency, hematuria   NEUROLOGICAL: No headaches, no dizziness  MUSCULOSKELETAL: No joint pain or swelling;     INTERVAL HPI/OVERNIGHT EVENTS:    ICU Vital Signs Last 24 Hrs  T(C): 37 (04 Aug 2023 08:00), Max: 37.2 (03 Aug 2023 19:00)  T(F): 98.6 (04 Aug 2023 08:00), Max: 99 (04 Aug 2023 00:00)  HR: 82 (04 Aug 2023 09:20) (74 - 106)  BP: 128/84 (04 Aug 2023 08:00) (118/67 - 146/86)  BP(mean): 97 (04 Aug 2023 08:00) (83 - 109)  ABP: --  ABP(mean): --  RR: 21 (04 Aug 2023 08:00) (15 - 33)  SpO2: 100% (04 Aug 2023 09:20) (96% - 100%)    O2 Parameters below as of 04 Aug 2023 09:20  Patient On (Oxygen Delivery Method): room air                LABS:                        12.5   16.64 )-----------( 332      ( 30 Jul 2023 07:16 )             39.4     07-30    132<L>  |  97<L>  |  18  ----------------------------<  255<H>  4.2   |  22  |  0.73    Ca    9.0      30 Jul 2023 07:16    TPro  8.4<H>  /  Alb  4.3  /  TBili  0.5  /  DBili  x   /  AST  14  /  ALT  17  /  AlkPhos  90  07-29    PT/INR - ( 29 Jul 2023 14:00 )   PT: 11.2 sec;   INR: 1.00 ratio         PTT - ( 30 Jul 2023 00:05 )  PTT:55.1 sec  Urinalysis Basic - ( 30 Jul 2023 07:16 )    Color: x / Appearance: x / SG: x / pH: x  Gluc: 255 mg/dL / Ketone: x  / Bili: x / Urobili: x   Blood: x / Protein: x / Nitrite: x   Leuk Esterase: x / RBC: x / WBC x   Sq Epi: x / Non Sq Epi: x / Bacteria: x      CAPILLARY BLOOD GLUCOSE      POCT Blood Glucose.: 252 mg/dL (30 Jul 2023 16:59)  POCT Blood Glucose.: 282 mg/dL (30 Jul 2023 11:48)  POCT Blood Glucose.: 263 mg/dL (30 Jul 2023 09:02)  POCT Blood Glucose.: 228 mg/dL (30 Jul 2023 07:32)  POCT Blood Glucose.: 319 mg/dL (30 Jul 2023 00:55)  POCT Blood Glucose.: 296 mg/dL (29 Jul 2023 20:21)        Urinalysis Basic - ( 30 Jul 2023 07:16 )    Color: x / Appearance: x / SG: x / pH: x  Gluc: 255 mg/dL / Ketone: x  / Bili: x / Urobili: x   Blood: x / Protein: x / Nitrite: x   Leuk Esterase: x / RBC: x / WBC x   Sq Epi: x / Non Sq Epi: x / Bacteria: x        PAST MEDICAL & SURGICAL HISTORY:  Hypertension      Diabetes mellitus      HLD (hyperlipidemia)      No significant past surgical history          MEDICATIONS  (STANDING):  amLODIPine   Tablet 5 milliGRAM(s) Oral daily  aspirin  chewable 81 milliGRAM(s) Oral daily  atorvastatin 10 milliGRAM(s) Oral at bedtime  cyclobenzaprine 10 milliGRAM(s) Oral at bedtime  dextrose 5%. 1000 milliLiter(s) (50 mL/Hr) IV Continuous <Continuous>  dextrose 5%. 1000 milliLiter(s) (100 mL/Hr) IV Continuous <Continuous>  dextrose 50% Injectable 12.5 Gram(s) IV Push once  dextrose 50% Injectable 25 Gram(s) IV Push once  dextrose 50% Injectable 25 Gram(s) IV Push once  glucagon  Injectable 1 milliGRAM(s) IntraMuscular once  hydrALAZINE 50 milliGRAM(s) Oral two times a day  insulin lispro (ADMELOG) corrective regimen sliding scale   SubCutaneous three times a day before meals  insulin lispro (ADMELOG) corrective regimen sliding scale   SubCutaneous at bedtime  latanoprost 0.005% Ophthalmic Solution 1 Drop(s) Both EYES at bedtime  losartan 100 milliGRAM(s) Oral daily  melatonin 9 milliGRAM(s) Oral at bedtime    MEDICATIONS  (PRN):  acetaminophen     Tablet .. 650 milliGRAM(s) Oral every 6 hours PRN Temp greater or equal to 38C (100.4F), Mild Pain (1 - 3)  aluminum hydroxide/magnesium hydroxide/simethicone Suspension 30 milliLiter(s) Oral every 4 hours PRN Dyspepsia  dextrose Oral Gel 15 Gram(s) Oral once PRN Blood Glucose LESS THAN 70 milliGRAM(s)/deciliter  HYDROmorphone  Injectable 0.5 milliGRAM(s) IV Push every 4 hours PRN breakthrough pain  nitroglycerin     SubLingual 0.4 milliGRAM(s) SubLingual every 5 minutes PRN Chest Pain  ondansetron Injectable 4 milliGRAM(s) IV Push every 8 hours PRN Nausea and/or Vomiting  oxycodone    5 mG/acetaminophen 325 mG 1 Tablet(s) Oral every 6 hours PRN Moderate Pain (4 - 6)  oxycodone    5 mG/acetaminophen 325 mG 2 Tablet(s) Oral every 4 hours PRN Severe Pain (7 - 10)        RADIOLOGY & ADDITIONAL TESTS:    Imaging Personally Reviewed:  [ ] YES  [ ] NO    Consultant(s) Notes Reviewed:  [x ] YES  [ ] NO    PHYSICAL EXAM:  GENERAL: Alert and awake lying in bed in no distress  HEAD:  Atraumatic, Normocephalic  EYES: EOMI, MADALYN, conjunctiva and sclera clear  NECK: Supple, No JVD, Normal thyroid  NERVOUS SYSTEM:  Alert & Oriented X3, Motor and sensory systems are intact,   CHEST/LUNG: Left sided chest tubes +  HEART: Regular rate and rhythm; No murmurs, rubs, or gallops  ABDOMEN: Soft, Nontender, Nondistended; Bowel sounds present  EXTREMITIES:   Peripheral Pulses are palpable, no  edema        Care Discussed with Consultants/Other Providers [x ] YES  [ ] NO      Code Status: [] Full Code [] DNR [] DNI [] Goals of Care:   Disposition: [] ICU [] Stroke Unit [] RCU []PCU []Floor [] Discharge Home         JANETT HighFACP

## 2023-08-04 NOTE — PROGRESS NOTE ADULT - SUBJECTIVE AND OBJECTIVE BOX
SARAH FRIAS      67y   Male   MRN-[id number]         No Known Allergies             Daily     Daily Weight in k (04 Aug 2023 00:00)Drug Dosing Weight  Height (cm): 168.9 (02 Aug 2023 15:14)  Weight (kg): 89.8 (02 Aug 2023 15:14)  BMI (kg/m2): 31.5 (02 Aug 2023 15:14)  BSA (m2): 2 (02 Aug 2023 15:14)    HPI:  67 yr old male with a pmh of HTN, HLD, T2DM on metformin, TAO on BiPAP who presents to the ED with acute, 10/10, sharp, intermittent stabbing left sided abdominal and left axillary/back pain. Pt reports the pain woke him up from his sleep and he took some melatonin to go back to sleep. When he woke up in the morning he still had the pain and it was not easing therefore he decided to present to the ED for further evaluation.  Reports difficulty in catching his breathe 2/2 pain.   Denies  headache, dizziness, chest pain, palpitations, SOB, joint pain, diarrhea/constipation, urinary symptoms.    Vitals: T 99.9, , /88, RR 24 satting 98% RA (2023 21:52)      CHIEF COMPLAINT: Follow up in ICU  for postoperative care of patient who is s/p lung surgery    Procedure:  Left uniport VATS empyema drainage; partial decortication; fluid irrigation and drainage 02-Aug-2023                        Issues:              Gram positive bacteria empyema  Hyponatremia  L Kidney mass  Post op pain  Chest tube in place  HTN  HLD  TAO on CPAP 14  DM2  Obesity (BMI 31)      Postop course:     Patient reports moderate pain at chest wall incision sites which is worse with coughing and deep breathing without associated fever or dyspnea. Pain is improved with use of PCA and  oral pain meds.         Home Medications:  amLODIPine 5 mg oral tablet: 1 tab(s) orally once a day (2023 22:00)  aspirin 81 mg oral capsule: 1 tab(s) orally once a day (2023 21:47)  atorvastatin 10 mg oral tablet: 1 tab(s) orally once a day (2023 21:47)  cyclobenzaprine 10 mg oral tablet: 1 tab(s) orally once a day (at bedtime) (2023 21:47)  hydrALAZINE 50 mg oral tablet: 1 tab(s) orally 2 times a day (2023 21:47)  losartan 100 mg oral tablet: 1 tab(s) orally once a day (2023 21:47)  melatonin 10 mg oral tablet: 1 tab(s) orally once a day (at bedtime) (2023 21:47)  metFORMIN 1000 mg oral tablet: 1 tab(s) orally 2 times a day (2023 21:47)    PAST MEDICAL & SURGICAL HISTORY:  Hypertension      Diabetes mellitus      HLD (hyperlipidemia)      No significant past surgical history        Vital Signs Last 24 Hrs  T(C): 37 (04 Aug 2023 08:00), Max: 37.2 (03 Aug 2023 19:00)  T(F): 98.6 (04 Aug 2023 08:00), Max: 99 (04 Aug 2023 00:00)  HR: 82 (04 Aug 2023 09:20) (74 - 104)  BP: 128/84 (04 Aug 2023 08:00) (118/67 - 146/86)  BP(mean): 97 (04 Aug 2023 08:00) (83 - 109)  RR: 21 (04 Aug 2023 08:00) (15 - 33)  SpO2: 100% (04 Aug 2023 09:20) (96% - 100%)    Parameters below as of 04 Aug 2023 09:20  Patient On (Oxygen Delivery Method): room air      I&O's Detail    03 Aug 2023 07:01  -  04 Aug 2023 07:00  --------------------------------------------------------  IN:    Oral Fluid: 600 mL    sodium chloride 0.9%: 300 mL  Total IN: 900 mL    OUT:    Chest Tube (mL): 60 mL    Chest Tube (mL): 95 mL    Voided (mL): 1305 mL  Total OUT: 1460 mL    Total NET: -560 mL        CAPILLARY BLOOD GLUCOSE      POCT Blood Glucose.: 242 mg/dL (04 Aug 2023 11:50)  POCT Blood Glucose.: 247 mg/dL (04 Aug 2023 07:49)  POCT Blood Glucose.: 256 mg/dL (03 Aug 2023 21:18)  POCT Blood Glucose.: 200 mg/dL (03 Aug 2023 16:54)    Home Medications:  amLODIPine 5 mg oral tablet: 1 tab(s) orally once a day (2023 22:00)  aspirin 81 mg oral capsule: 1 tab(s) orally once a day (2023 21:47)  atorvastatin 10 mg oral tablet: 1 tab(s) orally once a day (2023 21:47)  cyclobenzaprine 10 mg oral tablet: 1 tab(s) orally once a day (at bedtime) (2023 21:47)  hydrALAZINE 50 mg oral tablet: 1 tab(s) orally 2 times a day (2023 21:47)  losartan 100 mg oral tablet: 1 tab(s) orally once a day (2023 21:47)  melatonin 10 mg oral tablet: 1 tab(s) orally once a day (at bedtime) (2023 21:47)  metFORMIN 1000 mg oral tablet: 1 tab(s) orally 2 times a day (2023 21:47)    MEDICATIONS  (STANDING):  acetaminophen   IVPB .. 1000 milliGRAM(s) IV Intermittent once  albuterol    0.083% 2.5 milliGRAM(s) Nebulizer every 6 hours  aspirin  chewable 81 milliGRAM(s) Oral daily  atorvastatin 10 milliGRAM(s) Oral at bedtime  bisacodyl Suppository 10 milliGRAM(s) Rectal once  cefTRIAXone   IVPB 2000 milliGRAM(s) IV Intermittent every 24 hours  cyclobenzaprine 10 milliGRAM(s) Oral at bedtime  dextrose 50% Injectable 25 Gram(s) IV Push once  dornase shana Solution 2.5 milliGRAM(s) Inhalation daily  heparin   Injectable 5000 Unit(s) SubCutaneous every 8 hours  HYDROmorphone PCA (1 mG/mL) 30 milliLiter(s) PCA Continuous PCA Continuous  insulin glargine Injectable (LANTUS) 15 Unit(s) SubCutaneous at bedtime  insulin lispro (ADMELOG) corrective regimen sliding scale   SubCutaneous three times a day before meals  insulin lispro (ADMELOG) corrective regimen sliding scale   SubCutaneous at bedtime  insulin lispro Injectable (ADMELOG) 5 Unit(s) SubCutaneous before breakfast  insulin lispro Injectable (ADMELOG) 5 Unit(s) SubCutaneous before lunch  insulin lispro Injectable (ADMELOG) 5 Unit(s) SubCutaneous before dinner  lactulose Syrup 10 Gram(s) Oral every 8 hours  latanoprost 0.005% Ophthalmic Solution 1 Drop(s) Both EYES at bedtime  lidocaine   4% Patch 1 Patch Transdermal daily  melatonin 9 milliGRAM(s) Oral at bedtime  methylnaltrexone Injectable 12 milliGRAM(s) SubCutaneous daily  metroNIDAZOLE    Tablet 500 milliGRAM(s) Oral two times a day  polyethylene glycol 3350 17 Gram(s) Oral two times a day  senna 2 Tablet(s) Oral at bedtime  sodium chloride 0.9%. 1000 milliLiter(s) (30 mL/Hr) IV Continuous <Continuous>  sodium chloride 3%  Inhalation 4 milliLiter(s) Inhalation every 6 hours    MEDICATIONS  (PRN):  acetaminophen     Tablet .. 650 milliGRAM(s) Oral every 6 hours PRN Temp greater or equal to 38C (100.4F), Mild Pain (1 - 3)  aluminum hydroxide/magnesium hydroxide/simethicone Suspension 30 milliLiter(s) Oral every 4 hours PRN Dyspepsia  naloxone Injectable 0.1 milliGRAM(s) IV Push every 3 minutes PRN For ANY of the following changes in patient status:  A. RR LESS THAN 10 breaths per minute, B. Oxygen saturation LESS THAN 90%, C. Sedation score of 6  ondansetron Injectable 4 milliGRAM(s) IV Push every 6 hours PRN Nausea        Physical exam:                             General:               Pt is awake, alert,  appears to be in pain but not in distress                                                  Neuro:                  Nonfocal                             Psych:                   A&Ox3                          Cardiovascular:   S1 & S2, regular                           Respiratory:         Air entry is fair and equal on both sides, has bilateral conducted sounds                           GI:                          Soft, nondistended and nontender, Bowel sounds active                            Ext:                        No cyanosis or edema     Labs:                                                                           8.8    16.46 )-----------( 394      ( 04 Aug 2023 02:30 )             27.2             08-04    131<L>  |  95<L>  |  22  ----------------------------<  270<H>  3.9   |  24  |  0.64    Ca    8.3<L>      04 Aug 2023 02:30  Phos  2.5     08-04  Mg     2.40     08-04                   Urinalysis Basic - ( 04 Aug 2023 02:30 )    Color: x / Appearance: x / SG: x / pH: x  Gluc: 270 mg/dL / Ketone: x  / Bili: x / Urobili: x   Blood: x / Protein: x / Nitrite: x   Leuk Esterase: x / RBC: x / WBC x   Sq Epi: x / Non Sq Epi: x / Bacteria: x        Culture - Acid Fast - Tissue w/Smear (collected 02 Aug 2023 20:41)  Source: .Tissue 6-CONTENTS OF EMPYEMA    Culture - Fungal, Tissue (collected 02 Aug 2023 20:41)  Source: .Tissue 6-CONTENTS OF EMPYEMA  Preliminary Report (03 Aug 2023 09:19):    Testing in progress    Culture - Tissue with Gram Stain (collected 02 Aug 2023 20:41)  Source: .Tissue 6-CONTENTS OF EMPYEMA  Gram Stain (03 Aug 2023 06:15):    No polymorphonuclear leukocytes seen per low power field    No organisms seen per oil power field  Preliminary Report (03 Aug 2023 18:56):    No growth    Culture - Acid Fast - Body Fluid w/Smear (collected 02 Aug 2023 20:37)  Source: .Body Fluid 4- LEFT PLEURAL EFFUSION    Culture - Fungal, Body Fluid (collected 02 Aug 2023 20:37)  Source: .Body Fluid 4- LEFT PLEURAL EFFUSION  Preliminary Report (03 Aug 2023 09:16):    Testing in progress    Culture - Body Fluid with Gram Stain (collected 02 Aug 2023 20:37)  Source: .Body Fluid 4- LEFT PLEURAL EFFUSION  Gram Stain (03 Aug 2023 04:24):    polymorphonuclear leukocytes seen    Gram positive cocci in pairs seen    by cytocentrifuge  Preliminary Report (03 Aug 2023 19:14):    No growth    Culture - Acid Fast - Body Fluid w/Smear (collected 02 Aug 2023 20:33)  Source: .Body Fluid 3-LEFT PLEURAL EFFUSION    Culture - Fungal, Body Fluid (collected 02 Aug 2023 20:33)  Source: .Body Fluid 3-LEFT PLEURAL EFFUSION  Preliminary Report (03 Aug 2023 09:19):    Testing in progress    Culture - Body Fluid with Gram Stain (collected 02 Aug 2023 20:33)  Source: .Body Fluid 3-LEFT PLEURAL EFFUSION  Gram Stain (03 Aug 2023 03:25):    polymorphonuclear leukocytes seen    Gram positive cocci in pairs seen    by cytocentrifuge  Preliminary Report (03 Aug 2023 19:16):    No growth to date.    Culture - Acid Fast - Tissue w/Smear (collected 02 Aug 2023 20:29)  Source: .Tissue 5-DECODTICATION    Culture - Fungal, Tissue (collected 02 Aug 2023 20:29)  Source: .Tissue 5-DECODTICATION  Preliminary Report (03 Aug 2023 09:16):    Testing in progress    Culture - Tissue with Gram Stain (collected 02 Aug 2023 20:29)  Source: .Tissue 5-DECODTICATION  Gram Stain (03 Aug 2023 04:24):    Rare polymorphonuclear leukocytes seen per low power field    Rare Gram Variable Cocci seen per oil power field  Preliminary Report (03 Aug 2023 19:01):    No growth    Culture - Acid Fast - Tissue w/Smear (collected 02 Aug 2023 20:22)  Source: .Tissue 7.-CONTENTS OF EMPYEMA    Culture - Fungal, Tissue (collected 02 Aug 2023 20:22)  Source: .Tissue  Preliminary Report (04 Aug 2023 07:18):    Testing in progress    Culture - Tissue with Gram Stain (collected 02 Aug 2023 20:22)  Source: .Tissue 7.-CONTENTS OF EMPYEMA  Gram Stain (03 Aug 2023 04:25):    No polymorphonuclear leukocytes seen per low power field    No organisms seen per oil power field  Preliminary Report (03 Aug 2023 19:02):    No growth    Culture - Acid Fast - Body Fluid w/Smear (collected 02 Aug 2023 20:12)  Source: .Body Fluid LEFT PLEURAL EFFUSION -2    Culture - Fungal, Body Fluid (collected 02 Aug 2023 20:12)  Source: .Body Fluid LEFT PLEURAL EFFUSION -2  Preliminary Report (03 Aug 2023 09:10):    Testing in progress    Culture - Body Fluid with Gram Stain (collected 02 Aug 2023 20:12)  Source: .Body Fluid LEFT PLEURAL EFFUSION -2  Gram Stain (03 Aug 2023 03:28):    polymorphonuclear leukocytes seen    No organisms seen    by cytocentrifuge  Preliminary Report (03 Aug 2023 19:16):    No growth to date.    Culture - Acid Fast - Bronchial w/Smear (collected 02 Aug 2023 17:16)  Source: .Bronchial BAL    Culture - Fungal, Bronchial (collected 02 Aug 2023 17:16)  Source: .Bronchial BAL  Preliminary Report (03 Aug 2023 09:16):    Testing in progress    Culture - Bronchial (collected 02 Aug 2023 17:16)  Source: .Bronchial BAL  Gram Stain (03 Aug 2023 06:41):    Few polymorphonuclear leukocytes seen per low power field    Few Squamous epithelial cells seen per low power field    No organisms seen  Preliminary Report (03 Aug 2023 19:37):    No growth to date.      CXR:    < from: Xray Chest 1 View- PORTABLE-Routine (23 @ 06:02) >  Chest x-ray 2023 7:51 PM  Interval insertion of 2 left-sided chest tubes.  The heart is not accurately assessed in this AP projection.  Improved aeration of the left lung fields. Residual opacity over the left   lower lung, which may represent effusion and/or atelectasis.  The right lung is clear.  There is no pneumothorax  No acute bony abnormality.    Chest x-ray/3/23 5:23 AM  2 Left-sided chest tubes are again seen.  Similar opacity over the left lower lung field.    IMPRESSION:  Improved aeration of left lung fields after VATS and chest tube insertion.  Residualleft lower lung opacity, which may represent atelectasis and/or   pleural effusion.        Plan:  General: 67yMale s/p Left uniport VATS empyema drainage; partial decortication; fluid irrigation and drainage 02-Aug-2023 , experiencing  pain with deep breathing.                             Neuro:                                         Pain control with Oxy /  Tylenol PRN                            Cardiovascular:                                          Telemetry (medical test) - Reviewed by me today independently. Normal sinus rhythm.                                          HLD: Continue Lipitor                                         HTN: Continue  Hydralazine                                        Continue hemodynamic monitoring to prevent decompensation.                            Respiratory:                                         Postop hypoxemia requiring O2 via nasal cannula probably due to postop pain - Wean nasal cannula for goal O2sat above 92%.                                               Encourage incentive spirometry.                                                   Chest PT and frequent suctioning.                                          COPD: Continue bronchodilators, Pulmozyme and inhaled 3% saline inhalations.                                         OOB to chair & ambulate w/ assistance.                                          Continuous pulse oximetry for support & to prevent decompensation.                                         Monitor chest tube output                                         Chest tube to water seal                                                                                            GI                                         On  DASH / diabetic  diet as tolerated                                         Continue G.I prophylaxis with Protonix                                          Continue Zofran / Reglan for nausea - PRN                                         Continue bowel regimen	                                                                 Renal:                                         Renal mass, Urology to schedule for biopsy.                                          Monitor I/Os and electrolytes                                                                                        Hem/ Onc:                                         DVT prophylaxis with SQ Heparin and SCDs                                         Monitor chest tube output &  signs of bleeding.                                          Follow CBC in AM                           Infectious disease:     Empyema: Continue Ceftriaxone and Flagyl  Follow cultures  I.D f/u                                          Monitor for fever / leukocytosis.                                          All surgical incision / chest tube  sites look clean                            Endocrine                                             DM-2 / Hyperglycemia: Continue Lantus + Pre meal ADMELOG + Accu-Checks with coverage                                                Pertinent clinical, laboratory, radiographic, hemodynamic, echocardiographic, respiratory data, microbiologic data and chart were reviewed and analyzed frequently throughout the course of the day and night.     Patient seen, examined and plan discussed with CT Surgeon Dr. Orellana  / CTICU team during rounds.    OOB to chair and ambulate with physical therapy as tolerated.     Status discussed with patient and updated plan of care.     I have spent 50 minutes with this patient  including 20 minutes of time monitoring hemodynamic status, respiratory status and  coordinating care in the ICU.            Juan Antonio Rhodes MD

## 2023-08-04 NOTE — PROGRESS NOTE ADULT - SUBJECTIVE AND OBJECTIVE BOX
Follow Up:      Inverval History/ROS:Patient is a 67y old  Male who presents with a chief complaint of celiac artery stenosis, c/f median arcuate ligament syndrome; renal mass (04 Aug 2023 12:49)    No fever  No events      Allergies    No Known Allergies    Intolerances        ANTIMICROBIALS:  cefTRIAXone   IVPB 2000 every 24 hours  metroNIDAZOLE    Tablet 500 two times a day      OTHER MEDS:  acetaminophen     Tablet .. 650 milliGRAM(s) Oral every 6 hours PRN  acetaminophen   IVPB .. 1000 milliGRAM(s) IV Intermittent once  albuterol    0.083% 2.5 milliGRAM(s) Nebulizer every 6 hours  aluminum hydroxide/magnesium hydroxide/simethicone Suspension 30 milliLiter(s) Oral every 4 hours PRN  aspirin  chewable 81 milliGRAM(s) Oral daily  atorvastatin 10 milliGRAM(s) Oral at bedtime  bisacodyl Suppository 10 milliGRAM(s) Rectal once  cyclobenzaprine 10 milliGRAM(s) Oral at bedtime  dextrose 50% Injectable 25 Gram(s) IV Push once  dornase shana Solution 2.5 milliGRAM(s) Inhalation daily  heparin   Injectable 5000 Unit(s) SubCutaneous every 8 hours  HYDROmorphone PCA (1 mG/mL) 30 milliLiter(s) PCA Continuous PCA Continuous  insulin glargine Injectable (LANTUS) 17 Unit(s) SubCutaneous at bedtime  insulin lispro (ADMELOG) corrective regimen sliding scale   SubCutaneous three times a day before meals  insulin lispro (ADMELOG) corrective regimen sliding scale   SubCutaneous at bedtime  insulin lispro Injectable (ADMELOG) 8 Unit(s) SubCutaneous before breakfast  insulin lispro Injectable (ADMELOG) 8 Unit(s) SubCutaneous before lunch  insulin lispro Injectable (ADMELOG) 8 Unit(s) SubCutaneous before dinner  lactulose Syrup 10 Gram(s) Oral every 8 hours  latanoprost 0.005% Ophthalmic Solution 1 Drop(s) Both EYES at bedtime  lidocaine   4% Patch 1 Patch Transdermal daily  melatonin 9 milliGRAM(s) Oral at bedtime  methylnaltrexone Injectable 12 milliGRAM(s) SubCutaneous daily  naloxone Injectable 0.1 milliGRAM(s) IV Push every 3 minutes PRN  ondansetron Injectable 4 milliGRAM(s) IV Push every 6 hours PRN  polyethylene glycol 3350 17 Gram(s) Oral two times a day  senna 2 Tablet(s) Oral at bedtime  sodium chloride 0.9%. 1000 milliLiter(s) IV Continuous <Continuous>  sodium chloride 3%  Inhalation 4 milliLiter(s) Inhalation every 6 hours      Vital Signs Last 24 Hrs  T(C): 37.2 (04 Aug 2023 16:00), Max: 37.2 (03 Aug 2023 19:00)  T(F): 98.9 (04 Aug 2023 16:00), Max: 99 (04 Aug 2023 00:00)  HR: 94 (04 Aug 2023 16:00) (80 - 104)  BP: 138/84 (04 Aug 2023 16:00) (120/74 - 146/86)  BP(mean): 96 (04 Aug 2023 16:00) (88 - 114)  RR: 19 (04 Aug 2023 16:00) (15 - 33)  SpO2: 99% (04 Aug 2023 16:00) (97% - 100%)    Parameters below as of 04 Aug 2023 16:00  Patient On (Oxygen Delivery Method): room air        PHYSICAL EXAM:  General: [x ] non-toxic  HEAD/EYES: [ ] PERRL [x ] white sclera [ ] icterus  ENT:  [ ] normal [x ] supple [ ] thrush [ ] pharyngeal exudate  Cardiovascular:   [ ] murmur [x ] normal [ ] PPM/AICD  Respiratory:  [x ]CT in place  GI:  [x ] soft, non-tender, normal bowel sounds  :  [ ] horne [ ] no CVA tenderness   Musculoskeletal:  [x ] no synovitis  Neurologic:  [ ] non-focal exam   Skin:  [x ] no rash  Lymph: [x ] no lymphadenopathy  Psychiatric:  [x ] appropriate affect [ ] alert & oriented  Lines:  [x ] no phlebitis [ ] central line                                8.8    16.46 )-----------( 394      ( 04 Aug 2023 02:30 )             27.2       08-04    131<L>  |  95<L>  |  22  ----------------------------<  270<H>  3.9   |  24  |  0.64    Ca    8.3<L>      04 Aug 2023 02:30  Phos  2.5     08-04  Mg     2.40     08-04        Urinalysis Basic - ( 04 Aug 2023 02:30 )    Color: x / Appearance: x / SG: x / pH: x  Gluc: 270 mg/dL / Ketone: x  / Bili: x / Urobili: x   Blood: x / Protein: x / Nitrite: x   Leuk Esterase: x / RBC: x / WBC x   Sq Epi: x / Non Sq Epi: x / Bacteria: x        MICROBIOLOGY:Culture Results:   No growth (08-02-23 @ 20:41)  Culture Results:   Testing in progress (08-02-23 @ 20:41)  Culture Results:   No growth (08-02-23 @ 20:37)  Culture Results:   Testing in progress (08-02-23 @ 20:37)  Culture Results:   No growth to date. (08-02-23 @ 20:33)  Culture Results:   Testing in progress (08-02-23 @ 20:33)  Culture Results:   No growth (08-02-23 @ 20:29)  Culture Results:   Testing in progress (08-02-23 @ 20:29)  Culture Results:   No growth (08-02-23 @ 20:22)  Culture Results:   Testing in progress (08-02-23 @ 20:22)  Culture Results:   No growth to date. (08-02-23 @ 20:12)  Culture Results:   Testing in progress (08-02-23 @ 20:12)  Culture Results:   No growth to date. (08-02-23 @ 17:16)  Culture Results:   Testing in progress (08-02-23 @ 17:16)  Culture Results:   Few Streptococcus constellatus (08-01-23 @ 11:42)  Culture Results:   Testing in progress (08-01-23 @ 11:42)  Culture Results:   No growth at 72 Hours (07-31-23 @ 10:57)  Culture Results:   No growth at 4 days (07-31-23 @ 07:10)  Culture Results:   No growth at 5 days (07-29-23 @ 13:15)  Culture Results:   No growth at 5 days (07-29-23 @ 13:00)      RADIOLOGY:

## 2023-08-05 LAB
ANION GAP SERPL CALC-SCNC: 7 MMOL/L — SIGNIFICANT CHANGE UP (ref 7–14)
BUN SERPL-MCNC: 11 MG/DL — SIGNIFICANT CHANGE UP (ref 7–23)
CALCIUM SERPL-MCNC: 7.7 MG/DL — LOW (ref 8.4–10.5)
CHLORIDE SERPL-SCNC: 101 MMOL/L — SIGNIFICANT CHANGE UP (ref 98–107)
CO2 SERPL-SCNC: 25 MMOL/L — SIGNIFICANT CHANGE UP (ref 22–31)
CREAT SERPL-MCNC: 0.46 MG/DL — LOW (ref 0.5–1.3)
CULTURE RESULTS: SIGNIFICANT CHANGE UP
CULTURE RESULTS: SIGNIFICANT CHANGE UP
EGFR: 114 ML/MIN/1.73M2 — SIGNIFICANT CHANGE UP
GLUCOSE BLDC GLUCOMTR-MCNC: 146 MG/DL — HIGH (ref 70–99)
GLUCOSE BLDC GLUCOMTR-MCNC: 160 MG/DL — HIGH (ref 70–99)
GLUCOSE BLDC GLUCOMTR-MCNC: 198 MG/DL — HIGH (ref 70–99)
GLUCOSE BLDC GLUCOMTR-MCNC: 199 MG/DL — HIGH (ref 70–99)
GLUCOSE SERPL-MCNC: 193 MG/DL — HIGH (ref 70–99)
HCT VFR BLD CALC: 26.8 % — LOW (ref 39–50)
HGB BLD-MCNC: 8.6 G/DL — LOW (ref 13–17)
MAGNESIUM SERPL-MCNC: 1.9 MG/DL — SIGNIFICANT CHANGE UP (ref 1.6–2.6)
MCHC RBC-ENTMCNC: 28.2 PG — SIGNIFICANT CHANGE UP (ref 27–34)
MCHC RBC-ENTMCNC: 32.1 GM/DL — SIGNIFICANT CHANGE UP (ref 32–36)
MCV RBC AUTO: 87.9 FL — SIGNIFICANT CHANGE UP (ref 80–100)
NRBC # BLD: 0 /100 WBCS — SIGNIFICANT CHANGE UP (ref 0–0)
NRBC # FLD: 0.05 K/UL — HIGH (ref 0–0)
PHOSPHATE SERPL-MCNC: 2.5 MG/DL — SIGNIFICANT CHANGE UP (ref 2.5–4.5)
PLATELET # BLD AUTO: 412 K/UL — HIGH (ref 150–400)
POTASSIUM SERPL-MCNC: 3.7 MMOL/L — SIGNIFICANT CHANGE UP (ref 3.5–5.3)
POTASSIUM SERPL-SCNC: 3.7 MMOL/L — SIGNIFICANT CHANGE UP (ref 3.5–5.3)
RBC # BLD: 3.05 M/UL — LOW (ref 4.2–5.8)
RBC # FLD: 13.5 % — SIGNIFICANT CHANGE UP (ref 10.3–14.5)
SODIUM SERPL-SCNC: 133 MMOL/L — LOW (ref 135–145)
SPECIMEN SOURCE: SIGNIFICANT CHANGE UP
SPECIMEN SOURCE: SIGNIFICANT CHANGE UP
WBC # BLD: 18.72 K/UL — HIGH (ref 3.8–10.5)
WBC # FLD AUTO: 18.72 K/UL — HIGH (ref 3.8–10.5)

## 2023-08-05 PROCEDURE — 71045 X-RAY EXAM CHEST 1 VIEW: CPT | Mod: 26

## 2023-08-05 PROCEDURE — 99233 SBSQ HOSP IP/OBS HIGH 50: CPT

## 2023-08-05 RX ORDER — MAGNESIUM SULFATE 500 MG/ML
2 VIAL (ML) INJECTION ONCE
Refills: 0 | Status: COMPLETED | OUTPATIENT
Start: 2023-08-05 | End: 2023-08-05

## 2023-08-05 RX ORDER — ACETAMINOPHEN 500 MG
1000 TABLET ORAL ONCE
Refills: 0 | Status: COMPLETED | OUTPATIENT
Start: 2023-08-05 | End: 2023-08-05

## 2023-08-05 RX ORDER — CALCIUM GLUCONATE 100 MG/ML
1 VIAL (ML) INTRAVENOUS ONCE
Refills: 0 | Status: COMPLETED | OUTPATIENT
Start: 2023-08-05 | End: 2023-08-05

## 2023-08-05 RX ORDER — OXYCODONE HYDROCHLORIDE 5 MG/1
7.5 TABLET ORAL
Refills: 0 | Status: DISCONTINUED | OUTPATIENT
Start: 2023-08-05 | End: 2023-08-11

## 2023-08-05 RX ORDER — POTASSIUM CHLORIDE 20 MEQ
40 PACKET (EA) ORAL ONCE
Refills: 0 | Status: COMPLETED | OUTPATIENT
Start: 2023-08-05 | End: 2023-08-05

## 2023-08-05 RX ORDER — OXYCODONE HYDROCHLORIDE 5 MG/1
5 TABLET ORAL
Refills: 0 | Status: DISCONTINUED | OUTPATIENT
Start: 2023-08-05 | End: 2023-08-11

## 2023-08-05 RX ADMIN — Medication 9 MILLIGRAM(S): at 22:01

## 2023-08-05 RX ADMIN — Medication 25 GRAM(S): at 06:09

## 2023-08-05 RX ADMIN — Medication 500 MILLIGRAM(S): at 05:30

## 2023-08-05 RX ADMIN — Medication 8 UNIT(S): at 12:46

## 2023-08-05 RX ADMIN — LATANOPROST 1 DROP(S): 0.05 SOLUTION/ DROPS OPHTHALMIC; TOPICAL at 22:54

## 2023-08-05 RX ADMIN — HEPARIN SODIUM 5000 UNIT(S): 5000 INJECTION INTRAVENOUS; SUBCUTANEOUS at 15:29

## 2023-08-05 RX ADMIN — Medication 2: at 18:04

## 2023-08-05 RX ADMIN — SODIUM CHLORIDE 30 MILLILITER(S): 9 INJECTION INTRAMUSCULAR; INTRAVENOUS; SUBCUTANEOUS at 07:54

## 2023-08-05 RX ADMIN — ALBUTEROL 2.5 MILLIGRAM(S): 90 AEROSOL, METERED ORAL at 04:34

## 2023-08-05 RX ADMIN — CEFTRIAXONE 100 MILLIGRAM(S): 500 INJECTION, POWDER, FOR SOLUTION INTRAMUSCULAR; INTRAVENOUS at 10:41

## 2023-08-05 RX ADMIN — CYCLOBENZAPRINE HYDROCHLORIDE 10 MILLIGRAM(S): 10 TABLET, FILM COATED ORAL at 22:01

## 2023-08-05 RX ADMIN — SODIUM CHLORIDE 4 MILLILITER(S): 9 INJECTION INTRAMUSCULAR; INTRAVENOUS; SUBCUTANEOUS at 07:40

## 2023-08-05 RX ADMIN — HYDROMORPHONE HYDROCHLORIDE 30 MILLILITER(S): 2 INJECTION INTRAMUSCULAR; INTRAVENOUS; SUBCUTANEOUS at 07:06

## 2023-08-05 RX ADMIN — Medication 2: at 07:53

## 2023-08-05 RX ADMIN — SODIUM CHLORIDE 4 MILLILITER(S): 9 INJECTION INTRAMUSCULAR; INTRAVENOUS; SUBCUTANEOUS at 04:35

## 2023-08-05 RX ADMIN — Medication 500 MILLIGRAM(S): at 17:54

## 2023-08-05 RX ADMIN — OXYCODONE HYDROCHLORIDE 5 MILLIGRAM(S): 5 TABLET ORAL at 11:55

## 2023-08-05 RX ADMIN — Medication 400 MILLIGRAM(S): at 10:41

## 2023-08-05 RX ADMIN — Medication 8 UNIT(S): at 07:53

## 2023-08-05 RX ADMIN — HEPARIN SODIUM 5000 UNIT(S): 5000 INJECTION INTRAVENOUS; SUBCUTANEOUS at 22:01

## 2023-08-05 RX ADMIN — SODIUM CHLORIDE 4 MILLILITER(S): 9 INJECTION INTRAMUSCULAR; INTRAVENOUS; SUBCUTANEOUS at 17:29

## 2023-08-05 RX ADMIN — ALBUTEROL 2.5 MILLIGRAM(S): 90 AEROSOL, METERED ORAL at 07:40

## 2023-08-05 RX ADMIN — Medication 81 MILLIGRAM(S): at 11:55

## 2023-08-05 RX ADMIN — ATORVASTATIN CALCIUM 10 MILLIGRAM(S): 80 TABLET, FILM COATED ORAL at 22:01

## 2023-08-05 RX ADMIN — Medication 1000 MILLIGRAM(S): at 11:11

## 2023-08-05 RX ADMIN — OXYCODONE HYDROCHLORIDE 5 MILLIGRAM(S): 5 TABLET ORAL at 12:25

## 2023-08-05 RX ADMIN — ALBUTEROL 2.5 MILLIGRAM(S): 90 AEROSOL, METERED ORAL at 17:15

## 2023-08-05 RX ADMIN — INSULIN GLARGINE 17 UNIT(S): 100 INJECTION, SOLUTION SUBCUTANEOUS at 22:02

## 2023-08-05 RX ADMIN — Medication 8 UNIT(S): at 18:04

## 2023-08-05 RX ADMIN — HEPARIN SODIUM 5000 UNIT(S): 5000 INJECTION INTRAVENOUS; SUBCUTANEOUS at 05:30

## 2023-08-05 RX ADMIN — Medication 100 GRAM(S): at 05:33

## 2023-08-05 RX ADMIN — Medication 2: at 12:46

## 2023-08-05 RX ADMIN — Medication 40 MILLIEQUIVALENT(S): at 05:30

## 2023-08-05 RX ADMIN — DORNASE ALFA 2.5 MILLIGRAM(S): 1 SOLUTION RESPIRATORY (INHALATION) at 07:40

## 2023-08-05 NOTE — PROGRESS NOTE ADULT - SUBJECTIVE AND OBJECTIVE BOX
CHIEF COMPLAINT: FOLLOW UP IN ICU FOR POSTOPERATIVE CARE OF PATIENT WHO IS S/P DECORTICATION      PROCEDURES: Left uniport VATS empyema drainage; partial decortication; fluid irrigation and drainage 02-Aug-2023       ISSUES:     Left empyema  Hyponatremia improving  L Kidney mass  Post op pain  Chest tube in place  HTN  HLD  TAO on CPAP 14  DM2, hyperglycemic  Obesity (BMI 31)  Left renal exophytic mass  Medical arcuate ligament syndrome      INTERVAL EVENTS:     On room air, afebrile  Stable hemodynamics  Blood glucose better controlled  On rocephine and flagyl for strep. constellatus empyema, ID recs appreciated    Chest tubes on water seal - no airleak, 160ml on #1 and 45ml serosang outputs on #2 tube.   #2 chest tube to be removed today        HISTORY:   Patient reports moderate pain at chest wall incision sites which is worse with coughing and deep breathing without associated fever or dyspnea. Pain is improved with use of pain meds.     PHYSICAL EXAM:   Gen: Comfortable, No acute distress  Eyes: Sclera white, Conjunctiva normal, Eyelids normal, Pupils symmetrical   ENT: Mucous membranes moist,  ,  ,    Neck: Trachea midline,  ,  ,  ,  ,  ,    CV: Rate regular, Rhythm regular,  ,  ,    Resp: Breath sounds clear, No accessory muscles use, Two L chest tubes,  ,    Abd: Soft, Non-distended, Non-tender,   ,  ,  ,    Skin: Warm, No peripheral edema of lower extremities,  ,    : No horne  Neuro: Moving all 4 extremities,    Psych: A&Ox3      ASSESSMENT AND PLAN:     NEURO:  Post-operative Pain - Stable. Pain control with oxycodone and Tylenol IV PRN.        RESPIRATORY:  Hypoxia - Wean nasal cannula for goal O2sat above 92. Obtain CXR. Incentive spirometry. Chest PT and frequent suctioning. Continue bronchodilators. OOB to chair & ambulate w/ assistance. Continuous pulse oximetry for support & to prevent decompensation.    TAO - stable. Continue CPAP. Monitor nocturnal O2sat.           CARDIOVASCULAR:  Hemodynamically stable - Not on pressors. Continue hemodynamic monitoring.  Telemetry (medical test) - Reviewed by me today independently. Normal sinus rhythm.  HTN - stable. Hold home antihypertensives for now.         RENAL:  Stable - Monitor IOs and electrolytes. Keep K above 4.0 and Mg above 2.0.     Hyponatremia - improving        GASTROINTESTINAL:  GI prophylaxis not indicated  Zofran and Reglan IV PRN for nausea  Regular consistency diet, BM yesterday  No abdominal pain. exam soft NT. outpatient f/u for MALS disease             HEMATOLOGIC:  No signs of active bleeding. Monitor Hgb in CBC in AM  DVT prophylaxis with heparin subQ and SCDs.               INFECTIOUS DISEASE:  Gram positive Bacteria Left Empyema - improved s/p decortication.   - Continue Ceftriaxone and Flagyl.  - F/U OR cultures   - Chest tube – Pleurevac regulated water seal. Monitor chest tube output.  - ID consult recs      ENDOCRINE:  DM2 – Stable. Monitor glucose fingersticks for goal 120-180. Insulin sliding scale. Carb control diet.         ONCOLOGY:  L Renal mass - stable. outpatient oncology appointment and IR f/u for biopsy as outpatient        Pertinent clinical, laboratory, radiographic, hemodynamic, echocardiographic, respiratory data, microbiologic data and chart were reviewed by myself and analyzed frequently throughout the course of the day and night by myself.    Plan discussed at length with the CTICU staff and Attending CT Surgeon -   Dr Steven Gordon    Patient's status was discussed with patient and family at bedside.    I have spent 50 minutes of time with this patient monitoring hemodynamic status, respiratory status, and coordinating care in the ICU.    _________________________  VITAL SIGNS:  Vital Signs Last 24 Hrs  T(C): 37.3 (05 Aug 2023 08:00), Max: 37.3 (05 Aug 2023 08:00)  T(F): 99.1 (05 Aug 2023 08:00), Max: 99.1 (05 Aug 2023 08:00)  HR: 79 (05 Aug 2023 11:00) (71 - 97)  BP: 158/82 (05 Aug 2023 11:00) (128/77 - 162/89)  BP(mean): 101 (05 Aug 2023 11:00) (91 - 111)  RR: 27 (05 Aug 2023 11:00) (17 - 28)  SpO2: 100% (05 Aug 2023 11:00) (96% - 100%)    Parameters below as of 05 Aug 2023 11:00  Patient On (Oxygen Delivery Method): room air      I/Os:   I&O's Detail    04 Aug 2023 07:01  -  05 Aug 2023 07:00  --------------------------------------------------------  IN:    IV PiggyBack: 100 mL    Oral Fluid: 500 mL    sodium chloride 0.9%: 660 mL  Total IN: 1260 mL    OUT:    Chest Tube (mL): 160 mL    Chest Tube (mL): 45 mL    Voided (mL): 600 mL  Total OUT: 805 mL    Total NET: 455 mL      05 Aug 2023 07:01  -  05 Aug 2023 12:16  --------------------------------------------------------  IN:    IV PiggyBack: 150 mL    sodium chloride 0.9%: 150 mL  Total IN: 300 mL    OUT:    Chest Tube (mL): 0 mL    Chest Tube (mL): 0 mL  Total OUT: 0 mL    Total NET: 300 mL              MEDICATIONS:  MEDICATIONS  (STANDING):  acetaminophen   IVPB .. 1000 milliGRAM(s) IV Intermittent once  albuterol    0.083% 2.5 milliGRAM(s) Nebulizer every 6 hours  aspirin  chewable 81 milliGRAM(s) Oral daily  atorvastatin 10 milliGRAM(s) Oral at bedtime  cefTRIAXone   IVPB 2000 milliGRAM(s) IV Intermittent every 24 hours  cyclobenzaprine 10 milliGRAM(s) Oral at bedtime  dextrose 50% Injectable 25 Gram(s) IV Push once  dornase shana Solution 2.5 milliGRAM(s) Inhalation daily  heparin   Injectable 5000 Unit(s) SubCutaneous every 8 hours  insulin glargine Injectable (LANTUS) 17 Unit(s) SubCutaneous at bedtime  insulin lispro (ADMELOG) corrective regimen sliding scale   SubCutaneous three times a day before meals  insulin lispro (ADMELOG) corrective regimen sliding scale   SubCutaneous at bedtime  insulin lispro Injectable (ADMELOG) 8 Unit(s) SubCutaneous before breakfast  insulin lispro Injectable (ADMELOG) 8 Unit(s) SubCutaneous before lunch  insulin lispro Injectable (ADMELOG) 8 Unit(s) SubCutaneous before dinner  lactulose Syrup 10 Gram(s) Oral every 8 hours  latanoprost 0.005% Ophthalmic Solution 1 Drop(s) Both EYES at bedtime  lidocaine   4% Patch 1 Patch Transdermal daily  melatonin 9 milliGRAM(s) Oral at bedtime  methylnaltrexone Injectable 12 milliGRAM(s) SubCutaneous daily  metroNIDAZOLE    Tablet 500 milliGRAM(s) Oral two times a day  polyethylene glycol 3350 17 Gram(s) Oral two times a day  senna 2 Tablet(s) Oral at bedtime  sodium chloride 0.9%. 1000 milliLiter(s) (30 mL/Hr) IV Continuous <Continuous>  sodium chloride 3%  Inhalation 4 milliLiter(s) Inhalation every 6 hours    MEDICATIONS  (PRN):  acetaminophen     Tablet .. 650 milliGRAM(s) Oral every 6 hours PRN Temp greater or equal to 38C (100.4F), Mild Pain (1 - 3)  aluminum hydroxide/magnesium hydroxide/simethicone Suspension 30 milliLiter(s) Oral every 4 hours PRN Dyspepsia  naloxone Injectable 0.1 milliGRAM(s) IV Push every 3 minutes PRN For ANY of the following changes in patient status:  A. RR LESS THAN 10 breaths per minute, B. Oxygen saturation LESS THAN 90%, C. Sedation score of 6  ondansetron Injectable 4 milliGRAM(s) IV Push every 6 hours PRN Nausea  oxyCODONE    IR 7.5 milliGRAM(s) Oral every 3 hours PRN Severe Pain (7 - 10)  oxyCODONE    IR 5 milliGRAM(s) Oral every 3 hours PRN Moderate Pain (4 - 6)      LABS:  Laboratory data was independently reviewed by me today.                           8.6    18.72 )-----------( 412      ( 05 Aug 2023 03:18 )             26.8     08-05    133<L>  |  101  |  11  ----------------------------<  193<H>  3.7   |  25  |  0.46<L>    Ca    7.7<L>      05 Aug 2023 03:18  Phos  2.5     08-05  Mg     1.90     08-05            Urinalysis Basic - ( 05 Aug 2023 03:18 )    Color: x / Appearance: x / SG: x / pH: x  Gluc: 193 mg/dL / Ketone: x  / Bili: x / Urobili: x   Blood: x / Protein: x / Nitrite: x   Leuk Esterase: x / RBC: x / WBC x   Sq Epi: x / Non Sq Epi: x / Bacteria: x        RADIOLOGY:   Radiology images were independently reviewed by me today. Reports were reviewed by me today.    Xray Chest 1 View- PORTABLE-Routine:   ACC: [id number] EXAM:  XR CHEST PORTABLE ROUTINE 1V   ORDERED BY: JADA AARON     ACC: [id number] EXAM:  XR CHEST PORTABLE ROUTINE 1V   ORDERED BY: JOSSELINE MEDEL     PROCEDURE DATE:  08/04/2023          INTERPRETATION:  Chest one view 8/4/2023 5:44 AM    HISTORY: Postop    COMPARISON STUDY: 8/3/2023    Frontal expiratory view of the chest shows the heart to be similarly   enlarged in size. Left chest tubes remain present.    The lungs show clear right lung with partial clearing of the left lung.   Small left effusion is similar and there is no evidence of pneumothorax   nor right pleural effusion.    Chest one view 8/5/2023 6:13 AM  Compared to the prior study, there is further clearing of the left lung.   No pneumothorax.    IMPRESSION:  Left lung clearing. No pneumothorax.        Thank you for the courtesy of this referral.    --- End of Report ---            KYMBERLY FRANCES MD; Attending Interventional Radiologist  This document has been electronically signed. Aug  5 2023 10:56AM (08-05-23 @ 06:16)  Xray Chest 1 View- PORTABLE-Routine:   ACC: [id number] EXAM:  XR CHEST PORTABLE ROUTINE 1V   ORDERED BY: JADA AARON     ACC: [id number] EXAM:  XR CHEST PORTABLE ROUTINE 1V   ORDERED BY: JOSSELINE MEDEL     PROCEDURE DATE:  08/04/2023          INTERPRETATION:  Chest one view 8/4/2023 5:44 AM    HISTORY: Postop    COMPARISON STUDY: 8/3/2023    Frontal expiratory view of the chest shows the heart to be similarly   enlarged in size. Left chest tubes remain present.    The lungs show clear right lung with partial clearing of the left lung.   Small left effusion is similar and there is no evidence of pneumothorax   nor right pleural effusion.    Chest one view 8/5/2023 6:13 AM  Compared to the prior study, there is further clearing of the left lung.   No pneumothorax.    IMPRESSION:  Left lung clearing. No pneumothorax.        Thank you for the courtesy of this referral.    --- End of Report ---            KYMBERLY FRANCES MD; Attending Interventional Radiologist  This document has been electronically signed. Aug  5 2023 10:56AM (08-04-23 @ 06:06)  Xray Chest 1 View- PORTABLE-Routine:   ACC: [id number] EXAM:  XR CHEST PORTABLE ROUTINE 1V   ORDERED BY: SALVADOR RICHEY     ACC: [id number] EXAM:  XR CHEST PORTABLE URGENT 1V   ORDERED BY: ABEBA   JUAN MPÉREZPARDEEP     PROCEDURE DATE:  08/02/2023          INTERPRETATION:  EXAMINATION: XR CHEST URGENT, XR CHEST    CLINICAL INDICATION: s/p l VATS    TECHNIQUE: Single frontal, portable view of the chest was obtained.    COMPARISON: Chest x-ray 8/1/2020 3:51 PM.    FINDINGS:  Chest x-ray 8/2/2023 7:51 PM  Interval insertion of 2 left-sided chest tubes.  The heart is not accurately assessed in this AP projection.  Improved aeration of the left lung fields. Residual opacity over the left   lower lung, which may represent effusion and/or atelectasis.  The right lung is clear.  There is no pneumothorax  No acute bony abnormality.    Chest x-ray/3/23 5:23 AM  2 Left-sided chest tubes are again seen.  Similar opacity over the left lower lung field.    IMPRESSION:  Improved aeration of left lung fields after VATS and chest tube insertion.  Residualleft lower lung opacity, which may represent atelectasis and/or   pleural effusion.    --- End of Report ---          MAMADOU CAREY MD; Resident Radiologist  This document has been electronically signed.  MARIO DAMON DO; Attending Radiologist  This document has been electronically signed. Aug  3 2023  2:39PM (08-03-23 @ 06:02)

## 2023-08-05 NOTE — PROGRESS NOTE ADULT - SUBJECTIVE AND OBJECTIVE BOX
Anesthesia Pain Management Service    SUBJECTIVE: Patient is doing well with IV PCA and no significant problems reported.    Pain Scale Score	At rest: ___ 	With Activity: ___ 	[X ] Refer to charted pain scores    THERAPY:    [ ] IV PCA Morphine		[ ] 5 mg/mL	[ ] 1 mg/mL  [X ] IV PCA Hydromorphone	[ ] 5 mg/mL	[X ] 1 mg/mL  [ ] IV PCA Fentanyl		[ ] 50 micrograms/mL    Demand dose __0.2_ lockout __6_ (minutes) Continuous Rate _0__ Total: __3.4_   mg used (in past 24 hrs)      MEDICATIONS  (STANDING):  acetaminophen   IVPB .. 1000 milliGRAM(s) IV Intermittent once  albuterol    0.083% 2.5 milliGRAM(s) Nebulizer every 6 hours  aspirin  chewable 81 milliGRAM(s) Oral daily  atorvastatin 10 milliGRAM(s) Oral at bedtime  cefTRIAXone   IVPB 2000 milliGRAM(s) IV Intermittent every 24 hours  cyclobenzaprine 10 milliGRAM(s) Oral at bedtime  dextrose 50% Injectable 25 Gram(s) IV Push once  dornase shana Solution 2.5 milliGRAM(s) Inhalation daily  heparin   Injectable 5000 Unit(s) SubCutaneous every 8 hours  insulin glargine Injectable (LANTUS) 17 Unit(s) SubCutaneous at bedtime  insulin lispro (ADMELOG) corrective regimen sliding scale   SubCutaneous three times a day before meals  insulin lispro (ADMELOG) corrective regimen sliding scale   SubCutaneous at bedtime  insulin lispro Injectable (ADMELOG) 8 Unit(s) SubCutaneous before breakfast  insulin lispro Injectable (ADMELOG) 8 Unit(s) SubCutaneous before lunch  insulin lispro Injectable (ADMELOG) 8 Unit(s) SubCutaneous before dinner  lactulose Syrup 10 Gram(s) Oral every 8 hours  latanoprost 0.005% Ophthalmic Solution 1 Drop(s) Both EYES at bedtime  lidocaine   4% Patch 1 Patch Transdermal daily  melatonin 9 milliGRAM(s) Oral at bedtime  methylnaltrexone Injectable 12 milliGRAM(s) SubCutaneous daily  metroNIDAZOLE    Tablet 500 milliGRAM(s) Oral two times a day  polyethylene glycol 3350 17 Gram(s) Oral two times a day  senna 2 Tablet(s) Oral at bedtime  sodium chloride 0.9%. 1000 milliLiter(s) (30 mL/Hr) IV Continuous <Continuous>  sodium chloride 3%  Inhalation 4 milliLiter(s) Inhalation every 6 hours    MEDICATIONS  (PRN):  acetaminophen     Tablet .. 650 milliGRAM(s) Oral every 6 hours PRN Temp greater or equal to 38C (100.4F), Mild Pain (1 - 3)  aluminum hydroxide/magnesium hydroxide/simethicone Suspension 30 milliLiter(s) Oral every 4 hours PRN Dyspepsia  naloxone Injectable 0.1 milliGRAM(s) IV Push every 3 minutes PRN For ANY of the following changes in patient status:  A. RR LESS THAN 10 breaths per minute, B. Oxygen saturation LESS THAN 90%, C. Sedation score of 6  ondansetron Injectable 4 milliGRAM(s) IV Push every 6 hours PRN Nausea  oxyCODONE    IR 7.5 milliGRAM(s) Oral every 3 hours PRN Severe Pain (7 - 10)  oxyCODONE    IR 5 milliGRAM(s) Oral every 3 hours PRN Moderate Pain (4 - 6)      OBJECTIVE: Patient sitting in chair, CTx2    Sedation Score:	[ X] Alert	[ ] Drowsy 	[ ] Arousable	[ ] Asleep	[ ] Unresponsive    Side Effects:	[X ] None	[ ] Nausea	[ ] Vomiting	[ ] Pruritus  		[ ] Other:    Vital Signs Last 24 Hrs  T(C): 37.3 (05 Aug 2023 08:00), Max: 37.3 (05 Aug 2023 08:00)  T(F): 99.1 (05 Aug 2023 08:00), Max: 99.1 (05 Aug 2023 08:00)  HR: 70 (05 Aug 2023 12:00) (70 - 97)  BP: 143/73 (05 Aug 2023 12:00) (128/77 - 162/89)  BP(mean): 93 (05 Aug 2023 12:00) (91 - 111)  RR: 22 (05 Aug 2023 12:00) (17 - 28)  SpO2: 100% (05 Aug 2023 12:00) (96% - 100%)    Parameters below as of 05 Aug 2023 12:00  Patient On (Oxygen Delivery Method): room air        ASSESSMENT/ PLAN    Therapy to  be:	[ ] Continue   [ X] Discontinued   [X ] Change to prn Analgesics    Documentation and Verification of current medications:   [X] Done	[ ] Not done, not elligible    Comments: Discussed with CTICU, IV PCA discontinued. PRN Oral/IV opioids and/or Adjuvant non-opioid medication to be ordered at this point.    Progress Note written now but Patient was seen earlier.

## 2023-08-05 NOTE — PROGRESS NOTE ADULT - SUBJECTIVE AND OBJECTIVE BOX
DATE OF SERVICE: 08-08-23  Seen in CTICU    s/p VATS   Reports mild to moderate left sided pleuritic chest pain. SOB is better. CT is in place.    REVIEW OF SYSTEMS:  CONSTITUTIONAL: No fever  RESPIRATORY: No cough, hemoptysis shortness of breath  CARDIOVASCULAR: No palpitations, dizziness, or leg swelling  GASTROINTESTINAL: No nausea, vomiting, hematemesis  GENITOURINARY: No dysuria, frequency, hematuria   NEUROLOGICAL: No headaches, no dizziness  MUSCULOSKELETAL: No joint pain or swelling;     INTERVAL HPI/OVERNIGHT EVENTS:  ICU Vital Signs Last 24 Hrs  T(C): 36.7 (05 Aug 2023 12:00), Max: 37.3 (05 Aug 2023 08:00)  T(F): 98.1 (05 Aug 2023 12:00), Max: 99.1 (05 Aug 2023 08:00)  HR: 68 (05 Aug 2023 15:00) (68 - 97)  BP: 122/67 (05 Aug 2023 15:00) (122/67 - 162/89)  BP(mean): 84 (05 Aug 2023 15:00) (84 - 111)  ABP: --  ABP(mean): --  RR: 20 (05 Aug 2023 15:00) (17 - 30)  SpO2: 100% (05 Aug 2023 15:00) (96% - 100%)    O2 Parameters below as of 05 Aug 2023 15:00  Patient On (Oxygen Delivery Method): room air          LCBC Full  -  ( 05 Aug 2023 03:18 )  WBC Count : 18.72 K/uL  RBC Count : 3.05 M/uL  Hemoglobin : 8.6 g/dL  Hematocrit : 26.8 %  Platelet Count - Automated : 412 K/uL  Mean Cell Volume : 87.9 fL  Mean Cell Hemoglobin : 28.2 pg  Mean Cell Hemoglobin Concentration : 32.1 gm/dL  Auto Neutrophil # : x  Auto Lymphocyte # : x  Auto Monocyte # : x  Auto Eosinophil # : x  Auto Basophil # : x  Auto Neutrophil % : x  Auto Lymphocyte % : x  Auto Monocyte % : x  Auto Eosinophil % : x  Auto Basophil % : x        PAST MEDICAL & SURGICAL HISTORY:  Hypertension    BMI: BMI (kg/m2): 31.5 (08-02-23 @ 15:14)  HbA1c: A1C with Estimated Average Glucose Result: 7.8 % (07-30-23 @ 07:16)    Glucose: POCT Blood Glucose.: 199 mg/dL (08-05-23 @ 12:44)    BP: 122/67 (08-05-23 @ 15:00) (86/53 - 162/89)  Lipid Panel: Date/Time: 07-30-23 @ 07:16  Cholesterol, Serum: 101  Direct LDL: --  HDL Cholesterol, Serum: 50  Total Cholesterol/HDL Ration Measurement: --  Triglycerides, Serum: 122    Diabetes mellitus      HLD (hyperlipidemia)      No significant past surgical history          MEDICATIONS  (STANDING):  amLODIPine   Tablet 5 milliGRAM(s) Oral daily  aspirin  chewable 81 milliGRAM(s) Oral daily  atorvastatin 10 milliGRAM(s) Oral at bedtime  cyclobenzaprine 10 milliGRAM(s) Oral at bedtime  dextrose 5%. 1000 milliLiter(s) (50 mL/Hr) IV Continuous <Continuous>  dextrose 5%. 1000 milliLiter(s) (100 mL/Hr) IV Continuous <Continuous>  dextrose 50% Injectable 12.5 Gram(s) IV Push once  dextrose 50% Injectable 25 Gram(s) IV Push once  dextrose 50% Injectable 25 Gram(s) IV Push once  glucagon  Injectable 1 milliGRAM(s) IntraMuscular once  hydrALAZINE 50 milliGRAM(s) Oral two times a day  insulin lispro (ADMELOG) corrective regimen sliding scale   SubCutaneous three times a day before meals  insulin lispro (ADMELOG) corrective regimen sliding scale   SubCutaneous at bedtime  latanoprost 0.005% Ophthalmic Solution 1 Drop(s) Both EYES at bedtime  losartan 100 milliGRAM(s) Oral daily  melatonin 9 milliGRAM(s) Oral at bedtime    MEDICATIONS  (PRN):  acetaminophen     Tablet .. 650 milliGRAM(s) Oral every 6 hours PRN Temp greater or equal to 38C (100.4F), Mild Pain (1 - 3)  aluminum hydroxide/magnesium hydroxide/simethicone Suspension 30 milliLiter(s) Oral every 4 hours PRN Dyspepsia  dextrose Oral Gel 15 Gram(s) Oral once PRN Blood Glucose LESS THAN 70 milliGRAM(s)/deciliter  HYDROmorphone  Injectable 0.5 milliGRAM(s) IV Push every 4 hours PRN breakthrough pain  nitroglycerin     SubLingual 0.4 milliGRAM(s) SubLingual every 5 minutes PRN Chest Pain  ondansetron Injectable 4 milliGRAM(s) IV Push every 8 hours PRN Nausea and/or Vomiting  oxycodone    5 mG/acetaminophen 325 mG 1 Tablet(s) Oral every 6 hours PRN Moderate Pain (4 - 6)  oxycodone    5 mG/acetaminophen 325 mG 2 Tablet(s) Oral every 4 hours PRN Severe Pain (7 - 10)        RADIOLOGY & ADDITIONAL TESTS:    Imaging Personally Reviewed:  [ ] YES  [ ] NO    Consultant(s) Notes Reviewed:  [x ] YES  [ ] NO    PHYSICAL EXAM:  GENERAL: Alert and awake lying in bed in no distress  HEAD:  Atraumatic, Normocephalic  EYES: EOMI, MADALYN, conjunctiva and sclera clear  NECK: Supple, No JVD, Normal thyroid  NERVOUS SYSTEM:  Alert & Oriented X3, Motor and sensory systems are intact,   CHEST/LUNG: Left sided chest tubes +  HEART: Regular rate and rhythm; No murmurs, rubs, or gallops  ABDOMEN: Soft, Nontender, Nondistended; Bowel sounds present  EXTREMITIES:   Peripheral Pulses are palpable, no  edema        Care Discussed with Consultants/Other Providers [x ] YES  [ ] NO      Code Status: [] Full Code [] DNR [] DNI [] Goals of Care:   Disposition: [] ICU [] Stroke Unit [] RCU []PCU []Floor [] Discharge Home         [id number] HighP

## 2023-08-05 NOTE — PROGRESS NOTE ADULT - SUBJECTIVE AND OBJECTIVE BOX
Didier Cotton MD  Interventional Cardiology / Endovascular Specialist  [place] Office : [phone] [place] [address] [place] [address]  Tel:   [place] Office : [address] [facility] [place] [address]  Tel: [phone]    Subjective/Overnight events: Patient lying in bed comfortably. No acute distress. in CTICU   	  MEDICATIONS:  aspirin  chewable 81 milliGRAM(s) Oral daily  heparin   Injectable 5000 Unit(s) SubCutaneous every 8 hours    cefTRIAXone   IVPB 2000 milliGRAM(s) IV Intermittent every 24 hours  metroNIDAZOLE    Tablet 500 milliGRAM(s) Oral two times a day    albuterol    0.083% 2.5 milliGRAM(s) Nebulizer every 6 hours  dornase shana Solution 2.5 milliGRAM(s) Inhalation daily  sodium chloride 3%  Inhalation 4 milliLiter(s) Inhalation every 6 hours    acetaminophen     Tablet .. 650 milliGRAM(s) Oral every 6 hours PRN  acetaminophen   IVPB .. 1000 milliGRAM(s) IV Intermittent once  cyclobenzaprine 10 milliGRAM(s) Oral at bedtime  melatonin 9 milliGRAM(s) Oral at bedtime  ondansetron Injectable 4 milliGRAM(s) IV Push every 6 hours PRN  oxyCODONE    IR 5 milliGRAM(s) Oral every 3 hours PRN  oxyCODONE    IR 7.5 milliGRAM(s) Oral every 3 hours PRN    aluminum hydroxide/magnesium hydroxide/simethicone Suspension 30 milliLiter(s) Oral every 4 hours PRN  lactulose Syrup 10 Gram(s) Oral every 8 hours  methylnaltrexone Injectable 12 milliGRAM(s) SubCutaneous daily  polyethylene glycol 3350 17 Gram(s) Oral two times a day  senna 2 Tablet(s) Oral at bedtime    atorvastatin 10 milliGRAM(s) Oral at bedtime  dextrose 50% Injectable 25 Gram(s) IV Push once  insulin glargine Injectable (LANTUS) 17 Unit(s) SubCutaneous at bedtime  insulin lispro (ADMELOG) corrective regimen sliding scale   SubCutaneous three times a day before meals  insulin lispro (ADMELOG) corrective regimen sliding scale   SubCutaneous at bedtime  insulin lispro Injectable (ADMELOG) 8 Unit(s) SubCutaneous before breakfast  insulin lispro Injectable (ADMELOG) 8 Unit(s) SubCutaneous before lunch  insulin lispro Injectable (ADMELOG) 8 Unit(s) SubCutaneous before dinner    latanoprost 0.005% Ophthalmic Solution 1 Drop(s) Both EYES at bedtime  lidocaine   4% Patch 1 Patch Transdermal daily  sodium chloride 0.9%. 1000 milliLiter(s) IV Continuous <Continuous>      PAST MEDICAL/SURGICAL HISTORY  PAST MEDICAL & SURGICAL HISTORY:  Hypertension      Diabetes mellitus      HLD (hyperlipidemia)      No significant past surgical history          SOCIAL HISTORY: Substance Use (street drugs): ( x ) never used  (  ) other:    FAMILY HISTORY:  FH: breast cancer (Sibling)        REVIEW OF SYSTEMS:  CONSTITUTIONAL: No fever, weight loss, or fatigue  EYES: No eye pain, visual disturbances, or discharge  ENMT:  No difficulty hearing, tinnitus, vertigo; No sinus or throat pain  BREASTS: No pain, masses, or nipple discharge  GASTROINTESTINAL: No abdominal or epigastric pain. No nausea, vomiting, or hematemesis; No diarrhea or constipation. No melena or hematochezia.  GENITOURINARY: No dysuria, frequency, hematuria, or incontinence  NEUROLOGICAL: No headaches, memory loss, loss of strength, numbness, or tremors  ENDOCRINE: No heat or cold intolerance; No hair loss  MUSCULOSKELETAL: No joint pain or swelling; No muscle, back, or extremity pain  PSYCHIATRIC: No depression, anxiety, mood swings, or difficulty sleeping  HEME/LYMPH: No easy bruising, or bleeding gums  All others negative    PHYSICAL EXAM:  T(C): 36.7 (08-05-23 @ 12:00), Max: 37.3 (08-05-23 @ 08:00)  HR: 85 (08-05-23 @ 13:00) (70 - 97)  BP: 143/73 (08-05-23 @ 12:00) (128/77 - 162/89)  RR: 30 (08-05-23 @ 13:00) (17 - 30)  SpO2: 100% (08-05-23 @ 13:00) (96% - 100%)  Wt(kg): --  I&O's Summary    04 Aug 2023 07:01  -  05 Aug 2023 07:00  --------------------------------------------------------  IN: 1260 mL / OUT: 805 mL / NET: 455 mL    05 Aug 2023 07:01  -  05 Aug 2023 13:52  --------------------------------------------------------  IN: 360 mL / OUT: 250 mL / NET: 110 mL    GENERAL: NAD  EYES: conjunctiva and sclera clear  ENMT: No tonsillar erythema, exudates, or enlargement  Cardiovascular: Normal S1 S2, No JVD, No murmurs, No edema  Respiratory: diminished s/p chest tube  Gastrointestinal:  Soft, Non-tender, + BS	  Extremities: No edema                           8.6    18.72 )-----------( 412      ( 05 Aug 2023 03:18 )             26.8     08-05    133<L>  |  101  |  11  ----------------------------<  193<H>  3.7   |  25  |  0.46<L>    Ca    7.7<L>      05 Aug 2023 03:18  Phos  2.5     08-05  Mg     1.90     08-05      proBNP:   Lipid Profile:   HgA1c:   TSH:     Consultant(s) Notes Reviewed:  [x ] YES  [ ] NO    Care Discussed with Consultants/Other Providers [ x] YES  [ ] NO    Imaging Personally Reviewed independently:  [x] YES  [ ] NO    All labs, radiologic studies, vitals, orders and medications list reviewed. Patient is seen and examined at bedside. Case discussed with medical team.

## 2023-08-06 LAB
ANION GAP SERPL CALC-SCNC: 11 MMOL/L — SIGNIFICANT CHANGE UP (ref 7–14)
BUN SERPL-MCNC: 9 MG/DL — SIGNIFICANT CHANGE UP (ref 7–23)
CA-I BLD-SCNC: 1.14 MMOL/L — LOW (ref 1.15–1.29)
CALCIUM SERPL-MCNC: 8.5 MG/DL — SIGNIFICANT CHANGE UP (ref 8.4–10.5)
CHLORIDE SERPL-SCNC: 96 MMOL/L — LOW (ref 98–107)
CO2 SERPL-SCNC: 22 MMOL/L — SIGNIFICANT CHANGE UP (ref 22–31)
CREAT SERPL-MCNC: 0.52 MG/DL — SIGNIFICANT CHANGE UP (ref 0.5–1.3)
CULTURE RESULTS: SIGNIFICANT CHANGE UP
EGFR: 110 ML/MIN/1.73M2 — SIGNIFICANT CHANGE UP
GLUCOSE BLDC GLUCOMTR-MCNC: 137 MG/DL — HIGH (ref 70–99)
GLUCOSE BLDC GLUCOMTR-MCNC: 156 MG/DL — HIGH (ref 70–99)
GLUCOSE BLDC GLUCOMTR-MCNC: 165 MG/DL — HIGH (ref 70–99)
GLUCOSE BLDC GLUCOMTR-MCNC: 216 MG/DL — HIGH (ref 70–99)
GLUCOSE BLDC GLUCOMTR-MCNC: 243 MG/DL — HIGH (ref 70–99)
GLUCOSE SERPL-MCNC: 171 MG/DL — HIGH (ref 70–99)
HCT VFR BLD CALC: 26.4 % — LOW (ref 39–50)
HGB BLD-MCNC: 8.3 G/DL — LOW (ref 13–17)
MAGNESIUM SERPL-MCNC: 2.1 MG/DL — SIGNIFICANT CHANGE UP (ref 1.6–2.6)
MCHC RBC-ENTMCNC: 28 PG — SIGNIFICANT CHANGE UP (ref 27–34)
MCHC RBC-ENTMCNC: 31.4 GM/DL — LOW (ref 32–36)
MCV RBC AUTO: 89.2 FL — SIGNIFICANT CHANGE UP (ref 80–100)
NRBC # BLD: 0 /100 WBCS — SIGNIFICANT CHANGE UP (ref 0–0)
NRBC # FLD: 0.13 K/UL — HIGH (ref 0–0)
ORGANISM # SPEC MICROSCOPIC CNT: SIGNIFICANT CHANGE UP
ORGANISM # SPEC MICROSCOPIC CNT: SIGNIFICANT CHANGE UP
PHOSPHATE SERPL-MCNC: 3.2 MG/DL — SIGNIFICANT CHANGE UP (ref 2.5–4.5)
PLATELET # BLD AUTO: 487 K/UL — HIGH (ref 150–400)
POTASSIUM SERPL-MCNC: 4.6 MMOL/L — SIGNIFICANT CHANGE UP (ref 3.5–5.3)
POTASSIUM SERPL-SCNC: 4.6 MMOL/L — SIGNIFICANT CHANGE UP (ref 3.5–5.3)
RBC # BLD: 2.96 M/UL — LOW (ref 4.2–5.8)
RBC # FLD: 13.7 % — SIGNIFICANT CHANGE UP (ref 10.3–14.5)
SODIUM SERPL-SCNC: 129 MMOL/L — LOW (ref 135–145)
SPECIMEN SOURCE: SIGNIFICANT CHANGE UP
WBC # BLD: 19.3 K/UL — HIGH (ref 3.8–10.5)
WBC # FLD AUTO: 19.3 K/UL — HIGH (ref 3.8–10.5)

## 2023-08-06 PROCEDURE — 71045 X-RAY EXAM CHEST 1 VIEW: CPT | Mod: 26,76

## 2023-08-06 RX ORDER — HYDRALAZINE HCL 50 MG
50 TABLET ORAL EVERY 24 HOURS
Refills: 0 | Status: DISCONTINUED | OUTPATIENT
Start: 2023-08-06 | End: 2023-08-11

## 2023-08-06 RX ORDER — PANTOPRAZOLE SODIUM 20 MG/1
40 TABLET, DELAYED RELEASE ORAL
Refills: 0 | Status: DISCONTINUED | OUTPATIENT
Start: 2023-08-06 | End: 2023-08-11

## 2023-08-06 RX ORDER — CALCIUM GLUCONATE 100 MG/ML
2 VIAL (ML) INTRAVENOUS ONCE
Refills: 0 | Status: COMPLETED | OUTPATIENT
Start: 2023-08-06 | End: 2023-08-06

## 2023-08-06 RX ORDER — LOSARTAN POTASSIUM 100 MG/1
100 TABLET, FILM COATED ORAL DAILY
Refills: 0 | Status: DISCONTINUED | OUTPATIENT
Start: 2023-08-06 | End: 2023-08-11

## 2023-08-06 RX ORDER — AMLODIPINE BESYLATE 2.5 MG/1
5 TABLET ORAL DAILY
Refills: 0 | Status: DISCONTINUED | OUTPATIENT
Start: 2023-08-06 | End: 2023-08-11

## 2023-08-06 RX ORDER — METOPROLOL TARTRATE 50 MG
5 TABLET ORAL ONCE
Refills: 0 | Status: DISCONTINUED | OUTPATIENT
Start: 2023-08-06 | End: 2023-08-06

## 2023-08-06 RX ADMIN — LACTULOSE 10 GRAM(S): 10 SOLUTION ORAL at 21:07

## 2023-08-06 RX ADMIN — SODIUM CHLORIDE 4 MILLILITER(S): 9 INJECTION INTRAMUSCULAR; INTRAVENOUS; SUBCUTANEOUS at 19:48

## 2023-08-06 RX ADMIN — Medication 81 MILLIGRAM(S): at 13:26

## 2023-08-06 RX ADMIN — LATANOPROST 1 DROP(S): 0.05 SOLUTION/ DROPS OPHTHALMIC; TOPICAL at 21:08

## 2023-08-06 RX ADMIN — Medication 9 MILLIGRAM(S): at 21:08

## 2023-08-06 RX ADMIN — CEFTRIAXONE 100 MILLIGRAM(S): 500 INJECTION, POWDER, FOR SOLUTION INTRAMUSCULAR; INTRAVENOUS at 13:25

## 2023-08-06 RX ADMIN — Medication 4: at 13:37

## 2023-08-06 RX ADMIN — HEPARIN SODIUM 5000 UNIT(S): 5000 INJECTION INTRAVENOUS; SUBCUTANEOUS at 13:42

## 2023-08-06 RX ADMIN — HEPARIN SODIUM 5000 UNIT(S): 5000 INJECTION INTRAVENOUS; SUBCUTANEOUS at 05:45

## 2023-08-06 RX ADMIN — Medication 650 MILLIGRAM(S): at 20:01

## 2023-08-06 RX ADMIN — Medication 500 MILLIGRAM(S): at 05:45

## 2023-08-06 RX ADMIN — ATORVASTATIN CALCIUM 10 MILLIGRAM(S): 80 TABLET, FILM COATED ORAL at 21:07

## 2023-08-06 RX ADMIN — OXYCODONE HYDROCHLORIDE 7.5 MILLIGRAM(S): 5 TABLET ORAL at 11:39

## 2023-08-06 RX ADMIN — HEPARIN SODIUM 5000 UNIT(S): 5000 INJECTION INTRAVENOUS; SUBCUTANEOUS at 21:07

## 2023-08-06 RX ADMIN — SODIUM CHLORIDE 4 MILLILITER(S): 9 INJECTION INTRAMUSCULAR; INTRAVENOUS; SUBCUTANEOUS at 16:31

## 2023-08-06 RX ADMIN — SODIUM CHLORIDE 4 MILLILITER(S): 9 INJECTION INTRAMUSCULAR; INTRAVENOUS; SUBCUTANEOUS at 09:33

## 2023-08-06 RX ADMIN — ALBUTEROL 2.5 MILLIGRAM(S): 90 AEROSOL, METERED ORAL at 09:33

## 2023-08-06 RX ADMIN — Medication 650 MILLIGRAM(S): at 19:31

## 2023-08-06 RX ADMIN — ALBUTEROL 2.5 MILLIGRAM(S): 90 AEROSOL, METERED ORAL at 19:47

## 2023-08-06 RX ADMIN — SODIUM CHLORIDE 4 MILLILITER(S): 9 INJECTION INTRAMUSCULAR; INTRAVENOUS; SUBCUTANEOUS at 03:49

## 2023-08-06 RX ADMIN — ALBUTEROL 2.5 MILLIGRAM(S): 90 AEROSOL, METERED ORAL at 16:30

## 2023-08-06 RX ADMIN — Medication 500 MILLIGRAM(S): at 17:20

## 2023-08-06 RX ADMIN — LOSARTAN POTASSIUM 100 MILLIGRAM(S): 100 TABLET, FILM COATED ORAL at 05:45

## 2023-08-06 RX ADMIN — OXYCODONE HYDROCHLORIDE 7.5 MILLIGRAM(S): 5 TABLET ORAL at 17:19

## 2023-08-06 RX ADMIN — AMLODIPINE BESYLATE 5 MILLIGRAM(S): 2.5 TABLET ORAL at 05:45

## 2023-08-06 RX ADMIN — INSULIN GLARGINE 17 UNIT(S): 100 INJECTION, SOLUTION SUBCUTANEOUS at 21:05

## 2023-08-06 RX ADMIN — Medication 8 UNIT(S): at 08:31

## 2023-08-06 RX ADMIN — Medication 8 UNIT(S): at 13:37

## 2023-08-06 RX ADMIN — Medication 2: at 08:30

## 2023-08-06 RX ADMIN — SENNA PLUS 2 TABLET(S): 8.6 TABLET ORAL at 21:08

## 2023-08-06 RX ADMIN — Medication 50 MILLIGRAM(S): at 05:44

## 2023-08-06 RX ADMIN — Medication 200 GRAM(S): at 13:44

## 2023-08-06 RX ADMIN — OXYCODONE HYDROCHLORIDE 7.5 MILLIGRAM(S): 5 TABLET ORAL at 18:00

## 2023-08-06 RX ADMIN — CYCLOBENZAPRINE HYDROCHLORIDE 10 MILLIGRAM(S): 10 TABLET, FILM COATED ORAL at 21:08

## 2023-08-06 RX ADMIN — OXYCODONE HYDROCHLORIDE 7.5 MILLIGRAM(S): 5 TABLET ORAL at 10:58

## 2023-08-06 RX ADMIN — ALBUTEROL 2.5 MILLIGRAM(S): 90 AEROSOL, METERED ORAL at 03:50

## 2023-08-06 NOTE — PROGRESS NOTE ADULT - SUBJECTIVE AND OBJECTIVE BOX
Thoracic Surgery Progress Note    SUBJECTIVE: Patient seen and examined at bedside with surgical team, patient without complaints. Pt did not endorse chest pain, SOB, fevers/chills, N/V/D.    Vital Signs Last 24 Hrs  T(C): 36.9 (06 Aug 2023 08:22), Max: 37.2 (06 Aug 2023 00:48)  T(F): 98.5 (06 Aug 2023 08:22), Max: 98.9 (06 Aug 2023 00:48)  HR: 102 (06 Aug 2023 09:37) (64 - 106)  BP: 148/80 (06 Aug 2023 08:22) (122/67 - 175/86)  BP(mean): 93 (05 Aug 2023 20:00) (84 - 105)  RR: 20 (06 Aug 2023 08:22) (18 - 30)  SpO2: 100% (06 Aug 2023 09:37) (99% - 100%)    Parameters below as of 06 Aug 2023 09:37  Patient On (Oxygen Delivery Method): room air    I&O's Detail    05 Aug 2023 07:01  -  06 Aug 2023 07:00  --------------------------------------------------------  IN:    IV PiggyBack: 150 mL    Oral Fluid: 320 mL    sodium chloride 0.9%: 360 mL  Total IN: 830 mL    OUT:    Chest Tube (mL): 0 mL    Chest Tube (mL): 40 mL    Voided (mL): 750 mL  Total OUT: 790 mL    Total NET: 40 mL        Medications  MEDICATIONS  (STANDING):  acetaminophen   IVPB .. 1000 milliGRAM(s) IV Intermittent once  albuterol    0.083% 2.5 milliGRAM(s) Nebulizer every 6 hours  amLODIPine   Tablet 5 milliGRAM(s) Oral daily  aspirin  chewable 81 milliGRAM(s) Oral daily  atorvastatin 10 milliGRAM(s) Oral at bedtime  calcium gluconate IVPB 2 Gram(s) IV Intermittent once  cefTRIAXone   IVPB 2000 milliGRAM(s) IV Intermittent every 24 hours  cyclobenzaprine 10 milliGRAM(s) Oral at bedtime  dextrose 50% Injectable 25 Gram(s) IV Push once  dornase shana Solution 2.5 milliGRAM(s) Inhalation daily  heparin   Injectable 5000 Unit(s) SubCutaneous every 8 hours  hydrALAZINE 50 milliGRAM(s) Oral every 24 hours  insulin glargine Injectable (LANTUS) 17 Unit(s) SubCutaneous at bedtime  insulin lispro (ADMELOG) corrective regimen sliding scale   SubCutaneous three times a day before meals  insulin lispro (ADMELOG) corrective regimen sliding scale   SubCutaneous at bedtime  insulin lispro Injectable (ADMELOG) 8 Unit(s) SubCutaneous before breakfast  insulin lispro Injectable (ADMELOG) 8 Unit(s) SubCutaneous before lunch  insulin lispro Injectable (ADMELOG) 8 Unit(s) SubCutaneous before dinner  lactulose Syrup 10 Gram(s) Oral every 8 hours  latanoprost 0.005% Ophthalmic Solution 1 Drop(s) Both EYES at bedtime  lidocaine   4% Patch 1 Patch Transdermal daily  losartan 100 milliGRAM(s) Oral daily  melatonin 9 milliGRAM(s) Oral at bedtime  methylnaltrexone Injectable 12 milliGRAM(s) SubCutaneous daily  metroNIDAZOLE    Tablet 500 milliGRAM(s) Oral two times a day  polyethylene glycol 3350 17 Gram(s) Oral two times a day  senna 2 Tablet(s) Oral at bedtime  sodium chloride 3%  Inhalation 4 milliLiter(s) Inhalation every 6 hours    MEDICATIONS  (PRN):  acetaminophen     Tablet .. 650 milliGRAM(s) Oral every 6 hours PRN Temp greater or equal to 38C (100.4F), Mild Pain (1 - 3)  aluminum hydroxide/magnesium hydroxide/simethicone Suspension 30 milliLiter(s) Oral every 4 hours PRN Dyspepsia  naloxone Injectable 0.1 milliGRAM(s) IV Push every 3 minutes PRN For ANY of the following changes in patient status:  A. RR LESS THAN 10 breaths per minute, B. Oxygen saturation LESS THAN 90%, C. Sedation score of 6  ondansetron Injectable 4 milliGRAM(s) IV Push every 6 hours PRN Nausea  oxyCODONE    IR 5 milliGRAM(s) Oral every 3 hours PRN Moderate Pain (4 - 6)  oxyCODONE    IR 7.5 milliGRAM(s) Oral every 3 hours PRN Severe Pain (7 - 10)      Physical Exam  Constitutional: A&Ox3, NAD  Eyes: Scleras clear, PERRLA/ EOMI, Gross vision intact  Respiratory: Breathing comfortably on room air, symmetrical chest rise. No sign of resp distress or accessory muscle use,  Cardiac: S1, S2  Gastrointestinal: Soft nontender, nondistended  Extremities: Moving all extremities, no edema  Tubes: L Ang CT to WS, 40cc output  Skin: No Rashes, Hematoma, Ecchymosis    LABS:                        8.3    19.30 )-----------( 487      ( 06 Aug 2023 05:46 )             26.4     08-06    129<L>  |  96<L>  |  9   ----------------------------<  171<H>  4.6   |  22  |  0.52    Ca    8.5      06 Aug 2023 05:46  Phos  3.2     08-06  Mg     2.10     08-06          Urinalysis Basic - ( 06 Aug 2023 05:46 )    Color: x / Appearance: x / SG: x / pH: x  Gluc: 171 mg/dL / Ketone: x  / Bili: x / Urobili: x   Blood: x / Protein: x / Nitrite: x   Leuk Esterase: x / RBC: x / WBC x   Sq Epi: x / Non Sq Epi: x / Bacteria: x

## 2023-08-06 NOTE — PROGRESS NOTE ADULT - ASSESSMENT
Assessment: 67yr old M w/ PMH HTN, HLD, DM2 on metformin, TAO on BiPAP (14/14) found to have L sided empyema, now s/p FB, L uniportal VATs, drainage of empyema, partial decortication on 8/2/23    PLAN  Neuro: Pain management  Pulm: Encourage coughing, deep breathing and use of incentive spirometry. Wean off supplemental oxygen as able. Daily CXR.   Cardio: Monitor telemetry/alarms. Cont ASA.  GI: Tolerating diet. Continue stool softeners. Cont ISS.  Renal: monitor urine output, supplement electrolytes as needed. Urology following for renal mass: No urgent intervention, considering bx vs surgical excision  Vasc: Heparin SC/SCDs for DVT prophylaxis  Heme: Stable H/H. .   ID: Cont Ceftriaxone and Flagyl. F/u ID recs  Therapy: OOB/ambulate  Tubes: L Ang CT to WS, 40cc output  Disposition: Aim to D/C to home on once chest tube removed and pt stable from medical standpoint  Discussed with Cardiothoracic Team at AM rounds.

## 2023-08-06 NOTE — PROGRESS NOTE ADULT - SUBJECTIVE AND OBJECTIVE BOX
Didier Cotton MD  Interventional Cardiology / Endovascular Specialist  [place] Office : [phone] [place] [address] [place] [address]  Tel:   [place] Office : [address] [facility] [place] [address]  Tel: [phone]    Subjective/Overnight events: Patient lying in bed comfortably. No acute distress. in CTICU   	  MEDICATIONS:  amLODIPine   Tablet 5 milliGRAM(s) Oral daily  aspirin  chewable 81 milliGRAM(s) Oral daily  heparin   Injectable 5000 Unit(s) SubCutaneous every 8 hours  hydrALAZINE 50 milliGRAM(s) Oral every 24 hours  losartan 100 milliGRAM(s) Oral daily    cefTRIAXone   IVPB 2000 milliGRAM(s) IV Intermittent every 24 hours  metroNIDAZOLE    Tablet 500 milliGRAM(s) Oral two times a day    albuterol    0.083% 2.5 milliGRAM(s) Nebulizer every 6 hours  dornase shana Solution 2.5 milliGRAM(s) Inhalation daily  sodium chloride 3%  Inhalation 4 milliLiter(s) Inhalation every 6 hours    acetaminophen     Tablet .. 650 milliGRAM(s) Oral every 6 hours PRN  acetaminophen   IVPB .. 1000 milliGRAM(s) IV Intermittent once  cyclobenzaprine 10 milliGRAM(s) Oral at bedtime  melatonin 9 milliGRAM(s) Oral at bedtime  ondansetron Injectable 4 milliGRAM(s) IV Push every 6 hours PRN  oxyCODONE    IR 5 milliGRAM(s) Oral every 3 hours PRN  oxyCODONE    IR 7.5 milliGRAM(s) Oral every 3 hours PRN    aluminum hydroxide/magnesium hydroxide/simethicone Suspension 30 milliLiter(s) Oral every 4 hours PRN  lactulose Syrup 10 Gram(s) Oral every 8 hours  methylnaltrexone Injectable 12 milliGRAM(s) SubCutaneous daily  polyethylene glycol 3350 17 Gram(s) Oral two times a day  senna 2 Tablet(s) Oral at bedtime    atorvastatin 10 milliGRAM(s) Oral at bedtime  dextrose 50% Injectable 25 Gram(s) IV Push once  insulin glargine Injectable (LANTUS) 17 Unit(s) SubCutaneous at bedtime  insulin lispro (ADMELOG) corrective regimen sliding scale   SubCutaneous three times a day before meals  insulin lispro (ADMELOG) corrective regimen sliding scale   SubCutaneous at bedtime  insulin lispro Injectable (ADMELOG) 8 Unit(s) SubCutaneous before breakfast  insulin lispro Injectable (ADMELOG) 8 Unit(s) SubCutaneous before lunch  insulin lispro Injectable (ADMELOG) 8 Unit(s) SubCutaneous before dinner    calcium gluconate IVPB 2 Gram(s) IV Intermittent once  latanoprost 0.005% Ophthalmic Solution 1 Drop(s) Both EYES at bedtime  lidocaine   4% Patch 1 Patch Transdermal daily      PAST MEDICAL/SURGICAL HISTORY  PAST MEDICAL & SURGICAL HISTORY:  Hypertension      Diabetes mellitus      HLD (hyperlipidemia)      No significant past surgical history          SOCIAL HISTORY: Substance Use (street drugs): ( x ) never used  (  ) other:    FAMILY HISTORY:  FH: breast cancer (Sibling)        REVIEW OF SYSTEMS:  CONSTITUTIONAL: No fever, weight loss, or fatigue  EYES: No eye pain, visual disturbances, or discharge  ENMT:  No difficulty hearing, tinnitus, vertigo; No sinus or throat pain  BREASTS: No pain, masses, or nipple discharge  GASTROINTESTINAL: No abdominal or epigastric pain. No nausea, vomiting, or hematemesis; No diarrhea or constipation. No melena or hematochezia.  GENITOURINARY: No dysuria, frequency, hematuria, or incontinence  NEUROLOGICAL: No headaches, memory loss, loss of strength, numbness, or tremors  ENDOCRINE: No heat or cold intolerance; No hair loss  MUSCULOSKELETAL: No joint pain or swelling; No muscle, back, or extremity pain  PSYCHIATRIC: No depression, anxiety, mood swings, or difficulty sleeping  HEME/LYMPH: No easy bruising, or bleeding gums  All others negative    PHYSICAL EXAM:  T(C): 36.9 (08-06-23 @ 08:22), Max: 37.2 (08-06-23 @ 00:48)  HR: 102 (08-06-23 @ 09:37) (64 - 106)  BP: 148/80 (08-06-23 @ 08:22) (122/67 - 175/86)  RR: 20 (08-06-23 @ 08:22) (18 - 30)  SpO2: 100% (08-06-23 @ 09:37) (99% - 100%)  Wt(kg): --  I&O's Summary    05 Aug 2023 07:01  -  06 Aug 2023 07:00  --------------------------------------------------------  IN: 830 mL / OUT: 790 mL / NET: 40 mL      GENERAL: NAD  EYES: conjunctiva and sclera clear  ENMT: No tonsillar erythema, exudates, or enlargement  Cardiovascular: Normal S1 S2, No JVD, No murmurs, No edema  Respiratory: diminished s/p chest tube  Gastrointestinal:  Soft, Non-tender, + BS	  Extremities: No edema                               8.3    19.30 )-----------( 487      ( 06 Aug 2023 05:46 )             26.4     08-06    129<L>  |  96<L>  |  9   ----------------------------<  171<H>  4.6   |  22  |  0.52    Ca    8.5      06 Aug 2023 05:46  Phos  3.2     08-06  Mg     2.10     08-06      proBNP:   Lipid Profile:   HgA1c:   TSH:     Consultant(s) Notes Reviewed:  [x ] YES  [ ] NO    Care Discussed with Consultants/Other Providers [ x] YES  [ ] NO    Imaging Personally Reviewed independently:  [x] YES  [ ] NO    All labs, radiologic studies, vitals, orders and medications list reviewed. Patient is seen and examined at bedside. Case discussed with medical team.

## 2023-08-07 ENCOUNTER — TRANSCRIPTION ENCOUNTER (OUTPATIENT)
Age: 68
End: 2023-08-07

## 2023-08-07 LAB
-  CEFTRIAXONE: SIGNIFICANT CHANGE UP
-  CLINDAMYCIN: SIGNIFICANT CHANGE UP
-  ERYTHROMYCIN: SIGNIFICANT CHANGE UP
-  LEVOFLOXACIN: SIGNIFICANT CHANGE UP
-  PENICILLIN: SIGNIFICANT CHANGE UP
-  VANCOMYCIN: SIGNIFICANT CHANGE UP
ANION GAP SERPL CALC-SCNC: 9 MMOL/L — SIGNIFICANT CHANGE UP (ref 7–14)
BUN SERPL-MCNC: 9 MG/DL — SIGNIFICANT CHANGE UP (ref 7–23)
CALCIUM SERPL-MCNC: 8.9 MG/DL — SIGNIFICANT CHANGE UP (ref 8.4–10.5)
CHLORIDE SERPL-SCNC: 95 MMOL/L — LOW (ref 98–107)
CO2 SERPL-SCNC: 25 MMOL/L — SIGNIFICANT CHANGE UP (ref 22–31)
CREAT SERPL-MCNC: 0.54 MG/DL — SIGNIFICANT CHANGE UP (ref 0.5–1.3)
CULTURE RESULTS: SIGNIFICANT CHANGE UP
EGFR: 109 ML/MIN/1.73M2 — SIGNIFICANT CHANGE UP
GLUCOSE BLDC GLUCOMTR-MCNC: 168 MG/DL — HIGH (ref 70–99)
GLUCOSE BLDC GLUCOMTR-MCNC: 187 MG/DL — HIGH (ref 70–99)
GLUCOSE BLDC GLUCOMTR-MCNC: 198 MG/DL — HIGH (ref 70–99)
GLUCOSE BLDC GLUCOMTR-MCNC: 217 MG/DL — HIGH (ref 70–99)
GLUCOSE SERPL-MCNC: 153 MG/DL — HIGH (ref 70–99)
HCT VFR BLD CALC: 26.8 % — LOW (ref 39–50)
HGB BLD-MCNC: 8.6 G/DL — LOW (ref 13–17)
MAGNESIUM SERPL-MCNC: 1.9 MG/DL — SIGNIFICANT CHANGE UP (ref 1.6–2.6)
MCHC RBC-ENTMCNC: 28.5 PG — SIGNIFICANT CHANGE UP (ref 27–34)
MCHC RBC-ENTMCNC: 32.1 GM/DL — SIGNIFICANT CHANGE UP (ref 32–36)
MCV RBC AUTO: 88.7 FL — SIGNIFICANT CHANGE UP (ref 80–100)
METHOD TYPE: SIGNIFICANT CHANGE UP
NRBC # BLD: 0 /100 WBCS — SIGNIFICANT CHANGE UP (ref 0–0)
NRBC # FLD: 0.16 K/UL — HIGH (ref 0–0)
ORGANISM # SPEC MICROSCOPIC CNT: SIGNIFICANT CHANGE UP
ORGANISM # SPEC MICROSCOPIC CNT: SIGNIFICANT CHANGE UP
PHOSPHATE SERPL-MCNC: 4.2 MG/DL — SIGNIFICANT CHANGE UP (ref 2.5–4.5)
PLATELET # BLD AUTO: 564 K/UL — HIGH (ref 150–400)
POTASSIUM SERPL-MCNC: 4 MMOL/L — SIGNIFICANT CHANGE UP (ref 3.5–5.3)
POTASSIUM SERPL-SCNC: 4 MMOL/L — SIGNIFICANT CHANGE UP (ref 3.5–5.3)
RBC # BLD: 3.02 M/UL — LOW (ref 4.2–5.8)
RBC # FLD: 13.9 % — SIGNIFICANT CHANGE UP (ref 10.3–14.5)
SODIUM SERPL-SCNC: 129 MMOL/L — LOW (ref 135–145)
SPECIMEN SOURCE: SIGNIFICANT CHANGE UP
WBC # BLD: 19.73 K/UL — HIGH (ref 3.8–10.5)
WBC # FLD AUTO: 19.73 K/UL — HIGH (ref 3.8–10.5)

## 2023-08-07 PROCEDURE — 71045 X-RAY EXAM CHEST 1 VIEW: CPT | Mod: 26

## 2023-08-07 PROCEDURE — 99231 SBSQ HOSP IP/OBS SF/LOW 25: CPT

## 2023-08-07 PROCEDURE — 99232 SBSQ HOSP IP/OBS MODERATE 35: CPT

## 2023-08-07 RX ORDER — MAGNESIUM SULFATE 500 MG/ML
2 VIAL (ML) INJECTION ONCE
Refills: 0 | Status: COMPLETED | OUTPATIENT
Start: 2023-08-07 | End: 2023-08-07

## 2023-08-07 RX ADMIN — OXYCODONE HYDROCHLORIDE 7.5 MILLIGRAM(S): 5 TABLET ORAL at 22:54

## 2023-08-07 RX ADMIN — Medication 650 MILLIGRAM(S): at 14:11

## 2023-08-07 RX ADMIN — Medication 25 GRAM(S): at 16:15

## 2023-08-07 RX ADMIN — Medication 9 MILLIGRAM(S): at 22:24

## 2023-08-07 RX ADMIN — ALBUTEROL 2.5 MILLIGRAM(S): 90 AEROSOL, METERED ORAL at 22:14

## 2023-08-07 RX ADMIN — AMLODIPINE BESYLATE 5 MILLIGRAM(S): 2.5 TABLET ORAL at 05:18

## 2023-08-07 RX ADMIN — CYCLOBENZAPRINE HYDROCHLORIDE 10 MILLIGRAM(S): 10 TABLET, FILM COATED ORAL at 21:29

## 2023-08-07 RX ADMIN — CEFTRIAXONE 100 MILLIGRAM(S): 500 INJECTION, POWDER, FOR SOLUTION INTRAMUSCULAR; INTRAVENOUS at 12:55

## 2023-08-07 RX ADMIN — OXYCODONE HYDROCHLORIDE 5 MILLIGRAM(S): 5 TABLET ORAL at 09:54

## 2023-08-07 RX ADMIN — Medication 8 UNIT(S): at 17:45

## 2023-08-07 RX ADMIN — ALBUTEROL 2.5 MILLIGRAM(S): 90 AEROSOL, METERED ORAL at 03:55

## 2023-08-07 RX ADMIN — ALBUTEROL 2.5 MILLIGRAM(S): 90 AEROSOL, METERED ORAL at 15:33

## 2023-08-07 RX ADMIN — OXYCODONE HYDROCHLORIDE 5 MILLIGRAM(S): 5 TABLET ORAL at 10:24

## 2023-08-07 RX ADMIN — Medication 50 MILLIGRAM(S): at 05:18

## 2023-08-07 RX ADMIN — HEPARIN SODIUM 5000 UNIT(S): 5000 INJECTION INTRAVENOUS; SUBCUTANEOUS at 05:18

## 2023-08-07 RX ADMIN — SODIUM CHLORIDE 4 MILLILITER(S): 9 INJECTION INTRAMUSCULAR; INTRAVENOUS; SUBCUTANEOUS at 03:55

## 2023-08-07 RX ADMIN — LOSARTAN POTASSIUM 100 MILLIGRAM(S): 100 TABLET, FILM COATED ORAL at 05:18

## 2023-08-07 RX ADMIN — OXYCODONE HYDROCHLORIDE 7.5 MILLIGRAM(S): 5 TABLET ORAL at 22:24

## 2023-08-07 RX ADMIN — Medication 81 MILLIGRAM(S): at 12:53

## 2023-08-07 RX ADMIN — Medication 8 UNIT(S): at 09:03

## 2023-08-07 RX ADMIN — INSULIN GLARGINE 17 UNIT(S): 100 INJECTION, SOLUTION SUBCUTANEOUS at 21:29

## 2023-08-07 RX ADMIN — Medication 2: at 12:54

## 2023-08-07 RX ADMIN — PANTOPRAZOLE SODIUM 40 MILLIGRAM(S): 20 TABLET, DELAYED RELEASE ORAL at 05:21

## 2023-08-07 RX ADMIN — SODIUM CHLORIDE 4 MILLILITER(S): 9 INJECTION INTRAMUSCULAR; INTRAVENOUS; SUBCUTANEOUS at 07:52

## 2023-08-07 RX ADMIN — SODIUM CHLORIDE 4 MILLILITER(S): 9 INJECTION INTRAMUSCULAR; INTRAVENOUS; SUBCUTANEOUS at 22:15

## 2023-08-07 RX ADMIN — Medication 650 MILLIGRAM(S): at 13:41

## 2023-08-07 RX ADMIN — Medication 500 MILLIGRAM(S): at 17:45

## 2023-08-07 RX ADMIN — Medication 2: at 17:44

## 2023-08-07 RX ADMIN — ATORVASTATIN CALCIUM 10 MILLIGRAM(S): 80 TABLET, FILM COATED ORAL at 21:29

## 2023-08-07 RX ADMIN — ALBUTEROL 2.5 MILLIGRAM(S): 90 AEROSOL, METERED ORAL at 07:52

## 2023-08-07 RX ADMIN — SODIUM CHLORIDE 4 MILLILITER(S): 9 INJECTION INTRAMUSCULAR; INTRAVENOUS; SUBCUTANEOUS at 15:33

## 2023-08-07 RX ADMIN — LATANOPROST 1 DROP(S): 0.05 SOLUTION/ DROPS OPHTHALMIC; TOPICAL at 21:30

## 2023-08-07 RX ADMIN — HEPARIN SODIUM 5000 UNIT(S): 5000 INJECTION INTRAVENOUS; SUBCUTANEOUS at 13:41

## 2023-08-07 RX ADMIN — Medication 2: at 09:03

## 2023-08-07 RX ADMIN — Medication 500 MILLIGRAM(S): at 05:18

## 2023-08-07 RX ADMIN — Medication 8 UNIT(S): at 12:54

## 2023-08-07 RX ADMIN — HEPARIN SODIUM 5000 UNIT(S): 5000 INJECTION INTRAVENOUS; SUBCUTANEOUS at 21:29

## 2023-08-07 NOTE — CHART NOTE - NSCHARTNOTEFT_GEN_A_CORE
Consulted for IR-guided renal biopsy in this patient with a left renal mass, however patient currently on ASA. Per prior IR consult, this could be done as an OP. In order to not hold up discharge given IR requirement of 5 days off ASA, discussed with urology (Dr. Desai) who stated this can be done as OP by urology with patient still on ASA.

## 2023-08-07 NOTE — PROGRESS NOTE ADULT - ASSESSMENT
67 yr old male with a pmh of HTN, HLD, T2DM on metformin, TAO on BiPAP who presents to the ED with acute, 10/10, sharp, intermittent stabbing left sided abdominal and left axillary/back pain.    EKG - NSR poor Rwave progression     1) Epigastric pain   - EKG shows NSR poor R wave progression   -ct chest 7/29 showed Cardiomegaly with left ventricular dilatation with some concentric   hypertrophy. 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 plan for renal mass biopsy    2) HTN   - cont hydral, losartan and amlodipine     3) Cough/SOB  - RVP-  neg  - CXR  complete white out of the left hemithorax likely secondary to worsening left pleural effusion  -CT Chest The lobar bronchi of the left lung are opacified. There is complete atelectasis of the left lung with a large left pleural effusion. Redemonstrated solid exophytic mass of left kidney. thoracic surgery on board, s/p  Left uniport VATS empyema drainage; partial decortication; fluid irrigation and drainage  -f/u pulm recs  -Gram positive Bacteria Left Empyema on abx

## 2023-08-07 NOTE — PROGRESS NOTE ADULT - SUBJECTIVE AND OBJECTIVE BOX
UROLOGY Progress Note  SARAH FRIAS    S: Patient seen at bedside.    O:  T(C): 36.2 (08-07-23 @ 05:33), Max: 37.1 (08-06-23 @ 13:00)  HR: 69 (08-07-23 @ 05:33) (59 - 106)  BP: 147/79 (08-07-23 @ 05:33) (128/75 - 162/78)  RR: 18 (08-07-23 @ 05:33) (18 - 20)  SpO2: 100% (08-07-23 @ 05:33) (95% - 100%)        Physical Exam:  Gen: NAD  Neuro: Follows commands  Resp: cpap  CV: Normal rate, regular rhythm  Abd: Soft, NT, ND        Labs:

## 2023-08-07 NOTE — PROGRESS NOTE ADULT - ASSESSMENT
67 year old male with htn and DM presented with left sided chest pain.  Imaging with left sided opacities and ? worsening left sided effusion.  Associated leukocytosis.     1) Shortness of breath with cough  S/p vats/ decortication    Gram stain with GPC- pleural fluid growing strep constellatus  CT removed      Continue ceftriaxone/ flagyl (better coverage for strep)       2) Renal Mass-  Solid exophytic left renal mass and primary renal neoplasm cannot be   excluded. Additional indeterminate right renal lesion measuring 1.8 x 1.4   cm. Urology evaluation .     3) Abnormal imaging  stenosis of celiac axis  Surgery following    4) Leukocytosis-   continue to monitor  May be multifactorial- resolving infection, renal mass contributing

## 2023-08-07 NOTE — DISCHARGE NOTE PROVIDER - NSDCFUADDINST_GEN_ALL_CORE_FT
Keep the wounds clean and dry.  Leave open to air and allow to dry.  Watch for pus, fevers, increased redness, or other unusual symptoms and if noted, call Dr Orellana.  The suture sill come out in the office when you follow up with him.   Keep the wounds clean and dry.  Leave open to air and allow to dry.  Watch for pus, fevers, increased redness, or other unusual symptoms and if noted, call Dr Orellana.  The suture sill come out in the office when you follow up with him.      Continue antibiotics through 9/9  Ertapenem 1 gram iv daily   weekly cbc, bmp fax to [phone]

## 2023-08-07 NOTE — DISCHARGE NOTE PROVIDER - NSDCCPTREATMENT_GEN_ALL_CORE_FT
PRINCIPAL PROCEDURE  Procedure: Bronchoscopy with decortication of left lung using video-assisted thoracoscopic surgery (VATS)  Findings and Treatment:       SECONDARY PROCEDURE  Procedure: Drainage of empyema of left lung with video-assisted thoracoscopic surgery (VATS)  Findings and Treatment:

## 2023-08-07 NOTE — DISCHARGE NOTE PROVIDER - PROVIDER TOKENS
PROVIDER:[[id number]],FOLLOWUP:[2 weeks],ESTABLISHEDPATIENT:[T]],PROVIDER:[[id number]],FOLLOWUP:[2 weeks],ESTABLISHEDPATIENT:[T]] PROVIDER:[[id number]],FOLLOWUP:[2 weeks],ESTABLISHEDPATIENT:[T]],PROVIDER:[[id number]],FOLLOWUP:[2 weeks],ESTABLISHEDPATIENT:[T]],PROVIDER:[[id number]],FOLLOWUP:[2 weeks],ESTABLISHEDPATIENT:[T]] PROVIDER:[[id number]],FOLLOWUP:[2 weeks],ESTABLISHEDPATIENT:[T]],PROVIDER:[[id number]:[2 weeks],ESTABLISHEDPATIENT:[T]],PROVIDER:[[id number]],FOLLOWUP:[2 weeks],ESTABLISHEDPATIENT:[T]],PROVIDER:[TOKEN:[[id number]]]

## 2023-08-07 NOTE — DISCHARGE NOTE PROVIDER - HOSPITAL COURSE
67 yr old male with a pmh of HTN, HLD, T2DM on metformin, TAO on BiPAP presented to the ED on 8/1/23 with acute, 10/10, sharp, intermittent stabbing left sided abdominal and left axillary/back pain that was not improving.  He was found to have a left loculated pleural effusion.  Pulmonology performed a thoracentesis.  and drained 200 mL but was unable to p[lace a PTC.  On 8/2, the pt gardenia tot the OR for a FB, L uniportal VATS, empyema drainage and a partial decortication.  The tube was to suction for 48 h.  Urology consulted for a renal mass but as that was not urgent, the plan was to follow up as an outpatient.  The OR cultures grew Strep constellatus and treatment was with IV Flagyl and Rocephin.  After the tube was removed and ID recommended an Oral Abx, the pt was discharged home.         67 yr old male with a pmh of HTN, HLD, T2DM on metformin, TAO on BiPAP presented to the ED on 8/1/23 with acute, 10/10, sharp, intermittent stabbing left sided abdominal and left axillary/back pain that was not improving.  He was found to have a left loculated pleural effusion.  Pulmonology performed a thoracentesis.  and drained 200 mL but was unable to p[lace a PTC.  On 8/2, the pt gardenia tot the OR for a FB, L uniportal VATS, empyema drainage and a partial decortication.  The tube was to suction for 48 h.  Urology consulted for a renal mass but as that was not urgent, the plan was to follow up as an outpatient.  The OR cultures grew Strep constellatus and treatment was with IV Flagyl and Rocephin.  After the tube was removed and ID recommended an Oral Abx, the pt was discharged home.        Celiac artery stenosis, outpt f/u with Dr. Steven Carlos for MALS evaluation   TTE - minimal MR, EF 66% . BCx 7/31 ntd,  8/10- S/P- PICC line placement. D/C planning when home IV Abx. ertapenem until; 9/9. 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 As per MD recs (no need for GI), Urology consulted: no urgent intervention,    Pleural effusion  - CXR:Complete white out of the left hemithorax, likely secondary to worsening left-sided pleural effusion.  -CT chest --> The lobar bronchi of the left lung are opacified. There is complete atelectasis of the left lung with a large left pleural effusion. Redemonstrated solid exophytic mass of left kidney.  - PULM consulted- s/p thoracentesis 8/1- 300cc, Fluid Cx --GPC- liekly strep species  - c/w CTX/Flagyl per ID   - CTSx consulted  - Plan for flexible bronchoscopy, LVATS, drainage of effusion/empyema 8/2 with Dr. Orellana----      Renal mass.    -CT A/P: 5. Solid exophytic left renal mass and primary renal neoplasm cannot be excluded. Additional indeterminate right renal lesion measuring 1.8 x 1.4 cm.   -MRI abdomen - left renal solid exophytic lesion suggestive of papillary renal cell carcinoma. No evidence of vascular, hilar, or adjacent organ invasion. A right renal interpolar 0.2 cm proteinaceous/hemorrhagic cyst. No lymphadenopathy.Right renal. Mild duodenitis. A 0.3 cm pancreatic tail cystic lesion without pancreatic ductal dilatation.    Hyponatremia  - gentle IVF  - urine sutdies    Cardiomegaly    -Imaging: Cardiomegaly with left ventricular dilatation with some concentric hypertrophy.   -ECHO Left pleural effusion, minimal MR, EF 66%     Benign essential HTN.   -Continue losartan 100mg daily, amlodipine 5mg daily, hydralazine 50mg BID    Type 2 diabetes mellitus treated without insulin.   -Hold metformin 1g BID      HLD (hyperlipidemia).   -Continue atorvastatin 10mg daily       Case discussed with attending DR Reid . Patient stable for discharge home.

## 2023-08-07 NOTE — DISCHARGE NOTE PROVIDER - NSDCFUADDAPPT_GEN_ALL_CORE_FT
See Dr Orellana in 2 weeks- call for an appointment and bring a new chest Xray with you when you come.    Follow up with Dr Desai from Urology as well.   See Dr Orellana in 2 weeks- call for an appointment and bring a new chest Xray with you when you come.    Follow up with Dr Desai from Urology as well.      Follow up as an out patient with infectious disease- Call [phone] for an appointment.   See Dr Orellana in 2 weeks- call for an appointment and bring a new chest Xray with you when you come.      Follow up with Dr Desai from Urology as well.      Follow up with DR Steven Carlos for MALS evaluation.    Follow up as an out patient with infectious disease in one week- Call [phone] for an appointment    You need weekly bloodwork- cbc, bmp fax results to [phone]    .

## 2023-08-07 NOTE — CHART NOTE - NSCHARTNOTEFT_GEN_A_CORE
Discussed with IR.   Patient can follow up outpatient for office renal mass biopsy with Dr. Desai. No need to hold aspirin.     [facility]  [address] [place] [address] [phone]

## 2023-08-07 NOTE — PROGRESS NOTE ADULT - ASSESSMENT
Assessment: 67yr old M w/ PMH HTN, HLD, DM2 on metformin, TAO on BiPAP (14/14) found to have L sided empyema, now s/p FB, L uniportal VATs, drainage of empyema, partial decortication on 8/2/23    PLAN  - Medicine consult, possible transfer  Neuro: Pain management  Pulm: Encourage coughing, deep breathing and use of incentive spirometry. Wean off supplemental oxygen as able. Daily CXR.   Cardio: Monitor telemetry/alarms. Cont ASA.  GI: Tolerating diet. Continue stool softeners. Cont ISS.  Renal: monitor urine output, supplement electrolytes as needed. Urology following for renal mass: No urgent intervention, IR consulted for possible biopsy  Vasc: Heparin SC/SCDs for DVT prophylaxis  Heme: Stable H/H. .   ID: Cont Ceftriaxone and Flagyl. F/u ID recs  Therapy: OOB/ambulate  Tubes: N/A  Discussed with Cardiothoracic Team at AM rounds.

## 2023-08-07 NOTE — PROGRESS NOTE ADULT - ASSESSMENT
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 Urology consulted for incidental left renal mass    - Imaging reviewed: 6.4 cm exophytic left renal mass   - f/up pleural fluid studies   - reconsult IR for renal mass biopsy (will need to hold aspirin)     Seen and examined with Dr. Desai

## 2023-08-07 NOTE — DISCHARGE NOTE PROVIDER - NSDCCPCAREPLAN_GEN_ALL_CORE_FT
PRINCIPAL DISCHARGE DIAGNOSIS  Diagnosis: Pleural effusion, left  Assessment and Plan of Treatment:       SECONDARY DISCHARGE DIAGNOSES  Diagnosis: Renal mass  Assessment and Plan of Treatment:      PRINCIPAL DISCHARGE DIAGNOSIS  Diagnosis: Pleural effusion, left  Assessment and Plan of Treatment: Please follow up with your primary care physician regarding your hospitalization and take all medications prescribed. Continue with antibiotics until 9/9/23 at home.      SECONDARY DISCHARGE DIAGNOSES  Diagnosis: Renal mass  Assessment and Plan of Treatment: Followup with Dr Desai     PRINCIPAL DISCHARGE DIAGNOSIS  Diagnosis: Pleural effusion, left  Assessment and Plan of Treatment: Please follow up with your primary care physician regarding your hospitalization and take all medications prescribed. Continue with antibiotics until 9/9/23 at home.  Follow up as an out patient with infectious disease in one week- Call [phone] for an appointment. You need weekly bloodwork- cbc, bmp fax results to [phone].  See Dr Orellana in 2 weeks- call for an appointment and bring a new chest Xray with you when you come.      SECONDARY DISCHARGE DIAGNOSES  Diagnosis: Renal mass  Assessment and Plan of Treatment: Followup with Dr Desai- urologist in 1-2 weeks for evaluation of renal mass.    Diagnosis: Type 2 diabetes mellitus treated without insulin  Assessment and Plan of Treatment: 1800 calorie diabetic diet/ low salt, low fat , low cholesterol diet    Diagnosis: Hypertension  Assessment and Plan of Treatment: low salt, low fat, low cholesterol diet  Continue losartan, hydralazine, and norvasc.    Diagnosis: Diabetes mellitus  Assessment and Plan of Treatment: Continue metformin    Diagnosis: Hyperlipidemia  Assessment and Plan of Treatment: Continue lipitor

## 2023-08-07 NOTE — DISCHARGE NOTE PROVIDER - NPI NUMBER (FOR SYSADMIN USE ONLY) :
[[id number]] [[id number]],[[id number]] [[id number]]

## 2023-08-07 NOTE — PROGRESS NOTE ADULT - SUBJECTIVE AND OBJECTIVE BOX
Follow Up:      Inverval History/ROS:Patient is a 67y old  Male who presents with a chief complaint of celiac artery stenosis, c/f median arcuate ligament syndrome; renal mass (07 Aug 2023 11:03)    Chest tube removed  Denies sob, cough  No fever    Allergies    No Known Allergies    Intolerances        ANTIMICROBIALS:  cefTRIAXone   IVPB 2000 every 24 hours  metroNIDAZOLE    Tablet 500 two times a day      OTHER MEDS:  acetaminophen     Tablet .. 650 milliGRAM(s) Oral every 6 hours PRN  acetaminophen   IVPB .. 1000 milliGRAM(s) IV Intermittent once  albuterol    0.083% 2.5 milliGRAM(s) Nebulizer every 6 hours  aluminum hydroxide/magnesium hydroxide/simethicone Suspension 30 milliLiter(s) Oral every 4 hours PRN  amLODIPine   Tablet 5 milliGRAM(s) Oral daily  aspirin  chewable 81 milliGRAM(s) Oral daily  atorvastatin 10 milliGRAM(s) Oral at bedtime  cyclobenzaprine 10 milliGRAM(s) Oral at bedtime  dextrose 50% Injectable 25 Gram(s) IV Push once  dornase shana Solution 2.5 milliGRAM(s) Inhalation daily  heparin   Injectable 5000 Unit(s) SubCutaneous every 8 hours  hydrALAZINE 50 milliGRAM(s) Oral every 24 hours  insulin glargine Injectable (LANTUS) 17 Unit(s) SubCutaneous at bedtime  insulin lispro (ADMELOG) corrective regimen sliding scale   SubCutaneous three times a day before meals  insulin lispro (ADMELOG) corrective regimen sliding scale   SubCutaneous at bedtime  insulin lispro Injectable (ADMELOG) 8 Unit(s) SubCutaneous before breakfast  insulin lispro Injectable (ADMELOG) 8 Unit(s) SubCutaneous before dinner  insulin lispro Injectable (ADMELOG) 8 Unit(s) SubCutaneous before lunch  latanoprost 0.005% Ophthalmic Solution 1 Drop(s) Both EYES at bedtime  lidocaine   4% Patch 1 Patch Transdermal daily  losartan 100 milliGRAM(s) Oral daily  magnesium sulfate  IVPB 2 Gram(s) IV Intermittent once  melatonin 9 milliGRAM(s) Oral at bedtime  methylnaltrexone Injectable 12 milliGRAM(s) SubCutaneous daily  naloxone Injectable 0.1 milliGRAM(s) IV Push every 3 minutes PRN  ondansetron Injectable 4 milliGRAM(s) IV Push every 6 hours PRN  oxyCODONE    IR 7.5 milliGRAM(s) Oral every 3 hours PRN  oxyCODONE    IR 5 milliGRAM(s) Oral every 3 hours PRN  pantoprazole    Tablet 40 milliGRAM(s) Oral before breakfast  polyethylene glycol 3350 17 Gram(s) Oral two times a day  senna 2 Tablet(s) Oral at bedtime  sodium chloride 3%  Inhalation 4 milliLiter(s) Inhalation every 6 hours      Vital Signs Last 24 Hrs  T(C): 36.8 (07 Aug 2023 12:06), Max: 37.1 (07 Aug 2023 09:00)  T(F): 98.3 (07 Aug 2023 12:06), Max: 98.8 (07 Aug 2023 09:00)  HR: 76 (07 Aug 2023 15:35) (59 - 96)  BP: 120/67 (07 Aug 2023 12:06) (120/67 - 162/78)  BP(mean): --  RR: 18 (07 Aug 2023 12:06) (18 - 19)  SpO2: 100% (07 Aug 2023 15:35) (97% - 100%)    Parameters below as of 07 Aug 2023 12:06  Patient On (Oxygen Delivery Method): room air        PHYSICAL EXAM:  General: [x ] non-toxic  HEAD/EYES: [ ] PERRL [ ]x white sclera [ ] icterus  ENT:  [ ] normal [ ]x supple [ ] thrush [ ] pharyngeal exudate  Cardiovascular:   [ ] murmur [ x] normal [ ] PPM/AICD  Respiratory:  x ] clear to ausculation bilaterally  GI:  [ x] soft, non-tender, normal bowel sounds  :  [ ] horne [ ] no CVA tenderness   Musculoskeletal:  [ ] no synovitis  Neurologic:  [ ] non-focal exam   Skin:  [x ] no rash  Lymph: [ x] no lymphadenopathy  Psychiatric:  [ ] appropriate affect [ ] alert & oriented  Lines:  [ ] no phlebitis [ ] central line                                8.6    19.73 )-----------( 564      ( 07 Aug 2023 05:49 )             26.8       08-07    129<L>  |  95<L>  |  9   ----------------------------<  153<H>  4.0   |  25  |  0.54    Ca    8.9      07 Aug 2023 05:49  Phos  4.2     08-07  Mg     1.90     08-07        Urinalysis Basic - ( 07 Aug 2023 05:49 )    Color: x / Appearance: x / SG: x / pH: x  Gluc: 153 mg/dL / Ketone: x  / Bili: x / Urobili: x   Blood: x / Protein: x / Nitrite: x   Leuk Esterase: x / RBC: x / WBC x   Sq Epi: x / Non Sq Epi: x / Bacteria: x        MICROBIOLOGY:Culture Results:   No growth (08-04-23 @ 13:35)  Culture Results:   Rare Streptococcus constellatus  See previous culture [id number] (08-02-23 @ 20:41)  Culture Results:   Culture is being performed. Fungal cultures are held for 4 weeks. (08-02-23 @ 20:41)  Culture Results:   Culture is being performed. (08-02-23 @ 20:41)  Culture Results:   No growth (08-02-23 @ 20:37)  Culture Results:   Culture is being performed. Fungal cultures are held for 4 weeks. (08-02-23 @ 20:37)  Culture Results:   Culture is being performed. (08-02-23 @ 20:37)  Culture Results:   No growth to date. (08-02-23 @ 20:33)  Culture Results:   Culture is being performed. Fungal cultures are held for 4 weeks. (08-02-23 @ 20:33)  Culture Results:   Culture is being performed. (08-02-23 @ 20:33)  Culture Results:   Few Streptococcus constellatus (08-02-23 @ 20:29)  Culture Results:   Culture is being performed. Fungal cultures are held for 4 weeks. (08-02-23 @ 20:29)  Culture Results:   Culture is being performed. (08-02-23 @ 20:29)  Culture Results:   No growth at 5 days (08-02-23 @ 20:22)  Culture Results:   Culture is being performed. Fungal cultures are held for 4 weeks. (08-02-23 @ 20:22)  Culture Results:   Culture is being performed. (08-02-23 @ 20:22)  Culture Results:   No growth to date. (08-02-23 @ 20:12)  Culture Results:   Culture is being performed. Fungal cultures are held for 4 weeks. (08-02-23 @ 20:12)  Culture Results:   Culture is being performed. (08-02-23 @ 20:12)  Culture Results:   No growth at 48 hours (08-02-23 @ 17:16)  Culture Results:   Culture is being performed. Fungal cultures are held for 4 weeks. (08-02-23 @ 17:16)  Culture Results:   Culture is being performed. (08-02-23 @ 17:16)  Culture Results:   Few Streptococcus constellatus (08-01-23 @ 11:42)  Culture Results:   Culture is being performed. Fungal cultures are held for 4 weeks. (08-01-23 @ 11:42)      RADIOLOGY:    < from: Xray Chest 1 View- PORTABLE-Routine (Xray Chest 1 View- PORTABLE-Routine in AM.) (08.07.23 @ 07:34) >  IMPRESSION:  In final image, slight reduction of left-sided pleural effusion status   post chest tube removal.  No pneumothorax.  Right lung is clear, no right-sided pleural effusion.    < end of copied text >

## 2023-08-07 NOTE — DISCHARGE NOTE PROVIDER - CARE PROVIDERS DIRECT ADDRESSES
,[email or web address] ,[email or web address] ,[email or web address]

## 2023-08-07 NOTE — DISCHARGE NOTE PROVIDER - NSDCMRMEDTOKEN_GEN_ALL_CORE_FT
amLODIPine 5 mg oral tablet: 1 tab(s) orally once a day  aspirin 81 mg oral capsule: 1 tab(s) orally once a day  atorvastatin 10 mg oral tablet: 1 tab(s) orally once a day  cyclobenzaprine 10 mg oral tablet: 1 tab(s) orally once a day (at bedtime)  hydrALAZINE 50 mg oral tablet: 1 tab(s) orally 2 times a day  losartan 100 mg oral tablet: 1 tab(s) orally once a day  melatonin 10 mg oral tablet: 1 tab(s) orally once a day (at bedtime)  metFORMIN 1000 mg oral tablet: 1 tab(s) orally 2 times a day   amLODIPine 5 mg oral tablet: 1 tab(s) orally once a day  aspirin 81 mg oral capsule: 1 tab(s) orally once a day  atorvastatin 10 mg oral tablet: 1 tab(s) orally once a day  cyclobenzaprine 10 mg oral tablet: 1 tab(s) orally once a day (at bedtime)  ertapenem 1 g injection: 1 gram(s) intravenously once a day Continue until 9/9/23  hydrALAZINE 50 mg oral tablet: 1 tab(s) orally 2 times a day  Laboratory: Weekly CBC and BMP until 9/9/23  please fax to Dr Mason fax [phone]  losartan 100 mg oral tablet: 1 tab(s) orally once a day  melatonin 10 mg oral tablet: 1 tab(s) orally once a day (at bedtime)  metFORMIN 1000 mg oral tablet: 1 tab(s) orally 2 times a day   amLODIPine 5 mg oral tablet: 1 tab(s) orally once a day  aspirin 81 mg oral capsule: 1 tab(s) orally once a day  atorvastatin 10 mg oral tablet: 1 tab(s) orally once a day  cyclobenzaprine 10 mg oral tablet: 1 tab(s) orally once a day (at bedtime)  ertapenem 1 g injection: 1 gram(s) intravenously once a day Continue until 9/9/23  hydrALAZINE 50 mg oral tablet: 1 tab(s) orally 2 times a day  Laboratory: Weekly CBC and BMP until 9/9/23  please fax to Dr Mason fax [phone]  losartan 100 mg oral tablet: 1 tab(s) orally once a day  melatonin 10 mg oral tablet: 1 tab(s) orally once a day (at bedtime)  metFORMIN 1000 mg oral tablet: 1 tab(s) orally 2 times a day  Protonix 40 mg oral delayed release tablet: 1 tab(s) orally once a day (before a meal)

## 2023-08-07 NOTE — PROGRESS NOTE ADULT - SUBJECTIVE AND OBJECTIVE BOX
DATE OF SERVICE: 08-07-23  Ot og CTICU  s/p VATS   CT removed. Afebrile. Feels better    REVIEW OF SYSTEMS:  CONSTITUTIONAL: No fever  RESPIRATORY: No cough, hemoptysis shortness of breath  CARDIOVASCULAR: No palpitations, dizziness, or leg swelling  GASTROINTESTINAL: No nausea, vomiting, hematemesis  GENITOURINARY: No dysuria, frequency, hematuria   NEUROLOGICAL: No headaches, no dizziness  MUSCULOSKELETAL: No joint pain or swelling;     INTERVAL HPI/OVERNIGHT EVENTS:  ICU Vital Signs Last 24 Hrs  T(C): 36.7 (05 Aug 2023 12:00), Max: 37.3 (05 Aug 2023 08:00)  T(F): 98.1 (05 Aug 2023 12:00), Max: 99.1 (05 Aug 2023 08:00)  HR: 68 (05 Aug 2023 15:00) (68 - 97)  BP: 122/67 (05 Aug 2023 15:00) (122/67 - 162/89)  BP(mean): 84 (05 Aug 2023 15:00) (84 - 111)  ABP: --  ABP(mean): --  RR: 20 (05 Aug 2023 15:00) (17 - 30)  SpO2: 100% (05 Aug 2023 15:00) (96% - 100%)    O2 Parameters below as of 05 Aug 2023 15:00  Patient On (Oxygen Delivery Method): room air          LCBC Full  -  ( 05 Aug 2023 03:18 )  WBC Count : 18.72 K/uL  RBC Count : 3.05 M/uL  Hemoglobin : 8.6 g/dL  Hematocrit : 26.8 %  Platelet Count - Automated : 412 K/uL  Mean Cell Volume : 87.9 fL  Mean Cell Hemoglobin : 28.2 pg  Mean Cell Hemoglobin Concentration : 32.1 gm/dL  Auto Neutrophil # : x  Auto Lymphocyte # : x  Auto Monocyte # : x  Auto Eosinophil # : x  Auto Basophil # : x  Auto Neutrophil % : x  Auto Lymphocyte % : x  Auto Monocyte % : x  Auto Eosinophil % : x  Auto Basophil % : x        PAST MEDICAL & SURGICAL HISTORY:  Hypertension    BMI: BMI (kg/m2): 31.5 (08-02-23 @ 15:14)  HbA1c: A1C with Estimated Average Glucose Result: 7.8 % (07-30-23 @ 07:16)    Glucose: POCT Blood Glucose.: 199 mg/dL (08-05-23 @ 12:44)    BP: 122/67 (08-05-23 @ 15:00) (86/53 - 162/89)  Lipid Panel: Date/Time: 07-30-23 @ 07:16  Cholesterol, Serum: 101  Direct LDL: --  HDL Cholesterol, Serum: 50  Total Cholesterol/HDL Ration Measurement: --  Triglycerides, Serum: 122    Diabetes mellitus      HLD (hyperlipidemia)      No significant past surgical history          MEDICATIONS  (STANDING):  amLODIPine   Tablet 5 milliGRAM(s) Oral daily  aspirin  chewable 81 milliGRAM(s) Oral daily  atorvastatin 10 milliGRAM(s) Oral at bedtime  cyclobenzaprine 10 milliGRAM(s) Oral at bedtime  dextrose 5%. 1000 milliLiter(s) (50 mL/Hr) IV Continuous <Continuous>  dextrose 5%. 1000 milliLiter(s) (100 mL/Hr) IV Continuous <Continuous>  dextrose 50% Injectable 12.5 Gram(s) IV Push once  dextrose 50% Injectable 25 Gram(s) IV Push once  dextrose 50% Injectable 25 Gram(s) IV Push once  glucagon  Injectable 1 milliGRAM(s) IntraMuscular once  hydrALAZINE 50 milliGRAM(s) Oral two times a day  insulin lispro (ADMELOG) corrective regimen sliding scale   SubCutaneous three times a day before meals  insulin lispro (ADMELOG) corrective regimen sliding scale   SubCutaneous at bedtime  latanoprost 0.005% Ophthalmic Solution 1 Drop(s) Both EYES at bedtime  losartan 100 milliGRAM(s) Oral daily  melatonin 9 milliGRAM(s) Oral at bedtime    MEDICATIONS  (PRN):  acetaminophen     Tablet .. 650 milliGRAM(s) Oral every 6 hours PRN Temp greater or equal to 38C (100.4F), Mild Pain (1 - 3)  aluminum hydroxide/magnesium hydroxide/simethicone Suspension 30 milliLiter(s) Oral every 4 hours PRN Dyspepsia  dextrose Oral Gel 15 Gram(s) Oral once PRN Blood Glucose LESS THAN 70 milliGRAM(s)/deciliter  HYDROmorphone  Injectable 0.5 milliGRAM(s) IV Push every 4 hours PRN breakthrough pain  nitroglycerin     SubLingual 0.4 milliGRAM(s) SubLingual every 5 minutes PRN Chest Pain  ondansetron Injectable 4 milliGRAM(s) IV Push every 8 hours PRN Nausea and/or Vomiting  oxycodone    5 mG/acetaminophen 325 mG 1 Tablet(s) Oral every 6 hours PRN Moderate Pain (4 - 6)  oxycodone    5 mG/acetaminophen 325 mG 2 Tablet(s) Oral every 4 hours PRN Severe Pain (7 - 10)        RADIOLOGY & ADDITIONAL TESTS:    Imaging Personally Reviewed:  [ ] YES  [ ] NO    Consultant(s) Notes Reviewed:  [x ] YES  [ ] NO    PHYSICAL EXAM:  GENERAL: Alert and awake lying in bed in no distress  HEAD:  Atraumatic, Normocephalic  EYES: EOMI, MADALYN, conjunctiva and sclera clear  NECK: Supple, No JVD, Normal thyroid  NERVOUS SYSTEM:  Alert & Oriented X3, Motor and sensory systems are intact,   CHEST/LUNG: Left sided chest tubes +  HEART: Regular rate and rhythm; No murmurs, rubs, or gallops  ABDOMEN: Soft, Nontender, Nondistended; Bowel sounds present  EXTREMITIES:   Peripheral Pulses are palpable, no  edema        Care Discussed with Consultants/Other Providers [x ] YES  [ ] NO      Code Status: [] Full Code [] DNR [] DNI [] Goals of Care:   Disposition: [] ICU [] Stroke Unit [] RCU []PCU []Floor [] Discharge Home         JANETT HighFACP

## 2023-08-07 NOTE — DISCHARGE NOTE PROVIDER - CARE PROVIDER_API CALL
Marko Orellana  Thoracic Surgery  [address] [place] [address], Oncology Building [address] [place] [address]  Phone: [phone]  Fax: [phone]  Established Patient  Follow Up Time: 2 weeks    Souleymane Desai  Urology  [address] [place] [address], [address] [address]  Phone: [phone]  Fax: [phone]  Established Patient  Follow Up Time: 2 weeks   Marko Orellana  Thoracic Surgery  [address] [place] [address], Oncology Building [address] [place] [address]  Phone: [phone]  Fax: [phone]  Established Patient  Follow Up Time: 2 weeks    Souleymane Desai  Urology  [address] [place] [address], [address]  [place] [address]  Phone: [phone]  Fax: [phone]  Established Patient  Follow Up Time: 2 weeks    Michel Mason  Infectious Disease  [address] [place] [address] [place] [address]  Phone: [phone]  Fax: [phone]  Established Patient  Follow Up Time: 2 weeks   Marko Orellana  Thoracic Surgery  [address] [place] [address], Oncology Building [address] [place] [address]  Phone: [phone]  Fax: [phone]  Established Patient  Follow Up Time: 2 weeks    Souleymane Desai  Urology  [address], [address]  [place] [address]  Phone: [phone]  Fax: [phone]  Established Patient  Follow Up Time: 2 weeks    Michel Mason  Infectious Disease  [address] [place] [address] [place] [address]  Phone: [phone]  Fax: [phone]  Established Patient  Follow Up Time: 2 weeks    Steven Carlos  Surgery  [address] [place] [address]  Phone: [phone]  Fax: [phone]  Follow Up Time:

## 2023-08-07 NOTE — PROGRESS NOTE ADULT - SUBJECTIVE AND OBJECTIVE BOX
Thoracic Surgery Progress Note    SUBJECTIVE: Patient seen and examined at bedside with surgical team, patient without complaints. Pt did not endorse chest pain, SOB, fevers/chills, N/V/D.    Vital Signs Last 24 Hrs  T(C): 36.2 (08-07-23 @ 05:33), Max: 37.1 (08-06-23 @ 13:00)  HR: 85 (08-07-23 @ 07:59) (59 - 102)  BP: 147/79 (08-07-23 @ 05:33) (128/75 - 162/78)  BP(mean): --  ABP: --  ABP(mean): --  RR: 18 (08-07-23 @ 05:33) (18 - 19)  SpO2: 100% (08-07-23 @ 07:59) (95% - 100%)  Wt(kg): --  CVP(mm Hg): --  CI: --  CAPILLARY BLOOD GLUCOSE      POCT Blood Glucose.: 168 mg/dL (07 Aug 2023 08:14)  POCT Blood Glucose.: 156 mg/dL (06 Aug 2023 20:27)  POCT Blood Glucose.: 137 mg/dL (06 Aug 2023 17:27)  POCT Blood Glucose.: 216 mg/dL (06 Aug 2023 13:32)  POCT Blood Glucose.: 243 mg/dL (06 Aug 2023 12:27)   N/A      08-06 @ 07:01  -  08-07 @ 07:00  --------------------------------------------------------  IN:    Oral Fluid: 970 mL  Total IN: 970 mL    OUT:    Voided (mL): 900 mL  Total OUT: 900 mL    Total NET: 70 mL      08-07 @ 07:01  -  08-07 @ 09:09  --------------------------------------------------------  IN:  Total IN: 0 mL    OUT:    Oral Fluid: 0 mL    Voided (mL): 200 mL  Total OUT: 200 mL    Total NET: -200 mL    Medications  Neurologic Medications  acetaminophen     Tablet .. 650 milliGRAM(s) Oral every 6 hours PRN Temp greater or equal to 38C (100.4F), Mild Pain (1 - 3)  acetaminophen   IVPB .. 1000 milliGRAM(s) IV Intermittent once  cyclobenzaprine 10 milliGRAM(s) Oral at bedtime  melatonin 9 milliGRAM(s) Oral at bedtime  ondansetron Injectable 4 milliGRAM(s) IV Push every 6 hours PRN Nausea  oxyCODONE    IR 5 milliGRAM(s) Oral every 3 hours PRN Moderate Pain (4 - 6)  oxyCODONE    IR 7.5 milliGRAM(s) Oral every 3 hours PRN Severe Pain (7 - 10)    Respiratory Medications  albuterol    0.083% 2.5 milliGRAM(s) Nebulizer every 6 hours  dornase shana Solution 2.5 milliGRAM(s) Inhalation daily  sodium chloride 3%  Inhalation 4 milliLiter(s) Inhalation every 6 hours    Cardiovascular Medications  amLODIPine   Tablet 5 milliGRAM(s) Oral daily  hydrALAZINE 50 milliGRAM(s) Oral every 24 hours  losartan 100 milliGRAM(s) Oral daily    Gastrointestinal Medications  aluminum hydroxide/magnesium hydroxide/simethicone Suspension 30 milliLiter(s) Oral every 4 hours PRN Dyspepsia  magnesium sulfate  IVPB 2 Gram(s) IV Intermittent once  methylnaltrexone Injectable 12 milliGRAM(s) SubCutaneous daily  pantoprazole    Tablet 40 milliGRAM(s) Oral before breakfast  polyethylene glycol 3350 17 Gram(s) Oral two times a day  senna 2 Tablet(s) Oral at bedtime    Genitourinary Medications    Hematologic/Oncologic Medications  aspirin  chewable 81 milliGRAM(s) Oral daily  heparin   Injectable 5000 Unit(s) SubCutaneous every 8 hours    Antimicrobial/Immunologic Medications  cefTRIAXone   IVPB 2000 milliGRAM(s) IV Intermittent every 24 hours  metroNIDAZOLE    Tablet 500 milliGRAM(s) Oral two times a day    Endocrine/Metabolic Medications  atorvastatin 10 milliGRAM(s) Oral at bedtime  dextrose 50% Injectable 25 Gram(s) IV Push once  insulin glargine Injectable (LANTUS) 17 Unit(s) SubCutaneous at bedtime  insulin lispro (ADMELOG) corrective regimen sliding scale   SubCutaneous three times a day before meals  insulin lispro (ADMELOG) corrective regimen sliding scale   SubCutaneous at bedtime  insulin lispro Injectable (ADMELOG) 8 Unit(s) SubCutaneous before lunch  insulin lispro Injectable (ADMELOG) 8 Unit(s) SubCutaneous before breakfast  insulin lispro Injectable (ADMELOG) 8 Unit(s) SubCutaneous before dinner    Topical/Other Medications  latanoprost 0.005% Ophthalmic Solution 1 Drop(s) Both EYES at bedtime  lidocaine   4% Patch 1 Patch Transdermal daily  naloxone Injectable 0.1 milliGRAM(s) IV Push every 3 minutes PRN For ANY of the following changes in patient status:  A. RR LESS THAN 10 breaths per minute, B. Oxygen saturation LESS THAN 90%, C. Sedation score of 6    Physical Exam  Constitutional: A&Ox3, NAD  Eyes: Scleras clear, PERRLA/ EOMI, Gross vision intact  Respiratory: Breathing comfortably on room air, symmetrical chest rise. No sign of resp distress or accessory muscle use,  Cardiac: S1, S2  Gastrointestinal: Soft nontender, nondistended  Extremities: Moving all extremities, no edema  Skin: No Rashes, Hematoma, Ecchymosis    LABS:                                            8.6                   Neurophils% (auto):   x      (08-07 @ 05:49):    19.73)-----------(564          Lymphocytes% (auto):  x                                             26.8                   Eosinphils% (auto):   x        Manual%: Neutrophils x    ; Lymphocytes x    ; Eosinophils x    ; Bands%: x    ; Blasts x          08-07    129<L>  |  95<L>  |  9   ----------------------------<  153<H>  4.0   |  25  |  0.54    Ca    8.9      07 Aug 2023 05:49  Phos  4.2     08-07  Mg     1.90     08-07            RECENT CULTURES:  08-04 @ 13:35 Pleural Fl Pleural Fluid     No growth    polymorphonuclear leukocytes seen  No organisms seen  by cytocentrifuge    08-02 @ 20:41 .Tissue 6-CONTENTS OF EMPYEMA     Rare Streptococcus constellatus  See previous culture [id number]    No polymorphonuclear leukocytes seen per low power field  No organisms seen per oil power field    08-02 @ 20:37 .Body Fluid 4- LEFT PLEURAL EFFUSION     No growth    polymorphonuclear leukocytes seen  Gram positive cocci in pairs seen  by cytocentrifuge    08-02 @ 20:33 .Body Fluid 3-LEFT PLEURAL EFFUSION     No growth to date.    polymorphonuclear leukocytes seen  Gram positive cocci in pairs seen  by cytocentrifuge    08-02 @ 20:29 .Tissue 5-DECODTICATION     Few Streptococcus constellatus    Rare polymorphonuclear leukocytes seen per low power field  Rare Gram Variable Cocci seen per oil power field    08-02 @ 20:22 .Tissue 7.-CONTENTS OF EMPYEMA     No growth    No polymorphonuclear leukocytes seen per low power field  No organisms seen per oil power field    08-02 @ 20:12 .Body Fluid LEFT PLEURAL EFFUSION -2     No growth to date.    polymorphonuclear leukocytes seen  No organisms seen  by cytocentrifuge    08-02 @ 17:16 .Bronchial BAL     No growth at 48 hours    Few polymorphonuclear leukocytes seen per low power field  Few Squamous epithelial cells seen per low power field  No organisms seen

## 2023-08-07 NOTE — DISCHARGE NOTE PROVIDER - NSDCFUSCHEDAPPT_GEN_ALL_CORE_FT
[facility]  UROLOGY [address] [place] [address]  Scheduled Appointment: 08/25/2023    Souleymane Desai  [facility]  UROLOGY [address] [place] [address]  Scheduled Appointment: 08/25/2023     Michel Mason  [facility]  INFDISEASE [address]  Scheduled Appointment: 08/24/2023    [facility]  UROLOGY [address] [place] [address] R  Scheduled Appointment: 08/25/2023    Souleymane Desai  [facility]  UROLOGY [address] [place] [address] R  Scheduled Appointment: 08/25/2023

## 2023-08-07 NOTE — PROGRESS NOTE ADULT - SUBJECTIVE AND OBJECTIVE BOX
Didier Cotton MD  Interventional Cardiology / Advance Heart Failure and Cardiac Transplant Specialist  [place] Office : [address] [place] [address]  Tel:   [place] Office : [address] [facility] [address]  Tel: [phone]      Subjective/Overnight events: Pt is sitting up in chair, denies CP, SOB  	  MEDICATIONS:  amLODIPine   Tablet 5 milliGRAM(s) Oral daily  aspirin  chewable 81 milliGRAM(s) Oral daily  heparin   Injectable 5000 Unit(s) SubCutaneous every 8 hours  hydrALAZINE 50 milliGRAM(s) Oral every 24 hours  losartan 100 milliGRAM(s) Oral daily    cefTRIAXone   IVPB 2000 milliGRAM(s) IV Intermittent every 24 hours  metroNIDAZOLE    Tablet 500 milliGRAM(s) Oral two times a day    albuterol    0.083% 2.5 milliGRAM(s) Nebulizer every 6 hours  dornase shana Solution 2.5 milliGRAM(s) Inhalation daily  sodium chloride 3%  Inhalation 4 milliLiter(s) Inhalation every 6 hours    acetaminophen     Tablet .. 650 milliGRAM(s) Oral every 6 hours PRN  acetaminophen   IVPB .. 1000 milliGRAM(s) IV Intermittent once  cyclobenzaprine 10 milliGRAM(s) Oral at bedtime  melatonin 9 milliGRAM(s) Oral at bedtime  ondansetron Injectable 4 milliGRAM(s) IV Push every 6 hours PRN  oxyCODONE    IR 5 milliGRAM(s) Oral every 3 hours PRN  oxyCODONE    IR 7.5 milliGRAM(s) Oral every 3 hours PRN    aluminum hydroxide/magnesium hydroxide/simethicone Suspension 30 milliLiter(s) Oral every 4 hours PRN  methylnaltrexone Injectable 12 milliGRAM(s) SubCutaneous daily  pantoprazole    Tablet 40 milliGRAM(s) Oral before breakfast  polyethylene glycol 3350 17 Gram(s) Oral two times a day  senna 2 Tablet(s) Oral at bedtime    atorvastatin 10 milliGRAM(s) Oral at bedtime  dextrose 50% Injectable 25 Gram(s) IV Push once  insulin glargine Injectable (LANTUS) 17 Unit(s) SubCutaneous at bedtime  insulin lispro (ADMELOG) corrective regimen sliding scale   SubCutaneous three times a day before meals  insulin lispro (ADMELOG) corrective regimen sliding scale   SubCutaneous at bedtime  insulin lispro Injectable (ADMELOG) 8 Unit(s) SubCutaneous before breakfast  insulin lispro Injectable (ADMELOG) 8 Unit(s) SubCutaneous before dinner  insulin lispro Injectable (ADMELOG) 8 Unit(s) SubCutaneous before lunch    latanoprost 0.005% Ophthalmic Solution 1 Drop(s) Both EYES at bedtime  lidocaine   4% Patch 1 Patch Transdermal daily  magnesium sulfate  IVPB 2 Gram(s) IV Intermittent once      PAST MEDICAL/SURGICAL HISTORY  PAST MEDICAL & SURGICAL HISTORY:  Hypertension      Diabetes mellitus      HLD (hyperlipidemia)      No significant past surgical history          SOCIAL HISTORY: Substance Use (street drugs): ( x ) never used  (  ) other:    FAMILY HISTORY:  FH: breast cancer (Sibling)        PHYSICAL EXAM:  T(C): 37.1 (08-07-23 @ 09:00), Max: 37.1 (08-06-23 @ 13:00)  HR: 91 (08-07-23 @ 09:00) (59 - 94)  BP: 143/71 (08-07-23 @ 09:00) (128/75 - 162/78)  RR: 18 (08-07-23 @ 09:00) (18 - 19)  SpO2: 97% (08-07-23 @ 09:00) (95% - 100%)  Wt(kg): --  I&O's Summary    06 Aug 2023 07:01  -  07 Aug 2023 07:00  --------------------------------------------------------  IN: 970 mL / OUT: 900 mL / NET: 70 mL    07 Aug 2023 07:01  -  07 Aug 2023 11:03  --------------------------------------------------------  IN: 0 mL / OUT: 200 mL / NET: -200 mL        GENERAL: NAD  EYES: conjunctiva and sclera clear  ENMT: No tonsillar erythema, exudates, or enlargement  Cardiovascular: Normal S1 S2, No JVD, No murmurs, No edema  Respiratory: diminished s/p chest tube removal  Gastrointestinal:  Soft, Non-tender, + BS	  Extremities: No edema                                     8.6    19.73 )-----------( 564      ( 07 Aug 2023 05:49 )             26.8     08-07    129<L>  |  95<L>  |  9   ----------------------------<  153<H>  4.0   |  25  |  0.54    Ca    8.9      07 Aug 2023 05:49  Phos  4.2     08-07  Mg     1.90     08-07      proBNP:   Lipid Profile:   HgA1c:   TSH:     Consultant(s) Notes Reviewed:  [x ] YES  [ ] NO    Care Discussed with Consultants/Other Providers [ x] YES  [ ] NO    Imaging Personally Reviewed independently:  [x] YES  [ ] NO    All labs, radiologic studies, vitals, orders and medications list reviewed. Patient is seen and examined at bedside. Case discussed with medical team.

## 2023-08-08 PROBLEM — I10 ESSENTIAL (PRIMARY) HYPERTENSION: Chronic | Status: ACTIVE | Noted: 2023-07-29

## 2023-08-08 PROBLEM — E78.5 HYPERLIPIDEMIA, UNSPECIFIED: Chronic | Status: ACTIVE | Noted: 2023-07-29

## 2023-08-08 LAB
ANION GAP SERPL CALC-SCNC: 11 MMOL/L — SIGNIFICANT CHANGE UP (ref 7–14)
BUN SERPL-MCNC: 9 MG/DL — SIGNIFICANT CHANGE UP (ref 7–23)
CALCIUM SERPL-MCNC: 8.3 MG/DL — LOW (ref 8.4–10.5)
CHLORIDE SERPL-SCNC: 98 MMOL/L — SIGNIFICANT CHANGE UP (ref 98–107)
CO2 SERPL-SCNC: 24 MMOL/L — SIGNIFICANT CHANGE UP (ref 22–31)
CREAT SERPL-MCNC: 0.62 MG/DL — SIGNIFICANT CHANGE UP (ref 0.5–1.3)
EGFR: 104 ML/MIN/1.73M2 — SIGNIFICANT CHANGE UP
GLUCOSE BLDC GLUCOMTR-MCNC: 162 MG/DL — HIGH (ref 70–99)
GLUCOSE BLDC GLUCOMTR-MCNC: 179 MG/DL — HIGH (ref 70–99)
GLUCOSE BLDC GLUCOMTR-MCNC: 182 MG/DL — HIGH (ref 70–99)
GLUCOSE BLDC GLUCOMTR-MCNC: 191 MG/DL — HIGH (ref 70–99)
GLUCOSE SERPL-MCNC: 111 MG/DL — HIGH (ref 70–99)
HCT VFR BLD CALC: 26.4 % — LOW (ref 39–50)
HGB BLD-MCNC: 8.3 G/DL — LOW (ref 13–17)
MAGNESIUM SERPL-MCNC: 2.3 MG/DL — SIGNIFICANT CHANGE UP (ref 1.6–2.6)
MCHC RBC-ENTMCNC: 27.9 PG — SIGNIFICANT CHANGE UP (ref 27–34)
MCHC RBC-ENTMCNC: 31.4 GM/DL — LOW (ref 32–36)
MCV RBC AUTO: 88.9 FL — SIGNIFICANT CHANGE UP (ref 80–100)
NON-GYNECOLOGICAL CYTOLOGY STUDY: SIGNIFICANT CHANGE UP
NRBC # BLD: 0 /100 WBCS — SIGNIFICANT CHANGE UP (ref 0–0)
NRBC # FLD: 0.08 K/UL — HIGH (ref 0–0)
PHOSPHATE SERPL-MCNC: 3.8 MG/DL — SIGNIFICANT CHANGE UP (ref 2.5–4.5)
PLATELET # BLD AUTO: 566 K/UL — HIGH (ref 150–400)
POTASSIUM SERPL-MCNC: 4.2 MMOL/L — SIGNIFICANT CHANGE UP (ref 3.5–5.3)
POTASSIUM SERPL-SCNC: 4.2 MMOL/L — SIGNIFICANT CHANGE UP (ref 3.5–5.3)
RBC # BLD: 2.97 M/UL — LOW (ref 4.2–5.8)
RBC # FLD: 14.4 % — SIGNIFICANT CHANGE UP (ref 10.3–14.5)
SODIUM SERPL-SCNC: 133 MMOL/L — LOW (ref 135–145)
WBC # BLD: 20.38 K/UL — HIGH (ref 3.8–10.5)
WBC # FLD AUTO: 20.38 K/UL — HIGH (ref 3.8–10.5)

## 2023-08-08 PROCEDURE — 71045 X-RAY EXAM CHEST 1 VIEW: CPT | Mod: 26

## 2023-08-08 PROCEDURE — 99232 SBSQ HOSP IP/OBS MODERATE 35: CPT

## 2023-08-08 RX ADMIN — ALBUTEROL 2.5 MILLIGRAM(S): 90 AEROSOL, METERED ORAL at 08:24

## 2023-08-08 RX ADMIN — ALBUTEROL 2.5 MILLIGRAM(S): 90 AEROSOL, METERED ORAL at 04:33

## 2023-08-08 RX ADMIN — Medication 8 UNIT(S): at 17:24

## 2023-08-08 RX ADMIN — INSULIN GLARGINE 17 UNIT(S): 100 INJECTION, SOLUTION SUBCUTANEOUS at 22:35

## 2023-08-08 RX ADMIN — Medication 650 MILLIGRAM(S): at 13:19

## 2023-08-08 RX ADMIN — Medication 50 MILLIGRAM(S): at 06:09

## 2023-08-08 RX ADMIN — SODIUM CHLORIDE 4 MILLILITER(S): 9 INJECTION INTRAMUSCULAR; INTRAVENOUS; SUBCUTANEOUS at 04:33

## 2023-08-08 RX ADMIN — Medication 2: at 13:10

## 2023-08-08 RX ADMIN — OXYCODONE HYDROCHLORIDE 7.5 MILLIGRAM(S): 5 TABLET ORAL at 08:29

## 2023-08-08 RX ADMIN — SENNA PLUS 2 TABLET(S): 8.6 TABLET ORAL at 22:38

## 2023-08-08 RX ADMIN — HEPARIN SODIUM 5000 UNIT(S): 5000 INJECTION INTRAVENOUS; SUBCUTANEOUS at 22:38

## 2023-08-08 RX ADMIN — ALBUTEROL 2.5 MILLIGRAM(S): 90 AEROSOL, METERED ORAL at 14:26

## 2023-08-08 RX ADMIN — Medication 8 UNIT(S): at 13:10

## 2023-08-08 RX ADMIN — Medication 500 MILLIGRAM(S): at 06:09

## 2023-08-08 RX ADMIN — HEPARIN SODIUM 5000 UNIT(S): 5000 INJECTION INTRAVENOUS; SUBCUTANEOUS at 13:13

## 2023-08-08 RX ADMIN — Medication 9 MILLIGRAM(S): at 22:38

## 2023-08-08 RX ADMIN — Medication 2: at 17:24

## 2023-08-08 RX ADMIN — CEFTRIAXONE 100 MILLIGRAM(S): 500 INJECTION, POWDER, FOR SOLUTION INTRAMUSCULAR; INTRAVENOUS at 13:13

## 2023-08-08 RX ADMIN — AMLODIPINE BESYLATE 5 MILLIGRAM(S): 2.5 TABLET ORAL at 06:08

## 2023-08-08 RX ADMIN — PANTOPRAZOLE SODIUM 40 MILLIGRAM(S): 20 TABLET, DELAYED RELEASE ORAL at 06:09

## 2023-08-08 RX ADMIN — LATANOPROST 1 DROP(S): 0.05 SOLUTION/ DROPS OPHTHALMIC; TOPICAL at 22:39

## 2023-08-08 RX ADMIN — Medication 8 UNIT(S): at 09:00

## 2023-08-08 RX ADMIN — LOSARTAN POTASSIUM 100 MILLIGRAM(S): 100 TABLET, FILM COATED ORAL at 06:09

## 2023-08-08 RX ADMIN — SODIUM CHLORIDE 4 MILLILITER(S): 9 INJECTION INTRAMUSCULAR; INTRAVENOUS; SUBCUTANEOUS at 14:27

## 2023-08-08 RX ADMIN — Medication 81 MILLIGRAM(S): at 13:12

## 2023-08-08 RX ADMIN — SODIUM CHLORIDE 4 MILLILITER(S): 9 INJECTION INTRAMUSCULAR; INTRAVENOUS; SUBCUTANEOUS at 21:07

## 2023-08-08 RX ADMIN — Medication 2: at 09:00

## 2023-08-08 RX ADMIN — SODIUM CHLORIDE 4 MILLILITER(S): 9 INJECTION INTRAMUSCULAR; INTRAVENOUS; SUBCUTANEOUS at 08:33

## 2023-08-08 RX ADMIN — OXYCODONE HYDROCHLORIDE 7.5 MILLIGRAM(S): 5 TABLET ORAL at 08:59

## 2023-08-08 RX ADMIN — Medication 500 MILLIGRAM(S): at 17:25

## 2023-08-08 RX ADMIN — ALBUTEROL 2.5 MILLIGRAM(S): 90 AEROSOL, METERED ORAL at 21:06

## 2023-08-08 RX ADMIN — ATORVASTATIN CALCIUM 10 MILLIGRAM(S): 80 TABLET, FILM COATED ORAL at 22:38

## 2023-08-08 RX ADMIN — CYCLOBENZAPRINE HYDROCHLORIDE 10 MILLIGRAM(S): 10 TABLET, FILM COATED ORAL at 22:38

## 2023-08-08 RX ADMIN — HEPARIN SODIUM 5000 UNIT(S): 5000 INJECTION INTRAVENOUS; SUBCUTANEOUS at 06:09

## 2023-08-08 RX ADMIN — Medication 650 MILLIGRAM(S): at 13:47

## 2023-08-08 NOTE — PROGRESS NOTE ADULT - SUBJECTIVE AND OBJECTIVE BOX
DATE OF SERVICE: 08-08-23  No SOB  s/p VATS       REVIEW OF SYSTEMS:  CONSTITUTIONAL: No fever  RESPIRATORY: No cough, hemoptysis shortness of breath  CARDIOVASCULAR: No palpitations, dizziness, or leg swelling  GASTROINTESTINAL: No nausea, vomiting, hematemesis  GENITOURINARY: No dysuria, frequency, hematuria   NEUROLOGICAL: No headaches, no dizziness  MUSCULOSKELETAL: No joint pain or swelling;     INTERVAL HPI/OVERNIGHT EVENTS:  ICU Vital Signs Last 24 Hrs  T(C): 36.7 (05 Aug 2023 12:00), Max: 37.3 (05 Aug 2023 08:00)  T(F): 98.1 (05 Aug 2023 12:00), Max: 99.1 (05 Aug 2023 08:00)  HR: 68 (05 Aug 2023 15:00) (68 - 97)  BP: 122/67 (05 Aug 2023 15:00) (122/67 - 162/89)  BP(mean): 84 (05 Aug 2023 15:00) (84 - 111)  ABP: --  ABP(mean): --  RR: 20 (05 Aug 2023 15:00) (17 - 30)  SpO2: 100% (05 Aug 2023 15:00) (96% - 100%)    O2 Parameters below as of 05 Aug 2023 15:00  Patient On (Oxygen Delivery Method): room air          LCBC Full  -  ( 05 Aug 2023 03:18 )  WBC Count : 18.72 K/uL  RBC Count : 3.05 M/uL  Hemoglobin : 8.6 g/dL  Hematocrit : 26.8 %  Platelet Count - Automated : 412 K/uL  Mean Cell Volume : 87.9 fL  Mean Cell Hemoglobin : 28.2 pg  Mean Cell Hemoglobin Concentration : 32.1 gm/dL  Auto Neutrophil # : x  Auto Lymphocyte # : x  Auto Monocyte # : x  Auto Eosinophil # : x  Auto Basophil # : x  Auto Neutrophil % : x  Auto Lymphocyte % : x  Auto Monocyte % : x  Auto Eosinophil % : x  Auto Basophil % : x        PAST MEDICAL & SURGICAL HISTORY:  Hypertension    BMI: BMI (kg/m2): 31.5 (08-02-23 @ 15:14)  HbA1c: A1C with Estimated Average Glucose Result: 7.8 % (07-30-23 @ 07:16)    Glucose: POCT Blood Glucose.: 199 mg/dL (08-05-23 @ 12:44)    BP: 122/67 (08-05-23 @ 15:00) (86/53 - 162/89)  Lipid Panel: Date/Time: 07-30-23 @ 07:16  Cholesterol, Serum: 101  Direct LDL: --  HDL Cholesterol, Serum: 50  Total Cholesterol/HDL Ration Measurement: --  Triglycerides, Serum: 122    Diabetes mellitus      HLD (hyperlipidemia)      No significant past surgical history          MEDICATIONS  (STANDING):  amLODIPine   Tablet 5 milliGRAM(s) Oral daily  aspirin  chewable 81 milliGRAM(s) Oral daily  atorvastatin 10 milliGRAM(s) Oral at bedtime  cyclobenzaprine 10 milliGRAM(s) Oral at bedtime  dextrose 5%. 1000 milliLiter(s) (50 mL/Hr) IV Continuous <Continuous>  dextrose 5%. 1000 milliLiter(s) (100 mL/Hr) IV Continuous <Continuous>  dextrose 50% Injectable 12.5 Gram(s) IV Push once  dextrose 50% Injectable 25 Gram(s) IV Push once  dextrose 50% Injectable 25 Gram(s) IV Push once  glucagon  Injectable 1 milliGRAM(s) IntraMuscular once  hydrALAZINE 50 milliGRAM(s) Oral two times a day  insulin lispro (ADMELOG) corrective regimen sliding scale   SubCutaneous three times a day before meals  insulin lispro (ADMELOG) corrective regimen sliding scale   SubCutaneous at bedtime  latanoprost 0.005% Ophthalmic Solution 1 Drop(s) Both EYES at bedtime  losartan 100 milliGRAM(s) Oral daily  melatonin 9 milliGRAM(s) Oral at bedtime    MEDICATIONS  (PRN):  acetaminophen     Tablet .. 650 milliGRAM(s) Oral every 6 hours PRN Temp greater or equal to 38C (100.4F), Mild Pain (1 - 3)  aluminum hydroxide/magnesium hydroxide/simethicone Suspension 30 milliLiter(s) Oral every 4 hours PRN Dyspepsia  dextrose Oral Gel 15 Gram(s) Oral once PRN Blood Glucose LESS THAN 70 milliGRAM(s)/deciliter  HYDROmorphone  Injectable 0.5 milliGRAM(s) IV Push every 4 hours PRN breakthrough pain  nitroglycerin     SubLingual 0.4 milliGRAM(s) SubLingual every 5 minutes PRN Chest Pain  ondansetron Injectable 4 milliGRAM(s) IV Push every 8 hours PRN Nausea and/or Vomiting  oxycodone    5 mG/acetaminophen 325 mG 1 Tablet(s) Oral every 6 hours PRN Moderate Pain (4 - 6)  oxycodone    5 mG/acetaminophen 325 mG 2 Tablet(s) Oral every 4 hours PRN Severe Pain (7 - 10)        RADIOLOGY & ADDITIONAL TESTS:    Imaging Personally Reviewed:  [ ] YES  [ ] NO    Consultant(s) Notes Reviewed:  [x ] YES  [ ] NO    PHYSICAL EXAM:  GENERAL: Alert and awake lying in bed in no distress  HEAD:  Atraumatic, Normocephalic  EYES: EOMI, MADALYN, conjunctiva and sclera clear  NECK: Supple, No JVD, Normal thyroid  NERVOUS SYSTEM:  Alert & Oriented X3, Motor and sensory systems are intact,   CHEST/LUNG: Left sided chest tubes +  HEART: Regular rate and rhythm; No murmurs, rubs, or gallops  ABDOMEN: Soft, Nontender, Nondistended; Bowel sounds present  EXTREMITIES:   Peripheral Pulses are palpable, no  edema        Care Discussed with Consultants/Other Providers [x ] YES  [ ] NO      Code Status: [] Full Code [] DNR [] DNI [] Goals of Care:   Disposition: [] ICU [] Stroke Unit [] RCU []PCU []Floor [] Discharge Home         JANETT HighFACP

## 2023-08-08 NOTE — PROGRESS NOTE ADULT - SUBJECTIVE AND OBJECTIVE BOX
Didier Cotton MD  Interventional Cardiology / Advance Heart Failure and Cardiac Transplant Specialist  [place] Office : [address] [place] [address]  Tel:   [place] Office : [address] [facility] [address]  Tel: [phone]      Subjective/Overnight events: Pt is sitting up in chair, no acute distress. Sinus tach 110s on tele although patient had just finished taking a shower  	  MEDICATIONS:  amLODIPine   Tablet 5 milliGRAM(s) Oral daily  aspirin  chewable 81 milliGRAM(s) Oral daily  heparin   Injectable 5000 Unit(s) SubCutaneous every 8 hours  hydrALAZINE 50 milliGRAM(s) Oral every 24 hours  losartan 100 milliGRAM(s) Oral daily    cefTRIAXone   IVPB 2000 milliGRAM(s) IV Intermittent every 24 hours  metroNIDAZOLE    Tablet 500 milliGRAM(s) Oral two times a day    albuterol    0.083% 2.5 milliGRAM(s) Nebulizer every 6 hours  dornase shana Solution 2.5 milliGRAM(s) Inhalation daily  sodium chloride 3%  Inhalation 4 milliLiter(s) Inhalation every 6 hours    acetaminophen     Tablet .. 650 milliGRAM(s) Oral every 6 hours PRN  acetaminophen   IVPB .. 1000 milliGRAM(s) IV Intermittent once  cyclobenzaprine 10 milliGRAM(s) Oral at bedtime  melatonin 9 milliGRAM(s) Oral at bedtime  ondansetron Injectable 4 milliGRAM(s) IV Push every 6 hours PRN  oxyCODONE    IR 7.5 milliGRAM(s) Oral every 3 hours PRN  oxyCODONE    IR 5 milliGRAM(s) Oral every 3 hours PRN    aluminum hydroxide/magnesium hydroxide/simethicone Suspension 30 milliLiter(s) Oral every 4 hours PRN  methylnaltrexone Injectable 12 milliGRAM(s) SubCutaneous daily  pantoprazole    Tablet 40 milliGRAM(s) Oral before breakfast  polyethylene glycol 3350 17 Gram(s) Oral two times a day  senna 2 Tablet(s) Oral at bedtime    atorvastatin 10 milliGRAM(s) Oral at bedtime  dextrose 50% Injectable 25 Gram(s) IV Push once  insulin glargine Injectable (LANTUS) 17 Unit(s) SubCutaneous at bedtime  insulin lispro (ADMELOG) corrective regimen sliding scale   SubCutaneous three times a day before meals  insulin lispro (ADMELOG) corrective regimen sliding scale   SubCutaneous at bedtime  insulin lispro Injectable (ADMELOG) 8 Unit(s) SubCutaneous before breakfast  insulin lispro Injectable (ADMELOG) 8 Unit(s) SubCutaneous before dinner  insulin lispro Injectable (ADMELOG) 8 Unit(s) SubCutaneous before lunch    latanoprost 0.005% Ophthalmic Solution 1 Drop(s) Both EYES at bedtime  lidocaine   4% Patch 1 Patch Transdermal daily      PAST MEDICAL/SURGICAL HISTORY  PAST MEDICAL & SURGICAL HISTORY:  Hypertension      Diabetes mellitus      HLD (hyperlipidemia)      No significant past surgical history          SOCIAL HISTORY: Substance Use (street drugs): ( x ) never used  (  ) other:    FAMILY HISTORY:  FH: breast cancer (Sibling)          PHYSICAL EXAM:  T(C): 37.3 (08-08-23 @ 12:00), Max: 37.3 (08-08-23 @ 12:00)  HR: 101 (08-08-23 @ 12:00) (73 - 111)  BP: 127/63 (08-08-23 @ 12:00) (121/59 - 136/63)  RR: 18 (08-08-23 @ 12:00) (18 - 18)  SpO2: 97% (08-08-23 @ 12:00) (97% - 100%)  Wt(kg): --  I&O's Summary    07 Aug 2023 07:01  -  08 Aug 2023 07:00  --------------------------------------------------------  IN: 1000 mL / OUT: 650 mL / NET: 350 mL    08 Aug 2023 07:01  -  08 Aug 2023 14:28  --------------------------------------------------------  IN: 440 mL / OUT: 650 mL / NET: -210 mL            GENERAL: NAD  EYES: conjunctiva and sclera clear  ENMT: No tonsillar erythema, exudates, or enlargement  Cardiovascular: Normal S1 S2, No JVD, No murmurs, No edema  Respiratory: diminished s/p chest tube removal  Gastrointestinal:  Soft, Non-tender, + BS	  Extremities: No edema                                 8.3    20.38 )-----------( 566      ( 08 Aug 2023 05:35 )             26.4     08-08    133<L>  |  98  |  9   ----------------------------<  111<H>  4.2   |  24  |  0.62    Ca    8.3<L>      08 Aug 2023 05:35  Phos  3.8     08-08  Mg     2.30     08-08      proBNP:   Lipid Profile:   HgA1c:   TSH:     Consultant(s) Notes Reviewed:  [x ] YES  [ ] NO    Care Discussed with Consultants/Other Providers [ x] YES  [ ] NO    Imaging Personally Reviewed independently:  [x] YES  [ ] NO    All labs, radiologic studies, vitals, orders and medications list reviewed. Patient is seen and examined at bedside. Case discussed with medical team.

## 2023-08-08 NOTE — PROGRESS NOTE ADULT - SUBJECTIVE AND OBJECTIVE BOX
Follow Up:      Inverval History/ROS:Patient is a 67y old  Male who presents with a chief complaint of celiac artery stenosis, c/f median arcuate ligament syndrome; renal mass (08 Aug 2023 14:27)    No fever  No events    Allergies    No Known Allergies    Intolerances        ANTIMICROBIALS:  cefTRIAXone   IVPB 2000 every 24 hours  metroNIDAZOLE    Tablet 500 two times a day      OTHER MEDS:  acetaminophen     Tablet .. 650 milliGRAM(s) Oral every 6 hours PRN  acetaminophen   IVPB .. 1000 milliGRAM(s) IV Intermittent once  albuterol    0.083% 2.5 milliGRAM(s) Nebulizer every 6 hours  aluminum hydroxide/magnesium hydroxide/simethicone Suspension 30 milliLiter(s) Oral every 4 hours PRN  amLODIPine   Tablet 5 milliGRAM(s) Oral daily  aspirin  chewable 81 milliGRAM(s) Oral daily  atorvastatin 10 milliGRAM(s) Oral at bedtime  cyclobenzaprine 10 milliGRAM(s) Oral at bedtime  dextrose 50% Injectable 25 Gram(s) IV Push once  dornase shana Solution 2.5 milliGRAM(s) Inhalation daily  heparin   Injectable 5000 Unit(s) SubCutaneous every 8 hours  hydrALAZINE 50 milliGRAM(s) Oral every 24 hours  insulin glargine Injectable (LANTUS) 17 Unit(s) SubCutaneous at bedtime  insulin lispro (ADMELOG) corrective regimen sliding scale   SubCutaneous three times a day before meals  insulin lispro (ADMELOG) corrective regimen sliding scale   SubCutaneous at bedtime  insulin lispro Injectable (ADMELOG) 8 Unit(s) SubCutaneous before lunch  insulin lispro Injectable (ADMELOG) 8 Unit(s) SubCutaneous before breakfast  insulin lispro Injectable (ADMELOG) 8 Unit(s) SubCutaneous before dinner  latanoprost 0.005% Ophthalmic Solution 1 Drop(s) Both EYES at bedtime  lidocaine   4% Patch 1 Patch Transdermal daily  losartan 100 milliGRAM(s) Oral daily  melatonin 9 milliGRAM(s) Oral at bedtime  methylnaltrexone Injectable 12 milliGRAM(s) SubCutaneous daily  naloxone Injectable 0.1 milliGRAM(s) IV Push every 3 minutes PRN  ondansetron Injectable 4 milliGRAM(s) IV Push every 6 hours PRN  oxyCODONE    IR 7.5 milliGRAM(s) Oral every 3 hours PRN  oxyCODONE    IR 5 milliGRAM(s) Oral every 3 hours PRN  pantoprazole    Tablet 40 milliGRAM(s) Oral before breakfast  polyethylene glycol 3350 17 Gram(s) Oral two times a day  senna 2 Tablet(s) Oral at bedtime  sodium chloride 3%  Inhalation 4 milliLiter(s) Inhalation every 6 hours      Vital Signs Last 24 Hrs  T(C): 37.3 (08 Aug 2023 12:00), Max: 37.3 (08 Aug 2023 12:00)  T(F): 99.1 (08 Aug 2023 12:00), Max: 99.1 (08 Aug 2023 12:00)  HR: 74 (08 Aug 2023 15:39) (73 - 111)  BP: 127/63 (08 Aug 2023 12:00) (121/59 - 136/63)  BP(mean): --  RR: 18 (08 Aug 2023 12:00) (18 - 18)  SpO2: 100% (08 Aug 2023 15:39) (97% - 100%)    Parameters below as of 08 Aug 2023 14:29  Patient On (Oxygen Delivery Method): room air        PHYSICAL EXAM:  General: [x ] non-toxic  HEAD/EYES: [ ] PERRL [x ] white sclera [ ] icterus  ENT:  [ ] normal [x ] supple [ ] thrush [ ] pharyngeal exudate  Cardiovascular:   [ ] murmur [x ] normal [ ] PPM/AICD  Respiratory:  [x ] clear to ausculation bilaterally  GI:  [x ] soft, non-tender, normal bowel sounds  :  [ ] horne [ ] no CVA tenderness   Musculoskeletal:  [ ] no synovitis  Neurologic:  [ ] non-focal exam   Skin:  [ x] no rash  Lymph: [x ] no lymphadenopathy  Psychiatric:  [ ] appropriate affect [ ] alert & oriented  Lines:  [x ] no phlebitis [ ] central line                                8.3    20.38 )-----------( 566      ( 08 Aug 2023 05:35 )             26.4       08-08    133<L>  |  98  |  9   ----------------------------<  111<H>  4.2   |  24  |  0.62    Ca    8.3<L>      08 Aug 2023 05:35  Phos  3.8     08-08  Mg     2.30     08-08        Urinalysis Basic - ( 08 Aug 2023 05:35 )    Color: x / Appearance: x / SG: x / pH: x  Gluc: 111 mg/dL / Ketone: x  / Bili: x / Urobili: x   Blood: x / Protein: x / Nitrite: x   Leuk Esterase: x / RBC: x / WBC x   Sq Epi: x / Non Sq Epi: x / Bacteria: x        MICROBIOLOGY:Culture Results:   No growth (08-04-23 @ 13:35)  Culture Results:   Rare Streptococcus constellatus  See previous culture [id number]  Rare Parvimonas micra "Susceptibilities not performed" (08-02-23 @ 20:41)  Culture Results:   Culture is being performed. Fungal cultures are held for 4 weeks. (08-02-23 @ 20:41)  Culture Results:   Culture is being performed. (08-02-23 @ 20:41)  Culture Results:   No growth at 5 days (08-02-23 @ 20:37)  Culture Results:   Culture is being performed. Fungal cultures are held for 4 weeks. (08-02-23 @ 20:37)  Culture Results:   Culture is being performed. (08-02-23 @ 20:37)  Culture Results:   No growth at 5 days (08-02-23 @ 20:33)  Culture Results:   Culture is being performed. Fungal cultures are held for 4 weeks. (08-02-23 @ 20:33)  Culture Results:   Culture is being performed. (08-02-23 @ 20:33)  Culture Results:   Few Streptococcus constellatus  Few Parvimonas micra "Susceptibilities not performed" (08-02-23 @ 20:29)  Culture Results:   Culture is being performed. Fungal cultures are held for 4 weeks. (08-02-23 @ 20:29)  Culture Results:   Culture is being performed. (08-02-23 @ 20:29)  Culture Results:   No growth at 5 days (08-02-23 @ 20:22)  Culture Results:   Culture is being performed. Fungal cultures are held for 4 weeks. (08-02-23 @ 20:22)  Culture Results:   Culture is being performed. (08-02-23 @ 20:22)  Culture Results:   No growth at 5 days (08-02-23 @ 20:12)  Culture Results:   Culture is being performed. Fungal cultures are held for 4 weeks. (08-02-23 @ 20:12)  Culture Results:   Culture is being performed. (08-02-23 @ 20:12)  Culture Results:   No growth at 48 hours (08-02-23 @ 17:16)  Culture Results:   Culture is being performed. Fungal cultures are held for 4 weeks. (08-02-23 @ 17:16)  Culture Results:   Culture is being performed. (08-02-23 @ 17:16)      RADIOLOGY:

## 2023-08-08 NOTE — PROGRESS NOTE ADULT - ASSESSMENT
67 year old male with htn and DM presented with left sided chest pain.  Imaging with left sided opacities and ? worsening left sided effusion.  Associated leukocytosis.     1) Shortness of breath with cough  S/p vats/ decortication    Gram stain with GPC- pleural fluid growing strep constellatus and parvimonas  CT removed    Continue antibiotics through 9/9  Can change to ertapenem 1 gram iv daily to avoid prolonged use of flagyl  weekly cbc, bmp fax to [phone]    Follow up as an outpt [phone]    2) Renal Mass-  Solid exophytic left renal mass and primary renal neoplasm cannot be   excluded. Additional indeterminate right renal lesion measuring 1.8 x 1.4   cm. Urology evaluation .     3) Abnormal imaging  stenosis of celiac axis  Surgery following    4) Leukocytosis-   continue to monitor  May be multifactorial- resolving infection, renal mass contributing

## 2023-08-09 LAB
ANION GAP SERPL CALC-SCNC: 8 MMOL/L — SIGNIFICANT CHANGE UP (ref 7–14)
BUN SERPL-MCNC: 8 MG/DL — SIGNIFICANT CHANGE UP (ref 7–23)
CALCIUM SERPL-MCNC: 8.4 MG/DL — SIGNIFICANT CHANGE UP (ref 8.4–10.5)
CHLORIDE SERPL-SCNC: 102 MMOL/L — SIGNIFICANT CHANGE UP (ref 98–107)
CO2 SERPL-SCNC: 24 MMOL/L — SIGNIFICANT CHANGE UP (ref 22–31)
CREAT SERPL-MCNC: 0.62 MG/DL — SIGNIFICANT CHANGE UP (ref 0.5–1.3)
CULTURE RESULTS: NO GROWTH — SIGNIFICANT CHANGE UP
CULTURE RESULTS: SIGNIFICANT CHANGE UP
CULTURE RESULTS: SIGNIFICANT CHANGE UP
EGFR: 104 ML/MIN/1.73M2 — SIGNIFICANT CHANGE UP
GLUCOSE BLDC GLUCOMTR-MCNC: 143 MG/DL — HIGH (ref 70–99)
GLUCOSE BLDC GLUCOMTR-MCNC: 181 MG/DL — HIGH (ref 70–99)
GLUCOSE BLDC GLUCOMTR-MCNC: 217 MG/DL — HIGH (ref 70–99)
GLUCOSE BLDC GLUCOMTR-MCNC: 242 MG/DL — HIGH (ref 70–99)
GLUCOSE SERPL-MCNC: 133 MG/DL — HIGH (ref 70–99)
HCT VFR BLD CALC: 26.1 % — LOW (ref 39–50)
HGB BLD-MCNC: 8.2 G/DL — LOW (ref 13–17)
MAGNESIUM SERPL-MCNC: 2 MG/DL — SIGNIFICANT CHANGE UP (ref 1.6–2.6)
MCHC RBC-ENTMCNC: 29 PG — SIGNIFICANT CHANGE UP (ref 27–34)
MCHC RBC-ENTMCNC: 31.4 GM/DL — LOW (ref 32–36)
MCV RBC AUTO: 92.2 FL — SIGNIFICANT CHANGE UP (ref 80–100)
NRBC # BLD: 0 /100 WBCS — SIGNIFICANT CHANGE UP (ref 0–0)
NRBC # FLD: 0.03 K/UL — HIGH (ref 0–0)
PHOSPHATE SERPL-MCNC: 3.3 MG/DL — SIGNIFICANT CHANGE UP (ref 2.5–4.5)
PLATELET # BLD AUTO: 543 K/UL — HIGH (ref 150–400)
POTASSIUM SERPL-MCNC: 4.8 MMOL/L — SIGNIFICANT CHANGE UP (ref 3.5–5.3)
POTASSIUM SERPL-SCNC: 4.8 MMOL/L — SIGNIFICANT CHANGE UP (ref 3.5–5.3)
RBC # BLD: 2.83 M/UL — LOW (ref 4.2–5.8)
RBC # FLD: 14.6 % — HIGH (ref 10.3–14.5)
SODIUM SERPL-SCNC: 134 MMOL/L — LOW (ref 135–145)
SPECIMEN SOURCE: SIGNIFICANT CHANGE UP
SURGICAL PATHOLOGY STUDY: SIGNIFICANT CHANGE UP
WBC # BLD: 20.24 K/UL — HIGH (ref 3.8–10.5)
WBC # FLD AUTO: 20.24 K/UL — HIGH (ref 3.8–10.5)

## 2023-08-09 PROCEDURE — 99232 SBSQ HOSP IP/OBS MODERATE 35: CPT

## 2023-08-09 RX ADMIN — SODIUM CHLORIDE 4 MILLILITER(S): 9 INJECTION INTRAMUSCULAR; INTRAVENOUS; SUBCUTANEOUS at 08:36

## 2023-08-09 RX ADMIN — Medication 81 MILLIGRAM(S): at 11:19

## 2023-08-09 RX ADMIN — OXYCODONE HYDROCHLORIDE 7.5 MILLIGRAM(S): 5 TABLET ORAL at 22:26

## 2023-08-09 RX ADMIN — HEPARIN SODIUM 5000 UNIT(S): 5000 INJECTION INTRAVENOUS; SUBCUTANEOUS at 06:17

## 2023-08-09 RX ADMIN — LOSARTAN POTASSIUM 100 MILLIGRAM(S): 100 TABLET, FILM COATED ORAL at 06:16

## 2023-08-09 RX ADMIN — Medication 9 MILLIGRAM(S): at 21:59

## 2023-08-09 RX ADMIN — OXYCODONE HYDROCHLORIDE 7.5 MILLIGRAM(S): 5 TABLET ORAL at 21:56

## 2023-08-09 RX ADMIN — AMLODIPINE BESYLATE 5 MILLIGRAM(S): 2.5 TABLET ORAL at 06:16

## 2023-08-09 RX ADMIN — OXYCODONE HYDROCHLORIDE 5 MILLIGRAM(S): 5 TABLET ORAL at 11:56

## 2023-08-09 RX ADMIN — Medication 2: at 08:31

## 2023-08-09 RX ADMIN — Medication 4: at 17:34

## 2023-08-09 RX ADMIN — ATORVASTATIN CALCIUM 10 MILLIGRAM(S): 80 TABLET, FILM COATED ORAL at 21:59

## 2023-08-09 RX ADMIN — LATANOPROST 1 DROP(S): 0.05 SOLUTION/ DROPS OPHTHALMIC; TOPICAL at 22:00

## 2023-08-09 RX ADMIN — PANTOPRAZOLE SODIUM 40 MILLIGRAM(S): 20 TABLET, DELAYED RELEASE ORAL at 06:16

## 2023-08-09 RX ADMIN — ALBUTEROL 2.5 MILLIGRAM(S): 90 AEROSOL, METERED ORAL at 03:26

## 2023-08-09 RX ADMIN — Medication 8 UNIT(S): at 08:32

## 2023-08-09 RX ADMIN — ALBUTEROL 2.5 MILLIGRAM(S): 90 AEROSOL, METERED ORAL at 21:00

## 2023-08-09 RX ADMIN — Medication 8 UNIT(S): at 17:35

## 2023-08-09 RX ADMIN — HEPARIN SODIUM 5000 UNIT(S): 5000 INJECTION INTRAVENOUS; SUBCUTANEOUS at 22:00

## 2023-08-09 RX ADMIN — Medication 500 MILLIGRAM(S): at 17:34

## 2023-08-09 RX ADMIN — ALBUTEROL 2.5 MILLIGRAM(S): 90 AEROSOL, METERED ORAL at 14:59

## 2023-08-09 RX ADMIN — SODIUM CHLORIDE 4 MILLILITER(S): 9 INJECTION INTRAMUSCULAR; INTRAVENOUS; SUBCUTANEOUS at 14:59

## 2023-08-09 RX ADMIN — OXYCODONE HYDROCHLORIDE 5 MILLIGRAM(S): 5 TABLET ORAL at 10:25

## 2023-08-09 RX ADMIN — CEFTRIAXONE 100 MILLIGRAM(S): 500 INJECTION, POWDER, FOR SOLUTION INTRAMUSCULAR; INTRAVENOUS at 11:19

## 2023-08-09 RX ADMIN — Medication 500 MILLIGRAM(S): at 06:15

## 2023-08-09 RX ADMIN — SODIUM CHLORIDE 4 MILLILITER(S): 9 INJECTION INTRAMUSCULAR; INTRAVENOUS; SUBCUTANEOUS at 21:00

## 2023-08-09 RX ADMIN — CYCLOBENZAPRINE HYDROCHLORIDE 10 MILLIGRAM(S): 10 TABLET, FILM COATED ORAL at 21:59

## 2023-08-09 RX ADMIN — ALBUTEROL 2.5 MILLIGRAM(S): 90 AEROSOL, METERED ORAL at 08:36

## 2023-08-09 RX ADMIN — Medication 8 UNIT(S): at 12:40

## 2023-08-09 RX ADMIN — METHYLNALTREXONE BROMIDE 12 MILLIGRAM(S): 12 INJECTION, SOLUTION SUBCUTANEOUS at 11:22

## 2023-08-09 RX ADMIN — SODIUM CHLORIDE 4 MILLILITER(S): 9 INJECTION INTRAMUSCULAR; INTRAVENOUS; SUBCUTANEOUS at 03:26

## 2023-08-09 RX ADMIN — Medication 50 MILLIGRAM(S): at 06:15

## 2023-08-09 RX ADMIN — INSULIN GLARGINE 17 UNIT(S): 100 INJECTION, SOLUTION SUBCUTANEOUS at 21:57

## 2023-08-09 NOTE — PROGRESS NOTE ADULT - SUBJECTIVE AND OBJECTIVE BOX
Follow Up:      Inverval History/ROS:Patient is a 67y old  Male who presents with a chief complaint of celiac artery stenosis, c/f median arcuate ligament syndrome; renal mass (09 Aug 2023 16:49)    Comfortable on room air. No cough  No SOB    Allergies    No Known Allergies    Intolerances        ANTIMICROBIALS:  cefTRIAXone   IVPB 2000 every 24 hours  metroNIDAZOLE    Tablet 500 two times a day      OTHER MEDS:  acetaminophen     Tablet .. 650 milliGRAM(s) Oral every 6 hours PRN  acetaminophen   IVPB .. 1000 milliGRAM(s) IV Intermittent once  albuterol    0.083% 2.5 milliGRAM(s) Nebulizer every 6 hours  aluminum hydroxide/magnesium hydroxide/simethicone Suspension 30 milliLiter(s) Oral every 4 hours PRN  amLODIPine   Tablet 5 milliGRAM(s) Oral daily  aspirin  chewable 81 milliGRAM(s) Oral daily  atorvastatin 10 milliGRAM(s) Oral at bedtime  cyclobenzaprine 10 milliGRAM(s) Oral at bedtime  dextrose 50% Injectable 25 Gram(s) IV Push once  dornase shana Solution 2.5 milliGRAM(s) Inhalation daily  heparin   Injectable 5000 Unit(s) SubCutaneous every 8 hours  hydrALAZINE 50 milliGRAM(s) Oral every 24 hours  insulin glargine Injectable (LANTUS) 17 Unit(s) SubCutaneous at bedtime  insulin lispro (ADMELOG) corrective regimen sliding scale   SubCutaneous three times a day before meals  insulin lispro (ADMELOG) corrective regimen sliding scale   SubCutaneous at bedtime  insulin lispro Injectable (ADMELOG) 8 Unit(s) SubCutaneous before lunch  insulin lispro Injectable (ADMELOG) 8 Unit(s) SubCutaneous before breakfast  insulin lispro Injectable (ADMELOG) 8 Unit(s) SubCutaneous before dinner  latanoprost 0.005% Ophthalmic Solution 1 Drop(s) Both EYES at bedtime  lidocaine   4% Patch 1 Patch Transdermal daily  losartan 100 milliGRAM(s) Oral daily  melatonin 9 milliGRAM(s) Oral at bedtime  methylnaltrexone Injectable 12 milliGRAM(s) SubCutaneous daily  naloxone Injectable 0.1 milliGRAM(s) IV Push every 3 minutes PRN  ondansetron Injectable 4 milliGRAM(s) IV Push every 6 hours PRN  oxyCODONE    IR 5 milliGRAM(s) Oral every 3 hours PRN  oxyCODONE    IR 7.5 milliGRAM(s) Oral every 3 hours PRN  pantoprazole    Tablet 40 milliGRAM(s) Oral before breakfast  polyethylene glycol 3350 17 Gram(s) Oral two times a day  senna 2 Tablet(s) Oral at bedtime  sodium chloride 3%  Inhalation 4 milliLiter(s) Inhalation every 6 hours      Vital Signs Last 24 Hrs  T(C): 37.3 (09 Aug 2023 15:14), Max: 37.3 (09 Aug 2023 15:14)  T(F): 99.2 (09 Aug 2023 15:14), Max: 99.2 (09 Aug 2023 15:14)  HR: 95 (09 Aug 2023 15:14) (69 - 95)  BP: 121/61 (09 Aug 2023 15:14) (121/61 - 147/67)  BP(mean): --  RR: 18 (09 Aug 2023 15:14) (18 - 18)  SpO2: 100% (09 Aug 2023 15:14) (98% - 100%)    Parameters below as of 09 Aug 2023 15:14  Patient On (Oxygen Delivery Method): room air        PHYSICAL EXAM:  General: [ x] non-toxic  HEAD/EYES: [ ] PERRL [x ] white sclera [ ] icterus  ENT:  [ ] normal [ x] supple [ ] thrush [ ] pharyngeal exudate  Cardiovascular:   [ ] murmur [x ] normal [ ] PPM/AICD  Respiratory:  [ x] clear to ausculation bilaterally  GI:  [ x] soft, non-tender, normal bowel sounds  :  [ ] horne [x ] no CVA tenderness   Musculoskeletal:  [ ] no synovitis  Neurologic:  [ ] non-focal exam   Skin:  [x ] no rash  Lymph: [x ] no lymphadenopathy  Psychiatric:  [ ] appropriate affect [x ] alert & oriented  Lines:  [ x] no phlebitis [ ] central line                                8.2    20.24 )-----------( 543      ( 09 Aug 2023 05:50 )             26.1       08-09    134<L>  |  102  |  8   ----------------------------<  133<H>  4.8   |  24  |  0.62    Ca    8.4      09 Aug 2023 05:50  Phos  3.3     08-09  Mg     2.00     08-09        Urinalysis Basic - ( 09 Aug 2023 05:50 )    Color: x / Appearance: x / SG: x / pH: x  Gluc: 133 mg/dL / Ketone: x  / Bili: x / Urobili: x   Blood: x / Protein: x / Nitrite: x   Leuk Esterase: x / RBC: x / WBC x   Sq Epi: x / Non Sq Epi: x / Bacteria: x        MICROBIOLOGY:Culture Results:   No growth (08-04-23 @ 13:35)  Culture Results:   Rare Streptococcus constellatus  See previous culture [id number]  Rare Parvimonas micra "Susceptibilities not performed" (08-02-23 @ 20:41)  Culture Results:   Culture is being performed. Fungal cultures are held for 4 weeks. (08-02-23 @ 20:41)  Culture Results:   Culture is being performed. (08-02-23 @ 20:41)  Culture Results:   No growth at 5 days (08-02-23 @ 20:37)  Culture Results:   Culture is being performed. Fungal cultures are held for 4 weeks. (08-02-23 @ 20:37)  Culture Results:   Culture is being performed. (08-02-23 @ 20:37)  Culture Results:   No growth at 5 days (08-02-23 @ 20:33)  Culture Results:   Culture is being performed. Fungal cultures are held for 4 weeks. (08-02-23 @ 20:33)  Culture Results:   Culture is being performed. (08-02-23 @ 20:33)  Culture Results:   Few Streptococcus constellatus  Few Parvimonas micra "Susceptibilities not performed" (08-02-23 @ 20:29)  Culture Results:   Culture is being performed. Fungal cultures are held for 4 weeks. (08-02-23 @ 20:29)  Culture Results:   Culture is being performed. (08-02-23 @ 20:29)  Culture Results:   No growth at 5 days (08-02-23 @ 20:22)  Culture Results:   Culture is being performed. Fungal cultures are held for 4 weeks. (08-02-23 @ 20:22)  Culture Results:   Culture is being performed. (08-02-23 @ 20:22)  Culture Results:   No growth at 5 days (08-02-23 @ 20:12)  Culture Results:   Culture is being performed. Fungal cultures are held for 4 weeks. (08-02-23 @ 20:12)  Culture Results:   Culture is being performed. (08-02-23 @ 20:12)  Culture Results:   No growth at 48 hours (08-02-23 @ 17:16)  Culture Results:   Culture is being performed. Fungal cultures are held for 4 weeks. (08-02-23 @ 17:16)  Culture Results:   Culture is being performed. (08-02-23 @ 17:16)      RADIOLOGY:

## 2023-08-09 NOTE — CHART NOTE - NSCHARTNOTEFT_GEN_A_CORE
Patient Age: 67    Patient Gender: Male    Procedure (including site / side if known) : PICC    Diagnosis / Indication: Bacteremia in pleural effusion    Interventional Radiology Attending Physician:    Ordering Attending Physician: Dr Cantu    Pertinent Medical History:  67 year old male with htn and DM presented with left sided chest pain.  Imaging with left sided opacities and ? worsening left sided effusion.  Associated leukocytosis.    Shortness of breath with cough S/p vats/ decortication  Gram stain with GPC- pleural fluid growing strep constellatus and parvimonas  Continue antibiotics through 9/9  Can change to ertapenem 1 gram iv daily     PAST MEDICAL & SURGICAL HISTORY:  Hypertension    Diabetes mellitus    HLD (hyperlipidemia)    No significant past surgical history    Pertinent Labs:                          8.2    20.24 )-----------( 543      ( 09 Aug 2023 05:50 )             26.1       08-09    134<L>  |  102  |  8   ----------------------------<  133<H>  4.8   |  24  |  0.62    Ca    8.4      09 Aug 2023 05:50  Phos  3.3     08-09  Mg     2.00     08-09      Patient and Family aware:   [ X ]Y   [  ]N

## 2023-08-09 NOTE — PROGRESS NOTE ADULT - ASSESSMENT
67 year old male with htn and DM presented with left sided chest pain.  Imaging with left sided opacities and ? worsening left sided effusion.  Associated leukocytosis.     1) Shortness of breath with cough  S/p vats/ decortication    Gram stain with GPC- pleural fluid growing strep constellatus and parvimonas  CT removed    Continue antibiotics through 9/9  Can change to ertapenem 1 gram iv daily to avoid prolonged use of flagyl  weekly cbc, bmp fax to [phone]    2) Renal Mass-  Solid exophytic left renal mass and primary renal neoplasm cannot be   excluded. Additional indeterminate right renal lesion measuring 1.8 x 1.4   cm. Urology evaluation .     3) Abnormal imaging  stenosis of celiac axis  Surgery following    4) Leukocytosis-   continue to monitor  May be multifactorial- resolving infection, renal mass contributing    Follow up as an outpatient  [phone]

## 2023-08-09 NOTE — PROGRESS NOTE ADULT - SUBJECTIVE AND OBJECTIVE BOX
DATE OF SERVICE: 08-09-23  No SOB  Afebrile      REVIEW OF SYSTEMS:  CONSTITUTIONAL: No fever  RESPIRATORY: No cough, hemoptysis shortness of breath  CARDIOVASCULAR: No palpitations, dizziness, or leg swelling  GASTROINTESTINAL: No nausea, vomiting, hematemesis  GENITOURINARY: No dysuria, frequency, hematuria   NEUROLOGICAL: No headaches, no dizziness  MUSCULOSKELETAL: No joint pain or swelling;     INTERVAL HPI/OVERNIGHT EVENTS:  Vital Signs Last 24 Hrs  T(C): 37.3 (09 Aug 2023 15:14), Max: 37.3 (09 Aug 2023 15:14)  T(F): 99.2 (09 Aug 2023 15:14), Max: 99.2 (09 Aug 2023 15:14)  HR: 95 (09 Aug 2023 15:14) (69 - 95)  BP: 121/61 (09 Aug 2023 15:14) (121/61 - 147/67)  BP(mean): --  RR: 18 (09 Aug 2023 15:14) (18 - 18)  SpO2: 100% (09 Aug 2023 15:14) (98% - 100%)    Parameters below as of 09 Aug 2023 15:14  Patient On (Oxygen Delivery Method): room air          LCBC Full  -  ( 05 Aug 2023 03:18 )  WBC Count : 18.72 K/uL  RBC Count : 3.05 M/uL  Hemoglobin : 8.6 g/dL  Hematocrit : 26.8 %  Platelet Count - Automated : 412 K/uL  Mean Cell Volume : 87.9 fL  Mean Cell Hemoglobin : 28.2 pg  Mean Cell Hemoglobin Concentration : 32.1 gm/dL  Auto Neutrophil # : x  Auto Lymphocyte # : x  Auto Monocyte # : x  Auto Eosinophil # : x  Auto Basophil # : x  Auto Neutrophil % : x  Auto Lymphocyte % : x  Auto Monocyte % : x  Auto Eosinophil % : x  Auto Basophil % : x        PAST MEDICAL & SURGICAL HISTORY:  Hypertension    BMI: BMI (kg/m2): 31.5 (08-02-23 @ 15:14)  HbA1c: A1C with Estimated Average Glucose Result: 7.8 % (07-30-23 @ 07:16)    Glucose: POCT Blood Glucose.: 199 mg/dL (08-05-23 @ 12:44)    BP: 122/67 (08-05-23 @ 15:00) (86/53 - 162/89)  Lipid Panel: Date/Time: 07-30-23 @ 07:16  Cholesterol, Serum: 101  Direct LDL: --  HDL Cholesterol, Serum: 50  Total Cholesterol/HDL Ration Measurement: --  Triglycerides, Serum: 122    Diabetes mellitus      HLD (hyperlipidemia)      No significant past surgical history          MEDICATIONS  (STANDING):  amLODIPine   Tablet 5 milliGRAM(s) Oral daily  aspirin  chewable 81 milliGRAM(s) Oral daily  atorvastatin 10 milliGRAM(s) Oral at bedtime  cyclobenzaprine 10 milliGRAM(s) Oral at bedtime  dextrose 5%. 1000 milliLiter(s) (50 mL/Hr) IV Continuous <Continuous>  dextrose 5%. 1000 milliLiter(s) (100 mL/Hr) IV Continuous <Continuous>  dextrose 50% Injectable 12.5 Gram(s) IV Push once  dextrose 50% Injectable 25 Gram(s) IV Push once  dextrose 50% Injectable 25 Gram(s) IV Push once  glucagon  Injectable 1 milliGRAM(s) IntraMuscular once  hydrALAZINE 50 milliGRAM(s) Oral two times a day  insulin lispro (ADMELOG) corrective regimen sliding scale   SubCutaneous three times a day before meals  insulin lispro (ADMELOG) corrective regimen sliding scale   SubCutaneous at bedtime  latanoprost 0.005% Ophthalmic Solution 1 Drop(s) Both EYES at bedtime  losartan 100 milliGRAM(s) Oral daily  melatonin 9 milliGRAM(s) Oral at bedtime    MEDICATIONS  (PRN):  acetaminophen     Tablet .. 650 milliGRAM(s) Oral every 6 hours PRN Temp greater or equal to 38C (100.4F), Mild Pain (1 - 3)  aluminum hydroxide/magnesium hydroxide/simethicone Suspension 30 milliLiter(s) Oral every 4 hours PRN Dyspepsia  dextrose Oral Gel 15 Gram(s) Oral once PRN Blood Glucose LESS THAN 70 milliGRAM(s)/deciliter  HYDROmorphone  Injectable 0.5 milliGRAM(s) IV Push every 4 hours PRN breakthrough pain  nitroglycerin     SubLingual 0.4 milliGRAM(s) SubLingual every 5 minutes PRN Chest Pain  ondansetron Injectable 4 milliGRAM(s) IV Push every 8 hours PRN Nausea and/or Vomiting  oxycodone    5 mG/acetaminophen 325 mG 1 Tablet(s) Oral every 6 hours PRN Moderate Pain (4 - 6)  oxycodone    5 mG/acetaminophen 325 mG 2 Tablet(s) Oral every 4 hours PRN Severe Pain (7 - 10)        RADIOLOGY & ADDITIONAL TESTS:    Imaging Personally Reviewed:  [ ] YES  [ ] NO    Consultant(s) Notes Reviewed:  [x ] YES  [ ] NO    PHYSICAL EXAM:  GENERAL: Alert and awake lying in bed in no distress  HEAD:  Atraumatic, Normocephalic  EYES: EOMI, MADALYN, conjunctiva and sclera clear  NECK: Supple, No JVD, Normal thyroid  NERVOUS SYSTEM:  Alert & Oriented X3, Motor and sensory systems are intact,   CHEST/LUNG: Left sided chest tubes +  HEART: Regular rate and rhythm; No murmurs, rubs, or gallops  ABDOMEN: Soft, Nontender, Nondistended; Bowel sounds present  EXTREMITIES:   Peripheral Pulses are palpable, no  edema        Care Discussed with Consultants/Other Providers [x ] YES  [ ] NO      Code Status: [] Full Code [] DNR [] DNI [] Goals of Care:   Disposition: [] ICU [] Stroke Unit [] RCU []PCU []Floor [] Discharge Home         JANETT HighFACP

## 2023-08-09 NOTE — PROGRESS NOTE ADULT - ASSESSMENT
67 yr old male with a pmh of HTN, HLD, T2DM on metformin, TAO on BiPAP who presents to the ED with acute, 10/10, sharp, intermittent stabbing left sided abdominal and left axillary/back pain.    EKG - NSR poor Rwave progression   Tele: SR/ST 80-110s    1) Epigastric pain   - EKG shows NSR poor R wave progression   -ct chest 7/29 showed Cardiomegaly with left ventricular dilatation with some concentric   hypertrophy. 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 plan for renal mass biopsy    2) HTN   - cont hydral, losartan and amlodipine     3) Cough/SOB  - RVP-  neg  - CXR  complete white out of the left hemithorax likely secondary to worsening left pleural effusion  -CT Chest The lobar bronchi of the left lung are opacified. There is complete atelectasis of the left lung with a large left pleural effusion. Redemonstrated solid exophytic mass of left kidney. thoracic surgery on board, s/p  Left uniport VATS empyema drainage; partial decortication; fluid irrigation and drainage  -f/u pulm recs  -Gram positive Bacteria Left Empyema on abx

## 2023-08-09 NOTE — PROGRESS NOTE ADULT - SUBJECTIVE AND OBJECTIVE BOX
Didier Cotton MD  Interventional Cardiology / Advance Heart Failure and Cardiac Transplant Specialist  [place] Office : [address] [place] [id number]  Tel:   [place] Office : [address] [facility] [address]  Tel: [phone]      Subjective/Overnight events: Pt is sitting up in chair comfortable not in distress, no chest pains no SOB no palpitations  	  MEDICATIONS:  amLODIPine   Tablet 5 milliGRAM(s) Oral daily  aspirin  chewable 81 milliGRAM(s) Oral daily  heparin   Injectable 5000 Unit(s) SubCutaneous every 8 hours  hydrALAZINE 50 milliGRAM(s) Oral every 24 hours  losartan 100 milliGRAM(s) Oral daily    cefTRIAXone   IVPB 2000 milliGRAM(s) IV Intermittent every 24 hours  metroNIDAZOLE    Tablet 500 milliGRAM(s) Oral two times a day    albuterol    0.083% 2.5 milliGRAM(s) Nebulizer every 6 hours  dornase shana Solution 2.5 milliGRAM(s) Inhalation daily  sodium chloride 3%  Inhalation 4 milliLiter(s) Inhalation every 6 hours    acetaminophen     Tablet .. 650 milliGRAM(s) Oral every 6 hours PRN  acetaminophen   IVPB .. 1000 milliGRAM(s) IV Intermittent once  cyclobenzaprine 10 milliGRAM(s) Oral at bedtime  melatonin 9 milliGRAM(s) Oral at bedtime  ondansetron Injectable 4 milliGRAM(s) IV Push every 6 hours PRN  oxyCODONE    IR 5 milliGRAM(s) Oral every 3 hours PRN  oxyCODONE    IR 7.5 milliGRAM(s) Oral every 3 hours PRN    aluminum hydroxide/magnesium hydroxide/simethicone Suspension 30 milliLiter(s) Oral every 4 hours PRN  methylnaltrexone Injectable 12 milliGRAM(s) SubCutaneous daily  pantoprazole    Tablet 40 milliGRAM(s) Oral before breakfast  polyethylene glycol 3350 17 Gram(s) Oral two times a day  senna 2 Tablet(s) Oral at bedtime    atorvastatin 10 milliGRAM(s) Oral at bedtime  dextrose 50% Injectable 25 Gram(s) IV Push once  insulin glargine Injectable (LANTUS) 17 Unit(s) SubCutaneous at bedtime  insulin lispro (ADMELOG) corrective regimen sliding scale   SubCutaneous three times a day before meals  insulin lispro (ADMELOG) corrective regimen sliding scale   SubCutaneous at bedtime  insulin lispro Injectable (ADMELOG) 8 Unit(s) SubCutaneous before lunch  insulin lispro Injectable (ADMELOG) 8 Unit(s) SubCutaneous before breakfast  insulin lispro Injectable (ADMELOG) 8 Unit(s) SubCutaneous before dinner    latanoprost 0.005% Ophthalmic Solution 1 Drop(s) Both EYES at bedtime  lidocaine   4% Patch 1 Patch Transdermal daily      PAST MEDICAL/SURGICAL HISTORY  PAST MEDICAL & SURGICAL HISTORY:  Hypertension      Diabetes mellitus      HLD (hyperlipidemia)      No significant past surgical history          SOCIAL HISTORY: Substance Use (street drugs): ( x ) never used  (  ) other:    FAMILY HISTORY:  FH: breast cancer (Sibling)        PHYSICAL EXAM:  T(C): 36.8 (08-09-23 @ 08:00), Max: 37.3 (08-08-23 @ 12:00)  HR: 84 (08-09-23 @ 08:40) (69 - 101)  BP: 130/66 (08-09-23 @ 08:00) (127/63 - 147/67)  RR: 18 (08-09-23 @ 08:00) (18 - 18)  SpO2: 100% (08-09-23 @ 08:40) (97% - 100%)  Wt(kg): --  I&O's Summary    08 Aug 2023 07:01  -  09 Aug 2023 07:00  --------------------------------------------------------  IN: 1130 mL / OUT: 1750 mL / NET: -620 mL          GENERAL: NAD  EYES: conjunctiva and sclera clear  ENMT: No tonsillar erythema, exudates, or enlargement  Cardiovascular: Normal S1 S2, No JVD, No murmurs, No edema  Respiratory: diminished s/p chest tube removal  Gastrointestinal:  Soft, Non-tender, + BS	  Extremities: No edema                                   8.2    20.24 )-----------( 543      ( 09 Aug 2023 05:50 )             26.1     08-09    134<L>  |  102  |  8   ----------------------------<  133<H>  4.8   |  24  |  0.62    Ca    8.4      09 Aug 2023 05:50  Phos  3.3     08-09  Mg     2.00     08-09      proBNP:   Lipid Profile:   HgA1c:   TSH:     Consultant(s) Notes Reviewed:  [x ] YES  [ ] NO    Care Discussed with Consultants/Other Providers [ x] YES  [ ] NO    Imaging Personally Reviewed independently:  [x] YES  [ ] NO    All labs, radiologic studies, vitals, orders and medications list reviewed. Patient is seen and examined at bedside. Case discussed with medical team.

## 2023-08-10 ENCOUNTER — TRANSCRIPTION ENCOUNTER (OUTPATIENT)
Age: 68
End: 2023-08-10

## 2023-08-10 LAB
ANION GAP SERPL CALC-SCNC: 11 MMOL/L — SIGNIFICANT CHANGE UP (ref 7–14)
BUN SERPL-MCNC: 8 MG/DL — SIGNIFICANT CHANGE UP (ref 7–23)
CALCIUM SERPL-MCNC: 8.5 MG/DL — SIGNIFICANT CHANGE UP (ref 8.4–10.5)
CHLORIDE SERPL-SCNC: 100 MMOL/L — SIGNIFICANT CHANGE UP (ref 98–107)
CO2 SERPL-SCNC: 20 MMOL/L — LOW (ref 22–31)
CREAT SERPL-MCNC: 0.54 MG/DL — SIGNIFICANT CHANGE UP (ref 0.5–1.3)
EGFR: 109 ML/MIN/1.73M2 — SIGNIFICANT CHANGE UP
GLUCOSE BLDC GLUCOMTR-MCNC: 139 MG/DL — HIGH (ref 70–99)
GLUCOSE BLDC GLUCOMTR-MCNC: 222 MG/DL — HIGH (ref 70–99)
GLUCOSE BLDC GLUCOMTR-MCNC: 223 MG/DL — HIGH (ref 70–99)
GLUCOSE BLDC GLUCOMTR-MCNC: 224 MG/DL — HIGH (ref 70–99)
GLUCOSE BLDC GLUCOMTR-MCNC: 237 MG/DL — HIGH (ref 70–99)
GLUCOSE SERPL-MCNC: 131 MG/DL — HIGH (ref 70–99)
HCT VFR BLD CALC: 27.2 % — LOW (ref 39–50)
HGB BLD-MCNC: 8.5 G/DL — LOW (ref 13–17)
INR BLD: 1.07 RATIO — SIGNIFICANT CHANGE UP (ref 0.85–1.18)
MAGNESIUM SERPL-MCNC: 2.1 MG/DL — SIGNIFICANT CHANGE UP (ref 1.6–2.6)
MCHC RBC-ENTMCNC: 29 PG — SIGNIFICANT CHANGE UP (ref 27–34)
MCHC RBC-ENTMCNC: 31.3 GM/DL — LOW (ref 32–36)
MCV RBC AUTO: 92.8 FL — SIGNIFICANT CHANGE UP (ref 80–100)
NRBC # BLD: 0 /100 WBCS — SIGNIFICANT CHANGE UP (ref 0–0)
NRBC # FLD: 0.03 K/UL — HIGH (ref 0–0)
PHOSPHATE SERPL-MCNC: 3.2 MG/DL — SIGNIFICANT CHANGE UP (ref 2.5–4.5)
PLATELET # BLD AUTO: 564 K/UL — HIGH (ref 150–400)
POTASSIUM SERPL-MCNC: 5.3 MMOL/L — SIGNIFICANT CHANGE UP (ref 3.5–5.3)
POTASSIUM SERPL-SCNC: 5.3 MMOL/L — SIGNIFICANT CHANGE UP (ref 3.5–5.3)
PROTHROM AB SERPL-ACNC: 12 SEC — SIGNIFICANT CHANGE UP (ref 9.5–13)
RBC # BLD: 2.93 M/UL — LOW (ref 4.2–5.8)
RBC # FLD: 14.9 % — HIGH (ref 10.3–14.5)
SODIUM SERPL-SCNC: 131 MMOL/L — LOW (ref 135–145)
WBC # BLD: 19.67 K/UL — HIGH (ref 3.8–10.5)
WBC # FLD AUTO: 19.67 K/UL — HIGH (ref 3.8–10.5)

## 2023-08-10 PROCEDURE — 36573 INSJ PICC RS&I 5 YR+: CPT

## 2023-08-10 PROCEDURE — 99232 SBSQ HOSP IP/OBS MODERATE 35: CPT

## 2023-08-10 RX ORDER — ERTAPENEM SODIUM 1 G/1
1000 INJECTION, POWDER, LYOPHILIZED, FOR SOLUTION INTRAMUSCULAR; INTRAVENOUS EVERY 24 HOURS
Refills: 0 | Status: DISCONTINUED | OUTPATIENT
Start: 2023-08-10 | End: 2023-08-11

## 2023-08-10 RX ORDER — SODIUM CHLORIDE 9 MG/ML
10 INJECTION INTRAMUSCULAR; INTRAVENOUS; SUBCUTANEOUS
Refills: 0 | Status: DISCONTINUED | OUTPATIENT
Start: 2023-08-10 | End: 2023-08-11

## 2023-08-10 RX ORDER — CHLORHEXIDINE GLUCONATE 213 G/1000ML
1 SOLUTION TOPICAL
Refills: 0 | Status: DISCONTINUED | OUTPATIENT
Start: 2023-08-11 | End: 2023-08-11

## 2023-08-10 RX ORDER — ERTAPENEM SODIUM 1 G/1
1 INJECTION, POWDER, LYOPHILIZED, FOR SOLUTION INTRAMUSCULAR; INTRAVENOUS
Qty: 30 | Refills: 0
Start: 2023-08-10 | End: 2023-09-08

## 2023-08-10 RX ADMIN — Medication 500 MILLIGRAM(S): at 06:10

## 2023-08-10 RX ADMIN — HEPARIN SODIUM 5000 UNIT(S): 5000 INJECTION INTRAVENOUS; SUBCUTANEOUS at 14:32

## 2023-08-10 RX ADMIN — Medication 9 MILLIGRAM(S): at 22:58

## 2023-08-10 RX ADMIN — Medication 4: at 17:32

## 2023-08-10 RX ADMIN — ALBUTEROL 2.5 MILLIGRAM(S): 90 AEROSOL, METERED ORAL at 21:09

## 2023-08-10 RX ADMIN — SODIUM CHLORIDE 4 MILLILITER(S): 9 INJECTION INTRAMUSCULAR; INTRAVENOUS; SUBCUTANEOUS at 21:09

## 2023-08-10 RX ADMIN — INSULIN GLARGINE 17 UNIT(S): 100 INJECTION, SOLUTION SUBCUTANEOUS at 23:34

## 2023-08-10 RX ADMIN — ALBUTEROL 2.5 MILLIGRAM(S): 90 AEROSOL, METERED ORAL at 15:10

## 2023-08-10 RX ADMIN — LATANOPROST 1 DROP(S): 0.05 SOLUTION/ DROPS OPHTHALMIC; TOPICAL at 22:53

## 2023-08-10 RX ADMIN — Medication 650 MILLIGRAM(S): at 14:50

## 2023-08-10 RX ADMIN — HEPARIN SODIUM 5000 UNIT(S): 5000 INJECTION INTRAVENOUS; SUBCUTANEOUS at 06:11

## 2023-08-10 RX ADMIN — OXYCODONE HYDROCHLORIDE 7.5 MILLIGRAM(S): 5 TABLET ORAL at 23:30

## 2023-08-10 RX ADMIN — Medication 8 UNIT(S): at 08:42

## 2023-08-10 RX ADMIN — OXYCODONE HYDROCHLORIDE 7.5 MILLIGRAM(S): 5 TABLET ORAL at 22:58

## 2023-08-10 RX ADMIN — Medication 650 MILLIGRAM(S): at 14:37

## 2023-08-10 RX ADMIN — CYCLOBENZAPRINE HYDROCHLORIDE 10 MILLIGRAM(S): 10 TABLET, FILM COATED ORAL at 22:58

## 2023-08-10 RX ADMIN — AMLODIPINE BESYLATE 5 MILLIGRAM(S): 2.5 TABLET ORAL at 06:11

## 2023-08-10 RX ADMIN — ATORVASTATIN CALCIUM 10 MILLIGRAM(S): 80 TABLET, FILM COATED ORAL at 22:59

## 2023-08-10 RX ADMIN — Medication 50 MILLIGRAM(S): at 06:11

## 2023-08-10 RX ADMIN — OXYCODONE HYDROCHLORIDE 7.5 MILLIGRAM(S): 5 TABLET ORAL at 11:41

## 2023-08-10 RX ADMIN — OXYCODONE HYDROCHLORIDE 7.5 MILLIGRAM(S): 5 TABLET ORAL at 12:00

## 2023-08-10 RX ADMIN — HEPARIN SODIUM 5000 UNIT(S): 5000 INJECTION INTRAVENOUS; SUBCUTANEOUS at 22:58

## 2023-08-10 RX ADMIN — PANTOPRAZOLE SODIUM 40 MILLIGRAM(S): 20 TABLET, DELAYED RELEASE ORAL at 06:11

## 2023-08-10 RX ADMIN — SODIUM CHLORIDE 4 MILLILITER(S): 9 INJECTION INTRAMUSCULAR; INTRAVENOUS; SUBCUTANEOUS at 02:53

## 2023-08-10 RX ADMIN — ERTAPENEM SODIUM 120 MILLIGRAM(S): 1 INJECTION, POWDER, LYOPHILIZED, FOR SOLUTION INTRAMUSCULAR; INTRAVENOUS at 14:30

## 2023-08-10 RX ADMIN — SODIUM CHLORIDE 4 MILLILITER(S): 9 INJECTION INTRAMUSCULAR; INTRAVENOUS; SUBCUTANEOUS at 15:10

## 2023-08-10 RX ADMIN — Medication 8 UNIT(S): at 17:32

## 2023-08-10 RX ADMIN — LOSARTAN POTASSIUM 100 MILLIGRAM(S): 100 TABLET, FILM COATED ORAL at 06:11

## 2023-08-10 RX ADMIN — ALBUTEROL 2.5 MILLIGRAM(S): 90 AEROSOL, METERED ORAL at 02:53

## 2023-08-10 RX ADMIN — Medication 8 UNIT(S): at 13:19

## 2023-08-10 RX ADMIN — Medication 4: at 13:19

## 2023-08-10 RX ADMIN — Medication 81 MILLIGRAM(S): at 14:32

## 2023-08-10 NOTE — DISCHARGE NOTE NURSING/CASE MANAGEMENT/SOCIAL WORK - NSDCDMETYPESERV_GEN_ALL_CORE_FT
IV equipment and medication will be delivered to your home. Agency to visit the day after discharge.
None

## 2023-08-10 NOTE — DISCHARGE NOTE NURSING/CASE MANAGEMENT/SOCIAL WORK - NSDCFUADDAPPT_GEN_ALL_CORE_FT
See Dr Orellana in 2 weeks- call for an appointment and bring a new chest Xray with you when you come.    Follow up with Dr Desai from Urology as well.      Follow up as an out patient with infectious disease- Call [phone] for an appointment.

## 2023-08-10 NOTE — PROGRESS NOTE ADULT - SUBJECTIVE AND OBJECTIVE BOX
DATE OF SERVICE: 08-10-23  No SOB  Afebrile    REVIEW OF SYSTEMS:  CONSTITUTIONAL: No fever  RESPIRATORY: No cough, hemoptysis shortness of breath  CARDIOVASCULAR: No palpitations, dizziness, or leg swelling  GASTROINTESTINAL: No nausea, vomiting, hematemesis  GENITOURINARY: No dysuria, frequency, hematuria   NEUROLOGICAL: No headaches, no dizziness  MUSCULOSKELETAL: No joint pain or swelling;     INTERVAL HPI/OVERNIGHT EVENTS:  Vital Signs Last 24 Hrs  T(C): 37.1 (10 Aug 2023 12:44), Max: 37.1 (10 Aug 2023 12:44)  T(F): 98.8 (10 Aug 2023 12:44), Max: 98.8 (10 Aug 2023 12:44)  HR: 88 (10 Aug 2023 15:20) (64 - 109)  BP: 137/71 (10 Aug 2023 12:44) (125/62 - 141/71)  BP(mean): --  RR: 20 (10 Aug 2023 12:44) (16 - 20)  SpO2: 100% (10 Aug 2023 15:20) (95% - 100%)    Parameters below as of 10 Aug 2023 12:44  Patient On (Oxygen Delivery Method): room air          LCBC Full  -  ( 05 Aug 2023 03:18 )  WBC Count : 18.72 K/uL  RBC Count : 3.05 M/uL  Hemoglobin : 8.6 g/dL  Hematocrit : 26.8 %  Platelet Count - Automated : 412 K/uL  Mean Cell Volume : 87.9 fL  Mean Cell Hemoglobin : 28.2 pg  Mean Cell Hemoglobin Concentration : 32.1 gm/dL  Auto Neutrophil # : x  Auto Lymphocyte # : x  Auto Monocyte # : x  Auto Eosinophil # : x  Auto Basophil # : x  Auto Neutrophil % : x  Auto Lymphocyte % : x  Auto Monocyte % : x  Auto Eosinophil % : x  Auto Basophil % : x        PAST MEDICAL & SURGICAL HISTORY:  Hypertension    BMI: BMI (kg/m2): 31.5 (08-02-23 @ 15:14)  HbA1c: A1C with Estimated Average Glucose Result: 7.8 % (07-30-23 @ 07:16)    Glucose: POCT Blood Glucose.: 199 mg/dL (08-05-23 @ 12:44)    BP: 122/67 (08-05-23 @ 15:00) (86/53 - 162/89)  Lipid Panel: Date/Time: 07-30-23 @ 07:16  Cholesterol, Serum: 101  Direct LDL: --  HDL Cholesterol, Serum: 50  Total Cholesterol/HDL Ration Measurement: --  Triglycerides, Serum: 122    Diabetes mellitus      HLD (hyperlipidemia)      No significant past surgical history          MEDICATIONS  (STANDING):  amLODIPine   Tablet 5 milliGRAM(s) Oral daily  aspirin  chewable 81 milliGRAM(s) Oral daily  atorvastatin 10 milliGRAM(s) Oral at bedtime  cyclobenzaprine 10 milliGRAM(s) Oral at bedtime  dextrose 5%. 1000 milliLiter(s) (50 mL/Hr) IV Continuous <Continuous>  dextrose 5%. 1000 milliLiter(s) (100 mL/Hr) IV Continuous <Continuous>  dextrose 50% Injectable 12.5 Gram(s) IV Push once  dextrose 50% Injectable 25 Gram(s) IV Push once  dextrose 50% Injectable 25 Gram(s) IV Push once  glucagon  Injectable 1 milliGRAM(s) IntraMuscular once  hydrALAZINE 50 milliGRAM(s) Oral two times a day  insulin lispro (ADMELOG) corrective regimen sliding scale   SubCutaneous three times a day before meals  insulin lispro (ADMELOG) corrective regimen sliding scale   SubCutaneous at bedtime  latanoprost 0.005% Ophthalmic Solution 1 Drop(s) Both EYES at bedtime  losartan 100 milliGRAM(s) Oral daily  melatonin 9 milliGRAM(s) Oral at bedtime    MEDICATIONS  (PRN):  acetaminophen     Tablet .. 650 milliGRAM(s) Oral every 6 hours PRN Temp greater or equal to 38C (100.4F), Mild Pain (1 - 3)  aluminum hydroxide/magnesium hydroxide/simethicone Suspension 30 milliLiter(s) Oral every 4 hours PRN Dyspepsia  dextrose Oral Gel 15 Gram(s) Oral once PRN Blood Glucose LESS THAN 70 milliGRAM(s)/deciliter  HYDROmorphone  Injectable 0.5 milliGRAM(s) IV Push every 4 hours PRN breakthrough pain  nitroglycerin     SubLingual 0.4 milliGRAM(s) SubLingual every 5 minutes PRN Chest Pain  ondansetron Injectable 4 milliGRAM(s) IV Push every 8 hours PRN Nausea and/or Vomiting  oxycodone    5 mG/acetaminophen 325 mG 1 Tablet(s) Oral every 6 hours PRN Moderate Pain (4 - 6)  oxycodone    5 mG/acetaminophen 325 mG 2 Tablet(s) Oral every 4 hours PRN Severe Pain (7 - 10)        RADIOLOGY & ADDITIONAL TESTS:    Imaging Personally Reviewed:  [ ] YES  [ ] NO    Consultant(s) Notes Reviewed:  [x ] YES  [ ] NO    PHYSICAL EXAM:  GENERAL: Alert and awake lying in bed in no distress  HEAD:  Atraumatic, Normocephalic  EYES: EOMI, MADALYN, conjunctiva and sclera clear  NECK: Supple, No JVD, Normal thyroid  NERVOUS SYSTEM:  Alert & Oriented X3, Motor and sensory systems are intact,   CHEST/LUNG: Left sided chest tubes +  HEART: Regular rate and rhythm; No murmurs, rubs, or gallops  ABDOMEN: Soft, Nontender, Nondistended; Bowel sounds present  EXTREMITIES:   Peripheral Pulses are palpable, no  edema        Care Discussed with Consultants/Other Providers [x ] YES  [ ] NO      Code Status: [] Full Code [] DNR [] DNI [] Goals of Care:   Disposition: [] ICU [] Stroke Unit [] RCU []PCU []Floor [] Discharge Home         JANETT HighFACP

## 2023-08-10 NOTE — DISCHARGE NOTE NURSING/CASE MANAGEMENT/SOCIAL WORK - NSDCPEFALRISK_GEN_ALL_CORE
For information on Fall & Injury Prevention, visit: [email or web address] OR  [email or web address] OR  https://www.cdc.gov/steadi/patient.html

## 2023-08-10 NOTE — PROGRESS NOTE ADULT - ASSESSMENT
67 year old male with htn and DM presented with left sided chest pain.  Imaging with left sided opacities and ? worsening left sided effusion.  Associated leukocytosis.     1) Shortness of breath with cough  S/p vats/ decortication    Gram stain with GPC- pleural fluid growing strep constellatus and parvimonas  CT removed    Continue antibiotics through 9/9  Can change to ertapenem 1 gram iv daily to avoid prolonged use of flagyl  weekly cbc, bmp fax to [phone]    2) Renal Mass-  Solid exophytic left renal mass and primary renal neoplasm cannot be   excluded. Additional indeterminate right renal lesion measuring 1.8 x 1.4   cm. Urology evaluation .     3) Abnormal imaging  stenosis of celiac axis  Surgery following    4) Leukocytosis-   continue to monitor  May be multifactorial- resolving empyema/ post op, renal mass. SMA narrowing  Denies respiratory symptoms at this time  He is on antibiotics    Follow up as an outpatient  8/24 at 4 pm  [address]   [place] [address] [phone]    Call ID service with questions

## 2023-08-10 NOTE — DISCHARGE NOTE NURSING/CASE MANAGEMENT/SOCIAL WORK - PATIENT PORTAL LINK FT
You can access the FollowMyHealth Patient Portal offered by [facility] by registering at the following website: [email or web address]. By joining [employer]’s FollowMyHealth portal, you will also be able to view your health information using other applications (apps) compatible with our system.

## 2023-08-10 NOTE — CHART NOTE - NSCHARTNOTEFT_GEN_A_CORE
Pt seen for nutrition follow up    Medical Course: Per chart 67 year old male presenting with celiac artery stenosis, c/f median arcuate ligament syndrome; renal mass    Nutrition Course: Pt A&Ox4, reports good appetite. Per RN flowsheets % intake. Noted to have glucerna supplement at bedside from home, Wife brought them because his appetite wasn't great on admission. Now Pt with good PO intake and reports he is not drinking them anymore. NO reported GI distress. Last BM 8/10 per RN flowsheets. Currently on bowel regimen. Labs notable for hyponatremia. Per initial RD note 8/3 RD provided diabetes nutrition education. No nutrition related questions and declined review. Noted with minute maid lemonade on window sill. Encouraged compliance with consistent carb diet.     Diet Prescription: Diet, Consistent Carbohydrate/No Snacks (08-02-23 @ 23:11)    Pertinent Medications: MEDICATIONS  (STANDING):  acetaminophen   IVPB .. 1000 milliGRAM(s) IV Intermittent once  albuterol    0.083% 2.5 milliGRAM(s) Nebulizer every 6 hours  amLODIPine   Tablet 5 milliGRAM(s) Oral daily  aspirin  chewable 81 milliGRAM(s) Oral daily  atorvastatin 10 milliGRAM(s) Oral at bedtime  cyclobenzaprine 10 milliGRAM(s) Oral at bedtime  dextrose 50% Injectable 25 Gram(s) IV Push once  dornase shana Solution 2.5 milliGRAM(s) Inhalation daily  ertapenem  IVPB 1000 milliGRAM(s) IV Intermittent every 24 hours  heparin   Injectable 5000 Unit(s) SubCutaneous every 8 hours  hydrALAZINE 50 milliGRAM(s) Oral every 24 hours  insulin glargine Injectable (LANTUS) 17 Unit(s) SubCutaneous at bedtime  insulin lispro (ADMELOG) corrective regimen sliding scale   SubCutaneous three times a day before meals  insulin lispro (ADMELOG) corrective regimen sliding scale   SubCutaneous at bedtime  insulin lispro Injectable (ADMELOG) 8 Unit(s) SubCutaneous before breakfast  insulin lispro Injectable (ADMELOG) 8 Unit(s) SubCutaneous before dinner  insulin lispro Injectable (ADMELOG) 8 Unit(s) SubCutaneous before lunch  latanoprost 0.005% Ophthalmic Solution 1 Drop(s) Both EYES at bedtime  lidocaine   4% Patch 1 Patch Transdermal daily  losartan 100 milliGRAM(s) Oral daily  melatonin 9 milliGRAM(s) Oral at bedtime  methylnaltrexone Injectable 12 milliGRAM(s) SubCutaneous daily  pantoprazole    Tablet 40 milliGRAM(s) Oral before breakfast  polyethylene glycol 3350 17 Gram(s) Oral two times a day  senna 2 Tablet(s) Oral at bedtime  sodium chloride 3%  Inhalation 4 milliLiter(s) Inhalation every 6 hours    MEDICATIONS  (PRN):  acetaminophen     Tablet .. 650 milliGRAM(s) Oral every 6 hours PRN Temp greater or equal to 38C (100.4F), Mild Pain (1 - 3)  aluminum hydroxide/magnesium hydroxide/simethicone Suspension 30 milliLiter(s) Oral every 4 hours PRN Dyspepsia  naloxone Injectable 0.1 milliGRAM(s) IV Push every 3 minutes PRN For ANY of the following changes in patient status:  A. RR LESS THAN 10 breaths per minute, B. Oxygen saturation LESS THAN 90%, C. Sedation score of 6  ondansetron Injectable 4 milliGRAM(s) IV Push every 6 hours PRN Nausea  oxyCODONE    IR 7.5 milliGRAM(s) Oral every 3 hours PRN Severe Pain (7 - 10)  oxyCODONE    IR 5 milliGRAM(s) Oral every 3 hours PRN Moderate Pain (4 - 6)    Pertinent Labs: 08-10 Na131 mmol/L<L> Glu 131 mg/dL<H> K+ 5.3 mmol/L Cr  0.54 mg/dL BUN 8 mg/dL 08-10 Phos 3.2 mg/dL 07-30 Chol 101 mg/dL LDL --    HDL 50 mg/dL Trig 122 mg/dL     CAPILLARY BLOOD GLUCOSE  POCT Blood Glucose.: 139 mg/dL (10 Aug 2023 08:37)  POCT Blood Glucose.: 242 mg/dL (09 Aug 2023 21:34)  POCT Blood Glucose.: 217 mg/dL (09 Aug 2023 17:10)  POCT Blood Glucose.: 143 mg/dL (09 Aug 2023 12:12)      Weight: Height (cm): 168.9 (08-02 @ 15:14)  Weight (kg): 89.8 (08-02 @ 15:14)  BMI (kg/m2): 31.5 (08-02 @ 15:14)  Weight Assessment: No new weight to assess       Physical Assessment, per flowsheets:  Edema: No edema noted per RN flowsheets   Skin: Bridge of nose wound per RN flowsheets       Estimated Needs:   [X] No change since previous assessment    Previous Nutrition Diagnosis: Altered nutrition related labs  Nutrition Diagnosis is [x ] ongoing       Education:  [  x] Given on previous assessment by RD       Interventions:   1) Continue CSTCHO diet   2) Encourage PO intake and honor food preferences as able.   3) Obtain weekly weights   4) RD available prn     Monitor & Evaluate:  PO intake, tolerance to diet/supplement, nutrition related lab values, weight trends, BMs/GI distress, hydration status, skin integrity.    Sofia Styles MS, RD| [phone] (Also available on TEAMS)

## 2023-08-10 NOTE — DISCHARGE NOTE NURSING/CASE MANAGEMENT/SOCIAL WORK - NSPROMEDSBROUGHTTOHOSP_GEN_A_NUR
no
[FreeTextEntry1] : Patient presents back today unfortunately have a lot of difficulty with walking.  She has been having a lot of issues with her right knee and got a shot in the knee and since then cannot walk properly on the knee.  She called the orthopedist who ultimately referred her to pain management but she cannot get an appointment them until July 11.  She was also given meloxicam which she has been taking once a day at an unknown dose.  This does not seem to be helping at all.  Blood pressures at home she reports in the 120s to 130s most recently.  She reports no edema or any other symptoms.  Patient denies chest pain, shortness of breath, palpitations, orthopnea, presyncope, syncope.

## 2023-08-10 NOTE — PROGRESS NOTE ADULT - SUBJECTIVE AND OBJECTIVE BOX
Follow Up:      Inverval History/ROS:Patient is a 67y old  Male who presents with a chief complaint of celiac artery stenosis, c/f median arcuate ligament syndrome; renal mass (09 Aug 2023 17:07)    Denies sob, cough  No fever  No diarrhea      Allergies    No Known Allergies    Intolerances        ANTIMICROBIALS:  ertapenem  IVPB 1000 every 24 hours      OTHER MEDS:  acetaminophen     Tablet .. 650 milliGRAM(s) Oral every 6 hours PRN  acetaminophen   IVPB .. 1000 milliGRAM(s) IV Intermittent once  albuterol    0.083% 2.5 milliGRAM(s) Nebulizer every 6 hours  aluminum hydroxide/magnesium hydroxide/simethicone Suspension 30 milliLiter(s) Oral every 4 hours PRN  amLODIPine   Tablet 5 milliGRAM(s) Oral daily  aspirin  chewable 81 milliGRAM(s) Oral daily  atorvastatin 10 milliGRAM(s) Oral at bedtime  cyclobenzaprine 10 milliGRAM(s) Oral at bedtime  dextrose 50% Injectable 25 Gram(s) IV Push once  dornase shana Solution 2.5 milliGRAM(s) Inhalation daily  heparin   Injectable 5000 Unit(s) SubCutaneous every 8 hours  hydrALAZINE 50 milliGRAM(s) Oral every 24 hours  insulin glargine Injectable (LANTUS) 17 Unit(s) SubCutaneous at bedtime  insulin lispro (ADMELOG) corrective regimen sliding scale   SubCutaneous three times a day before meals  insulin lispro (ADMELOG) corrective regimen sliding scale   SubCutaneous at bedtime  insulin lispro Injectable (ADMELOG) 8 Unit(s) SubCutaneous before lunch  insulin lispro Injectable (ADMELOG) 8 Unit(s) SubCutaneous before breakfast  insulin lispro Injectable (ADMELOG) 8 Unit(s) SubCutaneous before dinner  latanoprost 0.005% Ophthalmic Solution 1 Drop(s) Both EYES at bedtime  lidocaine   4% Patch 1 Patch Transdermal daily  losartan 100 milliGRAM(s) Oral daily  melatonin 9 milliGRAM(s) Oral at bedtime  naloxone Injectable 0.1 milliGRAM(s) IV Push every 3 minutes PRN  ondansetron Injectable 4 milliGRAM(s) IV Push every 6 hours PRN  oxyCODONE    IR 5 milliGRAM(s) Oral every 3 hours PRN  oxyCODONE    IR 7.5 milliGRAM(s) Oral every 3 hours PRN  pantoprazole    Tablet 40 milliGRAM(s) Oral before breakfast  polyethylene glycol 3350 17 Gram(s) Oral two times a day  senna 2 Tablet(s) Oral at bedtime  sodium chloride 3%  Inhalation 4 milliLiter(s) Inhalation every 6 hours      Vital Signs Last 24 Hrs  T(C): 37.1 (10 Aug 2023 12:44), Max: 37.3 (09 Aug 2023 15:14)  T(F): 98.8 (10 Aug 2023 12:44), Max: 99.2 (09 Aug 2023 15:14)  HR: 81 (10 Aug 2023 12:44) (64 - 109)  BP: 137/71 (10 Aug 2023 12:44) (121/61 - 141/71)  BP(mean): --  RR: 20 (10 Aug 2023 12:44) (16 - 20)  SpO2: 98% (10 Aug 2023 12:44) (95% - 100%)    Parameters below as of 10 Aug 2023 12:44  Patient On (Oxygen Delivery Method): room air        PHYSICAL EXAM:  General: [x ] non-toxic  HEAD/EYES: [ ] PERRL [x ] white sclera [ ] icterus  ENT:  [ ] normal [x ] supple [ ] thrush [ ] pharyngeal exudate  Cardiovascular:   [ ] murmur [ x] normal [ ] PPM/AICD  Respiratory:  [x ] clear to ausculation bilaterally  GI:  [ x] soft, non-tender, normal bowel sounds  :  [ ] horne [x ] no CVA tenderness   Musculoskeletal:  [ ] no synovitis  Neurologic:  [ ] non-focal exam   Skin:  [x ] no rash  Lymph: [x ] no lymphadenopathy  Psychiatric:  [ ] appropriate affect [ ] alert & oriented  Lines:  [x ] no phlebitis [ ] central line                                8.5    19.67 )-----------( 564      ( 10 Aug 2023 05:35 )             27.2       08-10    131<L>  |  100  |  8   ----------------------------<  131<H>  5.3   |  20<L>  |  0.54    Ca    8.5      10 Aug 2023 05:35  Phos  3.2     08-10  Mg     2.10     08-10        Urinalysis Basic - ( 10 Aug 2023 05:35 )    Color: x / Appearance: x / SG: x / pH: x  Gluc: 131 mg/dL / Ketone: x  / Bili: x / Urobili: x   Blood: x / Protein: x / Nitrite: x   Leuk Esterase: x / RBC: x / WBC x   Sq Epi: x / Non Sq Epi: x / Bacteria: x        MICROBIOLOGY:Culture Results:   No growth (08-04-23 @ 13:35)      RADIOLOGY:

## 2023-08-11 VITALS
DIASTOLIC BLOOD PRESSURE: 68 MMHG | TEMPERATURE: 98 F | OXYGEN SATURATION: 100 % | HEART RATE: 92 BPM | RESPIRATION RATE: 17 BRPM | SYSTOLIC BLOOD PRESSURE: 132 MMHG

## 2023-08-11 DIAGNOSIS — E11.9 TYPE 2 DIABETES MELLITUS WITHOUT COMPLICATIONS: ICD-10-CM

## 2023-08-11 LAB
ANION GAP SERPL CALC-SCNC: 10 MMOL/L — SIGNIFICANT CHANGE UP (ref 7–14)
BUN SERPL-MCNC: 7 MG/DL — SIGNIFICANT CHANGE UP (ref 7–23)
CALCIUM SERPL-MCNC: 8.6 MG/DL — SIGNIFICANT CHANGE UP (ref 8.4–10.5)
CHLORIDE SERPL-SCNC: 98 MMOL/L — SIGNIFICANT CHANGE UP (ref 98–107)
CO2 SERPL-SCNC: 25 MMOL/L — SIGNIFICANT CHANGE UP (ref 22–31)
CREAT SERPL-MCNC: 0.57 MG/DL — SIGNIFICANT CHANGE UP (ref 0.5–1.3)
EGFR: 107 ML/MIN/1.73M2 — SIGNIFICANT CHANGE UP
GLUCOSE BLDC GLUCOMTR-MCNC: 171 MG/DL — HIGH (ref 70–99)
GLUCOSE BLDC GLUCOMTR-MCNC: 199 MG/DL — HIGH (ref 70–99)
GLUCOSE BLDC GLUCOMTR-MCNC: 233 MG/DL — HIGH (ref 70–99)
GLUCOSE SERPL-MCNC: 139 MG/DL — HIGH (ref 70–99)
HCT VFR BLD CALC: 26.9 % — LOW (ref 39–50)
HGB BLD-MCNC: 8.3 G/DL — LOW (ref 13–17)
MAGNESIUM SERPL-MCNC: 1.8 MG/DL — SIGNIFICANT CHANGE UP (ref 1.6–2.6)
MCHC RBC-ENTMCNC: 28.7 PG — SIGNIFICANT CHANGE UP (ref 27–34)
MCHC RBC-ENTMCNC: 30.9 GM/DL — LOW (ref 32–36)
MCV RBC AUTO: 93.1 FL — SIGNIFICANT CHANGE UP (ref 80–100)
NRBC # BLD: 0 /100 WBCS — SIGNIFICANT CHANGE UP (ref 0–0)
NRBC # FLD: 0.04 K/UL — HIGH (ref 0–0)
PHOSPHATE SERPL-MCNC: 3.2 MG/DL — SIGNIFICANT CHANGE UP (ref 2.5–4.5)
PLATELET # BLD AUTO: 492 K/UL — HIGH (ref 150–400)
POTASSIUM SERPL-MCNC: 4.7 MMOL/L — SIGNIFICANT CHANGE UP (ref 3.5–5.3)
POTASSIUM SERPL-SCNC: 4.7 MMOL/L — SIGNIFICANT CHANGE UP (ref 3.5–5.3)
RBC # BLD: 2.89 M/UL — LOW (ref 4.2–5.8)
RBC # FLD: 14.9 % — HIGH (ref 10.3–14.5)
SODIUM SERPL-SCNC: 133 MMOL/L — LOW (ref 135–145)
WBC # BLD: 17.8 K/UL — HIGH (ref 3.8–10.5)
WBC # FLD AUTO: 17.8 K/UL — HIGH (ref 3.8–10.5)

## 2023-08-11 PROCEDURE — 71250 CT THORAX DX C-: CPT | Mod: 26

## 2023-08-11 PROCEDURE — 99232 SBSQ HOSP IP/OBS MODERATE 35: CPT

## 2023-08-11 RX ORDER — PANTOPRAZOLE SODIUM 20 MG/1
1 TABLET, DELAYED RELEASE ORAL
Qty: 30 | Refills: 0
Start: 2023-08-11 | End: 2023-09-09

## 2023-08-11 RX ADMIN — Medication 8 UNIT(S): at 08:38

## 2023-08-11 RX ADMIN — HEPARIN SODIUM 5000 UNIT(S): 5000 INJECTION INTRAVENOUS; SUBCUTANEOUS at 06:16

## 2023-08-11 RX ADMIN — OXYCODONE HYDROCHLORIDE 7.5 MILLIGRAM(S): 5 TABLET ORAL at 12:06

## 2023-08-11 RX ADMIN — ALBUTEROL 2.5 MILLIGRAM(S): 90 AEROSOL, METERED ORAL at 09:46

## 2023-08-11 RX ADMIN — ERTAPENEM SODIUM 120 MILLIGRAM(S): 1 INJECTION, POWDER, LYOPHILIZED, FOR SOLUTION INTRAMUSCULAR; INTRAVENOUS at 14:10

## 2023-08-11 RX ADMIN — Medication 50 MILLIGRAM(S): at 06:16

## 2023-08-11 RX ADMIN — AMLODIPINE BESYLATE 5 MILLIGRAM(S): 2.5 TABLET ORAL at 06:16

## 2023-08-11 RX ADMIN — Medication 2: at 14:10

## 2023-08-11 RX ADMIN — SODIUM CHLORIDE 4 MILLILITER(S): 9 INJECTION INTRAMUSCULAR; INTRAVENOUS; SUBCUTANEOUS at 09:46

## 2023-08-11 RX ADMIN — CHLORHEXIDINE GLUCONATE 1 APPLICATION(S): 213 SOLUTION TOPICAL at 06:18

## 2023-08-11 RX ADMIN — DORNASE ALFA 2.5 MILLIGRAM(S): 1 SOLUTION RESPIRATORY (INHALATION) at 09:46

## 2023-08-11 RX ADMIN — ALBUTEROL 2.5 MILLIGRAM(S): 90 AEROSOL, METERED ORAL at 02:48

## 2023-08-11 RX ADMIN — LOSARTAN POTASSIUM 100 MILLIGRAM(S): 100 TABLET, FILM COATED ORAL at 06:16

## 2023-08-11 RX ADMIN — PANTOPRAZOLE SODIUM 40 MILLIGRAM(S): 20 TABLET, DELAYED RELEASE ORAL at 06:16

## 2023-08-11 RX ADMIN — Medication 8 UNIT(S): at 14:10

## 2023-08-11 RX ADMIN — OXYCODONE HYDROCHLORIDE 7.5 MILLIGRAM(S): 5 TABLET ORAL at 12:25

## 2023-08-11 RX ADMIN — Medication 2: at 08:35

## 2023-08-11 RX ADMIN — Medication 81 MILLIGRAM(S): at 12:08

## 2023-08-11 RX ADMIN — SODIUM CHLORIDE 4 MILLILITER(S): 9 INJECTION INTRAMUSCULAR; INTRAVENOUS; SUBCUTANEOUS at 02:47

## 2023-08-11 NOTE — PROGRESS NOTE ADULT - PROVIDER SPECIALTY LIST ADULT
Critical Care
Pain Medicine
Pain Medicine
Cardiology
Cardiology
Infectious Disease
Pulmonology
Pulmonology
Thoracic Surgery
Thoracic Surgery
Urology
Anesthesia
Cardiology
Critical Care
Critical Care
Infectious Disease
Cardiology
Infectious Disease
Infectious Disease
Urology
Internal Medicine

## 2023-08-11 NOTE — CHART NOTE - NSCHARTNOTESELECT_GEN_ALL_CORE
IR/Event Note
Nutrition Services
Pre IR PICC line note/Event Note
Thoracic/Event Note
Event Note
Event Note
POCUS
POCUS
Pigtail Catheter Attempt/Event Note
Pulmonary
Thoracic Surgery/Event Note
Thoracic/Event Note
urology

## 2023-08-11 NOTE — PROGRESS NOTE ADULT - PROBLEM SELECTOR PROBLEM 3
Renal mass
Elevated lactic acid level
Renal mass

## 2023-08-11 NOTE — PROGRESS NOTE ADULT - PROBLEM SELECTOR PLAN 6
Chronic moderate exacerbation  /88  Continue losartan 100mg daily, amlodipine 5mg daily, hydralazine 50mg BID  Monitor
Chronic moderate exacerbation  /88  Continue losartan 100mg daily, amlodipine 5mg daily, hydralazine 50mg BID  Monitor
Chronic moderate exacerbation    Hold metformin 1g BID  LDCS with diabetic diet  A1c and lipid panel
Chronic moderate exacerbation  /88  Continue losartan 100mg daily, amlodipine 5mg daily, hydralazine 50mg BID  Monitor

## 2023-08-11 NOTE — PROGRESS NOTE ADULT - PROBLEM SELECTOR PROBLEM 6
Benign essential HTN
Type 2 diabetes mellitus treated without insulin
Benign essential HTN

## 2023-08-11 NOTE — PROGRESS NOTE ADULT - PROBLEM SELECTOR PLAN 2
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 Steven Carlos outpatient for MALS evaluation.  No need for anticoagulation as there is no evidence of thrombus
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 Steven Carlos outpatient for MALS evaluation   Contacted Surgery to see if can d/c heparin drip-> no need for heparin drip at this time  Pain regimen: percocet 1 tab for moderate pain, 2 tab for severe pain, dilaudid 0.5MG IV for breakthrough pain  -d/c heparin drop  -surgery consult appreciated  -Vascular eval;  -f/u MRI ABD
New  CT A/P: 5. Solid exophytic left renal mass and primary renal neoplasm cannot be excluded. Additional indeterminate right renal lesion measuring 1.8 x 1.4 cm.   MRI abdomen with IV contrast ordered
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 Steven Carlos outpatient for MALS evaluation   Contacted Surgery to see if can d/c heparin drip-> no need for heparin drip at this time  Pain regimen: percocet 1 tab for moderate pain, 2 tab for severe pain, dilaudid 0.5MG IV for breakthrough pain  -d/c heparin drop  -surgery consult appreciated  -Vascular eval;  -f/u MRI ABD
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 Steven Carlos outpatient for MALS evaluation.  No need for anticoagulation as there is no evidence of thombus
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 Steven Carlos outpatient for MALS evaluation.  No need for anticoagulation as there is no evidence of thrombus
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 Steven Carlos outpatient for MALS evaluation   Contacted Surgery to see if can d/c heparin drip-> no need for heparin drip at this time  Pain regimen: percocet 1 tab for moderate pain, 2 tab for severe pain, dilaudid 0.5MG IV for breakthrough pain  -d/c heparin drop  -surgery consult appreciated  -Vascular FU
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 Steven Carlos outpatient for MALS evaluation.  No need for anticoagulation as there is no evidence of thrombus
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 Steven Carlos outpatient for MALS evaluation.  No need for anticoagulation as there is no evidence of thombus
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 Steven Carlos outpatient for MALS evaluation
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 Steven Carlos outpatient for MALS evaluation.  No need for anticoagulation as there is no evidence of thrombus.
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 Steven Carlos outpatient for MALS evaluation.  No need for anticoagulation as there is no evidence of thrombus.

## 2023-08-11 NOTE — PROGRESS NOTE ADULT - PROBLEM SELECTOR PLAN 3
New  CT A/P: 5. Solid exophytic left renal mass and primary renal neoplasm cannot be excluded. Additional indeterminate right renal lesion measuring 1.8 x 1.4 cm.    Eval noted, Biopsy is planned as out patient.
New  LA 4.2->3.8  Specific gravity 1.059  Pt likely slightly dry  s/p 1.5L INF  Encourage PO intake  Hold metformin
New  CT A/P: 5. Solid exophytic left renal mass and primary renal neoplasm cannot be excluded. Additional indeterminate right renal lesion measuring 1.8 x 1.4 cm.    Eval noted, Biopsy is planned.
New  CT A/P: 5. Solid exophytic left renal mass and primary renal neoplasm cannot be excluded. Additional indeterminate right renal lesion measuring 1.8 x 1.4 cm.   MRI abdomen with IV contrast ordered   will need to evaluate after pleural effusion is better
New  CT A/P: 5. Solid exophytic left renal mass and primary renal neoplasm cannot be excluded. Additional indeterminate right renal lesion measuring 1.8 x 1.4 cm.    Eval called
New  CT A/P: 5. Solid exophytic left renal mass and primary renal neoplasm cannot be excluded. Additional indeterminate right renal lesion measuring 1.8 x 1.4 cm.   MRI abdomen with IV contrast ordered   will need to evaluate after pleural effusion is better
New  CT A/P: 5. Solid exophytic left renal mass and primary renal neoplasm cannot be excluded. Additional indeterminate right renal lesion measuring 1.8 x 1.4 cm.    Eval noted, Biopsy is planned.
New  CT A/P: 5. Solid exophytic left renal mass and primary renal neoplasm cannot be excluded. Additional indeterminate right renal lesion measuring 1.8 x 1.4 cm.    Eval noted, Biopsy is planned as out patient.
New  CT A/P: 5. Solid exophytic left renal mass and primary renal neoplasm cannot be excluded. Additional indeterminate right renal lesion measuring 1.8 x 1.4 cm.    Eval noted, Biopsy is planned.
New  CT A/P: 5. Solid exophytic left renal mass and primary renal neoplasm cannot be excluded. Additional indeterminate right renal lesion measuring 1.8 x 1.4 cm.    Eval noted, Biopsy is planned.

## 2023-08-11 NOTE — PROGRESS NOTE ADULT - PROBLEM SELECTOR PLAN 4
Encourage PO intake  Hold metformin
Encourage PO intake  Hold metformin
New  Imaging: Cardiomegaly with left ventricular dilatation with some concentric hypertrophy.   ECHO ordered
Encourage PO intake  Hold metformin
New  LA 4.2->3.8  Specific gravity 1.059  Pt likely slightly dry  s/p 1.5L INF  Encourage PO intake  Hold metformin
Encourage PO intake  Hold metformin

## 2023-08-11 NOTE — PROGRESS NOTE ADULT - PROBLEM SELECTOR PROBLEM 5
Cardiomegaly
Benign essential HTN
Cardiomegaly

## 2023-08-11 NOTE — PROGRESS NOTE ADULT - PROBLEM SELECTOR PLAN 5
New  Imaging: Cardiomegaly with left ventricular dilatation with some concentric hypertrophy.   ECHO noted, NLVF
Chronic moderate exacerbation  /88  Continue losartan 100mg daily, amlodipine 5mg daily, hydralazine 50mg BID  Monitor
New  Imaging: Cardiomegaly with left ventricular dilatation with some concentric hypertrophy.   ECHO noted, NLVF

## 2023-08-11 NOTE — PROGRESS NOTE ADULT - SUBJECTIVE AND OBJECTIVE BOX
DATE OF SERVICE: 08-11-23  No SOB  Afebrile    REVIEW OF SYSTEMS:  CONSTITUTIONAL: No fever  RESPIRATORY: No cough, hemoptysis shortness of breath  CARDIOVASCULAR: No palpitations, dizziness, or leg swelling  GASTROINTESTINAL: No nausea, vomiting, hematemesis  GENITOURINARY: No dysuria, frequency, hematuria   NEUROLOGICAL: No headaches, no dizziness  MUSCULOSKELETAL: No joint pain or swelling;     INTERVAL HPI/OVERNIGHT EVENTS:  ICU Vital Signs Last 24 Hrs  T(C): 36.8 (11 Aug 2023 08:42), Max: 37.3 (11 Aug 2023 05:08)  T(F): 98.3 (11 Aug 2023 08:42), Max: 99.1 (11 Aug 2023 05:08)  HR: 100 (11 Aug 2023 10:19) (63 - 102)  BP: 139/75 (11 Aug 2023 08:42) (126/66 - 142/82)  BP(mean): --  ABP: --  ABP(mean): --  RR: 18 (11 Aug 2023 08:42) (16 - 20)  SpO2: 99% (11 Aug 2023 10:19) (97% - 100%)    O2 Parameters below as of 11 Aug 2023 09:46  Patient On (Oxygen Delivery Method): room air          Auto Neutrophil % : x  Auto Lymphocyte % : x  Auto Monocyte % : x  Auto Eosinophil % : x  Auto Basophil % : x        PAST MEDICAL & SURGICAL HISTORY:  Hypertension    BMI: BMI (kg/m2): 31.5 (08-02-23 @ 15:14)  HbA1c: A1C with Estimated Average Glucose Result: 7.8 % (07-30-23 @ 07:16)    Glucose: POCT Blood Glucose.: 199 mg/dL (08-05-23 @ 12:44)    BP: 122/67 (08-05-23 @ 15:00) (86/53 - 162/89)  Lipid Panel: Date/Time: 07-30-23 @ 07:16  Cholesterol, Serum: 101  Direct LDL: --  HDL Cholesterol, Serum: 50  Total Cholesterol/HDL Ration Measurement: --  Triglycerides, Serum: 122    Diabetes mellitus      HLD (hyperlipidemia)      No significant past surgical history          MEDICATIONS  (STANDING):  amLODIPine   Tablet 5 milliGRAM(s) Oral daily  aspirin  chewable 81 milliGRAM(s) Oral daily  atorvastatin 10 milliGRAM(s) Oral at bedtime  cyclobenzaprine 10 milliGRAM(s) Oral at bedtime  dextrose 5%. 1000 milliLiter(s) (50 mL/Hr) IV Continuous <Continuous>  dextrose 5%. 1000 milliLiter(s) (100 mL/Hr) IV Continuous <Continuous>  dextrose 50% Injectable 12.5 Gram(s) IV Push once  dextrose 50% Injectable 25 Gram(s) IV Push once  dextrose 50% Injectable 25 Gram(s) IV Push once  glucagon  Injectable 1 milliGRAM(s) IntraMuscular once  hydrALAZINE 50 milliGRAM(s) Oral two times a day  insulin lispro (ADMELOG) corrective regimen sliding scale   SubCutaneous three times a day before meals  insulin lispro (ADMELOG) corrective regimen sliding scale   SubCutaneous at bedtime  latanoprost 0.005% Ophthalmic Solution 1 Drop(s) Both EYES at bedtime  losartan 100 milliGRAM(s) Oral daily  melatonin 9 milliGRAM(s) Oral at bedtime    MEDICATIONS  (PRN):  acetaminophen     Tablet .. 650 milliGRAM(s) Oral every 6 hours PRN Temp greater or equal to 38C (100.4F), Mild Pain (1 - 3)  aluminum hydroxide/magnesium hydroxide/simethicone Suspension 30 milliLiter(s) Oral every 4 hours PRN Dyspepsia  dextrose Oral Gel 15 Gram(s) Oral once PRN Blood Glucose LESS THAN 70 milliGRAM(s)/deciliter  HYDROmorphone  Injectable 0.5 milliGRAM(s) IV Push every 4 hours PRN breakthrough pain  nitroglycerin     SubLingual 0.4 milliGRAM(s) SubLingual every 5 minutes PRN Chest Pain  ondansetron Injectable 4 milliGRAM(s) IV Push every 8 hours PRN Nausea and/or Vomiting  oxycodone    5 mG/acetaminophen 325 mG 1 Tablet(s) Oral every 6 hours PRN Moderate Pain (4 - 6)  oxycodone    5 mG/acetaminophen 325 mG 2 Tablet(s) Oral every 4 hours PRN Severe Pain (7 - 10)        RADIOLOGY & ADDITIONAL TESTS:    Imaging Personally Reviewed:  [ ] YES  [ ] NO    Consultant(s) Notes Reviewed:  [x ] YES  [ ] NO    PHYSICAL EXAM:  GENERAL: Alert and awake lying in bed in no distress  HEAD:  Atraumatic, Normocephalic  EYES: EOMI, MADALYN, conjunctiva and sclera clear  NECK: Supple, No JVD, Normal thyroid  NERVOUS SYSTEM:  Alert & Oriented X3, Motor and sensory systems are intact,   CHEST/LUNG: Left sided chest tubes +  HEART: Regular rate and rhythm; No murmurs, rubs, or gallops  ABDOMEN: Soft, Nontender, Nondistended; Bowel sounds present  EXTREMITIES:   Peripheral Pulses are palpable, no  edema        Care Discussed with Consultants/Other Providers [x ] YES  [ ] NO      Code Status: [] Full Code [] DNR [] DNI [] Goals of Care:   Disposition: [] ICU [] Stroke Unit [] RCU []PCU []Floor [] Discharge Home         PAOLA High.FACP

## 2023-08-11 NOTE — PROGRESS NOTE ADULT - REASON FOR ADMISSION
celiac artery stenosis, c/f median arcuate ligament syndrome; renal mass

## 2023-08-11 NOTE — PROGRESS NOTE ADULT - PROBLEM SELECTOR PROBLEM 2
Celiac artery stenosis
Renal mass
Celiac artery stenosis

## 2023-08-11 NOTE — PROGRESS NOTE ADULT - PROBLEM SELECTOR PLAN 1
Worsening on left, S/P thoracocentesis, pulm is following.  VATS performed  Switch to Ertapenem daily as per ID till 9/9    Repeat CT prior to discharge
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 Steven Carlos outpatient for MALS evaluation   Contacted Surgery to see if can d/c heparin drip-> no need for heparin drip at this time  Pain regimen: percocet 1 tab for moderate pain, 2 tab for severe pain, dilaudid 0.5MG IV for breakthrough pain  -d/c heparin drop  -surgery consult appreciated  -Vascular charles;  -f/u MRI ABD
Worsening on left, S/P thoracocentesis, pulm is following.  VATS performed  Switch to Ertapenem daily as per ID till 9/9  Needs PICC
Worsening on left, S/P thoracocentesis, pulm is following.  VATS planned.  Patient is medically optimized for the same.
Worsening on left, need to get thoracocentesis, pulm is following
Worsening on left, S/P thoracocentesis, pulm is following.  VATS planned.  Patient is medically optimized for the same.
Worsening on left, S/P thoracocentesis, pulm is following.  VATS performed  Switch to Ertapenem daily as per ID till 9/9  Needs PICC to be placed today
Worsening on left, S/P thoracocentesis, pulm is following.  VATS performed  culture growing strept, On ceftriaxone, flagyl
Worsening on left, S/P thoracocentesis, pulm is following.  VATS performed  CT may be removed today
Worsening on left, S/P thoracocentesis, pulm is following.  VATS performed
Worsening on left, S/P thoracocentesis, pulm is following.  VATS performed  Switch to Ertapenem daily as per ID till 9/9  Needs PICC to be placed
Worsening on left, S/P thoracocentesis, pulm is following.  VATS performed  culture growing strept, On ceftriaxone, flagyl  ID to FU for a prolonged Antibiotic course

## 2023-08-11 NOTE — PROGRESS NOTE ADULT - PROBLEM SELECTOR PROBLEM 7
Type 2 diabetes mellitus treated without insulin
HLD (hyperlipidemia)
Type 2 diabetes mellitus treated without insulin

## 2023-08-11 NOTE — PROGRESS NOTE ADULT - PROBLEM SELECTOR PLAN 7
Uncontrolled due to infection.  LDCS with diabetic diet  A1c and lipid panel  Need Endocrine
Chronic moderate exacerbation    Hold metformin 1g BID  LDCS with diabetic diet  A1c and lipid panel  Need Endocrine
Uncontrolled due to infection.  LDCS with diabetic diet  A1c and lipid panel  Need Endocrine
Chronic stable  Continue atorvastatin 10mg daily   Lipid panel in AM

## 2023-08-11 NOTE — PROGRESS NOTE ADULT - PROBLEM SELECTOR PROBLEM 1
Pleural effusion
Pleural effusion
Celiac artery stenosis
Pleural effusion

## 2023-08-11 NOTE — PROGRESS NOTE ADULT - ASSESSMENT
67 year old male with htn and DM presented with left sided chest pain.  Imaging with left sided opacities and ? worsening left sided effusion.  Associated leukocytosis.     1) Shortness of breath with cough  S/p vats/ decortication    Gram stain with GPC- pleural fluid growing strep constellatus and parvimonas  CT removed    Repeat CT chest with small effusion. I don't think this explains wbc.     Continue antibiotics through 9/9  Can change to ertapenem 1 gram iv daily to avoid prolonged use of flagyl  weekly cbc, bmp fax to [phone]    2) Renal Mass-  Solid exophytic left renal mass and primary renal neoplasm cannot be   excluded. Additional indeterminate right renal lesion measuring 1.8 x 1.4   cm. Urology evaluation .     3) Abnormal imaging  stenosis of celiac axis  Surgery following    4) Leukocytosis-   continue to monitor  May be multifactorial- resolving empyema/ post op, renal mass. SMA narrowing  Denies respiratory symptoms at this time  He is on antibiotics    Follow up as an outpatient  8/24 at 4 pm  [address] [facility] En[facility] [place] [id number] [phone]    Call ID service with questions

## 2023-08-11 NOTE — PROGRESS NOTE ADULT - SUBJECTIVE AND OBJECTIVE BOX
Follow Up:      Inverval History/ROS: Patient is a 67y old  Male who presents with a chief complaint of celiac artery stenosis, c/f median arcuate ligament syndrome; renal mass (11 Aug 2023 10:28)    No fever  Denies sob  no cough    Allergies    No Known Allergies    Intolerances        ANTIMICROBIALS:  ertapenem  IVPB 1000 every 24 hours      OTHER MEDS:  acetaminophen     Tablet .. 650 milliGRAM(s) Oral every 6 hours PRN  acetaminophen   IVPB .. 1000 milliGRAM(s) IV Intermittent once  albuterol    0.083% 2.5 milliGRAM(s) Nebulizer every 6 hours  aluminum hydroxide/magnesium hydroxide/simethicone Suspension 30 milliLiter(s) Oral every 4 hours PRN  amLODIPine   Tablet 5 milliGRAM(s) Oral daily  aspirin  chewable 81 milliGRAM(s) Oral daily  atorvastatin 10 milliGRAM(s) Oral at bedtime  chlorhexidine 2% Cloths 1 Application(s) Topical <User Schedule>  cyclobenzaprine 10 milliGRAM(s) Oral at bedtime  dextrose 50% Injectable 25 Gram(s) IV Push once  dornase shana Solution 2.5 milliGRAM(s) Inhalation daily  heparin   Injectable 5000 Unit(s) SubCutaneous every 8 hours  hydrALAZINE 50 milliGRAM(s) Oral every 24 hours  insulin glargine Injectable (LANTUS) 17 Unit(s) SubCutaneous at bedtime  insulin lispro (ADMELOG) corrective regimen sliding scale   SubCutaneous three times a day before meals  insulin lispro (ADMELOG) corrective regimen sliding scale   SubCutaneous at bedtime  insulin lispro Injectable (ADMELOG) 8 Unit(s) SubCutaneous before breakfast  insulin lispro Injectable (ADMELOG) 8 Unit(s) SubCutaneous before dinner  insulin lispro Injectable (ADMELOG) 8 Unit(s) SubCutaneous before lunch  latanoprost 0.005% Ophthalmic Solution 1 Drop(s) Both EYES at bedtime  lidocaine   4% Patch 1 Patch Transdermal daily  losartan 100 milliGRAM(s) Oral daily  melatonin 9 milliGRAM(s) Oral at bedtime  naloxone Injectable 0.1 milliGRAM(s) IV Push every 3 minutes PRN  ondansetron Injectable 4 milliGRAM(s) IV Push every 6 hours PRN  oxyCODONE    IR 5 milliGRAM(s) Oral every 3 hours PRN  oxyCODONE    IR 7.5 milliGRAM(s) Oral every 3 hours PRN  pantoprazole    Tablet 40 milliGRAM(s) Oral before breakfast  polyethylene glycol 3350 17 Gram(s) Oral two times a day  senna 2 Tablet(s) Oral at bedtime  sodium chloride 0.9% lock flush 10 milliLiter(s) IV Push every 1 hour PRN  sodium chloride 3%  Inhalation 4 milliLiter(s) Inhalation every 6 hours      Vital Signs Last 24 Hrs  T(C): 36.8 (11 Aug 2023 12:00), Max: 37.3 (11 Aug 2023 05:08)  T(F): 98.2 (11 Aug 2023 12:00), Max: 99.1 (11 Aug 2023 05:08)  HR: 95 (11 Aug 2023 12:00) (63 - 102)  BP: 136/71 (11 Aug 2023 12:00) (133/65 - 142/82)  BP(mean): --  RR: 18 (11 Aug 2023 12:00) (18 - 18)  SpO2: 100% (11 Aug 2023 12:00) (97% - 100%)    Parameters below as of 11 Aug 2023 12:00  Patient On (Oxygen Delivery Method): room air        PHYSICAL EXAM:  General: [ x] non-toxic  HEAD/EYES: [ ] PERRL [x ] white sclera [ ] icterus  ENT:  [ ] normal [ ] supple [ ] thrush [ ] pharyngeal exudate  Cardiovascular:   [ ] murmur [x ] normal [ ] PPM/AICD  Respiratory:  [ x] clear to ausculation bilaterally  GI:  [ ] soft, non-tender, normal bowel sounds  :  [ ] horne [ ] no CVA tenderness   Musculoskeletal:  [ ] no synovitis  Neurologic:  [ ] non-focal exam   Skin:  [x ] no rash  Lymph: [x ] no lymphadenopathy  Psychiatric:  [x ] appropriate affect [ ] alert & oriented  Lines:  [x ] no phlebitis [ ] central line                                8.3    17.80 )-----------( 492      ( 11 Aug 2023 05:55 )             26.9       08-11    133<L>  |  98  |  7   ----------------------------<  139<H>  4.7   |  25  |  0.57    Ca    8.6      11 Aug 2023 05:55  Phos  3.2     08-11  Mg     1.80     08-11        Urinalysis Basic - ( 11 Aug 2023 05:55 )    Color: x / Appearance: x / SG: x / pH: x  Gluc: 139 mg/dL / Ketone: x  / Bili: x / Urobili: x   Blood: x / Protein: x / Nitrite: x   Leuk Esterase: x / RBC: x / WBC x   Sq Epi: x / Non Sq Epi: x / Bacteria: x        MICROBIOLOGY:    RADIOLOGY:  < from: CT Chest No Cont (08.11.23 @ 14:07) >  ACC: [id number] EXAM:  CT CHEST   ORDERED BY: LAVONNE IRENE     PROCEDURE DATE:  08/11/2023          INTERPRETATION:  CLINICAL INFORMATION: Pleural effusion.    COMPARISON: CT chest 7/31/2023    CONTRAST/COMPLICATIONS:  IV Contrast: None  Oral Contrast: None  Complications: None reported    PROCEDURE:  CT of the Chest was performed.  Sagittal and coronal reformats were performed.    FINDINGS:    LUNGS/AIRWAYS/PLEURA: Patent central airways.  Unchanged 4 mm right   middle lobe pulmonary nodule.Linear opacities representing atelectasis   are noted within the left upper and left lower lobes. Small left pleural   effusion containing foci of air likely from recent instrumentation,   decrease in size from prior exam.  MEDIASTINUM AND ELIAN: No lymphadenopathy.  VESSELS: Left-sided PICC with tip in the SVC. Coronary arteries   calcification.  HEART: Mild cardiomegaly. Trace pericardial fluid. Aortic valve   calcifications.  CHEST WALL AND LOWER NECK: Subcutaneous changes related to prior chest   tube in the left lateral chest wall.  VISUALIZED UPPER ABDOMEN: Incompletely imaged stranding surrounding the   proximal duodenum.  BONES: Degenerative changes.    IMPRESSION:  Small left pleural effusion containing foci of air, decreased from prior   exam.    < end of copied text >

## 2023-08-11 NOTE — PROGRESS NOTE ADULT - PROBLEM SELECTOR PROBLEM 4
Elevated lactic acid level
Elevated lactic acid level
Cardiomegaly
Elevated lactic acid level

## 2023-08-11 NOTE — PROGRESS NOTE ADULT - PROBLEM SELECTOR PLAN 8
Chronic stable  Continue atorvastatin 10mg daily

## 2023-08-24 ENCOUNTER — NON-APPOINTMENT (OUTPATIENT)
Age: 68
End: 2023-08-24

## 2023-08-24 ENCOUNTER — APPOINTMENT (OUTPATIENT)
Dept: INFECTIOUS DISEASE | Facility: CLINIC | Age: 68
End: 2023-08-24
Payer: MEDICARE

## 2023-08-24 VITALS
BODY MASS INDEX: 29.51 KG/M2 | DIASTOLIC BLOOD PRESSURE: 78 MMHG | HEART RATE: 114 BPM | WEIGHT: 188 LBS | HEIGHT: 67 IN | TEMPERATURE: 97.8 F | OXYGEN SATURATION: 95 % | SYSTOLIC BLOOD PRESSURE: 148 MMHG

## 2023-08-24 PROCEDURE — 99214 OFFICE O/P EST MOD 30 MIN: CPT

## 2023-08-24 NOTE — PHYSICAL EXAM
[General Appearance - Alert] : alert [General Appearance - In No Acute Distress] : in no acute distress [Sclera] : the sclera and conjunctiva were normal [Outer Ear] : the ears and nose were normal in appearance [Hearing Threshold Finger Rub Not Woods] : hearing was normal [Neck Appearance] : the appearance of the neck was normal [] : no respiratory distress [Exaggerated Use Of Accessory Muscles For Inspiration] : no accessory muscle use [Heart Rate And Rhythm] : heart rate was normal and rhythm regular [Heart Sounds] : normal S1 and S2 [Bowel Sounds] : normal bowel sounds [Abdomen Tenderness] : non-tender [No Palpable Adenopathy] : no palpable adenopathy [Skin Color & Pigmentation] : normal skin color and pigmentation [Oriented To Time, Place, And Person] : oriented to person, place, and time [Affect] : the affect was normal

## 2023-08-24 NOTE — ASSESSMENT
[FreeTextEntry1] : 67 year old with DM who was admitted at the end of July with shortness of breath due to a loculated effusion/ empyema.  S/p decortication. Treated with antibiotics. Improving.   1) Empyema S/p vats/ decortication on 8/2 Cultures grew strep constellatus and parvimonas  Continue antibiotics through 9/9 with ertapenem 1 gram iv daily weekly cbc, bmp fax to [phone]  2) Renal Mass- Solid exophytic left renal mass and primary renal neoplasm cannot be  excluded. Additional indeterminate right renal lesion measuring 1.8 x 1.4 cm. Urology follow up  3) Abnormal imaging stenosis of celiac axis Outpatient general surgery follow up  4) Leukocytosis-  17 at discharge, most recent 11.9  Follow up with ID as needed

## 2023-08-24 NOTE — HISTORY OF PRESENT ILLNESS
[FreeTextEntry1] : 67 year old male with htn and DM presented with left sided chest pain at the end of July. .He was found to have a left side loculated effusion/emyema. He started on IV antibiotics on 7/29.  He had a vats/decortication on 8/2.  He was discharged on 8/11. At the time of discharge, his WBC was 17  He has been tolerating IV ertapenem He denies any fever or sob. His cough is improving  His picc line is working No diarrhea

## 2023-08-25 ENCOUNTER — APPOINTMENT (OUTPATIENT)
Dept: UROLOGY | Facility: CLINIC | Age: 68
End: 2023-08-25
Payer: MEDICARE

## 2023-08-25 ENCOUNTER — OUTPATIENT (OUTPATIENT)
Dept: OUTPATIENT SERVICES | Facility: HOSPITAL | Age: 68
LOS: 1 days | End: 2023-08-25
Payer: MEDICARE

## 2023-08-25 VITALS — SYSTOLIC BLOOD PRESSURE: 159 MMHG | DIASTOLIC BLOOD PRESSURE: 91 MMHG | HEART RATE: 92 BPM

## 2023-08-25 VITALS — DIASTOLIC BLOOD PRESSURE: 91 MMHG | HEART RATE: 105 BPM | SYSTOLIC BLOOD PRESSURE: 144 MMHG

## 2023-08-25 DIAGNOSIS — R35.0 FREQUENCY OF MICTURITION: ICD-10-CM

## 2023-08-25 PROCEDURE — 50200 RENAL BIOPSY PERQ: CPT | Mod: LT

## 2023-08-25 PROCEDURE — 76942 ECHO GUIDE FOR BIOPSY: CPT

## 2023-08-25 PROCEDURE — 76942 ECHO GUIDE FOR BIOPSY: CPT | Mod: 26

## 2023-08-25 PROCEDURE — 50200 RENAL BIOPSY PERQ: CPT

## 2023-08-25 NOTE — PROCEDURE
[Kidney Biopsy] : kidney biopsy was performed [Risks] : risks [Consent Obtained] : written consent was obtained prior to the procedure and is detailed in the patient's record [Patient] : the patient [Aspirin] : aspirin [Left Kidney] : left kidney [Chlorhexidine] : chlorhexidine [FreeTextEntry1] : pt positioned prone on table. flank prepped and draped. abdominal us probe used to visualize kidney and lesion. skin site identified and numbed. stab incision made. under us guidance 5 cores taken. tolerated well. us post procedure shows no bleeding. will call next week for results.

## 2023-08-28 DIAGNOSIS — N28.89 OTHER SPECIFIED DISORDERS OF KIDNEY AND URETER: ICD-10-CM

## 2023-08-28 LAB — CORE LAB BIOPSY: NORMAL

## 2023-08-29 ENCOUNTER — APPOINTMENT (OUTPATIENT)
Dept: THORACIC SURGERY | Facility: CLINIC | Age: 68
End: 2023-08-29
Payer: MEDICARE

## 2023-08-29 ENCOUNTER — APPOINTMENT (OUTPATIENT)
Dept: RADIOLOGY | Facility: HOSPITAL | Age: 68
End: 2023-08-29

## 2023-08-29 ENCOUNTER — RESULT REVIEW (OUTPATIENT)
Age: 68
End: 2023-08-29

## 2023-08-29 ENCOUNTER — OUTPATIENT (OUTPATIENT)
Dept: OUTPATIENT SERVICES | Facility: HOSPITAL | Age: 68
LOS: 1 days | End: 2023-08-29
Payer: MEDICARE

## 2023-08-29 VITALS
WEIGHT: 184 LBS | OXYGEN SATURATION: 98 % | SYSTOLIC BLOOD PRESSURE: 142 MMHG | HEART RATE: 97 BPM | RESPIRATION RATE: 16 BRPM | BODY MASS INDEX: 28.88 KG/M2 | HEIGHT: 67 IN | DIASTOLIC BLOOD PRESSURE: 91 MMHG

## 2023-08-29 DIAGNOSIS — J90 PLEURAL EFFUSION, NOT ELSEWHERE CLASSIFIED: ICD-10-CM

## 2023-08-29 PROCEDURE — 71046 X-RAY EXAM CHEST 2 VIEWS: CPT | Mod: 26

## 2023-08-29 PROCEDURE — 99024 POSTOP FOLLOW-UP VISIT: CPT

## 2023-08-29 RX ORDER — METFORMIN HYDROCHLORIDE 500 MG/1
500 TABLET, COATED ORAL
Refills: 0 | Status: ACTIVE | COMMUNITY

## 2023-08-29 RX ORDER — CHLORHEXIDINE GLUCONATE 4 %
325 (65 FE) LIQUID (ML) TOPICAL
Refills: 0 | Status: ACTIVE | COMMUNITY

## 2023-08-29 RX ORDER — CETIRIZINE HYDROCHLORIDE 10 MG/1
10 CAPSULE, LIQUID FILLED ORAL
Refills: 0 | Status: COMPLETED | COMMUNITY
Start: 2018-08-02 | End: 2023-08-29

## 2023-08-29 RX ORDER — MELOXICAM 15 MG/1
15 TABLET ORAL
Refills: 0 | Status: COMPLETED | COMMUNITY
Start: 2018-08-02 | End: 2023-08-29

## 2023-08-29 NOTE — ASSESSMENT
[FreeTextEntry1] : Mr. SARAH FRIAS, 67 year old male, ...smoker, w/ hx of .....HTN, HLD, Type 2 diabetes, TAO on BiPAP. Recently hospitalized and found to have left loculated pleural effusion. Pulmonology performed a thoracentesis and drained 200 mL but was unable to place a PTC.    Now, s/p Flex bronch, uniportal Left VATS, pneumonolysis, decortication, debridement and drainage of empyema, and intercostal nerve block on 8/2/23. Path: Contents of empyema: Acute, fibrinous and fibrous pleurisy. Marked acute inflammation with areas of necrosis, consistent with empyema. Path: Decortication: Acute necrotizing and fibrinous pleuritis with background of fibrous pleurisy.   Cultures grew strep constellatus and parvimonas  8/10- S/P- PICC line placement. IV Abx until 9/9, follows with Dr. Michel Mason ([place]).  OF NOTE: To follow up with outpatient URO for renal mass   CXR on 8/29/23: reviewed.  Patient presents to office for post op evaluation.   I have independently reviewed the medical records and imaging at the time of this office consultation, and discussed the following interpretations with the patient: - Path reviewed and explained to patient, negative malignancy, RTC in 3mo with CT Chest w/o contrast.  Recommendations reviewed with patient during this office visit, and all questions answered; Patient instructed on the importance of follow up and verbalizes understanding.   I, MARIA ELENA Cain, personally performed the evaluation and management (E/M) services for this established patient who presents today with (a) new problem(s)/exacerbation of (an) existing condition(s). That E/M includes conducting the examination, assessing all new/exacerbated conditions, and establishing a new plan of care. Today, my ACP, Jen Kearney NP was here to observe my evaluation and management services for this new problem/exacerbated condition to be followed going forward.

## 2023-08-29 NOTE — REASON FOR VISIT
SURENDRA to call SC[facility]  [de-identified] : Flexible bronchoscopy, uniportal left video-assisted thoracic surgery, pneumonolysis, decortication, debridement and drainage of empyema, and intercostal nerve block.   [de-identified] : 8/2/23

## 2023-08-30 LAB
CULTURE RESULTS: SIGNIFICANT CHANGE UP
SPECIMEN SOURCE: SIGNIFICANT CHANGE UP

## 2023-09-02 LAB

## 2023-09-12 ENCOUNTER — APPOINTMENT (OUTPATIENT)
Dept: UROLOGY | Facility: CLINIC | Age: 68
End: 2023-09-12
Payer: MEDICARE

## 2023-09-12 PROCEDURE — 99214 OFFICE O/P EST MOD 30 MIN: CPT

## 2023-09-19 ENCOUNTER — APPOINTMENT (OUTPATIENT)
Dept: NUCLEAR MEDICINE | Facility: IMAGING CENTER | Age: 68
End: 2023-09-19
Payer: MEDICARE

## 2023-09-19 ENCOUNTER — TRANSCRIPTION ENCOUNTER (OUTPATIENT)
Age: 68
End: 2023-09-19

## 2023-09-19 ENCOUNTER — RESULT REVIEW (OUTPATIENT)
Age: 68
End: 2023-09-19

## 2023-09-19 ENCOUNTER — OUTPATIENT (OUTPATIENT)
Dept: OUTPATIENT SERVICES | Facility: HOSPITAL | Age: 68
LOS: 1 days | End: 2023-09-19
Payer: MEDICARE

## 2023-09-19 DIAGNOSIS — Z00.8 ENCOUNTER FOR OTHER GENERAL EXAMINATION: ICD-10-CM

## 2023-09-19 PROCEDURE — A9552: CPT

## 2023-09-19 PROCEDURE — 78815 PET IMAGE W/CT SKULL-THIGH: CPT | Mod: 26,PI

## 2023-09-19 PROCEDURE — 78815 PET IMAGE W/CT SKULL-THIGH: CPT

## 2023-09-20 LAB

## 2023-09-22 ENCOUNTER — APPOINTMENT (OUTPATIENT)
Dept: OTOLARYNGOLOGY | Facility: CLINIC | Age: 68
End: 2023-09-22
Payer: MEDICARE

## 2023-09-22 VITALS
SYSTOLIC BLOOD PRESSURE: 156 MMHG | WEIGHT: 185 LBS | HEIGHT: 67 IN | DIASTOLIC BLOOD PRESSURE: 89 MMHG | HEART RATE: 73 BPM | BODY MASS INDEX: 29.03 KG/M2 | OXYGEN SATURATION: 98 %

## 2023-09-22 DIAGNOSIS — Z87.891 PERSONAL HISTORY OF NICOTINE DEPENDENCE: ICD-10-CM

## 2023-09-22 DIAGNOSIS — Z78.9 OTHER SPECIFIED HEALTH STATUS: ICD-10-CM

## 2023-09-22 PROCEDURE — 99203 OFFICE O/P NEW LOW 30 MIN: CPT | Mod: 25

## 2023-09-22 RX ORDER — ERTAPENEM SODIUM 1 G/1
1 INJECTION, POWDER, LYOPHILIZED, FOR SOLUTION INTRAMUSCULAR; INTRAVENOUS
Refills: 0 | Status: COMPLETED | COMMUNITY
End: 2023-09-22

## 2023-10-03 ENCOUNTER — APPOINTMENT (OUTPATIENT)
Dept: OTOLARYNGOLOGY | Facility: CLINIC | Age: 68
End: 2023-10-03
Payer: MEDICARE

## 2023-10-03 VITALS
OXYGEN SATURATION: 97 % | DIASTOLIC BLOOD PRESSURE: 87 MMHG | HEIGHT: 67 IN | SYSTOLIC BLOOD PRESSURE: 157 MMHG | BODY MASS INDEX: 29.51 KG/M2 | RESPIRATION RATE: 17 BRPM | WEIGHT: 188 LBS | HEART RATE: 75 BPM

## 2023-10-03 DIAGNOSIS — Z86.79 PERSONAL HISTORY OF OTHER DISEASES OF THE CIRCULATORY SYSTEM: ICD-10-CM

## 2023-10-03 DIAGNOSIS — J35.1 HYPERTROPHY OF TONSILS: ICD-10-CM

## 2023-10-03 DIAGNOSIS — Z86.2 PERSONAL HISTORY OF DISEASES OF THE BLOOD AND BLOOD-FORMING ORGANS AND CERTAIN DISORDERS INVOLVING THE IMMUNE MECHANISM: ICD-10-CM

## 2023-10-03 DIAGNOSIS — N28.89 OTHER SPECIFIED DISORDERS OF KIDNEY AND URETER: ICD-10-CM

## 2023-10-03 DIAGNOSIS — D49.519 NEOPLASM OF UNSPECIFIED BEHAVIOR OF UNSPECIFIED KIDNEY: ICD-10-CM

## 2023-10-03 DIAGNOSIS — Z87.19 PERSONAL HISTORY OF OTHER DISEASES OF THE DIGESTIVE SYSTEM: ICD-10-CM

## 2023-10-03 DIAGNOSIS — E11.49 TYPE 2 DIABETES MELLITUS WITH OTHER DIABETIC NEUROLOGICAL COMPLICATION: ICD-10-CM

## 2023-10-03 DIAGNOSIS — Z86.19 PERSONAL HISTORY OF OTHER INFECTIOUS AND PARASITIC DISEASES: ICD-10-CM

## 2023-10-03 DIAGNOSIS — Z79.4 TYPE 2 DIABETES MELLITUS WITH OTHER DIABETIC NEUROLOGICAL COMPLICATION: ICD-10-CM

## 2023-10-03 PROCEDURE — 99215 OFFICE O/P EST HI 40 MIN: CPT | Mod: 25

## 2023-10-03 PROCEDURE — 31575 DIAGNOSTIC LARYNGOSCOPY: CPT

## 2023-10-04 ENCOUNTER — OUTPATIENT (OUTPATIENT)
Dept: OUTPATIENT SERVICES | Facility: HOSPITAL | Age: 68
LOS: 1 days | End: 2023-10-04

## 2023-10-04 VITALS
OXYGEN SATURATION: 100 % | TEMPERATURE: 98 F | DIASTOLIC BLOOD PRESSURE: 91 MMHG | WEIGHT: 194.01 LBS | RESPIRATION RATE: 18 BRPM | HEIGHT: 66.75 IN | SYSTOLIC BLOOD PRESSURE: 144 MMHG | HEART RATE: 96 BPM

## 2023-10-04 DIAGNOSIS — N28.89 OTHER SPECIFIED DISORDERS OF KIDNEY AND URETER: ICD-10-CM

## 2023-10-04 DIAGNOSIS — Z98.890 OTHER SPECIFIED POSTPROCEDURAL STATES: Chronic | ICD-10-CM

## 2023-10-04 DIAGNOSIS — G47.30 SLEEP APNEA, UNSPECIFIED: ICD-10-CM

## 2023-10-04 DIAGNOSIS — H26.9 UNSPECIFIED CATARACT: Chronic | ICD-10-CM

## 2023-10-04 DIAGNOSIS — R94.31 ABNORMAL ELECTROCARDIOGRAM [ECG] [EKG]: ICD-10-CM

## 2023-10-04 LAB
ANION GAP SERPL CALC-SCNC: 10 MMOL/L — SIGNIFICANT CHANGE UP (ref 7–14)
BLD GP AB SCN SERPL QL: NEGATIVE — SIGNIFICANT CHANGE UP
BUN SERPL-MCNC: 14 MG/DL — SIGNIFICANT CHANGE UP (ref 7–23)
CALCIUM SERPL-MCNC: 9.9 MG/DL — SIGNIFICANT CHANGE UP (ref 8.4–10.5)
CHLORIDE SERPL-SCNC: 103 MMOL/L — SIGNIFICANT CHANGE UP (ref 98–107)
CO2 SERPL-SCNC: 27 MMOL/L — SIGNIFICANT CHANGE UP (ref 22–31)
CREAT SERPL-MCNC: 0.69 MG/DL — SIGNIFICANT CHANGE UP (ref 0.5–1.3)
EGFR: 101 ML/MIN/1.73M2 — SIGNIFICANT CHANGE UP
GLUCOSE SERPL-MCNC: 177 MG/DL — HIGH (ref 70–99)
HCT VFR BLD CALC: 39.7 % — SIGNIFICANT CHANGE UP (ref 39–50)
HGB BLD-MCNC: 12.5 G/DL — LOW (ref 13–17)
MCHC RBC-ENTMCNC: 27.3 PG — SIGNIFICANT CHANGE UP (ref 27–34)
MCHC RBC-ENTMCNC: 31.5 GM/DL — LOW (ref 32–36)
MCV RBC AUTO: 86.7 FL — SIGNIFICANT CHANGE UP (ref 80–100)
NRBC # BLD: 0 /100 WBCS — SIGNIFICANT CHANGE UP (ref 0–0)
NRBC # FLD: 0 K/UL — SIGNIFICANT CHANGE UP (ref 0–0)
PLATELET # BLD AUTO: 351 K/UL — SIGNIFICANT CHANGE UP (ref 150–400)
POTASSIUM SERPL-MCNC: 4.5 MMOL/L — SIGNIFICANT CHANGE UP (ref 3.5–5.3)
POTASSIUM SERPL-SCNC: 4.5 MMOL/L — SIGNIFICANT CHANGE UP (ref 3.5–5.3)
RBC # BLD: 4.58 M/UL — SIGNIFICANT CHANGE UP (ref 4.2–5.8)
RBC # FLD: 14.5 % — SIGNIFICANT CHANGE UP (ref 10.3–14.5)
RH IG SCN BLD-IMP: NEGATIVE — SIGNIFICANT CHANGE UP
SODIUM SERPL-SCNC: 140 MMOL/L — SIGNIFICANT CHANGE UP (ref 135–145)
WBC # BLD: 8.64 K/UL — SIGNIFICANT CHANGE UP (ref 3.8–10.5)
WBC # FLD AUTO: 8.64 K/UL — SIGNIFICANT CHANGE UP (ref 3.8–10.5)

## 2023-10-04 RX ORDER — LANOLIN ALCOHOL/MO/W.PET/CERES
1 CREAM (GRAM) TOPICAL
Refills: 0 | DISCHARGE

## 2023-10-04 RX ORDER — SODIUM CHLORIDE 9 MG/ML
1000 INJECTION, SOLUTION INTRAVENOUS
Refills: 0 | Status: DISCONTINUED | OUTPATIENT
Start: 2023-10-11 | End: 2023-10-11

## 2023-10-04 NOTE — H&P PST ADULT - NSICDXPASTSURGICALHX_GEN_ALL_CORE_FT
PAST SURGICAL HISTORY:  Bilateral cataracts     History of lung surgery     History of thoracentesis     S/P bronchoscopy

## 2023-10-04 NOTE — H&P PST ADULT - FUNCTIONAL STATUS
can climb stairs without SOB; can vacuum at home without SOB; can ambulate 2 blocks without SOB -- greater than 4 METS

## 2023-10-04 NOTE — H&P PST ADULT - PROBLEM SELECTOR PLAN 1
This is a 66 y/o female who is scheduled for This is a 66 y/o female who is scheduled for left lap partial nephrectomy possible radical nephrectomy on 10-11-23  * Given preop and cleanser instructions with good teach back and patient verbalized understanding

## 2023-10-04 NOTE — H&P PST ADULT - PROBLEM SELECTOR PLAN 3
Await prearranged cardiac evaluation with cardiologist due to abnormal ekg with h/o DM and patient had recent stress test and echo 9-28-23.  * Await ekg for comparison, stress test, and echo from cardiologist  * Last aspirin on 10-3-23  * Instructed not to take metformin the am of surgery  * Instructed to take normal am dose of hydralazine, omeprazole, and losartan the am of surgery

## 2023-10-04 NOTE — H&P PST ADULT - HISTORY OF PRESENT ILLNESS
This is a 66 y/o male who presents with incidental finding of left kidney mass on CT scan when he was hospitalized in July 2023 with eventual left VATS, thoracentesis and bronchoscopy on 8-2-23 which was not malignant. Scheduled for left laparoscopic partial nephrectomy possible radical nephrectomy.

## 2023-10-04 NOTE — H&P PST ADULT - NSICDXPASTMEDICALHX_GEN_ALL_CORE_FT
PAST MEDICAL HISTORY:  Anemia     Diabetes mellitus     GERD (gastroesophageal reflux disease)     H/O sleep apnea     Hard of hearing     HLD (hyperlipidemia)     Hypertension     Left kidney mass     Pleural effusion, left     Seasonal allergies

## 2023-10-10 ENCOUNTER — TRANSCRIPTION ENCOUNTER (OUTPATIENT)
Age: 68
End: 2023-10-10

## 2023-10-10 NOTE — ASU PATIENT PROFILE, ADULT - REASON FOR ADMISSION, PROFILE
[place] [employer] notified that 5-6 small orange capsules and light blue small baggie was found in patient's pants pockets, appeared to be traced white powdery substance appears to be contraband  Point of interest for family in room took possession of same left ER        John Cobb RN  01/31/23 [id number] left nephrectomy

## 2023-10-11 ENCOUNTER — APPOINTMENT (OUTPATIENT)
Dept: UROLOGY | Facility: HOSPITAL | Age: 68
End: 2023-10-11

## 2023-10-11 ENCOUNTER — OUTPATIENT (OUTPATIENT)
Dept: INPATIENT UNIT | Facility: HOSPITAL | Age: 68
LOS: 1 days | Discharge: HOME | End: 2023-10-11
Payer: MEDICARE

## 2023-10-11 ENCOUNTER — RESULT REVIEW (OUTPATIENT)
Age: 68
End: 2023-10-11

## 2023-10-11 VITALS
TEMPERATURE: 98 F | HEIGHT: 66.75 IN | WEIGHT: 194.01 LBS | OXYGEN SATURATION: 100 % | SYSTOLIC BLOOD PRESSURE: 151 MMHG | RESPIRATION RATE: 18 BRPM | DIASTOLIC BLOOD PRESSURE: 80 MMHG | HEART RATE: 79 BPM

## 2023-10-11 DIAGNOSIS — H26.9 UNSPECIFIED CATARACT: Chronic | ICD-10-CM

## 2023-10-11 DIAGNOSIS — Z98.890 OTHER SPECIFIED POSTPROCEDURAL STATES: Chronic | ICD-10-CM

## 2023-10-11 DIAGNOSIS — I77.1 STRICTURE OF ARTERY: ICD-10-CM

## 2023-10-11 DIAGNOSIS — N28.89 OTHER SPECIFIED DISORDERS OF KIDNEY AND URETER: ICD-10-CM

## 2023-10-11 DIAGNOSIS — Z87.09 PERSONAL HISTORY OF OTHER DISEASES OF THE RESPIRATORY SYSTEM: ICD-10-CM

## 2023-10-11 DIAGNOSIS — I10 ESSENTIAL (PRIMARY) HYPERTENSION: ICD-10-CM

## 2023-10-11 DIAGNOSIS — E11.9 TYPE 2 DIABETES MELLITUS WITHOUT COMPLICATIONS: ICD-10-CM

## 2023-10-11 DIAGNOSIS — K21.9 GASTRO-ESOPHAGEAL REFLUX DISEASE WITHOUT ESOPHAGITIS: ICD-10-CM

## 2023-10-11 LAB
GLUCOSE BLDC GLUCOMTR-MCNC: 144 MG/DL — HIGH (ref 70–99)
GLUCOSE BLDC GLUCOMTR-MCNC: 166 MG/DL — HIGH (ref 70–99)
GLUCOSE BLDC GLUCOMTR-MCNC: 201 MG/DL — HIGH (ref 70–99)
GLUCOSE BLDC GLUCOMTR-MCNC: 215 MG/DL — HIGH (ref 70–99)
GLUCOSE BLDC GLUCOMTR-MCNC: 250 MG/DL — HIGH (ref 70–99)
GLUCOSE BLDC GLUCOMTR-MCNC: 258 MG/DL — HIGH (ref 70–99)

## 2023-10-11 PROCEDURE — 88313 SPECIAL STAINS GROUP 2: CPT | Mod: 26

## 2023-10-11 PROCEDURE — 88331 PATH CONSLTJ SURG 1 BLK 1SPC: CPT | Mod: 26

## 2023-10-11 PROCEDURE — 88307 TISSUE EXAM BY PATHOLOGIST: CPT | Mod: 26

## 2023-10-11 DEVICE — SURGICEL 2 X 14": Type: IMPLANTABLE DEVICE | Site: LEFT | Status: FUNCTIONAL

## 2023-10-11 DEVICE — CLIP APPLIER COVIDIEN ENDOCLIP II 10MM MED/LG: Type: IMPLANTABLE DEVICE | Site: LEFT | Status: FUNCTIONAL

## 2023-10-11 DEVICE — LIGATING CLIPS WECK HEMOLOK POLYMER LARGE (PURPLE) 6: Type: IMPLANTABLE DEVICE | Site: LEFT | Status: FUNCTIONAL

## 2023-10-11 RX ORDER — INSULIN LISPRO 100/ML
3 VIAL (ML) SUBCUTANEOUS ONCE
Refills: 0 | Status: COMPLETED | OUTPATIENT
Start: 2023-10-11 | End: 2023-10-11

## 2023-10-11 RX ORDER — SODIUM CHLORIDE 9 MG/ML
1000 INJECTION, SOLUTION INTRAVENOUS
Refills: 0 | Status: DISCONTINUED | OUTPATIENT
Start: 2023-10-11 | End: 2023-10-12

## 2023-10-11 RX ORDER — DEXTROSE 50 % IN WATER 50 %
25 SYRINGE (ML) INTRAVENOUS ONCE
Refills: 0 | Status: DISCONTINUED | OUTPATIENT
Start: 2023-10-11 | End: 2023-10-12

## 2023-10-11 RX ORDER — ACETAMINOPHEN 500 MG
975 TABLET ORAL EVERY 6 HOURS
Refills: 0 | Status: DISCONTINUED | OUTPATIENT
Start: 2023-10-11 | End: 2023-10-12

## 2023-10-11 RX ORDER — ASPIRIN/CALCIUM CARB/MAGNESIUM 324 MG
81 TABLET ORAL DAILY
Refills: 0 | Status: DISCONTINUED | OUTPATIENT
Start: 2023-10-11 | End: 2023-10-12

## 2023-10-11 RX ORDER — ATORVASTATIN CALCIUM 80 MG/1
10 TABLET, FILM COATED ORAL AT BEDTIME
Refills: 0 | Status: DISCONTINUED | OUTPATIENT
Start: 2023-10-11 | End: 2023-10-12

## 2023-10-11 RX ORDER — HYDROMORPHONE HYDROCHLORIDE 2 MG/ML
0.5 INJECTION INTRAMUSCULAR; INTRAVENOUS; SUBCUTANEOUS
Refills: 0 | Status: DISCONTINUED | OUTPATIENT
Start: 2023-10-11 | End: 2023-10-11

## 2023-10-11 RX ORDER — SENNA PLUS 8.6 MG/1
2 TABLET ORAL AT BEDTIME
Refills: 0 | Status: DISCONTINUED | OUTPATIENT
Start: 2023-10-11 | End: 2023-10-12

## 2023-10-11 RX ORDER — DEXTROSE 50 % IN WATER 50 %
15 SYRINGE (ML) INTRAVENOUS ONCE
Refills: 0 | Status: DISCONTINUED | OUTPATIENT
Start: 2023-10-11 | End: 2023-10-12

## 2023-10-11 RX ORDER — CYCLOBENZAPRINE HYDROCHLORIDE 10 MG/1
10 TABLET, FILM COATED ORAL DAILY
Refills: 0 | Status: DISCONTINUED | OUTPATIENT
Start: 2023-10-11 | End: 2023-10-12

## 2023-10-11 RX ORDER — LOSARTAN POTASSIUM 100 MG/1
100 TABLET, FILM COATED ORAL DAILY
Refills: 0 | Status: DISCONTINUED | OUTPATIENT
Start: 2023-10-11 | End: 2023-10-12

## 2023-10-11 RX ORDER — ONDANSETRON 8 MG/1
4 TABLET, FILM COATED ORAL ONCE
Refills: 0 | Status: DISCONTINUED | OUTPATIENT
Start: 2023-10-11 | End: 2023-10-11

## 2023-10-11 RX ORDER — GLUCAGON INJECTION, SOLUTION 0.5 MG/.1ML
1 INJECTION, SOLUTION SUBCUTANEOUS ONCE
Refills: 0 | Status: DISCONTINUED | OUTPATIENT
Start: 2023-10-11 | End: 2023-10-12

## 2023-10-11 RX ORDER — INSULIN LISPRO 100/ML
VIAL (ML) SUBCUTANEOUS
Refills: 0 | Status: DISCONTINUED | OUTPATIENT
Start: 2023-10-11 | End: 2023-10-12

## 2023-10-11 RX ORDER — HEPARIN SODIUM 5000 [USP'U]/ML
5000 INJECTION INTRAVENOUS; SUBCUTANEOUS EVERY 8 HOURS
Refills: 0 | Status: DISCONTINUED | OUTPATIENT
Start: 2023-10-11 | End: 2023-10-12

## 2023-10-11 RX ORDER — OXYCODONE HYDROCHLORIDE 5 MG/1
5 TABLET ORAL EVERY 4 HOURS
Refills: 0 | Status: DISCONTINUED | OUTPATIENT
Start: 2023-10-11 | End: 2023-10-12

## 2023-10-11 RX ORDER — LANOLIN ALCOHOL/MO/W.PET/CERES
6 CREAM (GRAM) TOPICAL AT BEDTIME
Refills: 0 | Status: DISCONTINUED | OUTPATIENT
Start: 2023-10-11 | End: 2023-10-12

## 2023-10-11 RX ORDER — INSULIN LISPRO 100/ML
4 VIAL (ML) SUBCUTANEOUS ONCE
Refills: 0 | Status: COMPLETED | OUTPATIENT
Start: 2023-10-11 | End: 2023-10-11

## 2023-10-11 RX ORDER — OXYCODONE HYDROCHLORIDE 5 MG/1
5 TABLET ORAL ONCE
Refills: 0 | Status: DISCONTINUED | OUTPATIENT
Start: 2023-10-11 | End: 2023-10-11

## 2023-10-11 RX ORDER — DEXTROSE 50 % IN WATER 50 %
12.5 SYRINGE (ML) INTRAVENOUS ONCE
Refills: 0 | Status: DISCONTINUED | OUTPATIENT
Start: 2023-10-11 | End: 2023-10-12

## 2023-10-11 RX ORDER — HYDRALAZINE HCL 50 MG
50 TABLET ORAL
Refills: 0 | Status: DISCONTINUED | OUTPATIENT
Start: 2023-10-11 | End: 2023-10-12

## 2023-10-11 RX ORDER — INSULIN LISPRO 100/ML
VIAL (ML) SUBCUTANEOUS AT BEDTIME
Refills: 0 | Status: DISCONTINUED | OUTPATIENT
Start: 2023-10-11 | End: 2023-10-12

## 2023-10-11 RX ORDER — PANTOPRAZOLE SODIUM 20 MG/1
40 TABLET, DELAYED RELEASE ORAL
Refills: 0 | Status: DISCONTINUED | OUTPATIENT
Start: 2023-10-11 | End: 2023-10-12

## 2023-10-11 RX ORDER — ACETAMINOPHEN 500 MG
1000 TABLET ORAL ONCE
Refills: 0 | Status: COMPLETED | OUTPATIENT
Start: 2023-10-11 | End: 2023-10-11

## 2023-10-11 RX ORDER — AMLODIPINE BESYLATE 2.5 MG/1
5 TABLET ORAL DAILY
Refills: 0 | Status: DISCONTINUED | OUTPATIENT
Start: 2023-10-11 | End: 2023-10-12

## 2023-10-11 RX ADMIN — HEPARIN SODIUM 5000 UNIT(S): 5000 INJECTION INTRAVENOUS; SUBCUTANEOUS at 15:59

## 2023-10-11 RX ADMIN — SODIUM CHLORIDE 125 MILLILITER(S): 9 INJECTION, SOLUTION INTRAVENOUS at 17:20

## 2023-10-11 RX ADMIN — ATORVASTATIN CALCIUM 10 MILLIGRAM(S): 80 TABLET, FILM COATED ORAL at 22:27

## 2023-10-11 RX ADMIN — OXYCODONE HYDROCHLORIDE 5 MILLIGRAM(S): 5 TABLET ORAL at 22:27

## 2023-10-11 RX ADMIN — Medication 400 MILLIGRAM(S): at 14:31

## 2023-10-11 RX ADMIN — Medication 81 MILLIGRAM(S): at 15:59

## 2023-10-11 RX ADMIN — Medication 50 MILLIGRAM(S): at 18:40

## 2023-10-11 RX ADMIN — Medication 4 UNIT(S): at 15:24

## 2023-10-11 RX ADMIN — Medication 975 MILLIGRAM(S): at 18:40

## 2023-10-11 RX ADMIN — Medication 6 MILLIGRAM(S): at 23:06

## 2023-10-11 RX ADMIN — Medication 3 UNIT(S): at 14:01

## 2023-10-11 RX ADMIN — OXYCODONE HYDROCHLORIDE 5 MILLIGRAM(S): 5 TABLET ORAL at 23:30

## 2023-10-11 RX ADMIN — HEPARIN SODIUM 5000 UNIT(S): 5000 INJECTION INTRAVENOUS; SUBCUTANEOUS at 22:28

## 2023-10-11 RX ADMIN — Medication 1000 MILLIGRAM(S): at 15:00

## 2023-10-11 NOTE — CONSULT NOTE ADULT - SUBJECTIVE AND OBJECTIVE BOX
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    Patient seen postop in PACU, still groggy, denies chest pain/sob/dizziness, hemodynamically stable and pain control.     PAST MEDICAL & SURGICAL HISTORY:  Hypertension      Diabetes mellitus      HLD (hyperlipidemia)      GERD (gastroesophageal reflux disease)      Anemia      Seasonal allergies      Left kidney mass      Hard of hearing      H/O sleep apnea      Pleural effusion, left      History of thoracentesis      History of lung surgery      S/P bronchoscopy      Bilateral cataracts          Review of Systems:   CONSTITUTIONAL: No fever, weight loss, or fatigue  EYES: No eye pain, visual disturbances, or discharge  ENMT:  No difficulty hearing, tinnitus, vertigo; No sinus or throat pain  NECK: No pain or stiffness  BREASTS: No pain, masses, or nipple discharge  RESPIRATORY: No cough, wheezing, chills or hemoptysis; No shortness of breath  CARDIOVASCULAR: No chest pain, palpitations, dizziness, or leg swelling  GASTROINTESTINAL: No abdominal or epigastric pain. No nausea, vomiting, or hematemesis; No diarrhea or constipation. No melena or hematochezia.  GENITOURINARY: No dysuria, frequency, hematuria, or incontinence  NEUROLOGICAL: No headaches, memory loss, loss of strength, numbness, or tremors  SKIN: No itching, burning, rashes, or lesions   LYMPH NODES: No enlarged glands  ENDOCRINE: No heat or cold intolerance; No hair loss  MUSCULOSKELETAL: No joint pain or swelling; No muscle, back, or extremity pain  PSYCHIATRIC: No depression, anxiety, mood swings, or difficulty sleeping  HEME/LYMPH: No easy bruising, or bleeding gums  ALLERY AND IMMUNOLOGIC: No hives or eczema    Allergies    No Known Allergies    Intolerances        Social History: 1-2 drinks liquor 4 or more times/week, denies smoking    FAMILY HISTORY:  FH: breast cancer (Sibling)        Home Medications:  amLODIPine 5 mg oral tablet: 1 tab(s) orally once a day (11 Oct 2023 09:20)  aspirin 81 mg oral capsule: 1 tab(s) orally once a day (11 Oct 2023 09:20)  atorvastatin 10 mg oral tablet: 1 tab(s) orally once a day (11 Oct 2023 09:20)  cyclobenzaprine 10 mg oral tablet: 1 tab(s) orally once a day (at bedtime) (11 Oct 2023 09:20)  Ferrous Sulfate - OTC po 325 mg BID:  (11 Oct 2023 09:20)  hydrALAZINE 50 mg oral tablet: 1 tab(s) orally 2 times a day (11 Oct 2023 09:20)  losartan 100 mg oral tablet: 1 tab(s) orally once a day (11 Oct 2023 09:20)  metFORMIN 1000 mg oral tablet: 1 tab(s) orally 2 times a day (11 Oct 2023 09:20)  omeprazole 20 mg oral delayed release tablet: 1 tab(s) orally once a day (11 Oct 2023 09:20)      MEDICATIONS  (STANDING):  acetaminophen     Tablet .. 975 milliGRAM(s) Oral every 6 hours  amLODIPine   Tablet 5 milliGRAM(s) Oral daily  aspirin enteric coated 81 milliGRAM(s) Oral daily  atorvastatin 10 milliGRAM(s) Oral at bedtime  dextrose 5%. 1000 milliLiter(s) (100 mL/Hr) IV Continuous <Continuous>  dextrose 5%. 1000 milliLiter(s) (50 mL/Hr) IV Continuous <Continuous>  dextrose 50% Injectable 12.5 Gram(s) IV Push once  dextrose 50% Injectable 25 Gram(s) IV Push once  dextrose 50% Injectable 25 Gram(s) IV Push once  glucagon  Injectable 1 milliGRAM(s) IntraMuscular once  heparin   Injectable 5000 Unit(s) SubCutaneous every 8 hours  hydrALAZINE 50 milliGRAM(s) Oral two times a day  insulin lispro (ADMELOG) corrective regimen sliding scale   SubCutaneous three times a day before meals  insulin lispro (ADMELOG) corrective regimen sliding scale   SubCutaneous at bedtime  lactated ringers. 1000 milliLiter(s) (125 mL/Hr) IV Continuous <Continuous>  lactated ringers. 1000 milliLiter(s) (30 mL/Hr) IV Continuous <Continuous>  losartan 100 milliGRAM(s) Oral daily  pantoprazole    Tablet 40 milliGRAM(s) Oral before breakfast    MEDICATIONS  (PRN):  cyclobenzaprine 10 milliGRAM(s) Oral daily PRN Muscle Spasm  dextrose Oral Gel 15 Gram(s) Oral once PRN Blood Glucose LESS THAN 70 milliGRAM(s)/deciliter  HYDROmorphone  Injectable 0.5 milliGRAM(s) IV Push every 10 minutes PRN Severe Pain (7 - 10)  ondansetron Injectable 4 milliGRAM(s) IV Push once PRN Nausea and/or Vomiting  oxyCODONE    IR 5 milliGRAM(s) Oral every 4 hours PRN Severe Pain (7 - 10)  oxyCODONE    IR 5 milliGRAM(s) Oral once PRN Moderate Pain (4 - 6)  senna 2 Tablet(s) Oral at bedtime PRN Constipation      Vital Signs Last 24 Hrs  T(C): 36.4 (11 Oct 2023 14:00), Max: 36.8 (11 Oct 2023 08:47)  T(F): 97.5 (11 Oct 2023 14:00), Max: 98.3 (11 Oct 2023 08:47)  HR: 62 (11 Oct 2023 14:45) (62 - 79)  BP: 110/64 (11 Oct 2023 14:45) (110/64 - 151/80)  BP(mean): 75 (11 Oct 2023 14:45) (75 - 88)  RR: 21 (11 Oct 2023 14:45) (17 - 23)  SpO2: 100% (11 Oct 2023 14:45) (96% - 100%)    Parameters below as of 11 Oct 2023 14:45  Patient On (Oxygen Delivery Method): room air      CAPILLARY BLOOD GLUCOSE      POCT Blood Glucose.: 250 mg/dL (11 Oct 2023 14:34)  POCT Blood Glucose.: 258 mg/dL (11 Oct 2023 13:25)  POCT Blood Glucose.: 144 mg/dL (11 Oct 2023 09:04)    I&O's Summary    11 Oct 2023 07:01  -  11 Oct 2023 14:56  --------------------------------------------------------  IN: 125 mL / OUT: 75 mL / NET: 50 mL        PHYSICAL EXAM:  GENERAL: NAD, well-developed  HEAD:  Atraumatic, Normocephalic  EYES: EOMI, conjunctiva and sclera clear  NECK: Supple, No JVD  CHEST/LUNG: Clear to auscultation bilaterally; No wheeze  HEART: Regular rate and rhythm; No murmurs, rubs, or gallops  ABDOMEN: Soft, distended, incisional tenderness, wounds clean  : horne in place  EXTREMITIES:  2+ Peripheral Pulses, No clubbing, cyanosis, or edema  PSYCH: AAOx3  NEUROLOGY: non-focal  SKIN: No rashes or lesions    LABS:            Microbiology     RADIOLOGY & ADDITIONAL TESTS:    Imaging Personally Reviewed:  < from: NM PET/CT Onc FDG Skull to Thigh, Inital (09.19.23 @ 18:32) >  IMPRESSION: Abnormal skull-to-thigh FDG-PET/CT scan.    1. FDG avid exophytic left renal mass, unchanged in sizeas compared to   MRI dated 8/2/2023, corresponds to known renal cell carcinoma.    2. Similar-appearing small left pleural effusion status post   decortication. FDG activity within the effusion likely related to   inflammation/postprocedural.    3. Nonspecific, asymmetrically increased FDG activity in the right   oropharyngeal sidewall and FDG-avid, symmetrically prominent lingual   tonsils for which clinical correlation with direct visualization is   recommended.    4. Carious and periodontal disease of the right second mandibular molar   with associated thinning/dehiscence of the mandibular cortex. Dental   follow-up is recommended.      < from: MR Abdomen w/ IV Cont (08.02.23 @ 08:17) >  A left renal solid exophytic lesion with MR findings suggestive of   papillary renal cell carcinoma. No evidence of vascular, hilar, or   adjacent organ invasion.  A right renal interpolar 0.2 cm proteinaceous/hemorrhagic cyst.  No lymphadenopathy.    Mild duodenitis.    A 0.3 cm pancreatic tail cystic lesion without pancreatic ductal   dilatation.    Partially visualized large left pleural effusion.    < from: TTE with Doppler (w/Cont) (07.31.23 @ 13:31) >  CONCLUSIONS:  1. Mitral annular calcification, otherwise normal mitral  valve. Minimal mitral regurgitation.  2. Endocardium not well visualized; grossly normal left  ventricular systolic function.  Estimated LVEF about  66%  (by Barbosa's method).  Endocardial visualization enhanced  with intravenous injection of echo contrast (Definity).  3. The right ventricle is not well visualized.  4. Left pleural effusion.  --------------------------------        Consultant(s) Notes Reviewed:      Care Discussed with Consultants/Other Providers:

## 2023-10-11 NOTE — CONSULT NOTE ADULT - PROBLEM SELECTOR RECOMMENDATION 9
-s/p left lap partial nephrectomy on 10/11/23  -postop management per   -f/u path, pain control, IVF, IS, DVT ppx  -am labs

## 2023-10-11 NOTE — CONSULT NOTE ADULT - PROBLEM SELECTOR RECOMMENDATION 3
not on CPAP on CPAP at home, didn't bring it  will order BIPAP 10/5, fio2 30% while here  incentive spirometer  early mobilization  monitor sats

## 2023-10-11 NOTE — CONSULT NOTE ADULT - PROBLEM SELECTOR RECOMMENDATION 2
c/w home meds hydralazine 50 mg bid, amlodipine 5mg qd, losartan 100mg qd  -ctm    preop echo from July 2023: normal LVEF 66%, mac w/ minimal MR  stress test from 9/28/23: EF 63%, no ischemia

## 2023-10-11 NOTE — CONSULT NOTE ADULT - ASSESSMENT
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

## 2023-10-11 NOTE — CONSULT NOTE ADULT - PROBLEM SELECTOR RECOMMENDATION 6
-s/p empyema drainage, VATS/partial left lung decortication/bronchoscopy on 8/2 by CTS  -s/p course of antibiotics ceftriaxone/flagyl --> ertapenem through 9/9  -ctm, monitor sats, incentive spirometer

## 2023-10-11 NOTE — CONSULT NOTE ADULT - PROBLEM SELECTOR RECOMMENDATION 7
outpt vascular surgery f/u with Dr. Steven Carlos for median arcuate ligament syndrome (MALS) evaluation

## 2023-10-12 ENCOUNTER — TRANSCRIPTION ENCOUNTER (OUTPATIENT)
Age: 68
End: 2023-10-12

## 2023-10-12 VITALS
TEMPERATURE: 99 F | OXYGEN SATURATION: 97 % | RESPIRATION RATE: 17 BRPM | HEART RATE: 74 BPM | SYSTOLIC BLOOD PRESSURE: 138 MMHG | DIASTOLIC BLOOD PRESSURE: 70 MMHG

## 2023-10-12 LAB
A1C WITH ESTIMATED AVERAGE GLUCOSE RESULT: 5.9 % — HIGH (ref 4–5.6)
ANION GAP SERPL CALC-SCNC: 10 MMOL/L — SIGNIFICANT CHANGE UP (ref 7–14)
BUN SERPL-MCNC: 14 MG/DL — SIGNIFICANT CHANGE UP (ref 7–23)
CALCIUM SERPL-MCNC: 9.2 MG/DL — SIGNIFICANT CHANGE UP (ref 8.4–10.5)
CHLORIDE SERPL-SCNC: 101 MMOL/L — SIGNIFICANT CHANGE UP (ref 98–107)
CO2 SERPL-SCNC: 24 MMOL/L — SIGNIFICANT CHANGE UP (ref 22–31)
CREAT SERPL-MCNC: 0.81 MG/DL — SIGNIFICANT CHANGE UP (ref 0.5–1.3)
EGFR: 96 ML/MIN/1.73M2 — SIGNIFICANT CHANGE UP
ESTIMATED AVERAGE GLUCOSE: 123 — SIGNIFICANT CHANGE UP
GLUCOSE BLDC GLUCOMTR-MCNC: 162 MG/DL — HIGH (ref 70–99)
GLUCOSE BLDC GLUCOMTR-MCNC: 208 MG/DL — HIGH (ref 70–99)
GLUCOSE SERPL-MCNC: 148 MG/DL — HIGH (ref 70–99)
HCT VFR BLD CALC: 35.3 % — LOW (ref 39–50)
HGB BLD-MCNC: 11.6 G/DL — LOW (ref 13–17)
MAGNESIUM SERPL-MCNC: 1.6 MG/DL — SIGNIFICANT CHANGE UP (ref 1.6–2.6)
MCHC RBC-ENTMCNC: 27.6 PG — SIGNIFICANT CHANGE UP (ref 27–34)
MCHC RBC-ENTMCNC: 32.9 GM/DL — SIGNIFICANT CHANGE UP (ref 32–36)
MCV RBC AUTO: 84 FL — SIGNIFICANT CHANGE UP (ref 80–100)
NRBC # BLD: 0 /100 WBCS — SIGNIFICANT CHANGE UP (ref 0–0)
NRBC # FLD: 0 K/UL — SIGNIFICANT CHANGE UP (ref 0–0)
PHOSPHATE SERPL-MCNC: 3.8 MG/DL — SIGNIFICANT CHANGE UP (ref 2.5–4.5)
PLATELET # BLD AUTO: 318 K/UL — SIGNIFICANT CHANGE UP (ref 150–400)
POTASSIUM SERPL-MCNC: 4 MMOL/L — SIGNIFICANT CHANGE UP (ref 3.5–5.3)
POTASSIUM SERPL-SCNC: 4 MMOL/L — SIGNIFICANT CHANGE UP (ref 3.5–5.3)
RBC # BLD: 4.2 M/UL — SIGNIFICANT CHANGE UP (ref 4.2–5.8)
RBC # FLD: 14.6 % — HIGH (ref 10.3–14.5)
SODIUM SERPL-SCNC: 135 MMOL/L — SIGNIFICANT CHANGE UP (ref 135–145)
WBC # BLD: 11.94 K/UL — HIGH (ref 3.8–10.5)
WBC # FLD AUTO: 11.94 K/UL — HIGH (ref 3.8–10.5)

## 2023-10-12 RX ORDER — OXYCODONE HYDROCHLORIDE 5 MG/1
1 TABLET ORAL
Qty: 8 | Refills: 0
Start: 2023-10-12

## 2023-10-12 RX ORDER — SENNA PLUS 8.6 MG/1
2 TABLET ORAL
Qty: 0 | Refills: 0 | DISCHARGE
Start: 2023-10-12

## 2023-10-12 RX ORDER — ACETAMINOPHEN 500 MG
3 TABLET ORAL
Qty: 0 | Refills: 0 | DISCHARGE
Start: 2023-10-12

## 2023-10-12 RX ORDER — SODIUM CHLORIDE 9 MG/ML
1000 INJECTION, SOLUTION INTRAVENOUS
Refills: 0 | Status: DISCONTINUED | OUTPATIENT
Start: 2023-10-12 | End: 2023-10-12

## 2023-10-12 RX ADMIN — SODIUM CHLORIDE 75 MILLILITER(S): 9 INJECTION, SOLUTION INTRAVENOUS at 07:26

## 2023-10-12 RX ADMIN — Medication 1: at 07:58

## 2023-10-12 RX ADMIN — Medication 975 MILLIGRAM(S): at 03:09

## 2023-10-12 RX ADMIN — LOSARTAN POTASSIUM 100 MILLIGRAM(S): 100 TABLET, FILM COATED ORAL at 05:19

## 2023-10-12 RX ADMIN — HEPARIN SODIUM 5000 UNIT(S): 5000 INJECTION INTRAVENOUS; SUBCUTANEOUS at 05:19

## 2023-10-12 RX ADMIN — PANTOPRAZOLE SODIUM 40 MILLIGRAM(S): 20 TABLET, DELAYED RELEASE ORAL at 05:19

## 2023-10-12 RX ADMIN — AMLODIPINE BESYLATE 5 MILLIGRAM(S): 2.5 TABLET ORAL at 05:19

## 2023-10-12 RX ADMIN — Medication 81 MILLIGRAM(S): at 13:15

## 2023-10-12 RX ADMIN — Medication 975 MILLIGRAM(S): at 02:10

## 2023-10-12 RX ADMIN — Medication 975 MILLIGRAM(S): at 13:14

## 2023-10-12 RX ADMIN — Medication 2: at 11:17

## 2023-10-12 RX ADMIN — Medication 50 MILLIGRAM(S): at 05:19

## 2023-10-12 RX ADMIN — Medication 975 MILLIGRAM(S): at 07:36

## 2023-10-12 RX ADMIN — HEPARIN SODIUM 5000 UNIT(S): 5000 INJECTION INTRAVENOUS; SUBCUTANEOUS at 13:14

## 2023-10-12 RX ADMIN — Medication 10 MILLIGRAM(S): at 05:19

## 2023-10-12 NOTE — DISCHARGE NOTE PROVIDER - NSDCMRMEDTOKEN_GEN_ALL_CORE_FT
acetaminophen 325 mg oral tablet: 3 tab(s) orally every 6 hours as needed for mild to moderate pain  amLODIPine 5 mg oral tablet: 1 tab(s) orally once a day  aspirin 81 mg oral capsule: 1 tab(s) orally once a day  atorvastatin 10 mg oral tablet: 1 tab(s) orally once a day  cyclobenzaprine 10 mg oral tablet: 1 tab(s) orally once a day (at bedtime)  Ferrous Sulfate - OTC po 325 mg BID:   hydrALAZINE 50 mg oral tablet: 1 tab(s) orally 2 times a day  losartan 100 mg oral tablet: 1 tab(s) orally once a day  metFORMIN 1000 mg oral tablet: 1 tab(s) orally 2 times a day  omeprazole 20 mg oral delayed release tablet: 1 tab(s) orally once a day  oxyCODONE 5 mg oral tablet: 1 tab(s) orally every 6 hours MDD: 4  polyethylene glycol 3350 oral powder for reconstitution: 17 gram(s) orally once a day as needed for  constipation  senna leaf extract oral tablet: 2 tab(s) orally once a day (at bedtime) As needed Constipation

## 2023-10-12 NOTE — DISCHARGE NOTE PROVIDER - CARE PROVIDER_API CALL
Souleymane Desai  Urology  [address] [place] [address], [address] [address]  Phone: [phone]  Fax: [phone]  Follow Up Time:

## 2023-10-12 NOTE — DISCHARGE NOTE NURSING/CASE MANAGEMENT/SOCIAL WORK - PATIENT PORTAL LINK FT
You can access the FollowMyHealth Patient Portal offered by [facility] by registering at the following website: [email or web address]. By joining [employer]’s FollowMyHealth portal, you will also be able to view your health information using other applications (apps) compatible with our system.

## 2023-10-12 NOTE — PROGRESS NOTE ADULT - SUBJECTIVE AND OBJECTIVE BOX
UROLOGY Progress Note  SARAH FRIAS    S: Patient seen at bedside. No acute events     O:  T(C): 36.6 (10-12-23 @ 05:29), Max: 36.7 (10-11-23 @ 17:12)  HR: 72 (10-12-23 @ 05:29) (60 - 84)  BP: 145/71 (10-12-23 @ 05:29) (110/64 - 145/71)  RR: 17 (10-12-23 @ 05:29) (17 - 25)  SpO2: 95% (10-12-23 @ 05:29) (95% - 100%)        Physical Exam:  Gen: NAD  Neuro: Follows commands  Resp: No acute respiratory distress, normal effort  CV: Normal rate, regular rhythm  Abd: Soft, NT, slightly distended   Incision: Clean, no evidence of hematoma or active bleeding  Lines: horne removed       Labs:      
 Note    Post op Check    s/p Left lap partial nephrectomy  EBL 100ml    Patient seen and examined without complaints pain controlled, denies nausea.  Ambulating down the hallway.     Vital Signs Last 24 Hrs  T(C): 36.7 (11 Oct 2023 17:12), Max: 36.8 (11 Oct 2023 08:47)  T(F): 98.1 (11 Oct 2023 17:12), Max: 98.3 (11 Oct 2023 08:47)  HR: 69 (11 Oct 2023 17:12) (60 - 79)  BP: 125/57 (11 Oct 2023 17:12) (110/64 - 151/80)  BP(mean): 76 (11 Oct 2023 16:00) (74 - 88)  RR: 18 (11 Oct 2023 17:12) (17 - 25)  SpO2: 98% (11 Oct 2023 17:12) (96% - 100%)    Parameters below as of 11 Oct 2023 17:12  Patient On (Oxygen Delivery Method): room ai    I&O's Summary    11 Oct 2023 07:01  -  11 Oct 2023 21:33  --------------------------------------------------------  IN: 125 mL / OUT: 450 mL / NET: -325 mL    PHYSICAL EXAM:   Constitutional: well appearing, A+O, no distress    Respiratory: clear    Cardiovascular: regular     Gastrointestinal: distended, soft, nontender, dressings clean and dry    Genitourinary: horne in place, draining well, clear light pink urine, 450cc output.     Extremities: venodynes in place   
Dr. Lyn Marsh  Pager [phone]    PROGRESS NOTE:     Patient is a 67y old  Male who presents with a chief complaint of s/p surgery (11 Oct 2023 14:55)      SUBJECTIVE / OVERNIGHT EVENTS: denies chest pain or sob   ADDITIONAL REVIEW OF SYSTEMS: afebrile     MEDICATIONS  (STANDING):  acetaminophen     Tablet .. 975 milliGRAM(s) Oral every 6 hours  amLODIPine   Tablet 5 milliGRAM(s) Oral daily  aspirin enteric coated 81 milliGRAM(s) Oral daily  atorvastatin 10 milliGRAM(s) Oral at bedtime  dextrose 5% + sodium chloride 0.45%. 1000 milliLiter(s) (75 mL/Hr) IV Continuous <Continuous>  dextrose 5%. 1000 milliLiter(s) (50 mL/Hr) IV Continuous <Continuous>  dextrose 5%. 1000 milliLiter(s) (100 mL/Hr) IV Continuous <Continuous>  dextrose 50% Injectable 25 Gram(s) IV Push once  dextrose 50% Injectable 25 Gram(s) IV Push once  dextrose 50% Injectable 12.5 Gram(s) IV Push once  glucagon  Injectable 1 milliGRAM(s) IntraMuscular once  heparin   Injectable 5000 Unit(s) SubCutaneous every 8 hours  hydrALAZINE 50 milliGRAM(s) Oral two times a day  insulin lispro (ADMELOG) corrective regimen sliding scale   SubCutaneous three times a day before meals  insulin lispro (ADMELOG) corrective regimen sliding scale   SubCutaneous at bedtime  losartan 100 milliGRAM(s) Oral daily  melatonin 6 milliGRAM(s) Oral at bedtime  pantoprazole    Tablet 40 milliGRAM(s) Oral before breakfast    MEDICATIONS  (PRN):  cyclobenzaprine 10 milliGRAM(s) Oral daily PRN Muscle Spasm  dextrose Oral Gel 15 Gram(s) Oral once PRN Blood Glucose LESS THAN 70 milliGRAM(s)/deciliter  oxyCODONE    IR 5 milliGRAM(s) Oral every 4 hours PRN Severe Pain (7 - 10)  senna 2 Tablet(s) Oral at bedtime PRN Constipation      CAPILLARY BLOOD GLUCOSE      POCT Blood Glucose.: 208 mg/dL (12 Oct 2023 11:10)  POCT Blood Glucose.: 162 mg/dL (12 Oct 2023 07:34)  POCT Blood Glucose.: 166 mg/dL (11 Oct 2023 22:15)  POCT Blood Glucose.: 201 mg/dL (11 Oct 2023 17:28)  POCT Blood Glucose.: 215 mg/dL (11 Oct 2023 16:03)  POCT Blood Glucose.: 250 mg/dL (11 Oct 2023 14:34)  POCT Blood Glucose.: 258 mg/dL (11 Oct 2023 13:25)    I&O's Summary    11 Oct 2023 07:01  -  12 Oct 2023 07:00  --------------------------------------------------------  IN: 1375 mL / OUT: 1780 mL / NET: -405 mL        PHYSICAL EXAM:  Vital Signs Last 24 Hrs  T(C): 36.8 (12 Oct 2023 09:17), Max: 36.8 (12 Oct 2023 09:17)  T(F): 98.2 (12 Oct 2023 09:17), Max: 98.2 (12 Oct 2023 09:17)  HR: 77 (12 Oct 2023 09:17) (60 - 84)  BP: 141/78 (12 Oct 2023 09:17) (110/64 - 145/71)  BP(mean): 76 (11 Oct 2023 16:00) (74 - 88)  RR: 18 (12 Oct 2023 09:17) (17 - 25)  SpO2: 96% (12 Oct 2023 09:17) (95% - 100%)    Parameters below as of 12 Oct 2023 09:17  Patient On (Oxygen Delivery Method): room air    GENERAL: NAD, well-developed  HEAD:  Atraumatic, Normocephalic  EYES: EOMI, conjunctiva and sclera clear  NECK: Supple, No JVD  CHEST/LUNG: Clear to auscultation bilaterally; No wheeze  HEART: Regular rate and rhythm; No murmurs, rubs, or gallops  ABDOMEN: Soft, distended, incisional tenderness, wounds clean  : horne removed and voided  EXTREMITIES:  2+ Peripheral Pulses, No clubbing, cyanosis, or edema  PSYCH: AAOx3  NEUROLOGY: non-focal  SKIN: No rashes or lesions    LABS:                        11.6   11.94 )-----------( 318      ( 12 Oct 2023 05:13 )             35.3     10-12    135  |  101  |  14  ----------------------------<  148<H>  4.0   |  24  |  0.81    Ca    9.2      12 Oct 2023 05:13  Phos  3.8     10-12  Mg     1.60     10-12            Urinalysis Basic - ( 12 Oct 2023 05:13 )    Color: x / Appearance: x / SG: x / pH: x  Gluc: 148 mg/dL / Ketone: x  / Bili: x / Urobili: x   Blood: x / Protein: x / Nitrite: x   Leuk Esterase: x / RBC: x / WBC x   Sq Epi: x / Non Sq Epi: x / Bacteria: x          RADIOLOGY & ADDITIONAL TESTS:  Results Reviewed:   Imaging Personally Reviewed:    Electrocardiogram Personally Reviewed:    COORDINATION OF CARE:  Care Discussed with Consultants/Other Providers [Y/N]: clint Olvera, doing well, dc home today  Prior or Outpatient Records Reviewed [Y/N]:

## 2023-10-12 NOTE — PROGRESS NOTE ADULT - PROBLEM SELECTOR PLAN 6
s/p empyema drainage, VATS/partial left lung decortication/bronchoscopy on 8/2 by CTS  -s/p course of antibiotics ceftriaxone/flagyl --> ertapenem through 9/9  -ctm, monitor sats, incentive spirometer.

## 2023-10-12 NOTE — DISCHARGE NOTE PROVIDER - HOSPITAL COURSE
68 yo M underwent uncomplicated left lap partial nephrectomy on 10/11/2023.  Postoperative course uneventful, successful TOV on POD #1,  pain controlled, ambulating.  Tolerating CLD, no N/V.   Pt d/c on POD #1 to self advance diet to f/u with Dr. Desai.  I-stop checked.

## 2023-10-12 NOTE — DISCHARGE NOTE NURSING/CASE MANAGEMENT/SOCIAL WORK - NSDCPNINST_GEN_ALL_CORE
Make a follow up appointment with Dr. Desai. Call MD if you develop a fever, or if there is redness, swelling, drainage or pain not relieved by pain medication. No heavy lifting, bending, or straining to move your bowels. Take over the counter stool softeners as needed to prevent constipation which may be caused by pain medication. Drink plenty of liquids. Your A1C= 5.9. Continue to follow a consistent carbohydrate diet and take your medications for diabetes.

## 2023-10-12 NOTE — DISCHARGE NOTE NURSING/CASE MANAGEMENT/SOCIAL WORK - NSDCPEFALRISK_GEN_ALL_CORE
For information on Fall & Injury Prevention, visit: [email or web address] OR  [email or web address] OR  https://www.cdc.gov/steadi/patient.html

## 2023-10-12 NOTE — DISCHARGE NOTE PROVIDER - NSDCCPCAREPLAN_GEN_ALL_CORE_FT
PRINCIPAL DISCHARGE DIAGNOSIS  Diagnosis: Left kidney mass  Assessment and Plan of Treatment: Keep well hydrated.  No heavy lifting (greater than 10 pounds) or straining for 4 to 6 weeks.  You may shower, just pat white strips dry, they will fall off in a few weeks. Change dressing at drain site daily or as needed until dry. Do not drive when taking pain medication.  Dr. Desai's office will call you  to schedule a follow up appointment.  Call the office if you have fever greater than 101, difficulty urinating, your urine becomes bloody, pain not relieved with pain medication, nausea/vomiting.        SECONDARY DISCHARGE DIAGNOSES  Diagnosis: GERD (gastroesophageal reflux disease)  Assessment and Plan of Treatment: Continue current home medications and follow up with your primary care provider      Diagnosis: Diabetes mellitus  Assessment and Plan of Treatment: Continue current home medications and follow up with your primary care provider      Diagnosis: Hypertension  Assessment and Plan of Treatment: Continue current home medications and follow up with your primary care provider       PRINCIPAL DISCHARGE DIAGNOSIS  Diagnosis: Left kidney mass  Assessment and Plan of Treatment: Keep well hydrated, stay on a clear liquid diet until you pass gas more consistently, then advance your diet slowly as tolerated.  Eat small, frequent amounts, your appetite will take a while to return to normal.   No heavy lifting (greater than 10 pounds) or straining for 4 to 6 weeks.  You may shower, just pat white strips dry, they will fall off in a few weeks.   Do not drive when taking pain medication.  Dr. Desai's office will call you  to schedule a follow up appointment.  Call the office if you have fever greater than 101, difficulty urinating, your urine becomes bloody, pain not relieved with pain medication, nausea/vomiting.        SECONDARY DISCHARGE DIAGNOSES  Diagnosis: GERD (gastroesophageal reflux disease)  Assessment and Plan of Treatment: Continue current home medications and follow up with your primary care provider      Diagnosis: Diabetes mellitus  Assessment and Plan of Treatment: Continue current home medications and follow up with your primary care provider      Diagnosis: Hypertension  Assessment and Plan of Treatment: Continue current home medications and follow up with your primary care provider

## 2023-10-12 NOTE — PROGRESS NOTE ADULT - PROBLEM SELECTOR PLAN 3
on CPAP at home, didn't bring it  will order BIPAP 10/5, fio2 30% while here  incentive spirometer  early mobilization  monitor sats.

## 2023-10-12 NOTE — PROGRESS NOTE ADULT - PROBLEM SELECTOR PLAN 1
-s/p left lap partial nephrectomy on 10/11/23  -postop management per   -f/u path, pain control, IVF, IS, DVT ppx  -am labs reviewed, reactive leukocytosis WBC 11.9, acute blood loss anemia d/t surgery H/H 11.6/35.3, no indication for transfusion, Cr wnl 0.81  -doing well, dc home today per primary team

## 2023-10-12 NOTE — PROGRESS NOTE ADULT - PROBLEM SELECTOR PLAN 2
c/w home meds hydralazine 50 mg bid, amlodipine 5mg qd, losartan 100mg qd  -ctm  preop echo from July 2023: normal LVEF 66%, mac w/ minimal MR  stress test from 9/28/23: EF 63%, no ischemia.

## 2023-10-12 NOTE — PROGRESS NOTE ADULT - PROBLEM SELECTOR PLAN 7
outpt vascular surgery f/u with Dr. Steven Carlos for median arcuate ligament syndrome (MALS) evaluation.

## 2023-10-12 NOTE — PROGRESS NOTE ADULT - ASSESSMENT
68 y/o male s/p Left lap partial nephrectomy, stable.     -Strict I&O's  -Analgesia prn  -Antiemetics prn  -DVT prophylaxis  -Incentive spirometry  -Clears   -OOB  -AM labs
68 y/o male s/p Left lap partial nephrectomy, stable.     -Strict I&O's  -Analgesia prn  -Antiemetics prn  -DVT prophylaxis  -Incentive spirometry  -Clears   -OOB  -AM labs pending
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

## 2023-10-12 NOTE — DISCHARGE NOTE NURSING/CASE MANAGEMENT/SOCIAL WORK - NSDPDISTO_GEN_ALL_CORE
Pt. is afebrile and offers no complaints. In no acute distress. Abdominal scope sites: clean, dry and intact. Pt is ambulating ,tolerating diet well, and voiding in adequate amounts./Home

## 2023-10-13 LAB — SURGICAL PATHOLOGY STUDY: SIGNIFICANT CHANGE UP

## 2023-10-16 PROBLEM — J90 PLEURAL EFFUSION, NOT ELSEWHERE CLASSIFIED: Chronic | Status: ACTIVE | Noted: 2023-10-04

## 2023-10-16 PROBLEM — H91.90 UNSPECIFIED HEARING LOSS, UNSPECIFIED EAR: Chronic | Status: ACTIVE | Noted: 2023-10-04

## 2023-10-16 PROBLEM — J30.2 OTHER SEASONAL ALLERGIC RHINITIS: Chronic | Status: ACTIVE | Noted: 2023-10-04

## 2023-10-16 PROBLEM — N28.89 OTHER SPECIFIED DISORDERS OF KIDNEY AND URETER: Chronic | Status: ACTIVE | Noted: 2023-10-04

## 2023-10-16 PROBLEM — D64.9 ANEMIA, UNSPECIFIED: Chronic | Status: ACTIVE | Noted: 2023-10-04

## 2023-10-16 PROBLEM — K21.9 GASTRO-ESOPHAGEAL REFLUX DISEASE WITHOUT ESOPHAGITIS: Chronic | Status: ACTIVE | Noted: 2023-10-04

## 2023-11-03 ENCOUNTER — APPOINTMENT (OUTPATIENT)
Dept: UROLOGY | Facility: CLINIC | Age: 68
End: 2023-11-03
Payer: MEDICARE

## 2023-11-03 PROCEDURE — 99024 POSTOP FOLLOW-UP VISIT: CPT

## 2023-11-28 ENCOUNTER — APPOINTMENT (OUTPATIENT)
Dept: CT IMAGING | Facility: IMAGING CENTER | Age: 68
End: 2023-11-28
Payer: MEDICARE

## 2023-11-28 ENCOUNTER — OUTPATIENT (OUTPATIENT)
Dept: OUTPATIENT SERVICES | Facility: HOSPITAL | Age: 68
LOS: 1 days | End: 2023-11-28
Payer: MEDICARE

## 2023-11-28 DIAGNOSIS — J86.9 PYOTHORAX WITHOUT FISTULA: ICD-10-CM

## 2023-11-28 DIAGNOSIS — Z98.890 OTHER SPECIFIED POSTPROCEDURAL STATES: Chronic | ICD-10-CM

## 2023-11-28 DIAGNOSIS — H26.9 UNSPECIFIED CATARACT: Chronic | ICD-10-CM

## 2023-11-28 DIAGNOSIS — Z00.8 ENCOUNTER FOR OTHER GENERAL EXAMINATION: ICD-10-CM

## 2023-11-28 PROCEDURE — 71250 CT THORAX DX C-: CPT

## 2023-11-28 PROCEDURE — 71250 CT THORAX DX C-: CPT | Mod: 26

## 2023-12-05 ENCOUNTER — APPOINTMENT (OUTPATIENT)
Dept: THORACIC SURGERY | Facility: CLINIC | Age: 68
End: 2023-12-05
Payer: MEDICARE

## 2023-12-05 VITALS
RESPIRATION RATE: 16 BRPM | DIASTOLIC BLOOD PRESSURE: 80 MMHG | HEART RATE: 72 BPM | WEIGHT: 185 LBS | SYSTOLIC BLOOD PRESSURE: 171 MMHG | OXYGEN SATURATION: 99 % | HEIGHT: 67 IN | BODY MASS INDEX: 29.03 KG/M2

## 2023-12-05 PROCEDURE — 99213 OFFICE O/P EST LOW 20 MIN: CPT

## 2024-01-10 ENCOUNTER — APPOINTMENT (OUTPATIENT)
Dept: OTOLARYNGOLOGY | Facility: HOSPITAL | Age: 69
End: 2024-01-10

## 2024-01-18 ENCOUNTER — APPOINTMENT (OUTPATIENT)
Dept: OTOLARYNGOLOGY | Facility: CLINIC | Age: 69
End: 2024-01-18

## 2024-04-01 ENCOUNTER — APPOINTMENT (OUTPATIENT)
Dept: OTOLARYNGOLOGY | Facility: CLINIC | Age: 69
End: 2024-04-01
Payer: MEDICARE

## 2024-04-01 VITALS
WEIGHT: 190 LBS | BODY MASS INDEX: 29.82 KG/M2 | OXYGEN SATURATION: 98 % | HEART RATE: 65 BPM | DIASTOLIC BLOOD PRESSURE: 83 MMHG | SYSTOLIC BLOOD PRESSURE: 151 MMHG | HEIGHT: 67 IN

## 2024-04-01 PROCEDURE — 99214 OFFICE O/P EST MOD 30 MIN: CPT | Mod: 25

## 2024-04-01 PROCEDURE — 31575 DIAGNOSTIC LARYNGOSCOPY: CPT

## 2024-04-01 NOTE — HISTORY OF PRESENT ILLNESS
[de-identified] : SARAH FRIAS is a 68 year old male who presents to the [facility] for follow up of his anterior commissure infraglottic lesion and left renal cancer s/p resection.  I last saw the patient on 10/3/23. At that time he elected to proceed with in OR laser excision, possible BOT biopsy.  We had planned for this in mid Nov but was not scheduled until 4/10/24. No new symptoms or issues, here to discuss surgery.   Previously reported: anterior commissure lesion. He is referred by Dr. Chapman who last saw the patient on 9/22/23. She was originally seeing him for abnormal PET finding.  He now notes periods of normal voicing. He denies specific difficulties with swallowing. He notes frequent classic heartburn symptoms, takes pantoprazole daily Reports occasional cough. Social alcohol use. Former smoker in 1974 for one year. He has scheduled renal procedure on 10/11 to have renal mass removed. Patient denies throat pain, globus sensation, dysphagia, odynophagia, dyspnea, dysphonia, hemoptysis, otalgia. Denies recent fevers/infections, chills, night sweats, weight loss.  PET Scan 09/19/2023 IMPRESSION: Abnormal skull-to-thigh FDG-PET/CT scan. 1. FDG avid exophytic left renal mass, unchanged in size as compared to MRI dated 8/2/2023, corresponds to known renal cell carcinoma. 2. Similar-appearing small left pleural effusion status post decortication. FDG activity within the effusion likely related to inflammation/postprocedural. 3. Nonspecific, asymmetrically increased FDG activity in the right oropharyngeal sidewall and FDG-avid, symmetrically prominent lingual tonsils for which clinical correlation with direct visualization is recommended. 4. Carious and periodontal disease of the right second mandibular molar with associated thinning/dehiscence of the mandibular cortex. Dental follow-up is recommended.  Prior Pertinent Procedures: 8/2/23: Flex bronch, left VATS, decortication, and debridement and drainage of empyema; Dr. Orelalna 10/11/23: Left partial nephrectomy

## 2024-04-01 NOTE — PROCEDURE
[de-identified] : Laryngoscopy: Procedure: Transnasal flexible laryngoscopy Description: Informed consent was obtained from the patient prior to the procedure. The patient was seated in the clinic chair. Topical anesthesia was achieved by first spraying the nasal cavities with 4% lidocaine and 1% phenylephrine.  Exam: This demonstrates a clear vallecula and crisp epiglottis. The aryepiglottic folds are intact and symmetric bilaterally. The hypopharynx does not demonstrate pooling. The interarytenoid space demonstrates no lesions. It does not demonstrate pachydermia. The false vocal folds are symmetric and without lesions or masses. False fold voicing (plica ventricularis) is not noted today. The true vocal folds show normal and symmetric motion bilaterally. There is no paradoxical motion. The right medial edge is crisp and shows no lesions or masses. The left medial edge is crisp and shows no lesions or masses. The mucosal covering is minimally edematous. There is not erythema present today. There are no obvious vascular ectasias present. The vocal processes do not demonstrate granulomas. The subglottis and proximal trachea is clear and unobstructed to the limits of the examination today. Cyst like mass at anterior commissure of infraglottis more so on the right than left side and moderate in size. Large symmetric base of tongue/lingual tonsils.

## 2024-04-01 NOTE — ASSESSMENT
[FreeTextEntry1] : Assessment/Plan: #1 Anterior commissure infraglottic lesion #2 Left renal cancer s/p resection  I gave him two options: 1) In office biopsy and laser partial ablation 2) In OR laser excision, possible BOT biopsy  The risks, benefits, and alternatives to care were discussed with the patient and understanding expressed.  He elected to proceed with option #2.   We will do this on 4/10/24.

## 2024-04-03 ENCOUNTER — OUTPATIENT (OUTPATIENT)
Dept: OUTPATIENT SERVICES | Facility: HOSPITAL | Age: 69
LOS: 1 days | End: 2024-04-03
Payer: MEDICARE

## 2024-04-03 VITALS
HEART RATE: 72 BPM | HEIGHT: 66 IN | SYSTOLIC BLOOD PRESSURE: 120 MMHG | TEMPERATURE: 97 F | WEIGHT: 191.8 LBS | DIASTOLIC BLOOD PRESSURE: 82 MMHG | RESPIRATION RATE: 18 BRPM | OXYGEN SATURATION: 97 %

## 2024-04-03 DIAGNOSIS — I10 ESSENTIAL (PRIMARY) HYPERTENSION: ICD-10-CM

## 2024-04-03 DIAGNOSIS — Z90.5 ACQUIRED ABSENCE OF KIDNEY: Chronic | ICD-10-CM

## 2024-04-03 DIAGNOSIS — J35.1 HYPERTROPHY OF TONSILS: ICD-10-CM

## 2024-04-03 DIAGNOSIS — Z01.818 ENCOUNTER FOR OTHER PREPROCEDURAL EXAMINATION: ICD-10-CM

## 2024-04-03 DIAGNOSIS — D64.9 ANEMIA, UNSPECIFIED: ICD-10-CM

## 2024-04-03 DIAGNOSIS — A41.9 SEPSIS, UNSPECIFIED ORGANISM: Chronic | ICD-10-CM

## 2024-04-03 DIAGNOSIS — Z98.890 OTHER SPECIFIED POSTPROCEDURAL STATES: Chronic | ICD-10-CM

## 2024-04-03 DIAGNOSIS — E11.9 TYPE 2 DIABETES MELLITUS WITHOUT COMPLICATIONS: ICD-10-CM

## 2024-04-03 DIAGNOSIS — G47.33 OBSTRUCTIVE SLEEP APNEA (ADULT) (PEDIATRIC): ICD-10-CM

## 2024-04-03 DIAGNOSIS — H26.9 UNSPECIFIED CATARACT: Chronic | ICD-10-CM

## 2024-04-03 DIAGNOSIS — K21.9 GASTRO-ESOPHAGEAL REFLUX DISEASE WITHOUT ESOPHAGITIS: ICD-10-CM

## 2024-04-03 PROCEDURE — 93005 ELECTROCARDIOGRAM TRACING: CPT

## 2024-04-03 PROCEDURE — G0463: CPT

## 2024-04-03 PROCEDURE — 83036 HEMOGLOBIN GLYCOSYLATED A1C: CPT

## 2024-04-03 PROCEDURE — 80048 BASIC METABOLIC PNL TOTAL CA: CPT

## 2024-04-03 PROCEDURE — 85027 COMPLETE CBC AUTOMATED: CPT

## 2024-04-03 PROCEDURE — 36415 COLL VENOUS BLD VENIPUNCTURE: CPT

## 2024-04-03 PROCEDURE — 93010 ELECTROCARDIOGRAM REPORT: CPT | Mod: NC

## 2024-04-03 RX ORDER — ASPIRIN/CALCIUM CARB/MAGNESIUM 324 MG
1 TABLET ORAL
Refills: 0 | DISCHARGE

## 2024-04-03 RX ORDER — POLYETHYLENE GLYCOL 3350 17 G/17G
17 POWDER, FOR SOLUTION ORAL
Qty: 0 | Refills: 0 | DISCHARGE

## 2024-04-03 NOTE — H&P PST ADULT - PROBLEM SELECTOR PLAN 3
Await prearranged cardiac evaluation with cardiologist due to abnormal ekg with h/o DM and patient had recent stress test and echo 9-28-23.  * Await ekg for comparison, stress test, and echo from cardiologist  * Last aspirin on 10-3-23  * Instructed not to take metformin the am of surgery  * Instructed to take normal am dose of hydralazine, omeprazole, and losartan the am of surgery continue medications as prescribed.    Hold metformin 24 hours prior to DOS

## 2024-04-03 NOTE — H&P PST ADULT - NSICDXPASTMEDICALHX_GEN_ALL_CORE_FT
PAST MEDICAL HISTORY:  Anemia     DM2 (diabetes mellitus, type 2)     GERD (gastroesophageal reflux disease)     Hard of hearing     HLD (hyperlipidemia)     Hypertension     Left kidney mass     TAO (obstructive sleep apnea)     Pleural effusion, left     Seasonal allergies

## 2024-04-03 NOTE — H&P PST ADULT - NEGATIVE GASTROINTESTINAL SYMPTOMS
no nausea/no vomiting/no diarrhea/no constipation/no abdominal pain no nausea/no vomiting/no change in bowel habits

## 2024-04-03 NOTE — H&P PST ADULT - HISTORY OF PRESENT ILLNESS
Patient is a 68 year old M presents for perioperative testing for Direct laryngoscopy with biopsy, excision of laryngeal lesions with laser, possible steroid injection, possible base of tongue biopsy with Dr. Margarito Brennan scheduled for 04/10/2024. Denies dysphagia, dysphonia, or any acute symptoms at this time. Otherwise patient reports feeling well overall.

## 2024-04-03 NOTE — H&P PST ADULT - NEUROLOGICAL
sensation intact/responds to pain/responds to verbal commands/no spontaneous movement details… negative

## 2024-04-03 NOTE — H&P PST ADULT - PROBLEM/PLAN-6
[facility] returned RD call.  Stated will need new order from [place] for resuming PN.  RD called [facility] and spoke to Nupur.  Explained same PN to resume at [facility] as patient previously receiving.  Nupur indicated no further information needed, and PN will be able to resume at [facility].   DISPLAY PLAN FREE TEXT

## 2024-04-03 NOTE — H&P PST ADULT - ASSESSMENT
Pt reports he was at work when he sustained a laceration to his left forearm. Bleeding appears controlled at time of triage.    left kidney mass Hypertrophy of tonsils

## 2024-04-03 NOTE — H&P PST ADULT - COMMENTS
Sister h/o DVT/PE  Denies h/o DVT, PE  Denies bleeding or clotting disorders  Denies dentures/partials, loose teeth/caps 161/88 Sister h/o DVT/PE  Denies personal h/o DVT, PE  Denies bleeding or clotting disorders  Denies dentures/partials, loose teeth/caps 161/88 electronic

## 2024-04-03 NOTE — H&P PST ADULT - PROBLEM SELECTOR PLAN 1
This is a 68 y/o female who is scheduled for left lap partial nephrectomy possible radical nephrectomy on 10-11-23  * Given preop and cleanser instructions with good teach back and patient verbalized understanding Patient provided with pre-operative instructions and verbalized understanding.  Patient can take omeprazole, amlodipine, losartan but otherwise will be NPO on day of surgery. Patient will stop NSAIDs, aspirin, herbal supplements or vitamins 1 week prior to surgery.      Pending medical clearance as per surgeon.

## 2024-04-03 NOTE — H&P PST ADULT - BSA (M2)
Medications verified, no changes.  Denies known Latex allergy or symptoms of Latex sensitivity.    Health Maintenance Due   Topic Date Due   • DM/CKD Microalbumin  Never done   • Shingles Vaccine (3 of 3) 07/12/2021   • Influenza Vaccine (1) 09/01/2021   • COVID-19 Vaccine (3 - Pfizer booster) 09/16/2021   • Medicare Wellness Visit  10/20/2021   • DM/CKD GFR  10/29/2021       Patient is due for the topics as listed above and wishes to proceed with them.            1.96

## 2024-04-09 ENCOUNTER — TRANSCRIPTION ENCOUNTER (OUTPATIENT)
Age: 69
End: 2024-04-09

## 2024-04-09 NOTE — ASU PATIENT PROFILE, ADULT - FALL HARM RISK - UNIVERSAL INTERVENTIONS
Bed in lowest position, wheels locked, appropriate side rails in place/Call bell, personal items and telephone in reach/Instruct patient to call for assistance before getting out of bed or chair/Non-slip footwear when patient is out of bed/[place] to call system/Physically safe environment - no spills, clutter or unnecessary equipment/Purposeful Proactive Rounding/Room/bathroom lighting operational, light cord in reach

## 2024-04-09 NOTE — ASU PATIENT PROFILE, ADULT - FALL HARM RISK - FALLEN IN PAST
Patient Active Problem List    Diagnosis Date Noted   • Bone metastases (CMS/HCC) 07/31/2018     Priority: Medium     Overview Note:     Oligo metastatic status post radiation therapy     • Port-a-cath in place 10/13/2017     Priority: Medium   • Prostate cancer (CMS/HCC) 10/13/2017     Priority: Medium     Overview Note:     August 2013. Dx adenocarcinoma [place] score 4+3. PSA 11.4  November 2013. Prostatectomy, extraprostatic extension  July 2014. Postoperative radiation therapy  September 2015. Possible small external iliac lymph node of concern  December 2016. Imaging studies with adenopathy. Rx docetaxel chemotherapy plus androgen deprivation therapy  April 2017. Chemotherapy completed after 5 cycles, cycle #6 held secondary to neuropathy  May 2017. Positive CEA 11 PET CT scan. 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 Immediate sharp pain. Aggravated while sailing in May. X-rays revealed moderate glenohumeral arthritis with type II acromion,                                   MRI 5/17/18-high-grade partial supraspinatus tear, possible small full-thickness tear. There is no significant retraction. Tendinosis of the adjacent rotator cuff. Recommend PT, subacromial injection, surgery if not improved 3-6 months        • Osteopenia of hip 10/30/2017     Priority: Low     Overview Note:     H/o androgen deprivation for prostate cancer.   DEXA done 10/2017 shows osteopenia, FRAX score as follows:  7.9% major osteoporotic fracture  0.8% 10 year risk of hip fracture             HISTORY OF  PRESENT ILLNESS:  Can returns to the office in follow-up. He has a complicated history of prostate cancer. He is followed at the [facility] by Dr. Rony Haas. He has been discontinued from his Abiraterone and is on androgen preservation therapy. He is here for his Lupron injection. He also has a rotator cuff injury and considering surgical treatment. He denies any new back or bony pain otherwise, no new neurologic symptoms, acceptable side effects from androgen deprivation therapy. 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    PAST MEDICAL HISTORY AND PAST SURGICAL HISTORY:  Reviewed and interval changes as noted above.    ALLERGIES AND DRUG INTOLERANCE:  Reviewed and interval changes as noted above    CURRENT MEDICATIONS:  Reviewed and interval changes as noted above    FAMILY HISTORY AND SOCIAL HISTORY:  Reviewed and interval changes as noted above.    REVIEW OF SYSTEMS:  Per electronic medical record notes, reviewed and agreed upon.    PHYSICAL EXAMINATION:  Oncology Encounter Vitals [07/31/18 1350]   ONC OP Encounter Vitals Group      /79      Pulse 55      Resp 16      Temp 98.6 °F (37 °C)      Temp src Oral      SpO2 95 %      Weight 247 lb 8 oz (112.3 kg)      Height       Pain Score       Pain Location       Pain Education?       BSA (Calculated - m2) - [employer]       BMI (Calculated)        ECOG Performance Status   0 - Fully active, able to carry on all predisease activities without restrictions.     General Appearance: 62 year old male in no acute distress.  Psychiatric: Mood and affect are normal.  Judgment and insight are appropriate.  HEENT:  Head: Normocephalic, atraumatic.  Mouth: No oral lesions.   Lymphatic: No pathological adenopathy palpated.  Cardiovascular:  No JVD(jugular venous distention).  Heart had a regular rhythm and rate with no gallops rubs or murmurs noted.  Respiratory:  Normal respiratory effort, bilateral and symmetric expansion.  Lungs are clear to auscultation bilaterally.  Abdomen: No masses or ascites, soft and non-tender, no hepatosplenomegaly. Normal bowel sounds, no rebound tenderness.   Musculoskeletal: Extremities without clubbing, cyanosis or edema. No tenderness to palpation of the spine.   Skin: No rashes, petechiae or ecchymosis. No icterus no ulcerations.   Neurologic: Normal gait and station. Alert and oriented times 3.    SIGNIFICANT LABORATORY AND RADIOLOGY DATA:  Lab Services on 07/31/2018   Component Date Value   • WBC 07/31/2018 4.0*   • RBC 07/31/2018 4.57    • HGB 07/31/2018 13.4    • HCT 07/31/2018 39.2    • MCV 07/31/2018 85.8    • MCH 07/31/2018 29.3    • MCHC 07/31/2018 34.2    • RDW-CV 07/31/2018 13.4    • PLT 07/31/2018 197    • DIFF TYPE 07/31/2018 AUTOMATED DIFFERENTIAL    • Neutrophil 07/31/2018 74    • LYMPH 07/31/2018 14    • MONO 07/31/2018 9    • EOSIN 07/31/2018 2    • BASO 07/31/2018 1    • Absolute Neutrophil 07/31/2018 3.0    • Absolute Lymph 07/31/2018 0.6*   • Absolute Mono 07/31/2018 0.4    • Absolute Eos 07/31/2018 0.1    • Absolute Baso 07/31/2018 0.0    • Fasting Status 07/31/2018 UNK    • Sodium 07/31/2018 139    • Potassium 07/31/2018 4.3    • Chloride 07/31/2018 104    • Carbon Dioxide 07/31/2018 27    • Anion Gap 07/31/2018 12    • Glucose 07/31/2018 112*   • BUN 07/31/2018 18    • Creatinine 07/31/2018 0.78    • GFR Estimate,  Am* 07/31/2018 >90    • GFR Estimate, Non [place]* 07/31/2018 >90    • BUN/Creatinine Ratio 07/31/2018 23    • CALCIUM 07/31/2018 8.9    • TOTAL BILIRUBIN 07/31/2018 0.7    • AST/SGOT 07/31/2018 37    • ALT/SGPT 07/31/2018 61    • ALK PHOSPHATASE 07/31/2018 111    • TOTAL PROTEIN 07/31/2018 7.3    • Albumin 07/31/2018 3.8    • GLOBULIN 07/31/2018 3.5    • A/G Ratio, Serum 07/31/2018 1.1    • LDH 07/31/2018 220    • PSA, Total 07/31/2018 <0.01        CLINICAL IMPRESSION AND PLAN:  #1. Prostate cancer. Has follow-up scheduled at the [facility] with repeat imaging  studies. Continue care as recommended per Dr. Rony Haas.  #2. Supportive care/health maintenance. Patient is on androgen deprivation therapy and has a history of oligo metastatic disease to the bone. Plan to start him on zoledronic acid every 3 months.  #3. Genetics. 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  #4. Mediport care. Patient wishes to have the Mediport removed if he has a good scans at his upcoming visit at the [facility].           No

## 2024-04-09 NOTE — ASU PATIENT PROFILE, ADULT - ABILITY TO HEAR (WITH HEARING AID OR HEARING APPLIANCE IF NORMALLY USED):
Mildly to Moderately Impaired: difficulty hearing in some environments or speaker may need to increase volume or speak distinctly bilateral hearing aides/Mildly to Moderately Impaired: difficulty hearing in some environments or speaker may need to increase volume or speak distinctly

## 2024-04-09 NOTE — ASU PATIENT PROFILE, ADULT - NSICDXPASTSURGICALHX_GEN_ALL_CORE_FT
PAST SURGICAL HISTORY:  Bilateral cataracts     History of lung surgery     History of partial nephrectomy     History of thoracentesis     S/P bronchoscopy

## 2024-04-09 NOTE — ASU PATIENT PROFILE, ADULT - NS TRANSFER HEARING AID
impaired balance/impaired coordination/impaired motor control/impaired postural control
Bilateral/Left/Right

## 2024-04-09 NOTE — ASU PATIENT PROFILE, ADULT - NSICDXPASTMEDICALHX_GEN_ALL_CORE_FT
PAST MEDICAL HISTORY:  Anemia     DM2 (diabetes mellitus, type 2)     GERD (gastroesophageal reflux disease)     Hard of hearing     HLD (hyperlipidemia)     Hypertension     Left kidney mass     TAO (obstructive sleep apnea) cpap    Pleural effusion, left     Seasonal allergies

## 2024-04-10 ENCOUNTER — OUTPATIENT (OUTPATIENT)
Dept: OUTPATIENT SERVICES | Facility: HOSPITAL | Age: 69
LOS: 1 days | End: 2024-04-10
Payer: MEDICARE

## 2024-04-10 ENCOUNTER — APPOINTMENT (OUTPATIENT)
Dept: OTOLARYNGOLOGY | Facility: HOSPITAL | Age: 69
End: 2024-04-10
Payer: MEDICARE

## 2024-04-10 ENCOUNTER — RESULT REVIEW (OUTPATIENT)
Age: 69
End: 2024-04-10

## 2024-04-10 ENCOUNTER — TRANSCRIPTION ENCOUNTER (OUTPATIENT)
Age: 69
End: 2024-04-10

## 2024-04-10 VITALS
RESPIRATION RATE: 14 BRPM | OXYGEN SATURATION: 96 % | DIASTOLIC BLOOD PRESSURE: 74 MMHG | TEMPERATURE: 98 F | HEART RATE: 69 BPM | HEIGHT: 66 IN | SYSTOLIC BLOOD PRESSURE: 124 MMHG | WEIGHT: 191.8 LBS

## 2024-04-10 VITALS
HEART RATE: 62 BPM | DIASTOLIC BLOOD PRESSURE: 84 MMHG | OXYGEN SATURATION: 98 % | SYSTOLIC BLOOD PRESSURE: 130 MMHG | RESPIRATION RATE: 18 BRPM | TEMPERATURE: 97 F

## 2024-04-10 DIAGNOSIS — Z98.890 OTHER SPECIFIED POSTPROCEDURAL STATES: Chronic | ICD-10-CM

## 2024-04-10 DIAGNOSIS — H26.9 UNSPECIFIED CATARACT: Chronic | ICD-10-CM

## 2024-04-10 DIAGNOSIS — J35.1 HYPERTROPHY OF TONSILS: ICD-10-CM

## 2024-04-10 DIAGNOSIS — Z90.5 ACQUIRED ABSENCE OF KIDNEY: Chronic | ICD-10-CM

## 2024-04-10 LAB — GLUCOSE BLDC GLUCOMTR-MCNC: 161 MG/DL — HIGH (ref 70–99)

## 2024-04-10 PROCEDURE — C9399: CPT

## 2024-04-10 PROCEDURE — 31541 LARYNSCOP W/TUMR EXC + SCOPE: CPT

## 2024-04-10 PROCEDURE — 31571 LARYNGOSCOP W/VC INJ + SCOPE: CPT

## 2024-04-10 PROCEDURE — 82962 GLUCOSE BLOOD TEST: CPT

## 2024-04-10 PROCEDURE — 88305 TISSUE EXAM BY PATHOLOGIST: CPT | Mod: 26

## 2024-04-10 PROCEDURE — ZZZZZ: CPT

## 2024-04-10 PROCEDURE — 88305 TISSUE EXAM BY PATHOLOGIST: CPT

## 2024-04-10 RX ORDER — SODIUM CHLORIDE 9 MG/ML
1000 INJECTION, SOLUTION INTRAVENOUS
Refills: 0 | Status: DISCONTINUED | OUTPATIENT
Start: 2024-04-10 | End: 2024-04-10

## 2024-04-10 RX ORDER — CYCLOBENZAPRINE HYDROCHLORIDE 10 MG/1
1 TABLET, FILM COATED ORAL
Refills: 0 | DISCHARGE

## 2024-04-10 RX ORDER — METFORMIN HYDROCHLORIDE 850 MG/1
1 TABLET ORAL
Refills: 0 | DISCHARGE

## 2024-04-10 RX ORDER — HYDRALAZINE HCL 50 MG
1 TABLET ORAL
Refills: 0 | DISCHARGE

## 2024-04-10 RX ORDER — OMEPRAZOLE 10 MG/1
1 CAPSULE, DELAYED RELEASE ORAL
Refills: 0 | DISCHARGE

## 2024-04-10 RX ORDER — BUDESONIDE, MICRONIZED 100 %
2 POWDER (GRAM) MISCELLANEOUS
Qty: 1 | Refills: 3
Start: 2024-04-10

## 2024-04-10 RX ORDER — OXYCODONE AND ACETAMINOPHEN 5; 325 MG/1; MG/1
1 TABLET ORAL
Qty: 5 | Refills: 0
Start: 2024-04-10

## 2024-04-10 RX ORDER — ASPIRIN/CALCIUM CARB/MAGNESIUM 324 MG
0 TABLET ORAL
Refills: 0 | DISCHARGE

## 2024-04-10 RX ORDER — LOSARTAN POTASSIUM 100 MG/1
1 TABLET, FILM COATED ORAL
Refills: 0 | DISCHARGE

## 2024-04-10 RX ORDER — ONDANSETRON 8 MG/1
4 TABLET, FILM COATED ORAL ONCE
Refills: 0 | Status: DISCONTINUED | OUTPATIENT
Start: 2024-04-10 | End: 2024-04-10

## 2024-04-10 RX ORDER — HYDROMORPHONE HYDROCHLORIDE 2 MG/ML
0.5 INJECTION INTRAMUSCULAR; INTRAVENOUS; SUBCUTANEOUS
Refills: 0 | Status: DISCONTINUED | OUTPATIENT
Start: 2024-04-10 | End: 2024-04-10

## 2024-04-10 RX ORDER — AMLODIPINE BESYLATE 2.5 MG/1
1 TABLET ORAL
Refills: 0 | DISCHARGE

## 2024-04-10 RX ORDER — LANOLIN ALCOHOL/MO/W.PET/CERES
1 CREAM (GRAM) TOPICAL
Refills: 0 | DISCHARGE

## 2024-04-10 RX ORDER — OXYCODONE AND ACETAMINOPHEN 5; 325 MG/1; MG/1
1 TABLET ORAL EVERY 4 HOURS
Refills: 0 | Status: DISCONTINUED | OUTPATIENT
Start: 2024-04-10 | End: 2024-04-10

## 2024-04-10 RX ORDER — ATORVASTATIN CALCIUM 80 MG/1
1 TABLET, FILM COATED ORAL
Refills: 0 | DISCHARGE

## 2024-04-10 RX ADMIN — SODIUM CHLORIDE 50 MILLILITER(S): 9 INJECTION, SOLUTION INTRAVENOUS at 12:33

## 2024-04-10 NOTE — ASU DISCHARGE PLAN (ADULT/PEDIATRIC) - NS MD DC FALL RISK RISK
For information on Fall & Injury Prevention, visit: [email or web address] OR  [email or web address] OR  https://www.cdc.gov/steadi/patient.html

## 2024-04-10 NOTE — ASU DISCHARGE PLAN (ADULT/PEDIATRIC) - ASU DC SPECIAL INSTRUCTIONSFT
Start new inhaler.  If not covered mostly by insurance call Dr. Brennan office on morning of 4/11/24 and he will send in a different one.

## 2024-04-11 RX ORDER — BECLOMETHASONE DIPROPIONATE HFA 80 UG/1
80 AEROSOL, METERED RESPIRATORY (INHALATION) TWICE DAILY
Qty: 1 | Refills: 5 | Status: ACTIVE | COMMUNITY
Start: 2024-04-11 | End: 1900-01-01

## 2024-04-12 LAB — SURGICAL PATHOLOGY STUDY: SIGNIFICANT CHANGE UP

## 2024-04-12 RX ORDER — FLUTICASONE FUROATE 200 UG/1
200 POWDER RESPIRATORY (INHALATION) TWICE DAILY
Qty: 2 | Refills: 3 | Status: ACTIVE | COMMUNITY
Start: 2024-04-12 | End: 1900-01-01

## 2024-04-18 ENCOUNTER — APPOINTMENT (OUTPATIENT)
Dept: OTOLARYNGOLOGY | Facility: CLINIC | Age: 69
End: 2024-04-18

## 2024-04-21 ENCOUNTER — NON-APPOINTMENT (OUTPATIENT)
Age: 69
End: 2024-04-21

## 2024-04-22 ENCOUNTER — APPOINTMENT (OUTPATIENT)
Dept: OTOLARYNGOLOGY | Facility: CLINIC | Age: 69
End: 2024-04-22
Payer: MEDICARE

## 2024-04-22 VITALS — HEIGHT: 67 IN | BODY MASS INDEX: 29.82 KG/M2 | WEIGHT: 190 LBS

## 2024-04-22 PROBLEM — E11.9 TYPE 2 DIABETES MELLITUS WITHOUT COMPLICATIONS: Chronic | Status: ACTIVE | Noted: 2024-04-03

## 2024-04-22 PROBLEM — G47.33 OBSTRUCTIVE SLEEP APNEA (ADULT) (PEDIATRIC): Chronic | Status: ACTIVE | Noted: 2024-04-03

## 2024-04-22 PROCEDURE — 99213 OFFICE O/P EST LOW 20 MIN: CPT | Mod: 25

## 2024-04-22 PROCEDURE — 31575 DIAGNOSTIC LARYNGOSCOPY: CPT

## 2024-04-22 NOTE — ASSESSMENT
[FreeTextEntry1] : Assessment/Plan: #1 Anterior commissure infraglottic cyst s/p excision #2 Left renal cancer s/p resection  Patient is to continue inhaled steroid until it runs out.  I would like to see him back for 1 last visit in 2 weeks.

## 2024-04-22 NOTE — PROCEDURE
[de-identified] : Procedure: Transnasal flexible laryngoscopy  Description: Informed consent was obtained from the patient prior to the procedure. The patient was seated in the clinic chair. Topical anesthesia was achieved by first spraying the nasal cavities with 4% lidocaine and 1% phenylephrine.   Exam: This demonstrates a clear vallecula and crisp epiglottis. The aryepiglottic folds are intact and symmetric bilaterally. The hypopharynx does not demonstrate pooling. The interarytenoid space demonstrates no lesions. It does not demonstrate pachydermia. The false vocal folds are symmetric and without lesions or masses. False fold voicing (plica ventricularis) is not noted today. The true vocal folds show normal and symmetric motion bilaterally. There is no paradoxical motion. The right medial edge is crisp and shows no lesions or masses. The left medial edge is crisp and shows no lesions or masses. The mucosal covering is minimally edematous. There is not erythema present today. There are no obvious vascular ectasias present. The vocal processes do not demonstrate granulomas. The subglottis and proximal trachea is clear and unobstructed to the limits of the examination today. Moderate erythema and mild fullness of anterior subglottis.

## 2024-04-22 NOTE — HISTORY OF PRESENT ILLNESS
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 I last saw the patient on 4/10/24. This is his 1st POA. Doing well, voice is strong and doing well. No throat pain or post op bleeding   Previously reported: anterior commissure lesion. He is referred by Dr. Chapman who last saw the patient on 9/22/23. She was originally seeing him for abnormal PET finding.  He now notes periods of normal voicing. He denies specific difficulties with swallowing. He notes frequent classic heartburn symptoms, takes pantoprazole daily Reports occasional cough. Social alcohol use. Former smoker in 1974 for one year. He has scheduled renal procedure on 10/11 to have renal mass removed. Patient denies throat pain, globus sensation, dysphagia, odynophagia, dyspnea, dysphonia, hemoptysis, otalgia. Denies recent fevers/infections, chills, night sweats, weight loss.  PET Scan 09/19/2023 IMPRESSION: Abnormal skull-to-thigh FDG-PET/CT scan. 1. FDG avid exophytic left renal mass, unchanged in size as compared to MRI dated 8/2/2023, corresponds to known renal cell carcinoma. 2. Similar-appearing small left pleural effusion status post decortication. FDG activity within the effusion likely related to inflammation/postprocedural. 3. Nonspecific, asymmetrically increased FDG activity in the right oropharyngeal sidewall and FDG-avid, symmetrically prominent lingual tonsils for which clinical correlation with direct visualization is recommended. 4. Carious and periodontal disease of the right second mandibular molar with associated thinning/dehiscence of the mandibular cortex. Dental follow-up is recommended.  Prior Pertinent Procedures: 8/2/23: Flex bronch, left VATS, decortication, and debridement and drainage of empyema; Dr. Orellana 10/11/23: Left partial nephrectomy 4/10/24: DL, laser excision of anterior commissure infraglottic cyst, steroid injection; Dr. Brennan

## 2024-05-07 ENCOUNTER — APPOINTMENT (OUTPATIENT)
Dept: OTOLARYNGOLOGY | Facility: CLINIC | Age: 69
End: 2024-05-07
Payer: MEDICARE

## 2024-05-07 VITALS
SYSTOLIC BLOOD PRESSURE: 147 MMHG | HEART RATE: 63 BPM | RESPIRATION RATE: 17 BRPM | OXYGEN SATURATION: 97 % | HEIGHT: 67 IN | DIASTOLIC BLOOD PRESSURE: 87 MMHG | WEIGHT: 190 LBS | BODY MASS INDEX: 29.82 KG/M2

## 2024-05-07 DIAGNOSIS — J38.3 OTHER DISEASES OF VOCAL CORDS: ICD-10-CM

## 2024-05-07 PROCEDURE — 99213 OFFICE O/P EST LOW 20 MIN: CPT | Mod: 25

## 2024-05-07 PROCEDURE — 31575 DIAGNOSTIC LARYNGOSCOPY: CPT

## 2024-05-07 NOTE — ASSESSMENT
[FreeTextEntry1] : Assessment/Plan: #1 Anterior commissure infraglottic cyst s/p excision #2 Left renal cancer s/p resection  At patients request I will see him back in 6 months.

## 2024-05-07 NOTE — PROCEDURE
[de-identified] : Stroboscopic Laryngoscopy Procedure Note:  Indication:	Assess laryngeal biomechanics and vocal fold oscillation.  Description of Procedure:	Informed consent was verbally obtained from the patient prior to the procedure. The patient was seated in the clinic chair. Topical anesthesia was achieved by first spraying the nasal cavities with 4% lidocaine and nasal decongestant.   Findings:  Supraglottis: no masses or lesions  Glottis:    Structure:                        Right: crisp and shows no lesions or masses, minimal infraglottic web                       Left:  crisp and shows no lesions or masses                 Mobility:                        Right:  normal                        Left:  normal                Amplitude:                        Right:  normal                       Left:  normal                Closure: complete                 Wave symmetry:  symmetric  Subglottis: no masses or lesions within the visualized subglottis Visualized airway is widely patent.

## 2024-05-07 NOTE — HISTORY OF PRESENT ILLNESS
[de-identified] : SARAH FRIAS is a 68 year old male who presents to the [facility] for follow up of his anterior commissure infraglottic cyst and left renal cancer s/p resection. I last saw the patient on 4/22/24. This is his 2nd POA. Reports doing well since excision  Finished inhaler yesterday.  Voice is slightly hoarse for 2 days.  Patient denies throat pain, globus sensation, dysphagia, odynophagia, dyspnea, hemoptysis, otalgia, recent fevers/infections, chills, night sweats, weight loss.  Path (4/10/24): Subglottic cyst, excision: -Laryngeal cyst  Previously reported: anterior commissure lesion. He is referred by Dr. Chapman who last saw the patient on 9/22/23. She was originally seeing him for abnormal PET finding. He now notes periods of normal voicing. He denies specific difficulties with swallowing. He notes frequent classic heartburn symptoms, takes pantoprazole daily Reports occasional cough. Social alcohol use. Former smoker in 1974 for one year. He has scheduled renal procedure on 10/11 to have renal mass removed. Patient denies throat pain, globus sensation, dysphagia, odynophagia, dyspnea, dysphonia, hemoptysis, otalgia. Denies recent fevers/infections, chills, night sweats, weight loss.  PET Scan 09/19/2023 IMPRESSION: Abnormal skull-to-thigh FDG-PET/CT scan. 1. FDG avid exophytic left renal mass, unchanged in size as compared to MRI dated 8/2/2023, corresponds to known renal cell carcinoma. 2. Similar-appearing small left pleural effusion status post decortication. FDG activity within the effusion likely related to inflammation/postprocedural. 3. Nonspecific, asymmetrically increased FDG activity in the right oropharyngeal sidewall and FDG-avid, symmetrically prominent lingual tonsils for which clinical correlation with direct visualization is recommended. 4. Carious and periodontal disease of the right second mandibular molar with associated thinning/dehiscence of the mandibular cortex. Dental follow-up is recommended.  Prior Pertinent Procedures: 8/2/23: Flex bronch, left VATS, decortication, and debridement and drainage of empyema; Dr. Orellana 10/11/23: Left partial nephrectomy 4/10/24: DL, laser excision of anterior commissure infraglottic cyst, steroid injection; Dr. Brennan

## 2024-05-28 ENCOUNTER — APPOINTMENT (OUTPATIENT)
Dept: CT IMAGING | Facility: IMAGING CENTER | Age: 69
End: 2024-05-28
Payer: MEDICARE

## 2024-05-28 ENCOUNTER — OUTPATIENT (OUTPATIENT)
Dept: OUTPATIENT SERVICES | Facility: HOSPITAL | Age: 69
LOS: 1 days | End: 2024-05-28
Payer: MEDICARE

## 2024-05-28 DIAGNOSIS — Z98.890 OTHER SPECIFIED POSTPROCEDURAL STATES: Chronic | ICD-10-CM

## 2024-05-28 DIAGNOSIS — Z90.5 ACQUIRED ABSENCE OF KIDNEY: Chronic | ICD-10-CM

## 2024-05-28 DIAGNOSIS — J86.9 PYOTHORAX WITHOUT FISTULA: ICD-10-CM

## 2024-05-28 DIAGNOSIS — Z00.8 ENCOUNTER FOR OTHER GENERAL EXAMINATION: ICD-10-CM

## 2024-05-28 DIAGNOSIS — H26.9 UNSPECIFIED CATARACT: Chronic | ICD-10-CM

## 2024-05-28 DIAGNOSIS — R91.1 SOLITARY PULMONARY NODULE: ICD-10-CM

## 2024-05-28 PROCEDURE — 71250 CT THORAX DX C-: CPT | Mod: 26

## 2024-05-28 PROCEDURE — 71250 CT THORAX DX C-: CPT

## 2024-05-30 ENCOUNTER — NON-APPOINTMENT (OUTPATIENT)
Age: 69
End: 2024-05-30

## 2024-05-30 ENCOUNTER — APPOINTMENT (OUTPATIENT)
Dept: OPHTHALMOLOGY | Facility: CLINIC | Age: 69
End: 2024-05-30
Payer: MEDICARE

## 2024-05-30 PROCEDURE — 92133 CPTRZD OPH DX IMG PST SGM ON: CPT

## 2024-05-30 PROCEDURE — 92014 COMPRE OPH EXAM EST PT 1/>: CPT

## 2024-06-04 ENCOUNTER — APPOINTMENT (OUTPATIENT)
Dept: THORACIC SURGERY | Facility: CLINIC | Age: 69
End: 2024-06-04
Payer: MEDICARE

## 2024-06-04 VITALS
RESPIRATION RATE: 17 BRPM | HEIGHT: 67 IN | WEIGHT: 190 LBS | OXYGEN SATURATION: 98 % | HEART RATE: 61 BPM | DIASTOLIC BLOOD PRESSURE: 85 MMHG | SYSTOLIC BLOOD PRESSURE: 151 MMHG | BODY MASS INDEX: 29.82 KG/M2

## 2024-06-04 PROCEDURE — 99213 OFFICE O/P EST LOW 20 MIN: CPT

## 2024-06-04 NOTE — HISTORY OF PRESENT ILLNESS
[FreeTextEntry1] : Mr. SARAH FRIAS, 68 year old male, HTN, HLD, Type 2 diabetes, TAO on BiPAP. Recently hospitalized and found to have left loculated pleural effusion. Pulmonology performed a thoracentesis and drained 200 mL but was unable to place a PTC.  Now, s/p Flex bronch, uniportal Left VATS, pneumonolysis, decortication, debridement and drainage of empyema, and intercostal nerve block on 8/2/23. Path: Contents of empyema: Acute, fibrinous and fibrous pleurisy. Marked acute inflammation with areas of necrosis, consistent with empyema. Path: Decortication: Acute necrotizing and fibrinous pleuritis with background of fibrous pleurisy.  Cultures grew strep constellatus and parvimonas  8/10- S/P- PICC line placement. IV Abx until 9/9, follows with Dr. Michel Mason (ID).  Patient is s/p left partial nephrectomy on 10/13/2023. Path showed Papillary renal cell carcinoma, type 1, WHO/ISUP Grade 2.   CXR on 8/29/23: Small left effusion unchanged.  CT Chest on 11/28/23: - Small juxta fissural nodules within RML (series 4 image 82) and LLL (series 4 image 57) are stable likely benign pulmonary lymph nodes. - Cardiomegaly; Aortic valvular calcifications. - Stable small bone island within T3 vertebral body.  CT Chest on 5/28/24:  - Patent trachea and bronchi; Emphysema.  - Unchanged sub-4 mm nodules in the LLL (2-60) and the middle lobe (2-81).  - Few new adjacent nodules up to 4 mm in the RUL (2:35-42). - Remaining centimeters ascending aorta.  Patient presents today for follow up. Patient denies shortness of breath, cough, chest pain, fever, chills. Patient had a cyst removed near vocal cord 2 months agon.

## 2024-06-04 NOTE — ASSESSMENT
[FreeTextEntry1] : Mr. SARAH FRIAS, 68 year old male, HTN, HLD, Type 2 diabetes, TAO on BiPAP. Recently hospitalized and found to have left loculated pleural effusion. Pulmonology performed a thoracentesis and drained 200 mL but was unable to place a PTC.  Now, s/p Flex bronch, uniportal Left VATS, pneumonolysis, decortication, debridement and drainage of empyema, and intercostal nerve block on 8/2/23. Path: Contents of empyema: Acute, fibrinous and fibrous pleurisy. Marked acute inflammation with areas of necrosis, consistent with empyema. Path: Decortication: Acute necrotizing and fibrinous pleuritis with background of fibrous pleurisy.  Cultures grew strep constellatus and parvimonas  8/10- S/P- PICC line placement. IV Abx until 9/9, follows with Dr. Michel Mason (ID).  Patient is s/p left partial nephrectomy on 10/13/2023. Path showed Papillary renal cell carcinoma, type 1, WHO/ISUP Grade 2.   CXR on 8/29/23: Small left effusion unchanged.  CT Chest on 11/28/23: - Small juxta fissural nodules within RML (series 4 image 82) and LLL (series 4 image 57) are stable likely benign pulmonary lymph nodes. - Cardiomegaly; Aortic valvular calcifications. - Stable small bone island within T3 vertebral body.  CT Chest on 5/28/24:  - Patent trachea and bronchi; Emphysema.  - Unchanged sub-4 mm nodules in the LLL (2-60) and the middle lobe (2-81).  - Few new adjacent nodules up to 4 mm in the RUL (2:35-42). - Remaining centimeters ascending aorta.  I have reviewed the patient's medical records and diagnostic images at time of this office consultation and have made the following recommendation: 1. CT chest reviewed and explained to patient, new small RUL nodule, I recommended patient to return to office in 4 months with CT Chest without contrast.  2. Will order Nodify test, RTC via TTM to discuss results.   I, SARA CainIM, personally performed the evaluation and management (E/M) services for this established patient who follow up today with an existing condition.  That E/M includes conducting the examination, assessing all new/exacerbated/existing conditions, and establishing a plan of care.  Today, my ACP, Herminia Bush ANP-C, was here to observe my evaluation and management services for this existing condition to be followed going forward.

## 2024-06-18 ENCOUNTER — APPOINTMENT (OUTPATIENT)
Dept: THORACIC SURGERY | Facility: CLINIC | Age: 69
End: 2024-06-18
Payer: MEDICARE

## 2024-06-18 DIAGNOSIS — J86.9 PYOTHORAX W/OUT FISTULA: ICD-10-CM

## 2024-06-18 PROCEDURE — 99443: CPT

## 2024-06-18 NOTE — HISTORY OF PRESENT ILLNESS
[FreeTextEntry1] : Mr. SARAH FRIAS, 68 year old male, HTN, HLD, Type 2 diabetes, TAO on BiPAP. Recently hospitalized and found to have left loculated pleural effusion. Pulmonology performed a thoracentesis and drained 200 mL but was unable to place a PTC.  Now, s/p Flex bronch, uniportal Left VATS, pneumonolysis, decortication, debridement and drainage of empyema, and intercostal nerve block on 8/2/23. Path: Contents of empyema: Acute, fibrinous and fibrous pleurisy. Marked acute inflammation with areas of necrosis, consistent with empyema. Path: Decortication: Acute necrotizing and fibrinous pleuritis with background of fibrous pleurisy.  Cultures grew strep constellatus and parvimonas  8/10- S/P- PICC line placement. IV Abx until 9/9, follows with Dr. Michel Mason (ID).  Patient is s/p left partial nephrectomy on 10/13/2023. Path showed Papillary renal cell carcinoma, type 1, WHO/ISUP Grade 2.   CXR on 8/29/23: Small left effusion unchanged.  CT Chest on 11/28/23: - Small juxta fissural nodules within RML (series 4 image 82) and LLL (series 4 image 57) are stable likely benign pulmonary lymph nodes. - Cardiomegaly; Aortic valvular calcifications. - Stable small bone island within T3 vertebral body.  s/p vocal cord cyst removal ~ April 2024  CT Chest on 5/28/24:  - Patent trachea and bronchi; Emphysema.  - Unchanged sub-4 mm nodules in the LLL (2-60) and the middle lobe (2-81).  - Few new adjacent nodules up to 4 mm in the RUL (2:35-42). - Remaining centimeters ascending aorta.  Seen on 6/4/24: 1. CT chest reviewed and explained to patient, new small RUL nodule, I recommended patient to return to office in 4 months with CT Chest without contrast. 2. Will order Nodify test, RTC via TTM to discuss results.  NODIFY on 6/7/24: Pre test: Solitary Pulmonary nodule calculator not validated for this patient. PostTest: No pre-test risk of malignancy available for calculation   Patient presents today for follow up, via tele, to discuss NODIFY results.

## 2024-06-18 NOTE — ASSESSMENT
[FreeTextEntry1] : Mr. SARAH FRIAS, 68 year old male, HTN, HLD, Type 2 diabetes, TAO on BiPAP. Recently hospitalized and found to have left loculated pleural effusion. Pulmonology performed a thoracentesis and drained 200 mL but was unable to place a PTC.  Now, s/p Flex bronch, uniportal Left VATS, pneumonolysis, decortication, debridement and drainage of empyema, and intercostal nerve block on 8/2/23. Path: Contents of empyema: Acute, fibrinous and fibrous pleurisy. Marked acute inflammation with areas of necrosis, consistent with empyema. Path: Decortication: Acute necrotizing and fibrinous pleuritis with background of fibrous pleurisy.  Cultures grew strep constellatus and parvimonas  8/10- S/P- PICC line placement. IV Abx until 9/9, follows with Dr. Michle Mason (ID).  Patient is s/p left partial nephrectomy on 10/13/2023. Path showed Papillary renal cell carcinoma, type 1, WHO/ISUP Grade 2.   CXR on 8/29/23: Small left effusion unchanged.  CT Chest on 11/28/23: - Small juxta fissural nodules within RML (series 4 image 82) and LLL (series 4 image 57) are stable likely benign pulmonary lymph nodes. - Cardiomegaly; Aortic valvular calcifications. - Stable small bone island within T3 vertebral body.  CT Chest on 5/28/24:  - Patent trachea and bronchi; Emphysema.  - Unchanged sub-4 mm nodules in the LLL (2-60) and the middle lobe (2-81).  - Few new adjacent nodules up to 4 mm in the RUL (2:35-42). - Remaining centimeters ascending aorta.  Seen on 6/4/24: 1. CT chest reviewed and explained to patient, new small RUL nodule, I recommended patient to return to office in 4 months with CT Chest without contrast. 2. Will order Nodify test, RTC via TTM to discuss results.  NODIFY on 6/7/24: Pre test: Solitary Pulmonary nodule calculator not validated for this patient. PostTest: No pre-test risk of malignancy available for calculation   Patient presents today for follow up, via tele, to discuss NODIFY results I have reviewed the patient's medical records and diagnostic images at time of this office consultation and have made the following recommendation: 1. Reviewed with pt Notify BW result, not validated for this pt. Therefore, I continue with surveillance scan in 6 mons.    I, Dr. WOODS [facility], personally performed the evaluation and management (E/M) services for this established patient who presents today with (a) new problem(s)/exacerbation of (an) existing condition(s).  That E/M includes conducting the examination, assessing all new/exacerbated conditions, and establishing a new plan of care.  Today, my ACP, Stephanie Malhotra, KATIEP-BC was here to observe my evaluation and management services for this new problem/exacerbated condition to be followed going forward.

## 2024-06-24 ENCOUNTER — NON-APPOINTMENT (OUTPATIENT)
Age: 69
End: 2024-06-24

## 2024-06-25 ENCOUNTER — OUTPATIENT (OUTPATIENT)
Dept: OUTPATIENT SERVICES | Facility: HOSPITAL | Age: 69
LOS: 1 days | End: 2024-06-25
Payer: MEDICARE

## 2024-06-25 ENCOUNTER — APPOINTMENT (OUTPATIENT)
Dept: UROLOGY | Facility: CLINIC | Age: 69
End: 2024-06-25
Payer: MEDICARE

## 2024-06-25 VITALS
HEART RATE: 66 BPM | SYSTOLIC BLOOD PRESSURE: 149 MMHG | DIASTOLIC BLOOD PRESSURE: 80 MMHG | BODY MASS INDEX: 30.13 KG/M2 | HEIGHT: 67 IN | RESPIRATION RATE: 17 BRPM | WEIGHT: 192 LBS | TEMPERATURE: 98.7 F

## 2024-06-25 DIAGNOSIS — Z98.890 OTHER SPECIFIED POSTPROCEDURAL STATES: Chronic | ICD-10-CM

## 2024-06-25 DIAGNOSIS — C64.9 MALIGNANT NEOPLASM OF UNSPECIFIED KIDNEY, EXCEPT RENAL PELVIS: ICD-10-CM

## 2024-06-25 DIAGNOSIS — R91.1 SOLITARY PULMONARY NODULE: ICD-10-CM

## 2024-06-25 DIAGNOSIS — Z90.5 ACQUIRED ABSENCE OF KIDNEY: Chronic | ICD-10-CM

## 2024-06-25 DIAGNOSIS — R35.0 FREQUENCY OF MICTURITION: ICD-10-CM

## 2024-06-25 DIAGNOSIS — C64.2 MALIGNANT NEOPLASM OF LEFT KIDNEY, EXCEPT RENAL PELVIS: ICD-10-CM

## 2024-06-25 DIAGNOSIS — H26.9 UNSPECIFIED CATARACT: Chronic | ICD-10-CM

## 2024-06-25 PROCEDURE — G2211 COMPLEX E/M VISIT ADD ON: CPT

## 2024-06-25 PROCEDURE — 99215 OFFICE O/P EST HI 40 MIN: CPT

## 2024-06-25 PROCEDURE — 76775 US EXAM ABDO BACK WALL LIM: CPT

## 2024-06-25 PROCEDURE — 76775 US EXAM ABDO BACK WALL LIM: CPT | Mod: 26

## 2024-09-16 NOTE — PACU DISCHARGE NOTE - MENTAL STATUS: PATIENT PARTICIPATION
Awake Scheduled procedure with SpouseEsvin  at    Telephone Information:   Mobile [phone]      Scheduled Via: Case Request Order for  [facility]    Did patient request a different provider then the referred to:Yes    Procedure date: 11/26     Procedure time: 1145 ; Did patient request this specific time? Yes    If a MAC patient is scheduled, patient was notified a family member/friend will need to stay with the patient over night  once they return home after their procedure: Yes    Was patient's demographics verified, (address,phone number email)  Yes    Insurance name confirmed as [facility], will be the same at time of procedure?: Yes  Prep required? Yes, Pharmacy is [facility] ; was request sent to nurse to send prep to pharmacy? Yes;     Was prep instructions briefly reviewed? Yes;     If there was a med hold in the chart review .was the patient made aware of the med hold?  Yes    Was patient told to contact us back if prep instructions not received in 7 business days?  Yes    If procedure is scheduled 7 days or less, patient was told to  prep letter?: No

## 2024-09-26 ENCOUNTER — OUTPATIENT (OUTPATIENT)
Dept: OUTPATIENT SERVICES | Facility: HOSPITAL | Age: 69
LOS: 1 days | End: 2024-09-26
Payer: MEDICARE

## 2024-09-26 ENCOUNTER — APPOINTMENT (OUTPATIENT)
Dept: CT IMAGING | Facility: IMAGING CENTER | Age: 69
End: 2024-09-26
Payer: MEDICARE

## 2024-09-26 DIAGNOSIS — Z98.890 OTHER SPECIFIED POSTPROCEDURAL STATES: Chronic | ICD-10-CM

## 2024-09-26 DIAGNOSIS — C64.9 MALIGNANT NEOPLASM OF UNSPECIFIED KIDNEY, EXCEPT RENAL PELVIS: ICD-10-CM

## 2024-09-26 DIAGNOSIS — H26.9 UNSPECIFIED CATARACT: Chronic | ICD-10-CM

## 2024-09-26 DIAGNOSIS — Z90.5 ACQUIRED ABSENCE OF KIDNEY: Chronic | ICD-10-CM

## 2024-09-26 DIAGNOSIS — Z00.8 ENCOUNTER FOR OTHER GENERAL EXAMINATION: ICD-10-CM

## 2024-09-26 PROCEDURE — 71250 CT THORAX DX C-: CPT

## 2024-09-26 PROCEDURE — 71250 CT THORAX DX C-: CPT | Mod: 26

## 2024-10-01 ENCOUNTER — APPOINTMENT (OUTPATIENT)
Dept: THORACIC SURGERY | Facility: CLINIC | Age: 69
End: 2024-10-01
Payer: MEDICARE

## 2024-10-01 VITALS
RESPIRATION RATE: 17 BRPM | DIASTOLIC BLOOD PRESSURE: 87 MMHG | OXYGEN SATURATION: 95 % | SYSTOLIC BLOOD PRESSURE: 167 MMHG | HEART RATE: 83 BPM | WEIGHT: 187 LBS | HEIGHT: 67 IN | BODY MASS INDEX: 29.35 KG/M2

## 2024-10-01 DIAGNOSIS — J86.9 PYOTHORAX W/OUT FISTULA: ICD-10-CM

## 2024-10-01 PROCEDURE — 99213 OFFICE O/P EST LOW 20 MIN: CPT

## 2024-10-01 RX ORDER — SEMAGLUTIDE 1.34 MG/ML
INJECTION, SOLUTION SUBCUTANEOUS
Refills: 0 | Status: ACTIVE | COMMUNITY

## 2024-10-01 NOTE — ASSESSMENT
[FreeTextEntry1] : Mr. SARAH FRIAS, 68 year old male, HTN, HLD, Type 2 diabetes, TAO on BiPAP. Recently hospitalized and found to have left loculated pleural effusion. Pulmonology performed a thoracentesis and drained 200 mL but was unable to place a PTC.  Now, s/p Flex bronch, uniportal Left VATS, pneumonolysis, decortication, debridement and drainage of empyema, and intercostal nerve block on 8/2/23. Path: Contents of empyema: Acute, fibrinous and fibrous pleurisy. Marked acute inflammation with areas of necrosis, consistent with empyema. Path: Decortication: Acute necrotizing and fibrinous pleuritis with background of fibrous pleurisy.  Cultures grew strep constellatus and parvimonas  8/10- S/P- PICC line placement. IV Abx until 9/9, follows with Dr. Michel Mason (ID).  Patient is s/p left partial nephrectomy on 10/13/2023. Path showed Papillary renal cell carcinoma, type 1, WHO/ISUP Grade 2.  Patient presents today with follow up imaging.   I have independently reviewed the medical records and imaging at the time of this office consultation, and discussed the following interpretations with the patient: - CT imaging reviewed. Stable findings. Discussed returning to clinic in 6 months to re-evaluate stability. He is agreeable.   Recommendations reviewed with patient during this office visit, and all questions answered; Patient instructed on the importance of follow up and verbalizes understanding.  I, MARIA ELENA Cain, personally performed the evaluation and management (E/M) services for this established patient. That E/M includes conducting the examination, assessing all new/exacerbated conditions, and establishing a new plan of care. Today, My ACP, Mima Espitia, was here to observe my evaluation and management services for this patient to be followed going forward.

## 2024-10-01 NOTE — HISTORY OF PRESENT ILLNESS
[FreeTextEntry1] : Mr. SARAH FRIAS, 68 year old male, HTN, HLD, Type 2 diabetes, TAO on BiPAP. Recently hospitalized and found to have left loculated pleural effusion. Pulmonology performed a thoracentesis and drained 200 mL but was unable to place a PTC.  Now, s/p Flex bronch, uniportal Left VATS, pneumonolysis, decortication, debridement and drainage of empyema, and intercostal nerve block on 8/2/23. Path: Contents of empyema: Acute, fibrinous and fibrous pleurisy. Marked acute inflammation with areas of necrosis, consistent with empyema. Path: Decortication: Acute necrotizing and fibrinous pleuritis with background of fibrous pleurisy.  Cultures grew strep constellatus and parvimonas  8/10- S/P- PICC line placement. IV Abx until 9/9, follows with Dr. Michel Mason (ID).  Patient is s/p left partial nephrectomy on 10/13/2023. Path showed Papillary renal cell carcinoma, type 1, WHO/ISUP Grade 2.  CXR on 8/29/23: Small left effusion unchanged.  CT Chest on 11/28/23: - Small juxta fissural nodules within RML (series 4 image 82) and LLL (series 4 image 57) are stable likely benign pulmonary lymph nodes. - Cardiomegaly; Aortic valvular calcifications. - Stable small bone island within T3 vertebral body.  s/p vocal cord cyst removal ~ April 2024  CT Chest on 5/28/24: - Patent trachea and bronchi; Emphysema. - Unchanged sub-4 mm nodules in the LLL (2-60) and the middle lobe (2-81). - Few new adjacent nodules up to 4 mm in the RUL (2:35-42). - Remaining centimeters ascending aorta.  Seen on 6/4/24: 1. CT chest reviewed and explained to patient, new small RUL nodule, I recommended patient to return to office in 4 months with CT Chest without contrast. 2. Will order Nodify test, RTC via TTM to discuss results.  NODIFY on 6/7/24: Pre test: Solitary Pulmonary nodule calculator not validated for this patient. PostTest: No pre-test risk of malignancy available for calculation  Followed up in June:  Reviewed with pt Notify BW result, not validated for this pt. Therefore, I continue with surveillance scan in 6 mons.  CT Chest on 9/26/24:  - Trace pericardial effusion parts of which appear new since the prior study. Heart size is enlarged. Vascular calcifications with involvement of the aorta and the coronary arteries. Aortic root calcifications. No pleural effusions. - Evaluation of the lungs demonstrate mild bilateral lower lung bronchiectasis unchanged.  - Minimal LLL linear or subsegmental atelectasis appears unchanged; Emphysema. - Near complete resolution of the previously seen RUL posterior segment new nodules since May 28, 2024 which have either resolved or demonstrate interval decrease in size since May 28, 2024.  - Residual 2 mm right upper lobe nodular opacity on image 36 of series 2 has decreased in size. - LLL and RML sub-4 mm previously described nodules are unchanged. 3 mm right middle lobe nodule is unchanged since the chest CT of August 11, 2023. -  6 mm sclerotic focus within the posterior aspect of the left second rib is unchanged since August 11, 2023.  Patient presents today for follow up. Today, patient denies worsening SOB, chest pain, cough, hemoptysis, fever, chills, night sweats, lightheadedness or dizziness.

## 2024-11-19 ENCOUNTER — APPOINTMENT (OUTPATIENT)
Dept: OTOLARYNGOLOGY | Facility: CLINIC | Age: 69
End: 2024-11-19

## 2025-01-31 ENCOUNTER — APPOINTMENT (OUTPATIENT)
Dept: ULTRASOUND IMAGING | Facility: CLINIC | Age: 70
End: 2025-01-31
Payer: MEDICARE

## 2025-01-31 ENCOUNTER — OUTPATIENT (OUTPATIENT)
Dept: OUTPATIENT SERVICES | Facility: HOSPITAL | Age: 70
LOS: 1 days | End: 2025-01-31
Payer: COMMERCIAL

## 2025-01-31 DIAGNOSIS — Z98.890 OTHER SPECIFIED POSTPROCEDURAL STATES: Chronic | ICD-10-CM

## 2025-01-31 DIAGNOSIS — Z90.5 ACQUIRED ABSENCE OF KIDNEY: Chronic | ICD-10-CM

## 2025-01-31 DIAGNOSIS — R18.8 OTHER ASCITES: ICD-10-CM

## 2025-01-31 DIAGNOSIS — H26.9 UNSPECIFIED CATARACT: Chronic | ICD-10-CM

## 2025-01-31 DIAGNOSIS — Z00.8 ENCOUNTER FOR OTHER GENERAL EXAMINATION: ICD-10-CM

## 2025-01-31 PROCEDURE — 76700 US EXAM ABDOM COMPLETE: CPT | Mod: 26

## 2025-01-31 PROCEDURE — 76700 US EXAM ABDOM COMPLETE: CPT

## 2025-02-11 ENCOUNTER — APPOINTMENT (OUTPATIENT)
Dept: PULMONOLOGY | Facility: CLINIC | Age: 70
End: 2025-02-11
Payer: MEDICARE

## 2025-02-11 VITALS
BODY MASS INDEX: 29.19 KG/M2 | SYSTOLIC BLOOD PRESSURE: 136 MMHG | OXYGEN SATURATION: 99 % | HEART RATE: 101 BPM | WEIGHT: 186 LBS | DIASTOLIC BLOOD PRESSURE: 79 MMHG | TEMPERATURE: 97.7 F | HEIGHT: 67 IN

## 2025-02-11 DIAGNOSIS — G47.33 OBSTRUCTIVE SLEEP APNEA (ADULT) (PEDIATRIC): ICD-10-CM

## 2025-02-11 PROCEDURE — G2211 COMPLEX E/M VISIT ADD ON: CPT

## 2025-02-11 PROCEDURE — 99213 OFFICE O/P EST LOW 20 MIN: CPT

## 2025-02-14 NOTE — H&P PST ADULT - NEGATIVE NEUROLOGICAL SYMPTOMS
no cva/no generalized seizures/no focal seizures/no headache
Alert-The patient is alert, awake and responds to voice. The patient is oriented to time, place, and person. The triage nurse is able to obtain subjective information.

## 2025-02-19 ENCOUNTER — APPOINTMENT (OUTPATIENT)
Dept: HEPATOLOGY | Facility: CLINIC | Age: 70
End: 2025-02-19
Payer: MEDICARE

## 2025-02-19 ENCOUNTER — RESULT REVIEW (OUTPATIENT)
Age: 70
End: 2025-02-19

## 2025-02-19 VITALS
HEIGHT: 67 IN | OXYGEN SATURATION: 98 % | DIASTOLIC BLOOD PRESSURE: 95 MMHG | TEMPERATURE: 98.3 F | SYSTOLIC BLOOD PRESSURE: 164 MMHG | WEIGHT: 186 LBS | BODY MASS INDEX: 29.19 KG/M2 | HEART RATE: 103 BPM | RESPIRATION RATE: 16 BRPM

## 2025-02-19 DIAGNOSIS — C64.9 MALIGNANT NEOPLASM OF UNSPECIFIED KIDNEY, EXCEPT RENAL PELVIS: ICD-10-CM

## 2025-02-19 DIAGNOSIS — C64.2 MALIGNANT NEOPLASM OF LEFT KIDNEY, EXCEPT RENAL PELVIS: ICD-10-CM

## 2025-02-19 DIAGNOSIS — R18.8 OTHER ASCITES: ICD-10-CM

## 2025-02-19 LAB
ALBUMIN SERPL ELPH-MCNC: 4.4 G/DL
ALP BLD-CCNC: 80 U/L
ALT SERPL-CCNC: 12 U/L
ANION GAP SERPL CALC-SCNC: 12 MMOL/L
AST SERPL-CCNC: 17 U/L
BASOPHILS # BLD AUTO: 0.04 K/UL
BASOPHILS NFR BLD AUTO: 0.3 %
BILIRUB SERPL-MCNC: 0.4 MG/DL
BUN SERPL-MCNC: 14 MG/DL
CALCIUM SERPL-MCNC: 10.6 MG/DL
CANCER AG19-9 SERPL-ACNC: 31 U/ML
CEA SERPL-MCNC: 1.1 NG/ML
CHLORIDE SERPL-SCNC: 104 MMOL/L
CO2 SERPL-SCNC: 26 MMOL/L
CREAT SERPL-MCNC: 0.9 MG/DL
EGFR: 92 ML/MIN/1.73M2
EOSINOPHIL # BLD AUTO: 0.69 K/UL
EOSINOPHIL NFR BLD AUTO: 5.6 %
GLUCOSE SERPL-MCNC: 133 MG/DL
HCT VFR BLD CALC: 43.7 %
HGB BLD-MCNC: 13.5 G/DL
IMM GRANULOCYTES NFR BLD AUTO: 0.3 %
INR PPP: 0.93 RATIO
LYMPHOCYTES # BLD AUTO: 1.53 K/UL
LYMPHOCYTES NFR BLD AUTO: 12.3 %
MAN DIFF?: NORMAL
MCHC RBC-ENTMCNC: 28.1 PG
MCHC RBC-ENTMCNC: 30.9 G/DL
MCV RBC AUTO: 91 FL
MONOCYTES # BLD AUTO: 1.06 K/UL
MONOCYTES NFR BLD AUTO: 8.5 %
NEUTROPHILS # BLD AUTO: 9.07 K/UL
NEUTROPHILS NFR BLD AUTO: 73 %
PLATELET # BLD AUTO: 452 K/UL
POTASSIUM SERPL-SCNC: 5.5 MMOL/L
PROT SERPL-MCNC: 7.7 G/DL
PT BLD: 11 SEC
RBC # BLD: 4.8 M/UL
RBC # FLD: 13.9 %
SODIUM SERPL-SCNC: 142 MMOL/L
WBC # FLD AUTO: 12.43 K/UL

## 2025-02-19 PROCEDURE — 99245 OFF/OP CONSLTJ NEW/EST HI 55: CPT

## 2025-02-19 PROCEDURE — 99205 OFFICE O/P NEW HI 60 MIN: CPT

## 2025-02-19 RX ORDER — SPIRONOLACTONE 50 MG/1
50 TABLET ORAL DAILY
Qty: 30 | Refills: 0 | Status: ACTIVE | COMMUNITY
Start: 2025-02-19 | End: 1900-01-01

## 2025-02-19 RX ORDER — FUROSEMIDE 40 MG/1
40 TABLET ORAL DAILY
Qty: 30 | Refills: 1 | Status: ACTIVE | COMMUNITY
Start: 2025-02-19 | End: 1900-01-01

## 2025-02-20 ENCOUNTER — APPOINTMENT (OUTPATIENT)
Dept: CT IMAGING | Facility: IMAGING CENTER | Age: 70
End: 2025-02-20
Payer: MEDICARE

## 2025-02-20 ENCOUNTER — OUTPATIENT (OUTPATIENT)
Dept: OUTPATIENT SERVICES | Facility: HOSPITAL | Age: 70
LOS: 1 days | End: 2025-02-20
Payer: COMMERCIAL

## 2025-02-20 DIAGNOSIS — C64.2 MALIGNANT NEOPLASM OF LEFT KIDNEY, EXCEPT RENAL PELVIS: ICD-10-CM

## 2025-02-20 DIAGNOSIS — Z00.8 ENCOUNTER FOR OTHER GENERAL EXAMINATION: ICD-10-CM

## 2025-02-20 DIAGNOSIS — Z98.890 OTHER SPECIFIED POSTPROCEDURAL STATES: Chronic | ICD-10-CM

## 2025-02-20 DIAGNOSIS — Z90.5 ACQUIRED ABSENCE OF KIDNEY: Chronic | ICD-10-CM

## 2025-02-20 DIAGNOSIS — H26.9 UNSPECIFIED CATARACT: Chronic | ICD-10-CM

## 2025-02-20 PROCEDURE — 71260 CT THORAX DX C+: CPT | Mod: 26

## 2025-02-20 PROCEDURE — 74177 CT ABD & PELVIS W/CONTRAST: CPT | Mod: 26

## 2025-02-20 PROCEDURE — 71260 CT THORAX DX C+: CPT

## 2025-02-20 PROCEDURE — 74177 CT ABD & PELVIS W/CONTRAST: CPT

## 2025-02-26 LAB
ALPHA-1-FETOPROTEIN-L3: NORMAL %
ALPHA-1-FETOPROTEIN: 1.8 NG/ML

## 2025-02-27 ENCOUNTER — RESULT REVIEW (OUTPATIENT)
Age: 70
End: 2025-02-27

## 2025-02-27 ENCOUNTER — APPOINTMENT (OUTPATIENT)
Dept: ULTRASOUND IMAGING | Facility: IMAGING CENTER | Age: 70
End: 2025-02-27
Payer: MEDICARE

## 2025-02-27 ENCOUNTER — OUTPATIENT (OUTPATIENT)
Dept: OUTPATIENT SERVICES | Facility: HOSPITAL | Age: 70
LOS: 1 days | End: 2025-02-27
Payer: COMMERCIAL

## 2025-02-27 DIAGNOSIS — Z98.890 OTHER SPECIFIED POSTPROCEDURAL STATES: Chronic | ICD-10-CM

## 2025-02-27 DIAGNOSIS — Z90.5 ACQUIRED ABSENCE OF KIDNEY: Chronic | ICD-10-CM

## 2025-02-27 DIAGNOSIS — H26.9 UNSPECIFIED CATARACT: Chronic | ICD-10-CM

## 2025-02-27 DIAGNOSIS — R18.8 OTHER ASCITES: ICD-10-CM

## 2025-02-27 PROCEDURE — 49083 ABD PARACENTESIS W/IMAGING: CPT

## 2025-03-03 ENCOUNTER — APPOINTMENT (OUTPATIENT)
Dept: CARDIOLOGY | Facility: CLINIC | Age: 70
End: 2025-03-03
Payer: MEDICARE

## 2025-03-03 PROCEDURE — 93306 TTE W/DOPPLER COMPLETE: CPT

## 2025-03-04 ENCOUNTER — APPOINTMENT (OUTPATIENT)
Dept: HEPATOLOGY | Facility: CLINIC | Age: 70
End: 2025-03-04

## 2025-03-17 ENCOUNTER — NON-APPOINTMENT (OUTPATIENT)
Age: 70
End: 2025-03-17

## 2025-03-17 DIAGNOSIS — Z12.11 ENCOUNTER FOR SCREENING FOR MALIGNANT NEOPLASM OF COLON: ICD-10-CM

## 2025-03-18 ENCOUNTER — APPOINTMENT (OUTPATIENT)
Dept: NUCLEAR MEDICINE | Facility: IMAGING CENTER | Age: 70
End: 2025-03-18
Payer: MEDICARE

## 2025-03-18 ENCOUNTER — APPOINTMENT (OUTPATIENT)
Dept: UROLOGY | Facility: CLINIC | Age: 70
End: 2025-03-18
Payer: MEDICARE

## 2025-03-18 ENCOUNTER — OUTPATIENT (OUTPATIENT)
Dept: OUTPATIENT SERVICES | Facility: HOSPITAL | Age: 70
LOS: 1 days | End: 2025-03-18
Payer: COMMERCIAL

## 2025-03-18 VITALS
BODY MASS INDEX: 27.62 KG/M2 | DIASTOLIC BLOOD PRESSURE: 76 MMHG | OXYGEN SATURATION: 98 % | HEIGHT: 67 IN | WEIGHT: 176 LBS | HEART RATE: 107 BPM | SYSTOLIC BLOOD PRESSURE: 115 MMHG

## 2025-03-18 DIAGNOSIS — C78.6 SECONDARY MALIGNANT NEOPLASM OF RETROPERITONEUM AND PERITONEUM: ICD-10-CM

## 2025-03-18 DIAGNOSIS — Z98.890 OTHER SPECIFIED POSTPROCEDURAL STATES: Chronic | ICD-10-CM

## 2025-03-18 DIAGNOSIS — Z00.8 ENCOUNTER FOR OTHER GENERAL EXAMINATION: ICD-10-CM

## 2025-03-18 PROCEDURE — 78815 PET IMAGE W/CT SKULL-THIGH: CPT | Mod: 26,PI

## 2025-03-18 PROCEDURE — A9552: CPT

## 2025-03-18 PROCEDURE — 99214 OFFICE O/P EST MOD 30 MIN: CPT

## 2025-03-18 PROCEDURE — 78815 PET IMAGE W/CT SKULL-THIGH: CPT

## 2025-03-19 ENCOUNTER — APPOINTMENT (OUTPATIENT)
Dept: HEPATOLOGY | Facility: CLINIC | Age: 70
End: 2025-03-19
Payer: MEDICARE

## 2025-03-19 VITALS
HEIGHT: 67 IN | RESPIRATION RATE: 16 BRPM | BODY MASS INDEX: 27.31 KG/M2 | WEIGHT: 174 LBS | DIASTOLIC BLOOD PRESSURE: 67 MMHG | SYSTOLIC BLOOD PRESSURE: 102 MMHG | OXYGEN SATURATION: 99 % | HEART RATE: 111 BPM | TEMPERATURE: 98.1 F

## 2025-03-19 DIAGNOSIS — C64.2 MALIGNANT NEOPLASM OF LEFT KIDNEY, EXCEPT RENAL PELVIS: ICD-10-CM

## 2025-03-19 DIAGNOSIS — R18.8 OTHER ASCITES: ICD-10-CM

## 2025-03-19 PROCEDURE — 99214 OFFICE O/P EST MOD 30 MIN: CPT

## 2025-03-20 ENCOUNTER — OUTPATIENT (OUTPATIENT)
Dept: OUTPATIENT SERVICES | Facility: HOSPITAL | Age: 70
LOS: 1 days | Discharge: HOME | End: 2025-03-20

## 2025-03-20 DIAGNOSIS — Z98.890 OTHER SPECIFIED POSTPROCEDURAL STATES: Chronic | ICD-10-CM

## 2025-03-20 DIAGNOSIS — H26.9 UNSPECIFIED CATARACT: Chronic | ICD-10-CM

## 2025-03-20 DIAGNOSIS — Z90.5 ACQUIRED ABSENCE OF KIDNEY: Chronic | ICD-10-CM

## 2025-03-20 DIAGNOSIS — C64.9 MALIGNANT NEOPLASM OF UNSPECIFIED KIDNEY, EXCEPT RENAL PELVIS: ICD-10-CM

## 2025-03-21 ENCOUNTER — RESULT REVIEW (OUTPATIENT)
Age: 70
End: 2025-03-21

## 2025-03-21 ENCOUNTER — NON-APPOINTMENT (OUTPATIENT)
Age: 70
End: 2025-03-21

## 2025-03-21 ENCOUNTER — APPOINTMENT (OUTPATIENT)
Dept: HEMATOLOGY ONCOLOGY | Facility: CLINIC | Age: 70
End: 2025-03-21
Payer: MEDICARE

## 2025-03-21 VITALS
HEIGHT: 66.02 IN | BODY MASS INDEX: 28.7 KG/M2 | WEIGHT: 178.55 LBS | DIASTOLIC BLOOD PRESSURE: 72 MMHG | OXYGEN SATURATION: 99 % | TEMPERATURE: 97.4 F | SYSTOLIC BLOOD PRESSURE: 117 MMHG | RESPIRATION RATE: 16 BRPM | HEART RATE: 92 BPM

## 2025-03-21 DIAGNOSIS — C64.9 MALIGNANT NEOPLASM OF UNSPECIFIED KIDNEY, EXCEPT RENAL PELVIS: ICD-10-CM

## 2025-03-21 DIAGNOSIS — Z80.9 FAMILY HISTORY OF MALIGNANT NEOPLASM, UNSPECIFIED: ICD-10-CM

## 2025-03-21 DIAGNOSIS — Z86.39 PERSONAL HISTORY OF OTHER ENDOCRINE, NUTRITIONAL AND METABOLIC DISEASE: ICD-10-CM

## 2025-03-21 DIAGNOSIS — I10 ESSENTIAL (PRIMARY) HYPERTENSION: ICD-10-CM

## 2025-03-21 DIAGNOSIS — E13.9 OTHER SPECIFIED DIABETES MELLITUS W/OUT COMPLICATIONS: ICD-10-CM

## 2025-03-21 DIAGNOSIS — Z87.891 PERSONAL HISTORY OF NICOTINE DEPENDENCE: ICD-10-CM

## 2025-03-21 DIAGNOSIS — G47.30 SLEEP APNEA, UNSPECIFIED: ICD-10-CM

## 2025-03-21 LAB
ALBUMIN SERPL ELPH-MCNC: 4.5 G/DL
ALP BLD-CCNC: 70 U/L
ALT SERPL-CCNC: 10 U/L
ANION GAP SERPL CALC-SCNC: 16 MMOL/L
AST SERPL-CCNC: 12 U/L
BASOPHILS # BLD AUTO: 0.03 K/UL — SIGNIFICANT CHANGE UP (ref 0–0.2)
BASOPHILS NFR BLD AUTO: 0.2 % — SIGNIFICANT CHANGE UP (ref 0–2)
BILIRUB SERPL-MCNC: 0.4 MG/DL
BUN SERPL-MCNC: 31 MG/DL
CALCIUM SERPL-MCNC: 10.2 MG/DL
CHLORIDE SERPL-SCNC: 97 MMOL/L
CO2 SERPL-SCNC: 24 MMOL/L
CREAT SERPL-MCNC: 1.34 MG/DL
EGFRCR SERPLBLD CKD-EPI 2021: 57 ML/MIN/1.73M2
EOSINOPHIL # BLD AUTO: 0.36 K/UL — SIGNIFICANT CHANGE UP (ref 0–0.5)
EOSINOPHIL NFR BLD AUTO: 2.9 % — SIGNIFICANT CHANGE UP (ref 0–6)
GLUCOSE SERPL-MCNC: 107 MG/DL
HBV E AB SER QL: NONREACTIVE
HBV E AG SER QL: NONREACTIVE
HCT VFR BLD CALC: 36.9 % — LOW (ref 39–50)
HGB BLD-MCNC: 11.6 G/DL — LOW (ref 13–17)
IMM GRANULOCYTES NFR BLD AUTO: 0.3 % — SIGNIFICANT CHANGE UP (ref 0–0.9)
LYMPHOCYTES # BLD AUTO: 1.91 K/UL — SIGNIFICANT CHANGE UP (ref 1–3.3)
LYMPHOCYTES # BLD AUTO: 15.2 % — SIGNIFICANT CHANGE UP (ref 13–44)
MCHC RBC-ENTMCNC: 28.4 PG — SIGNIFICANT CHANGE UP (ref 27–34)
MCHC RBC-ENTMCNC: 31.4 G/DL — LOW (ref 32–36)
MCV RBC AUTO: 90.2 FL — SIGNIFICANT CHANGE UP (ref 80–100)
MONOCYTES # BLD AUTO: 1.04 K/UL — HIGH (ref 0–0.9)
MONOCYTES NFR BLD AUTO: 8.3 % — SIGNIFICANT CHANGE UP (ref 2–14)
NEUTROPHILS # BLD AUTO: 9.19 K/UL — HIGH (ref 1.8–7.4)
NEUTROPHILS NFR BLD AUTO: 73.1 % — SIGNIFICANT CHANGE UP (ref 43–77)
NRBC BLD AUTO-RTO: 0 /100 WBCS — SIGNIFICANT CHANGE UP (ref 0–0)
PLATELET # BLD AUTO: 343 K/UL — SIGNIFICANT CHANGE UP (ref 150–400)
POTASSIUM SERPL-SCNC: 5.4 MMOL/L
PROT SERPL-MCNC: 7.8 G/DL
RBC # BLD: 4.09 M/UL — LOW (ref 4.2–5.8)
RBC # FLD: 14.2 % — SIGNIFICANT CHANGE UP (ref 10.3–14.5)
SODIUM SERPL-SCNC: 137 MMOL/L
T4 FREE SERPL-MCNC: 1.4 NG/DL
TSH SERPL-ACNC: 2.5 UIU/ML
WBC # BLD: 12.57 K/UL — HIGH (ref 3.8–10.5)
WBC # FLD AUTO: 12.57 K/UL — HIGH (ref 3.8–10.5)

## 2025-03-21 PROCEDURE — 99205 OFFICE O/P NEW HI 60 MIN: CPT

## 2025-03-21 RX ORDER — CABOZANTINIB 40 MG/1
40 TABLET ORAL DAILY
Qty: 30 | Refills: 5 | Status: ACTIVE | COMMUNITY
Start: 2025-03-21 | End: 1900-01-01

## 2025-03-21 RX ORDER — POLYETHYLENE GLYCOL 3350 AND ELECTROLYTES WITH LEMON FLAVOR 236; 22.74; 6.74; 5.86; 2.97 G/4L; G/4L; G/4L; G/4L; G/4L
236 POWDER, FOR SOLUTION ORAL
Qty: 1 | Refills: 0 | Status: DISCONTINUED | COMMUNITY
Start: 2025-03-17 | End: 2025-03-21

## 2025-03-22 LAB
HBV CORE IGG+IGM SER QL: REACTIVE
HBV CORE IGM SER QL: NONREACTIVE
HBV SURFACE AB SER QL: REACTIVE
HBV SURFACE AG SER QL: NONREACTIVE
HCV AB SER QL: NONREACTIVE
HCV S/CO RATIO: 0.09 S/CO

## 2025-03-24 ENCOUNTER — APPOINTMENT (OUTPATIENT)
Dept: HEPATOLOGY | Facility: HOSPITAL | Age: 70
End: 2025-03-24

## 2025-03-26 ENCOUNTER — RESULT REVIEW (OUTPATIENT)
Age: 70
End: 2025-03-26

## 2025-03-26 ENCOUNTER — NON-APPOINTMENT (OUTPATIENT)
Age: 70
End: 2025-03-26

## 2025-03-26 ENCOUNTER — APPOINTMENT (OUTPATIENT)
Dept: INFUSION THERAPY | Facility: HOSPITAL | Age: 70
End: 2025-03-26

## 2025-03-26 LAB
ALBUMIN SERPL ELPH-MCNC: 4.2 G/DL — SIGNIFICANT CHANGE UP (ref 3.3–5)
ALP SERPL-CCNC: 67 U/L — SIGNIFICANT CHANGE UP (ref 40–120)
ALT FLD-CCNC: 13 U/L — SIGNIFICANT CHANGE UP (ref 10–45)
ANION GAP SERPL CALC-SCNC: 12 MMOL/L — SIGNIFICANT CHANGE UP (ref 5–17)
AST SERPL-CCNC: 18 U/L — SIGNIFICANT CHANGE UP (ref 10–40)
BASOPHILS # BLD AUTO: 0.05 K/UL — SIGNIFICANT CHANGE UP (ref 0–0.2)
BASOPHILS NFR BLD AUTO: 0.4 % — SIGNIFICANT CHANGE UP (ref 0–2)
BILIRUB SERPL-MCNC: 0.3 MG/DL — SIGNIFICANT CHANGE UP (ref 0.2–1.2)
BUN SERPL-MCNC: 16 MG/DL — SIGNIFICANT CHANGE UP (ref 7–23)
CALCIUM SERPL-MCNC: 9.9 MG/DL — SIGNIFICANT CHANGE UP (ref 8.4–10.5)
CHLORIDE SERPL-SCNC: 104 MMOL/L — SIGNIFICANT CHANGE UP (ref 96–108)
CO2 SERPL-SCNC: 25 MMOL/L — SIGNIFICANT CHANGE UP (ref 22–31)
CREAT SERPL-MCNC: 1.04 MG/DL — SIGNIFICANT CHANGE UP (ref 0.5–1.3)
EGFR: 77 ML/MIN/1.73M2 — SIGNIFICANT CHANGE UP
EGFR: 77 ML/MIN/1.73M2 — SIGNIFICANT CHANGE UP
EOSINOPHIL # BLD AUTO: 0.42 K/UL — SIGNIFICANT CHANGE UP (ref 0–0.5)
EOSINOPHIL NFR BLD AUTO: 3.6 % — SIGNIFICANT CHANGE UP (ref 0–6)
GLUCOSE SERPL-MCNC: 112 MG/DL — HIGH (ref 70–99)
HCT VFR BLD CALC: 33.9 % — LOW (ref 39–50)
HGB BLD-MCNC: 10.6 G/DL — LOW (ref 13–17)
IMM GRANULOCYTES NFR BLD AUTO: 0.3 % — SIGNIFICANT CHANGE UP (ref 0–0.9)
LYMPHOCYTES # BLD AUTO: 1.89 K/UL — SIGNIFICANT CHANGE UP (ref 1–3.3)
LYMPHOCYTES # BLD AUTO: 16.3 % — SIGNIFICANT CHANGE UP (ref 13–44)
MCHC RBC-ENTMCNC: 28.5 PG — SIGNIFICANT CHANGE UP (ref 27–34)
MCHC RBC-ENTMCNC: 31.3 G/DL — LOW (ref 32–36)
MCV RBC AUTO: 91.1 FL — SIGNIFICANT CHANGE UP (ref 80–100)
MONOCYTES # BLD AUTO: 0.99 K/UL — HIGH (ref 0–0.9)
MONOCYTES NFR BLD AUTO: 8.5 % — SIGNIFICANT CHANGE UP (ref 2–14)
NEUTROPHILS # BLD AUTO: 8.2 K/UL — HIGH (ref 1.8–7.4)
NEUTROPHILS NFR BLD AUTO: 70.9 % — SIGNIFICANT CHANGE UP (ref 43–77)
NRBC BLD AUTO-RTO: 0 /100 WBCS — SIGNIFICANT CHANGE UP (ref 0–0)
PLATELET # BLD AUTO: 336 K/UL — SIGNIFICANT CHANGE UP (ref 150–400)
POTASSIUM SERPL-MCNC: 5.1 MMOL/L — SIGNIFICANT CHANGE UP (ref 3.5–5.3)
POTASSIUM SERPL-SCNC: 5.1 MMOL/L — SIGNIFICANT CHANGE UP (ref 3.5–5.3)
PROT SERPL-MCNC: 7.6 G/DL — SIGNIFICANT CHANGE UP (ref 6–8.3)
RBC # BLD: 3.72 M/UL — LOW (ref 4.2–5.8)
RBC # FLD: 14.4 % — SIGNIFICANT CHANGE UP (ref 10.3–14.5)
SODIUM SERPL-SCNC: 141 MMOL/L — SIGNIFICANT CHANGE UP (ref 135–145)
WBC # BLD: 11.59 K/UL — HIGH (ref 3.8–10.5)
WBC # FLD AUTO: 11.59 K/UL — HIGH (ref 3.8–10.5)

## 2025-03-27 DIAGNOSIS — Z51.11 ENCOUNTER FOR ANTINEOPLASTIC CHEMOTHERAPY: ICD-10-CM

## 2025-03-27 LAB
T4 FREE SERPL-MCNC: 1.4 NG/DL — SIGNIFICANT CHANGE UP (ref 0.9–1.7)
TSH SERPL-MCNC: 2.54 UIU/ML — SIGNIFICANT CHANGE UP (ref 0.27–4.2)

## 2025-04-07 PROBLEM — J86.9 PYOTHORAX WITHOUT FISTULA: Chronic | Status: ACTIVE | Noted: 2025-03-26

## 2025-04-08 ENCOUNTER — APPOINTMENT (OUTPATIENT)
Dept: HEMATOLOGY ONCOLOGY | Facility: CLINIC | Age: 70
End: 2025-04-08

## 2025-04-08 ENCOUNTER — NON-APPOINTMENT (OUTPATIENT)
Age: 70
End: 2025-04-08

## 2025-04-09 ENCOUNTER — RESULT REVIEW (OUTPATIENT)
Age: 70
End: 2025-04-09

## 2025-04-09 ENCOUNTER — OUTPATIENT (OUTPATIENT)
Dept: OUTPATIENT SERVICES | Facility: HOSPITAL | Age: 70
LOS: 1 days | End: 2025-04-09
Payer: COMMERCIAL

## 2025-04-09 ENCOUNTER — APPOINTMENT (OUTPATIENT)
Dept: ULTRASOUND IMAGING | Facility: IMAGING CENTER | Age: 70
End: 2025-04-09
Payer: MEDICARE

## 2025-04-09 DIAGNOSIS — Z98.890 OTHER SPECIFIED POSTPROCEDURAL STATES: Chronic | ICD-10-CM

## 2025-04-09 DIAGNOSIS — Z90.5 ACQUIRED ABSENCE OF KIDNEY: Chronic | ICD-10-CM

## 2025-04-09 DIAGNOSIS — R18.8 OTHER ASCITES: ICD-10-CM

## 2025-04-09 DIAGNOSIS — H26.9 UNSPECIFIED CATARACT: Chronic | ICD-10-CM

## 2025-04-09 LAB
APTT BLD: 33.9 SEC
INR PPP: 0.86 RATIO
PT BLD: 10.2 SEC

## 2025-04-09 PROCEDURE — 49083 ABD PARACENTESIS W/IMAGING: CPT

## 2025-04-11 ENCOUNTER — NON-APPOINTMENT (OUTPATIENT)
Age: 70
End: 2025-04-11

## 2025-04-23 ENCOUNTER — RESULT REVIEW (OUTPATIENT)
Age: 70
End: 2025-04-23

## 2025-04-23 ENCOUNTER — APPOINTMENT (OUTPATIENT)
Dept: INFUSION THERAPY | Facility: HOSPITAL | Age: 70
End: 2025-04-23

## 2025-04-23 ENCOUNTER — APPOINTMENT (OUTPATIENT)
Dept: HEMATOLOGY ONCOLOGY | Facility: CLINIC | Age: 70
End: 2025-04-23
Payer: MEDICARE

## 2025-04-23 VITALS
TEMPERATURE: 97.2 F | HEART RATE: 68 BPM | WEIGHT: 171.08 LBS | OXYGEN SATURATION: 98 % | BODY MASS INDEX: 27.6 KG/M2 | DIASTOLIC BLOOD PRESSURE: 79 MMHG | RESPIRATION RATE: 16 BRPM | SYSTOLIC BLOOD PRESSURE: 142 MMHG

## 2025-04-23 DIAGNOSIS — C64.2 MALIGNANT NEOPLASM OF LEFT KIDNEY, EXCEPT RENAL PELVIS: ICD-10-CM

## 2025-04-23 LAB
T4 FREE SERPL-MCNC: 1.2 NG/DL — SIGNIFICANT CHANGE UP (ref 0.9–1.8)
TSH SERPL-MCNC: 1.7 UIU/ML — SIGNIFICANT CHANGE UP (ref 0.27–4.2)

## 2025-04-23 PROCEDURE — 99215 OFFICE O/P EST HI 40 MIN: CPT

## 2025-04-23 NOTE — ASU PATIENT PROFILE, ADULT - AS SC [facility] M[facility]ILITY
Report from Sending RN:    Report From: Etelvina  Recent Surgery of Procedure: No  Baseline Level of Consciousness (LOC): a/o x4  Oxygen Use: No  Type: room air  Diabetic: Yes  Last BP Med Given Day of Dialysis: See MAR  Last Pain Med Given: See MAR  Lab Tests to be Obtained with Dialysis: No  Blood Transfusion to be Given During Dialysis: No  Available IV Access: Yes  Medications to be Administered During Dialysis: No  Continuous IV Infusion Running: No  Restraints on Currently or in the Last 24 Hours: No  Hand-Off Communication: full code; stable for hd; right cvc  Dialysis Catheter Dressing: tbd  Last Dressing Change: tbd    (4) no limitation

## 2025-04-24 LAB
ALBUMIN SERPL ELPH-MCNC: 4.3 G/DL — SIGNIFICANT CHANGE UP (ref 3.3–5)
ALP SERPL-CCNC: 100 U/L — SIGNIFICANT CHANGE UP (ref 40–120)
ALT FLD-CCNC: 48 U/L — HIGH (ref 10–45)
ANION GAP SERPL CALC-SCNC: 15 MMOL/L — SIGNIFICANT CHANGE UP (ref 5–17)
AST SERPL-CCNC: 35 U/L — SIGNIFICANT CHANGE UP (ref 10–40)
BASOPHILS # BLD AUTO: 0.02 K/UL — SIGNIFICANT CHANGE UP (ref 0–0.2)
BASOPHILS NFR BLD AUTO: 0.3 % — SIGNIFICANT CHANGE UP (ref 0–2)
BILIRUB SERPL-MCNC: 0.4 MG/DL — SIGNIFICANT CHANGE UP (ref 0.2–1.2)
BUN SERPL-MCNC: 12 MG/DL — SIGNIFICANT CHANGE UP (ref 7–23)
CALCIUM SERPL-MCNC: 9.9 MG/DL — SIGNIFICANT CHANGE UP (ref 8.4–10.5)
CHLORIDE SERPL-SCNC: 99 MMOL/L — SIGNIFICANT CHANGE UP (ref 96–108)
CO2 SERPL-SCNC: 24 MMOL/L — SIGNIFICANT CHANGE UP (ref 22–31)
CREAT SERPL-MCNC: 0.71 MG/DL — SIGNIFICANT CHANGE UP (ref 0.5–1.3)
EGFR: 99 ML/MIN/1.73M2 — SIGNIFICANT CHANGE UP
EGFR: 99 ML/MIN/1.73M2 — SIGNIFICANT CHANGE UP
EOSINOPHIL # BLD AUTO: 0.37 K/UL — SIGNIFICANT CHANGE UP (ref 0–0.5)
EOSINOPHIL NFR BLD AUTO: 5.2 % — SIGNIFICANT CHANGE UP (ref 0–6)
GLUCOSE SERPL-MCNC: 60 MG/DL — LOW (ref 70–99)
HCT VFR BLD CALC: 38.4 % — LOW (ref 39–50)
HGB BLD-MCNC: 12 G/DL — LOW (ref 13–17)
IMM GRANULOCYTES NFR BLD AUTO: 0.1 % — SIGNIFICANT CHANGE UP (ref 0–0.9)
LYMPHOCYTES # BLD AUTO: 1.85 K/UL — SIGNIFICANT CHANGE UP (ref 1–3.3)
LYMPHOCYTES # BLD AUTO: 26.2 % — SIGNIFICANT CHANGE UP (ref 13–44)
MCHC RBC-ENTMCNC: 28.7 PG — SIGNIFICANT CHANGE UP (ref 27–34)
MCHC RBC-ENTMCNC: 31.3 G/DL — LOW (ref 32–36)
MCV RBC AUTO: 91.9 FL — SIGNIFICANT CHANGE UP (ref 80–100)
MONOCYTES # BLD AUTO: 0.6 K/UL — SIGNIFICANT CHANGE UP (ref 0–0.9)
MONOCYTES NFR BLD AUTO: 8.5 % — SIGNIFICANT CHANGE UP (ref 2–14)
NEUTROPHILS # BLD AUTO: 4.21 K/UL — SIGNIFICANT CHANGE UP (ref 1.8–7.4)
NEUTROPHILS NFR BLD AUTO: 59.7 % — SIGNIFICANT CHANGE UP (ref 43–77)
PLATELET # BLD AUTO: 251 K/UL — SIGNIFICANT CHANGE UP (ref 150–400)
POTASSIUM SERPL-MCNC: 4.3 MMOL/L — SIGNIFICANT CHANGE UP (ref 3.5–5.3)
POTASSIUM SERPL-SCNC: 4.3 MMOL/L — SIGNIFICANT CHANGE UP (ref 3.5–5.3)
PROT SERPL-MCNC: 8.1 G/DL — SIGNIFICANT CHANGE UP (ref 6–8.3)
RBC # BLD: 4.18 M/UL — LOW (ref 4.2–5.8)
RBC # FLD: 16 % — HIGH (ref 10.3–14.5)
SODIUM SERPL-SCNC: 138 MMOL/L — SIGNIFICANT CHANGE UP (ref 135–145)
WBC # BLD: 7.06 K/UL — SIGNIFICANT CHANGE UP (ref 3.8–10.5)
WBC # FLD AUTO: 7.06 K/UL — SIGNIFICANT CHANGE UP (ref 3.8–10.5)

## 2025-04-25 ENCOUNTER — RX RENEWAL (OUTPATIENT)
Age: 70
End: 2025-04-25

## 2025-05-19 ENCOUNTER — RESULT REVIEW (OUTPATIENT)
Age: 70
End: 2025-05-19

## 2025-05-19 ENCOUNTER — OUTPATIENT (OUTPATIENT)
Dept: OUTPATIENT SERVICES | Facility: HOSPITAL | Age: 70
LOS: 1 days | Discharge: HOME | End: 2025-05-19

## 2025-05-19 ENCOUNTER — APPOINTMENT (OUTPATIENT)
Dept: HEMATOLOGY ONCOLOGY | Facility: CLINIC | Age: 70
End: 2025-05-19
Payer: MEDICARE

## 2025-05-19 VITALS
WEIGHT: 171.98 LBS | HEART RATE: 71 BPM | OXYGEN SATURATION: 98 % | DIASTOLIC BLOOD PRESSURE: 87 MMHG | BODY MASS INDEX: 27.75 KG/M2 | SYSTOLIC BLOOD PRESSURE: 155 MMHG | RESPIRATION RATE: 16 BRPM | TEMPERATURE: 97.3 F

## 2025-05-19 DIAGNOSIS — Z98.890 OTHER SPECIFIED POSTPROCEDURAL STATES: Chronic | ICD-10-CM

## 2025-05-19 DIAGNOSIS — Z90.5 ACQUIRED ABSENCE OF KIDNEY: Chronic | ICD-10-CM

## 2025-05-19 DIAGNOSIS — C64.9 MALIGNANT NEOPLASM OF UNSPECIFIED KIDNEY, EXCEPT RENAL PELVIS: ICD-10-CM

## 2025-05-19 DIAGNOSIS — Z00.00 ENCOUNTER FOR GENERAL ADULT MEDICAL EXAMINATION W/OUT ABNORMAL FINDINGS: ICD-10-CM

## 2025-05-19 DIAGNOSIS — H26.9 UNSPECIFIED CATARACT: Chronic | ICD-10-CM

## 2025-05-19 LAB
BASOPHILS # BLD AUTO: 0.02 K/UL — SIGNIFICANT CHANGE UP (ref 0–0.2)
BASOPHILS NFR BLD AUTO: 0.3 % — SIGNIFICANT CHANGE UP (ref 0–2)
EOSINOPHIL # BLD AUTO: 0.22 K/UL — SIGNIFICANT CHANGE UP (ref 0–0.5)
EOSINOPHIL NFR BLD AUTO: 3 % — SIGNIFICANT CHANGE UP (ref 0–6)
HCT VFR BLD CALC: 37.9 % — LOW (ref 39–50)
HGB BLD-MCNC: 12.1 G/DL — LOW (ref 13–17)
IMM GRANULOCYTES NFR BLD AUTO: 0.3 % — SIGNIFICANT CHANGE UP (ref 0–0.9)
LYMPHOCYTES # BLD AUTO: 1.56 K/UL — SIGNIFICANT CHANGE UP (ref 1–3.3)
LYMPHOCYTES # BLD AUTO: 21 % — SIGNIFICANT CHANGE UP (ref 13–44)
MCHC RBC-ENTMCNC: 30.4 PG — SIGNIFICANT CHANGE UP (ref 27–34)
MCHC RBC-ENTMCNC: 31.9 G/DL — LOW (ref 32–36)
MCV RBC AUTO: 95.2 FL — SIGNIFICANT CHANGE UP (ref 80–100)
MONOCYTES # BLD AUTO: 0.65 K/UL — SIGNIFICANT CHANGE UP (ref 0–0.9)
MONOCYTES NFR BLD AUTO: 8.7 % — SIGNIFICANT CHANGE UP (ref 2–14)
NEUTROPHILS # BLD AUTO: 4.96 K/UL — SIGNIFICANT CHANGE UP (ref 1.8–7.4)
NEUTROPHILS NFR BLD AUTO: 66.7 % — SIGNIFICANT CHANGE UP (ref 43–77)
NRBC BLD AUTO-RTO: 0 /100 WBCS — SIGNIFICANT CHANGE UP (ref 0–0)
PLATELET # BLD AUTO: 229 K/UL — SIGNIFICANT CHANGE UP (ref 150–400)
RBC # BLD: 3.98 M/UL — LOW (ref 4.2–5.8)
RBC # FLD: 15.4 % — HIGH (ref 10.3–14.5)
WBC # BLD: 7.43 K/UL — SIGNIFICANT CHANGE UP (ref 3.8–10.5)
WBC # FLD AUTO: 7.43 K/UL — SIGNIFICANT CHANGE UP (ref 3.8–10.5)

## 2025-05-19 PROCEDURE — 99214 OFFICE O/P EST MOD 30 MIN: CPT

## 2025-05-20 ENCOUNTER — APPOINTMENT (OUTPATIENT)
Dept: INFUSION THERAPY | Facility: HOSPITAL | Age: 70
End: 2025-05-20

## 2025-05-21 DIAGNOSIS — Z51.11 ENCOUNTER FOR ANTINEOPLASTIC CHEMOTHERAPY: ICD-10-CM

## 2025-05-26 LAB
ALBUMIN SERPL ELPH-MCNC: 4.1 G/DL
ALP BLD-CCNC: 91 U/L
ALT SERPL-CCNC: 45 U/L
ANION GAP SERPL CALC-SCNC: 13 MMOL/L
AST SERPL-CCNC: 31 U/L
BILIRUB SERPL-MCNC: 0.4 MG/DL
BUN SERPL-MCNC: 9 MG/DL
CALCIUM SERPL-MCNC: 9.4 MG/DL
CHLORIDE SERPL-SCNC: 104 MMOL/L
CO2 SERPL-SCNC: 23 MMOL/L
CREAT SERPL-MCNC: 0.76 MG/DL
EGFRCR SERPLBLD CKD-EPI 2021: 97 ML/MIN/1.73M2
GLUCOSE SERPL-MCNC: 121 MG/DL
POTASSIUM SERPL-SCNC: 4.6 MMOL/L
PROT SERPL-MCNC: 7.4 G/DL
SODIUM SERPL-SCNC: 141 MMOL/L
T4 FREE SERPL-MCNC: 1.4 NG/DL
TSH SERPL-ACNC: 2.31 UIU/ML

## 2025-05-27 ENCOUNTER — APPOINTMENT (OUTPATIENT)
Dept: CT IMAGING | Facility: IMAGING CENTER | Age: 70
End: 2025-05-27
Payer: MEDICARE

## 2025-05-27 ENCOUNTER — OUTPATIENT (OUTPATIENT)
Dept: OUTPATIENT SERVICES | Facility: HOSPITAL | Age: 70
LOS: 1 days | End: 2025-05-27
Payer: COMMERCIAL

## 2025-05-27 DIAGNOSIS — Z98.890 OTHER SPECIFIED POSTPROCEDURAL STATES: Chronic | ICD-10-CM

## 2025-05-27 DIAGNOSIS — J86.9 PYOTHORAX WITHOUT FISTULA: ICD-10-CM

## 2025-05-27 DIAGNOSIS — H26.9 UNSPECIFIED CATARACT: Chronic | ICD-10-CM

## 2025-05-27 DIAGNOSIS — Z90.5 ACQUIRED ABSENCE OF KIDNEY: Chronic | ICD-10-CM

## 2025-05-27 PROCEDURE — 71250 CT THORAX DX C-: CPT | Mod: 26

## 2025-05-27 PROCEDURE — 71250 CT THORAX DX C-: CPT

## 2025-06-03 ENCOUNTER — APPOINTMENT (OUTPATIENT)
Dept: UROLOGY | Facility: CLINIC | Age: 70
End: 2025-06-03

## 2025-06-03 ENCOUNTER — APPOINTMENT (OUTPATIENT)
Dept: THORACIC SURGERY | Facility: CLINIC | Age: 70
End: 2025-06-03
Payer: MEDICARE

## 2025-06-03 VITALS
RESPIRATION RATE: 17 BRPM | BODY MASS INDEX: 26.68 KG/M2 | SYSTOLIC BLOOD PRESSURE: 140 MMHG | OXYGEN SATURATION: 97 % | DIASTOLIC BLOOD PRESSURE: 94 MMHG | WEIGHT: 170 LBS | HEART RATE: 108 BPM | HEIGHT: 67 IN

## 2025-06-03 DIAGNOSIS — R91.1 SOLITARY PULMONARY NODULE: ICD-10-CM

## 2025-06-03 PROCEDURE — 99214 OFFICE O/P EST MOD 30 MIN: CPT

## 2025-06-09 ENCOUNTER — OUTPATIENT (OUTPATIENT)
Dept: OUTPATIENT SERVICES | Facility: HOSPITAL | Age: 70
LOS: 1 days | End: 2025-06-09
Payer: COMMERCIAL

## 2025-06-09 ENCOUNTER — APPOINTMENT (OUTPATIENT)
Dept: CT IMAGING | Facility: IMAGING CENTER | Age: 70
End: 2025-06-09
Payer: MEDICARE

## 2025-06-09 DIAGNOSIS — Z98.890 OTHER SPECIFIED POSTPROCEDURAL STATES: Chronic | ICD-10-CM

## 2025-06-09 DIAGNOSIS — Z90.5 ACQUIRED ABSENCE OF KIDNEY: Chronic | ICD-10-CM

## 2025-06-09 DIAGNOSIS — C64.9 MALIGNANT NEOPLASM OF UNSPECIFIED KIDNEY, EXCEPT RENAL PELVIS: ICD-10-CM

## 2025-06-09 DIAGNOSIS — H26.9 UNSPECIFIED CATARACT: Chronic | ICD-10-CM

## 2025-06-09 PROCEDURE — 74170 CT ABD WO CNTRST FLWD CNTRST: CPT | Mod: 26

## 2025-06-09 PROCEDURE — 74170 CT ABD WO CNTRST FLWD CNTRST: CPT

## 2025-06-16 ENCOUNTER — RESULT REVIEW (OUTPATIENT)
Age: 70
End: 2025-06-16

## 2025-06-16 ENCOUNTER — APPOINTMENT (OUTPATIENT)
Dept: HEMATOLOGY ONCOLOGY | Facility: CLINIC | Age: 70
End: 2025-06-16
Payer: MEDICARE

## 2025-06-16 ENCOUNTER — APPOINTMENT (OUTPATIENT)
Dept: INFUSION THERAPY | Facility: HOSPITAL | Age: 70
End: 2025-06-16

## 2025-06-16 VITALS
TEMPERATURE: 98 F | OXYGEN SATURATION: 96 % | WEIGHT: 167.55 LBS | RESPIRATION RATE: 15 BRPM | SYSTOLIC BLOOD PRESSURE: 152 MMHG | BODY MASS INDEX: 27.58 KG/M2 | HEART RATE: 85 BPM | HEIGHT: 65.2 IN | DIASTOLIC BLOOD PRESSURE: 87 MMHG

## 2025-06-16 LAB
ALBUMIN SERPL ELPH-MCNC: 4.2 G/DL — SIGNIFICANT CHANGE UP (ref 3.3–5)
ALP SERPL-CCNC: 88 U/L — SIGNIFICANT CHANGE UP (ref 40–120)
ALT FLD-CCNC: 48 U/L — HIGH (ref 10–45)
ANION GAP SERPL CALC-SCNC: 14 MMOL/L — SIGNIFICANT CHANGE UP (ref 5–17)
AST SERPL-CCNC: 59 U/L — HIGH (ref 10–40)
BASOPHILS # BLD AUTO: 0.02 K/UL — SIGNIFICANT CHANGE UP (ref 0–0.2)
BASOPHILS NFR BLD AUTO: 0.3 % — SIGNIFICANT CHANGE UP (ref 0–2)
BILIRUB SERPL-MCNC: 0.5 MG/DL — SIGNIFICANT CHANGE UP (ref 0.2–1.2)
BUN SERPL-MCNC: 9 MG/DL — SIGNIFICANT CHANGE UP (ref 7–23)
CALCIUM SERPL-MCNC: 9.4 MG/DL — SIGNIFICANT CHANGE UP (ref 8.4–10.5)
CHLORIDE SERPL-SCNC: 99 MMOL/L — SIGNIFICANT CHANGE UP (ref 96–108)
CO2 SERPL-SCNC: 22 MMOL/L — SIGNIFICANT CHANGE UP (ref 22–31)
CREAT SERPL-MCNC: 0.63 MG/DL — SIGNIFICANT CHANGE UP (ref 0.5–1.3)
EGFR: 102 ML/MIN/1.73M2 — SIGNIFICANT CHANGE UP
EGFR: 102 ML/MIN/1.73M2 — SIGNIFICANT CHANGE UP
EOSINOPHIL # BLD AUTO: 0.39 K/UL — SIGNIFICANT CHANGE UP (ref 0–0.5)
EOSINOPHIL NFR BLD AUTO: 5.8 % — SIGNIFICANT CHANGE UP (ref 0–6)
GLUCOSE SERPL-MCNC: 130 MG/DL — HIGH (ref 70–99)
HCT VFR BLD CALC: 37.4 % — LOW (ref 39–50)
HGB BLD-MCNC: 12.1 G/DL — LOW (ref 13–17)
IMM GRANULOCYTES NFR BLD AUTO: 0.3 % — SIGNIFICANT CHANGE UP (ref 0–0.9)
LYMPHOCYTES # BLD AUTO: 1.55 K/UL — SIGNIFICANT CHANGE UP (ref 1–3.3)
LYMPHOCYTES # BLD AUTO: 23.2 % — SIGNIFICANT CHANGE UP (ref 13–44)
MCHC RBC-ENTMCNC: 30.7 PG — SIGNIFICANT CHANGE UP (ref 27–34)
MCHC RBC-ENTMCNC: 32.4 G/DL — SIGNIFICANT CHANGE UP (ref 32–36)
MCV RBC AUTO: 94.9 FL — SIGNIFICANT CHANGE UP (ref 80–100)
MONOCYTES # BLD AUTO: 0.45 K/UL — SIGNIFICANT CHANGE UP (ref 0–0.9)
MONOCYTES NFR BLD AUTO: 6.7 % — SIGNIFICANT CHANGE UP (ref 2–14)
NEUTROPHILS # BLD AUTO: 4.26 K/UL — SIGNIFICANT CHANGE UP (ref 1.8–7.4)
NEUTROPHILS NFR BLD AUTO: 63.7 % — SIGNIFICANT CHANGE UP (ref 43–77)
NRBC BLD AUTO-RTO: 0 /100 WBCS — SIGNIFICANT CHANGE UP (ref 0–0)
PLATELET # BLD AUTO: 283 K/UL — SIGNIFICANT CHANGE UP (ref 150–400)
POTASSIUM SERPL-MCNC: 4.6 MMOL/L — SIGNIFICANT CHANGE UP (ref 3.5–5.3)
POTASSIUM SERPL-SCNC: 4.6 MMOL/L — SIGNIFICANT CHANGE UP (ref 3.5–5.3)
PROT SERPL-MCNC: 8.3 G/DL — SIGNIFICANT CHANGE UP (ref 6–8.3)
RBC # BLD: 3.94 M/UL — LOW (ref 4.2–5.8)
RBC # FLD: 14.8 % — HIGH (ref 10.3–14.5)
SODIUM SERPL-SCNC: 135 MMOL/L — SIGNIFICANT CHANGE UP (ref 135–145)
WBC # BLD: 6.69 K/UL — SIGNIFICANT CHANGE UP (ref 3.8–10.5)
WBC # FLD AUTO: 6.69 K/UL — SIGNIFICANT CHANGE UP (ref 3.8–10.5)

## 2025-06-16 PROCEDURE — 99214 OFFICE O/P EST MOD 30 MIN: CPT

## 2025-06-16 PROCEDURE — G2211 COMPLEX E/M VISIT ADD ON: CPT

## 2025-06-17 PROBLEM — R43.2 DYSGEUSIA: Status: ACTIVE | Noted: 2025-06-17

## 2025-06-17 PROBLEM — R63.4 UNINTENTIONAL WEIGHT LOSS: Status: ACTIVE | Noted: 2025-06-17

## 2025-06-17 LAB
T4 FREE SERPL-MCNC: 1.6 NG/DL — SIGNIFICANT CHANGE UP (ref 0.9–1.8)
TSH SERPL-MCNC: 1.63 UIU/ML — SIGNIFICANT CHANGE UP (ref 0.27–4.2)

## 2025-06-20 ENCOUNTER — NON-APPOINTMENT (OUTPATIENT)
Age: 70
End: 2025-06-20

## 2025-06-24 ENCOUNTER — NON-APPOINTMENT (OUTPATIENT)
Age: 70
End: 2025-06-24

## 2025-06-26 ENCOUNTER — RESULT REVIEW (OUTPATIENT)
Age: 70
End: 2025-06-26

## 2025-06-26 ENCOUNTER — APPOINTMENT (OUTPATIENT)
Dept: INFUSION THERAPY | Facility: HOSPITAL | Age: 70
End: 2025-06-26

## 2025-06-26 LAB
ALBUMIN SERPL ELPH-MCNC: 3.9 G/DL — SIGNIFICANT CHANGE UP (ref 3.3–5)
ALP SERPL-CCNC: 86 U/L — SIGNIFICANT CHANGE UP (ref 40–120)
ALT FLD-CCNC: 47 U/L — HIGH (ref 10–45)
ANION GAP SERPL CALC-SCNC: 11 MMOL/L — SIGNIFICANT CHANGE UP (ref 5–17)
AST SERPL-CCNC: 38 U/L — SIGNIFICANT CHANGE UP (ref 10–40)
BASOPHILS # BLD AUTO: 0.01 K/UL — SIGNIFICANT CHANGE UP (ref 0–0.2)
BASOPHILS NFR BLD AUTO: 0.2 % — SIGNIFICANT CHANGE UP (ref 0–2)
BILIRUB SERPL-MCNC: 0.4 MG/DL — SIGNIFICANT CHANGE UP (ref 0.2–1.2)
BUN SERPL-MCNC: 7 MG/DL — SIGNIFICANT CHANGE UP (ref 7–23)
CALCIUM SERPL-MCNC: 9.1 MG/DL — SIGNIFICANT CHANGE UP (ref 8.4–10.5)
CHLORIDE SERPL-SCNC: 101 MMOL/L — SIGNIFICANT CHANGE UP (ref 96–108)
CO2 SERPL-SCNC: 25 MMOL/L — SIGNIFICANT CHANGE UP (ref 22–31)
CREAT SERPL-MCNC: 0.62 MG/DL — SIGNIFICANT CHANGE UP (ref 0.5–1.3)
EGFR: 103 ML/MIN/1.73M2 — SIGNIFICANT CHANGE UP
EGFR: 103 ML/MIN/1.73M2 — SIGNIFICANT CHANGE UP
EOSINOPHIL # BLD AUTO: 0.46 K/UL — SIGNIFICANT CHANGE UP (ref 0–0.5)
EOSINOPHIL NFR BLD AUTO: 7 % — HIGH (ref 0–6)
GLUCOSE SERPL-MCNC: 88 MG/DL — SIGNIFICANT CHANGE UP (ref 70–99)
HCT VFR BLD CALC: 35.3 % — LOW (ref 39–50)
HGB BLD-MCNC: 11.2 G/DL — LOW (ref 13–17)
IMM GRANULOCYTES NFR BLD AUTO: 0.2 % — SIGNIFICANT CHANGE UP (ref 0–0.9)
LYMPHOCYTES # BLD AUTO: 1.68 K/UL — SIGNIFICANT CHANGE UP (ref 1–3.3)
LYMPHOCYTES # BLD AUTO: 25.4 % — SIGNIFICANT CHANGE UP (ref 13–44)
MCHC RBC-ENTMCNC: 30.7 PG — SIGNIFICANT CHANGE UP (ref 27–34)
MCHC RBC-ENTMCNC: 31.7 G/DL — LOW (ref 32–36)
MCV RBC AUTO: 96.7 FL — SIGNIFICANT CHANGE UP (ref 80–100)
MONOCYTES # BLD AUTO: 0.52 K/UL — SIGNIFICANT CHANGE UP (ref 0–0.9)
MONOCYTES NFR BLD AUTO: 7.9 % — SIGNIFICANT CHANGE UP (ref 2–14)
NEUTROPHILS # BLD AUTO: 3.93 K/UL — SIGNIFICANT CHANGE UP (ref 1.8–7.4)
NEUTROPHILS NFR BLD AUTO: 59.3 % — SIGNIFICANT CHANGE UP (ref 43–77)
NRBC BLD AUTO-RTO: 0 /100 WBCS — SIGNIFICANT CHANGE UP (ref 0–0)
PLATELET # BLD AUTO: 266 K/UL — SIGNIFICANT CHANGE UP (ref 150–400)
POTASSIUM SERPL-MCNC: 4.3 MMOL/L — SIGNIFICANT CHANGE UP (ref 3.5–5.3)
POTASSIUM SERPL-SCNC: 4.3 MMOL/L — SIGNIFICANT CHANGE UP (ref 3.5–5.3)
PROT SERPL-MCNC: 7.7 G/DL — SIGNIFICANT CHANGE UP (ref 6–8.3)
RBC # BLD: 3.65 M/UL — LOW (ref 4.2–5.8)
RBC # FLD: 14.5 % — SIGNIFICANT CHANGE UP (ref 10.3–14.5)
SODIUM SERPL-SCNC: 137 MMOL/L — SIGNIFICANT CHANGE UP (ref 135–145)
WBC # BLD: 6.61 K/UL — SIGNIFICANT CHANGE UP (ref 3.8–10.5)
WBC # FLD AUTO: 6.61 K/UL — SIGNIFICANT CHANGE UP (ref 3.8–10.5)

## 2025-06-27 LAB
T4 FREE SERPL-MCNC: 1.5 NG/DL — SIGNIFICANT CHANGE UP (ref 0.9–1.8)
TSH SERPL-MCNC: 1.5 UIU/ML — SIGNIFICANT CHANGE UP (ref 0.27–4.2)

## 2025-06-28 ENCOUNTER — APPOINTMENT (OUTPATIENT)
Dept: CT IMAGING | Facility: IMAGING CENTER | Age: 70
End: 2025-06-28

## 2025-06-28 ENCOUNTER — OUTPATIENT (OUTPATIENT)
Dept: OUTPATIENT SERVICES | Facility: HOSPITAL | Age: 70
LOS: 1 days | End: 2025-06-28
Payer: COMMERCIAL

## 2025-06-28 DIAGNOSIS — Z98.890 OTHER SPECIFIED POSTPROCEDURAL STATES: Chronic | ICD-10-CM

## 2025-06-28 DIAGNOSIS — C64.9 MALIGNANT NEOPLASM OF UNSPECIFIED KIDNEY, EXCEPT RENAL PELVIS: ICD-10-CM

## 2025-06-28 DIAGNOSIS — H26.9 UNSPECIFIED CATARACT: Chronic | ICD-10-CM

## 2025-06-28 DIAGNOSIS — Z90.5 ACQUIRED ABSENCE OF KIDNEY: Chronic | ICD-10-CM

## 2025-06-28 PROCEDURE — 72193 CT PELVIS W/DYE: CPT

## 2025-06-28 PROCEDURE — 72193 CT PELVIS W/DYE: CPT | Mod: 26

## 2025-07-14 ENCOUNTER — APPOINTMENT (OUTPATIENT)
Dept: HEMATOLOGY ONCOLOGY | Facility: CLINIC | Age: 70
End: 2025-07-14

## 2025-07-19 ENCOUNTER — OUTPATIENT (OUTPATIENT)
Dept: OUTPATIENT SERVICES | Facility: HOSPITAL | Age: 70
LOS: 1 days | Discharge: HOME | End: 2025-07-19

## 2025-07-19 DIAGNOSIS — Z90.5 ACQUIRED ABSENCE OF KIDNEY: Chronic | ICD-10-CM

## 2025-07-19 DIAGNOSIS — H26.9 UNSPECIFIED CATARACT: Chronic | ICD-10-CM

## 2025-07-19 DIAGNOSIS — Z98.890 OTHER SPECIFIED POSTPROCEDURAL STATES: Chronic | ICD-10-CM

## 2025-07-19 DIAGNOSIS — C64.9 MALIGNANT NEOPLASM OF UNSPECIFIED KIDNEY, EXCEPT RENAL PELVIS: ICD-10-CM

## 2025-07-21 ENCOUNTER — RESULT REVIEW (OUTPATIENT)
Age: 70
End: 2025-07-21

## 2025-07-23 ENCOUNTER — RESULT REVIEW (OUTPATIENT)
Age: 70
End: 2025-07-23

## 2025-07-23 ENCOUNTER — APPOINTMENT (OUTPATIENT)
Dept: INFUSION THERAPY | Facility: HOSPITAL | Age: 70
End: 2025-07-23

## 2025-07-23 ENCOUNTER — APPOINTMENT (OUTPATIENT)
Dept: HEMATOLOGY ONCOLOGY | Facility: CLINIC | Age: 70
End: 2025-07-23
Payer: MEDICARE

## 2025-07-23 VITALS
HEART RATE: 95 BPM | RESPIRATION RATE: 15 BRPM | BODY MASS INDEX: 27.2 KG/M2 | WEIGHT: 165.23 LBS | HEIGHT: 65.2 IN | OXYGEN SATURATION: 98 % | DIASTOLIC BLOOD PRESSURE: 91 MMHG | SYSTOLIC BLOOD PRESSURE: 152 MMHG | TEMPERATURE: 97.7 F

## 2025-07-23 DIAGNOSIS — C64.9 MALIGNANT NEOPLASM OF UNSPECIFIED KIDNEY, EXCEPT RENAL PELVIS: ICD-10-CM

## 2025-07-23 LAB
ALBUMIN SERPL ELPH-MCNC: 4.1 G/DL — SIGNIFICANT CHANGE UP (ref 3.3–5)
ALP SERPL-CCNC: 96 U/L — SIGNIFICANT CHANGE UP (ref 40–120)
ALT FLD-CCNC: 45 U/L — SIGNIFICANT CHANGE UP (ref 10–45)
ANION GAP SERPL CALC-SCNC: 15 MMOL/L — SIGNIFICANT CHANGE UP (ref 5–17)
AST SERPL-CCNC: 44 U/L — HIGH (ref 10–40)
BASOPHILS # BLD AUTO: 0.01 K/UL — SIGNIFICANT CHANGE UP (ref 0–0.2)
BASOPHILS NFR BLD AUTO: 0.1 % — SIGNIFICANT CHANGE UP (ref 0–2)
BILIRUB SERPL-MCNC: 0.5 MG/DL — SIGNIFICANT CHANGE UP (ref 0.2–1.2)
BUN SERPL-MCNC: 6 MG/DL — LOW (ref 7–23)
CALCIUM SERPL-MCNC: 10.2 MG/DL — SIGNIFICANT CHANGE UP (ref 8.4–10.5)
CHLORIDE SERPL-SCNC: 99 MMOL/L — SIGNIFICANT CHANGE UP (ref 96–108)
CO2 SERPL-SCNC: 24 MMOL/L — SIGNIFICANT CHANGE UP (ref 22–31)
CREAT SERPL-MCNC: 0.64 MG/DL — SIGNIFICANT CHANGE UP (ref 0.5–1.3)
EGFR: 102 ML/MIN/1.73M2 — SIGNIFICANT CHANGE UP
EGFR: 102 ML/MIN/1.73M2 — SIGNIFICANT CHANGE UP
EOSINOPHIL # BLD AUTO: 0.44 K/UL — SIGNIFICANT CHANGE UP (ref 0–0.5)
EOSINOPHIL NFR BLD AUTO: 5.7 % — SIGNIFICANT CHANGE UP (ref 0–6)
GLUCOSE SERPL-MCNC: 84 MG/DL — SIGNIFICANT CHANGE UP (ref 70–99)
HCT VFR BLD CALC: 37 % — LOW (ref 39–50)
HGB BLD-MCNC: 11.9 G/DL — LOW (ref 13–17)
IMM GRANULOCYTES NFR BLD AUTO: 0.3 % — SIGNIFICANT CHANGE UP (ref 0–0.9)
LYMPHOCYTES # BLD AUTO: 1.91 K/UL — SIGNIFICANT CHANGE UP (ref 1–3.3)
LYMPHOCYTES # BLD AUTO: 24.9 % — SIGNIFICANT CHANGE UP (ref 13–44)
MCHC RBC-ENTMCNC: 30.9 PG — SIGNIFICANT CHANGE UP (ref 27–34)
MCHC RBC-ENTMCNC: 32.2 G/DL — SIGNIFICANT CHANGE UP (ref 32–36)
MCV RBC AUTO: 96.1 FL — SIGNIFICANT CHANGE UP (ref 80–100)
MONOCYTES # BLD AUTO: 0.57 K/UL — SIGNIFICANT CHANGE UP (ref 0–0.9)
MONOCYTES NFR BLD AUTO: 7.4 % — SIGNIFICANT CHANGE UP (ref 2–14)
NEUTROPHILS # BLD AUTO: 4.72 K/UL — SIGNIFICANT CHANGE UP (ref 1.8–7.4)
NEUTROPHILS NFR BLD AUTO: 61.6 % — SIGNIFICANT CHANGE UP (ref 43–77)
NRBC BLD AUTO-RTO: 0 /100 WBCS — SIGNIFICANT CHANGE UP (ref 0–0)
PLATELET # BLD AUTO: 341 K/UL — SIGNIFICANT CHANGE UP (ref 150–400)
POTASSIUM SERPL-MCNC: 4.9 MMOL/L — SIGNIFICANT CHANGE UP (ref 3.5–5.3)
POTASSIUM SERPL-SCNC: 4.9 MMOL/L — SIGNIFICANT CHANGE UP (ref 3.5–5.3)
PROT SERPL-MCNC: 8.3 G/DL — SIGNIFICANT CHANGE UP (ref 6–8.3)
RBC # BLD: 3.85 M/UL — LOW (ref 4.2–5.8)
RBC # FLD: 13.9 % — SIGNIFICANT CHANGE UP (ref 10.3–14.5)
SODIUM SERPL-SCNC: 138 MMOL/L — SIGNIFICANT CHANGE UP (ref 135–145)
WBC # BLD: 7.67 K/UL — SIGNIFICANT CHANGE UP (ref 3.8–10.5)
WBC # FLD AUTO: 7.67 K/UL — SIGNIFICANT CHANGE UP (ref 3.8–10.5)

## 2025-07-23 PROCEDURE — 99214 OFFICE O/P EST MOD 30 MIN: CPT

## 2025-07-24 DIAGNOSIS — Z51.11 ENCOUNTER FOR ANTINEOPLASTIC CHEMOTHERAPY: ICD-10-CM

## 2025-07-24 LAB
T4 FREE SERPL-MCNC: 1.4 NG/DL — SIGNIFICANT CHANGE UP (ref 0.9–1.8)
TSH SERPL-MCNC: 2.48 UIU/ML — SIGNIFICANT CHANGE UP (ref 0.27–4.2)

## 2025-07-29 ENCOUNTER — OUTPATIENT (OUTPATIENT)
Dept: OUTPATIENT SERVICES | Facility: HOSPITAL | Age: 70
LOS: 1 days | End: 2025-07-29
Payer: COMMERCIAL

## 2025-07-29 ENCOUNTER — APPOINTMENT (OUTPATIENT)
Dept: MRI IMAGING | Facility: CLINIC | Age: 70
End: 2025-07-29
Payer: MEDICARE

## 2025-07-29 DIAGNOSIS — Z98.890 OTHER SPECIFIED POSTPROCEDURAL STATES: Chronic | ICD-10-CM

## 2025-07-29 DIAGNOSIS — H26.9 UNSPECIFIED CATARACT: Chronic | ICD-10-CM

## 2025-07-29 DIAGNOSIS — Z90.5 ACQUIRED ABSENCE OF KIDNEY: Chronic | ICD-10-CM

## 2025-07-29 DIAGNOSIS — C64.9 MALIGNANT NEOPLASM OF UNSPECIFIED KIDNEY, EXCEPT RENAL PELVIS: ICD-10-CM

## 2025-07-29 PROCEDURE — 74183 MRI ABD W/O CNTR FLWD CNTR: CPT

## 2025-07-29 PROCEDURE — 72197 MRI PELVIS W/O & W/DYE: CPT

## 2025-07-29 PROCEDURE — 72197 MRI PELVIS W/O & W/DYE: CPT | Mod: 26

## 2025-07-29 PROCEDURE — A9585: CPT

## 2025-07-29 PROCEDURE — 74183 MRI ABD W/O CNTR FLWD CNTR: CPT | Mod: 26

## 2025-07-31 ENCOUNTER — OUTPATIENT (OUTPATIENT)
Dept: OUTPATIENT SERVICES | Facility: HOSPITAL | Age: 70
LOS: 1 days | End: 2025-07-31
Payer: COMMERCIAL

## 2025-07-31 ENCOUNTER — APPOINTMENT (OUTPATIENT)
Dept: NUCLEAR MEDICINE | Facility: IMAGING CENTER | Age: 70
End: 2025-07-31
Payer: MEDICARE

## 2025-07-31 DIAGNOSIS — Z90.5 ACQUIRED ABSENCE OF KIDNEY: Chronic | ICD-10-CM

## 2025-07-31 DIAGNOSIS — C64.2 MALIGNANT NEOPLASM OF LEFT KIDNEY, EXCEPT RENAL PELVIS: ICD-10-CM

## 2025-07-31 DIAGNOSIS — Z98.890 OTHER SPECIFIED POSTPROCEDURAL STATES: Chronic | ICD-10-CM

## 2025-07-31 DIAGNOSIS — H26.9 UNSPECIFIED CATARACT: Chronic | ICD-10-CM

## 2025-07-31 DIAGNOSIS — Z00.8 ENCOUNTER FOR OTHER GENERAL EXAMINATION: ICD-10-CM

## 2025-07-31 PROCEDURE — A9561: CPT

## 2025-07-31 PROCEDURE — 78306 BONE IMAGING WHOLE BODY: CPT | Mod: 26

## 2025-07-31 PROCEDURE — 78306 BONE IMAGING WHOLE BODY: CPT

## 2025-08-20 ENCOUNTER — APPOINTMENT (OUTPATIENT)
Dept: INFUSION THERAPY | Facility: HOSPITAL | Age: 70
End: 2025-08-20

## 2025-08-20 ENCOUNTER — RESULT REVIEW (OUTPATIENT)
Age: 70
End: 2025-08-20

## 2025-08-20 ENCOUNTER — APPOINTMENT (OUTPATIENT)
Dept: HEMATOLOGY ONCOLOGY | Facility: CLINIC | Age: 70
End: 2025-08-20
Payer: MEDICARE

## 2025-08-20 VITALS
TEMPERATURE: 98 F | RESPIRATION RATE: 16 BRPM | DIASTOLIC BLOOD PRESSURE: 96 MMHG | OXYGEN SATURATION: 98 % | WEIGHT: 164.02 LBS | SYSTOLIC BLOOD PRESSURE: 157 MMHG | HEART RATE: 84 BPM | BODY MASS INDEX: 27.13 KG/M2

## 2025-08-20 DIAGNOSIS — Z29.89 ENCOUNTER. FOR OTHER SPECIFIED PROPHYLACTIC MEASURES: ICD-10-CM

## 2025-08-20 DIAGNOSIS — C64.2 MALIGNANT NEOPLASM OF LEFT KIDNEY, EXCEPT RENAL PELVIS: ICD-10-CM

## 2025-08-20 LAB
ALBUMIN SERPL ELPH-MCNC: 4.1 G/DL — SIGNIFICANT CHANGE UP (ref 3.3–5)
ALP SERPL-CCNC: 79 U/L — SIGNIFICANT CHANGE UP (ref 40–120)
ALT FLD-CCNC: 40 U/L — SIGNIFICANT CHANGE UP (ref 10–45)
ANION GAP SERPL CALC-SCNC: 13 MMOL/L — SIGNIFICANT CHANGE UP (ref 5–17)
AST SERPL-CCNC: 36 U/L — SIGNIFICANT CHANGE UP (ref 10–40)
BASOPHILS # BLD AUTO: 0.02 K/UL — SIGNIFICANT CHANGE UP (ref 0–0.2)
BASOPHILS NFR BLD AUTO: 0.3 % — SIGNIFICANT CHANGE UP (ref 0–2)
BILIRUB SERPL-MCNC: 0.5 MG/DL — SIGNIFICANT CHANGE UP (ref 0.2–1.2)
BUN SERPL-MCNC: 7 MG/DL — SIGNIFICANT CHANGE UP (ref 7–23)
CALCIUM SERPL-MCNC: 10 MG/DL — SIGNIFICANT CHANGE UP (ref 8.4–10.5)
CHLORIDE SERPL-SCNC: 102 MMOL/L — SIGNIFICANT CHANGE UP (ref 96–108)
CO2 SERPL-SCNC: 24 MMOL/L — SIGNIFICANT CHANGE UP (ref 22–31)
CREAT SERPL-MCNC: 0.68 MG/DL — SIGNIFICANT CHANGE UP (ref 0.5–1.3)
EGFR: 100 ML/MIN/1.73M2 — SIGNIFICANT CHANGE UP
EGFR: 100 ML/MIN/1.73M2 — SIGNIFICANT CHANGE UP
EOSINOPHIL # BLD AUTO: 0.42 K/UL — SIGNIFICANT CHANGE UP (ref 0–0.5)
EOSINOPHIL NFR BLD AUTO: 5.9 % — SIGNIFICANT CHANGE UP (ref 0–6)
GLUCOSE SERPL-MCNC: 95 MG/DL — SIGNIFICANT CHANGE UP (ref 70–99)
HCT VFR BLD CALC: 36.5 % — LOW (ref 39–50)
HGB BLD-MCNC: 11.6 G/DL — LOW (ref 13–17)
IMM GRANULOCYTES NFR BLD AUTO: 0.1 % — SIGNIFICANT CHANGE UP (ref 0–0.9)
LYMPHOCYTES # BLD AUTO: 1.36 K/UL — SIGNIFICANT CHANGE UP (ref 1–3.3)
LYMPHOCYTES # BLD AUTO: 19.1 % — SIGNIFICANT CHANGE UP (ref 13–44)
MCHC RBC-ENTMCNC: 31.3 PG — SIGNIFICANT CHANGE UP (ref 27–34)
MCHC RBC-ENTMCNC: 31.8 G/DL — LOW (ref 32–36)
MCV RBC AUTO: 98.4 FL — SIGNIFICANT CHANGE UP (ref 80–100)
MONOCYTES # BLD AUTO: 0.56 K/UL — SIGNIFICANT CHANGE UP (ref 0–0.9)
MONOCYTES NFR BLD AUTO: 7.9 % — SIGNIFICANT CHANGE UP (ref 2–14)
NEUTROPHILS # BLD AUTO: 4.76 K/UL — SIGNIFICANT CHANGE UP (ref 1.8–7.4)
NEUTROPHILS NFR BLD AUTO: 66.7 % — SIGNIFICANT CHANGE UP (ref 43–77)
NRBC BLD AUTO-RTO: 0 /100 WBCS — SIGNIFICANT CHANGE UP (ref 0–0)
PLATELET # BLD AUTO: 276 K/UL — SIGNIFICANT CHANGE UP (ref 150–400)
POTASSIUM SERPL-MCNC: 4.6 MMOL/L — SIGNIFICANT CHANGE UP (ref 3.5–5.3)
POTASSIUM SERPL-SCNC: 4.6 MMOL/L — SIGNIFICANT CHANGE UP (ref 3.5–5.3)
PROT SERPL-MCNC: 7.8 G/DL — SIGNIFICANT CHANGE UP (ref 6–8.3)
RBC # BLD: 3.71 M/UL — LOW (ref 4.2–5.8)
RBC # FLD: 14.6 % — HIGH (ref 10.3–14.5)
SODIUM SERPL-SCNC: 139 MMOL/L — SIGNIFICANT CHANGE UP (ref 135–145)
T4 FREE SERPL-MCNC: 1.3 NG/DL — SIGNIFICANT CHANGE UP (ref 0.9–1.8)
TSH SERPL-MCNC: 3.37 UIU/ML — SIGNIFICANT CHANGE UP (ref 0.27–4.2)
WBC # BLD: 7.13 K/UL — SIGNIFICANT CHANGE UP (ref 3.8–10.5)
WBC # FLD AUTO: 7.13 K/UL — SIGNIFICANT CHANGE UP (ref 3.8–10.5)

## 2025-08-20 PROCEDURE — 99215 OFFICE O/P EST HI 40 MIN: CPT

## 2025-08-20 RX ORDER — PANTOPRAZOLE 20 MG/1
20 TABLET, DELAYED RELEASE ORAL DAILY
Refills: 0 | Status: ACTIVE | COMMUNITY
Start: 2025-08-20

## 2025-09-02 ENCOUNTER — APPOINTMENT (OUTPATIENT)
Dept: PULMONOLOGY | Facility: CLINIC | Age: 70
End: 2025-09-02
Payer: MEDICARE

## 2025-09-02 VITALS — HEART RATE: 73 BPM | SYSTOLIC BLOOD PRESSURE: 147 MMHG | DIASTOLIC BLOOD PRESSURE: 88 MMHG | OXYGEN SATURATION: 96 %

## 2025-09-02 DIAGNOSIS — G47.33 OBSTRUCTIVE SLEEP APNEA (ADULT) (PEDIATRIC): ICD-10-CM

## 2025-09-02 PROCEDURE — 99213 OFFICE O/P EST LOW 20 MIN: CPT

## 2025-09-02 PROCEDURE — G2211 COMPLEX E/M VISIT ADD ON: CPT

## 2025-09-05 ENCOUNTER — APPOINTMENT (OUTPATIENT)
Dept: OPHTHALMOLOGY | Facility: CLINIC | Age: 70
End: 2025-09-05
Payer: MEDICARE

## 2025-09-05 ENCOUNTER — NON-APPOINTMENT (OUTPATIENT)
Age: 70
End: 2025-09-05

## 2025-09-05 PROCEDURE — 92134 CPTRZ OPH DX IMG PST SGM RTA: CPT

## 2025-09-05 PROCEDURE — 92014 COMPRE OPH EXAM EST PT 1/>: CPT

## 2025-09-08 ENCOUNTER — NON-APPOINTMENT (OUTPATIENT)
Age: 70
End: 2025-09-08

## 2025-09-09 ENCOUNTER — RESULT REVIEW (OUTPATIENT)
Age: 70
End: 2025-09-09

## 2025-09-09 ENCOUNTER — APPOINTMENT (OUTPATIENT)
Dept: HEMATOLOGY ONCOLOGY | Facility: CLINIC | Age: 70
End: 2025-09-09

## 2025-09-09 LAB
BASOPHILS # BLD AUTO: 0.01 K/UL — SIGNIFICANT CHANGE UP (ref 0–0.2)
BASOPHILS NFR BLD AUTO: 0.1 % — SIGNIFICANT CHANGE UP (ref 0–2)
EOSINOPHIL # BLD AUTO: 0.39 K/UL — SIGNIFICANT CHANGE UP (ref 0–0.5)
EOSINOPHIL NFR BLD AUTO: 5.4 % — SIGNIFICANT CHANGE UP (ref 0–6)
HCT VFR BLD CALC: 35.8 % — LOW (ref 39–50)
HGB BLD-MCNC: 11.3 G/DL — LOW (ref 13–17)
IMM GRANULOCYTES NFR BLD AUTO: 0.3 % — SIGNIFICANT CHANGE UP (ref 0–0.9)
LYMPHOCYTES # BLD AUTO: 1.24 K/UL — SIGNIFICANT CHANGE UP (ref 1–3.3)
LYMPHOCYTES # BLD AUTO: 17.2 % — SIGNIFICANT CHANGE UP (ref 13–44)
MCHC RBC-ENTMCNC: 30.7 PG — SIGNIFICANT CHANGE UP (ref 27–34)
MCHC RBC-ENTMCNC: 31.6 G/DL — LOW (ref 32–36)
MCV RBC AUTO: 97.3 FL — SIGNIFICANT CHANGE UP (ref 80–100)
MONOCYTES # BLD AUTO: 0.58 K/UL — SIGNIFICANT CHANGE UP (ref 0–0.9)
MONOCYTES NFR BLD AUTO: 8 % — SIGNIFICANT CHANGE UP (ref 2–14)
NEUTROPHILS # BLD AUTO: 4.99 K/UL — SIGNIFICANT CHANGE UP (ref 1.8–7.4)
NEUTROPHILS NFR BLD AUTO: 69 % — SIGNIFICANT CHANGE UP (ref 43–77)
NRBC BLD AUTO-RTO: 0 /100 WBCS — SIGNIFICANT CHANGE UP (ref 0–0)
PLATELET # BLD AUTO: 245 K/UL — SIGNIFICANT CHANGE UP (ref 150–400)
RBC # BLD: 3.68 M/UL — LOW (ref 4.2–5.8)
RBC # FLD: 14 % — SIGNIFICANT CHANGE UP (ref 10.3–14.5)
WBC # BLD: 7.23 K/UL — SIGNIFICANT CHANGE UP (ref 3.8–10.5)
WBC # FLD AUTO: 7.23 K/UL — SIGNIFICANT CHANGE UP (ref 3.8–10.5)

## 2025-09-10 DIAGNOSIS — Z01.812 ENCOUNTER FOR PREPROCEDURAL LABORATORY EXAMINATION: ICD-10-CM

## 2025-09-10 LAB
APTT BLD: 32.1 SEC
INR PPP: 0.9 RATIO
PT BLD: 10.5 SEC

## 2025-09-11 ENCOUNTER — APPOINTMENT (OUTPATIENT)
Dept: ULTRASOUND IMAGING | Facility: IMAGING CENTER | Age: 70
End: 2025-09-11
Payer: MEDICARE

## 2025-09-11 PROCEDURE — 49083 ABD PARACENTESIS W/IMAGING: CPT

## 2025-09-17 ENCOUNTER — RESULT REVIEW (OUTPATIENT)
Age: 70
End: 2025-09-17

## 2025-09-17 ENCOUNTER — APPOINTMENT (OUTPATIENT)
Dept: INFUSION THERAPY | Facility: HOSPITAL | Age: 70
End: 2025-09-17

## 2025-09-17 ENCOUNTER — APPOINTMENT (OUTPATIENT)
Dept: HEMATOLOGY ONCOLOGY | Facility: CLINIC | Age: 70
End: 2025-09-17
Payer: MEDICARE

## 2025-09-17 VITALS
OXYGEN SATURATION: 95 % | RESPIRATION RATE: 18 BRPM | HEART RATE: 88 BPM | BODY MASS INDEX: 26.87 KG/M2 | WEIGHT: 162.48 LBS | DIASTOLIC BLOOD PRESSURE: 92 MMHG | SYSTOLIC BLOOD PRESSURE: 150 MMHG | TEMPERATURE: 97.1 F

## 2025-09-17 DIAGNOSIS — L27.1 LOCALIZED SKIN ERUPTION DUE TO DRUGS AND MEDICAMENTS TAKEN INTERNALLY: ICD-10-CM

## 2025-09-17 DIAGNOSIS — C64.9 MALIGNANT NEOPLASM OF UNSPECIFIED KIDNEY, EXCEPT RENAL PELVIS: ICD-10-CM

## 2025-09-17 DIAGNOSIS — Z29.89 ENCOUNTER. FOR OTHER SPECIFIED PROPHYLACTIC MEASURES: ICD-10-CM

## 2025-09-17 LAB
ALBUMIN SERPL ELPH-MCNC: 3.9 G/DL — SIGNIFICANT CHANGE UP (ref 3.3–5)
ALP SERPL-CCNC: 81 U/L — SIGNIFICANT CHANGE UP (ref 40–120)
ALT FLD-CCNC: 40 U/L — SIGNIFICANT CHANGE UP (ref 10–45)
ANION GAP SERPL CALC-SCNC: 12 MMOL/L — SIGNIFICANT CHANGE UP (ref 5–17)
AST SERPL-CCNC: 38 U/L — SIGNIFICANT CHANGE UP (ref 10–40)
BASOPHILS # BLD AUTO: 0.01 K/UL — SIGNIFICANT CHANGE UP (ref 0–0.2)
BASOPHILS NFR BLD AUTO: 0.1 % — SIGNIFICANT CHANGE UP (ref 0–2)
BILIRUB SERPL-MCNC: 0.4 MG/DL — SIGNIFICANT CHANGE UP (ref 0.2–1.2)
BUN SERPL-MCNC: 13 MG/DL — SIGNIFICANT CHANGE UP (ref 7–23)
CALCIUM SERPL-MCNC: 10.1 MG/DL — SIGNIFICANT CHANGE UP (ref 8.4–10.5)
CHLORIDE SERPL-SCNC: 98 MMOL/L — SIGNIFICANT CHANGE UP (ref 96–108)
CO2 SERPL-SCNC: 26 MMOL/L — SIGNIFICANT CHANGE UP (ref 22–31)
CREAT SERPL-MCNC: 0.73 MG/DL — SIGNIFICANT CHANGE UP (ref 0.5–1.3)
EGFR: 98 ML/MIN/1.73M2 — SIGNIFICANT CHANGE UP
EGFR: 98 ML/MIN/1.73M2 — SIGNIFICANT CHANGE UP
EOSINOPHIL # BLD AUTO: 0.41 K/UL — SIGNIFICANT CHANGE UP (ref 0–0.5)
EOSINOPHIL NFR BLD AUTO: 4.7 % — SIGNIFICANT CHANGE UP (ref 0–6)
GLUCOSE SERPL-MCNC: 93 MG/DL — SIGNIFICANT CHANGE UP (ref 70–99)
HCT VFR BLD CALC: 35.6 % — LOW (ref 39–50)
HGB BLD-MCNC: 11.6 G/DL — LOW (ref 13–17)
IMM GRANULOCYTES NFR BLD AUTO: 0.2 % — SIGNIFICANT CHANGE UP (ref 0–0.9)
LYMPHOCYTES # BLD AUTO: 1.62 K/UL — SIGNIFICANT CHANGE UP (ref 1–3.3)
LYMPHOCYTES # BLD AUTO: 18.6 % — SIGNIFICANT CHANGE UP (ref 13–44)
MCHC RBC-ENTMCNC: 31.3 PG — SIGNIFICANT CHANGE UP (ref 27–34)
MCHC RBC-ENTMCNC: 32.6 G/DL — SIGNIFICANT CHANGE UP (ref 32–36)
MCV RBC AUTO: 96 FL — SIGNIFICANT CHANGE UP (ref 80–100)
MONOCYTES # BLD AUTO: 0.61 K/UL — SIGNIFICANT CHANGE UP (ref 0–0.9)
MONOCYTES NFR BLD AUTO: 7 % — SIGNIFICANT CHANGE UP (ref 2–14)
NEUTROPHILS # BLD AUTO: 6.02 K/UL — SIGNIFICANT CHANGE UP (ref 1.8–7.4)
NEUTROPHILS NFR BLD AUTO: 69.4 % — SIGNIFICANT CHANGE UP (ref 43–77)
NRBC BLD AUTO-RTO: 0 /100 WBCS — SIGNIFICANT CHANGE UP (ref 0–0)
PLATELET # BLD AUTO: 281 K/UL — SIGNIFICANT CHANGE UP (ref 150–400)
POTASSIUM SERPL-MCNC: 4.2 MMOL/L — SIGNIFICANT CHANGE UP (ref 3.5–5.3)
POTASSIUM SERPL-SCNC: 4.2 MMOL/L — SIGNIFICANT CHANGE UP (ref 3.5–5.3)
PROT SERPL-MCNC: 7.6 G/DL — SIGNIFICANT CHANGE UP (ref 6–8.3)
RBC # BLD: 3.71 M/UL — LOW (ref 4.2–5.8)
RBC # FLD: 13.9 % — SIGNIFICANT CHANGE UP (ref 10.3–14.5)
SODIUM SERPL-SCNC: 136 MMOL/L — SIGNIFICANT CHANGE UP (ref 135–145)
T4 FREE SERPL-MCNC: 1.3 NG/DL — SIGNIFICANT CHANGE UP (ref 0.9–1.8)
TSH SERPL-MCNC: 3.92 UIU/ML — SIGNIFICANT CHANGE UP (ref 0.27–4.2)
WBC # BLD: 8.69 K/UL — SIGNIFICANT CHANGE UP (ref 3.8–10.5)
WBC # FLD AUTO: 8.69 K/UL — SIGNIFICANT CHANGE UP (ref 3.8–10.5)

## 2025-09-17 PROCEDURE — 99215 OFFICE O/P EST HI 40 MIN: CPT

## 2025-09-17 RX ORDER — CABOZANTINIB 20 MG/1
20 TABLET ORAL
Qty: 30 | Refills: 1 | Status: ACTIVE | COMMUNITY
Start: 2025-09-17 | End: 1900-01-01

## 2025-09-17 RX ORDER — BETAMETHASONE DIPROPIONATE 0.5 MG/G
0.05 CREAM, AUGMENTED TOPICAL TWICE DAILY
Qty: 1 | Refills: 0 | Status: ACTIVE | COMMUNITY
Start: 2025-09-17 | End: 1900-01-01

## (undated) DEVICE — BASIN SET SINGLE

## (undated) DEVICE — CHEST DRAIN PLEUR-EVAC DRY/WET ADULT-PEDS SINGLE (QUICK)

## (undated) DEVICE — TUBING AIRSEAL TRI-LUMEN FILTERED

## (undated) DEVICE — DRAPE MAGNETIC INSTRUMENT MEDIUM

## (undated) DEVICE — TUBING SUCTION NONCONDUCTIVE 6MM X 12FT

## (undated) DEVICE — Device

## (undated) DEVICE — NDL HYPO REGULAR BEVEL 25G X 1.5" (BLUE)

## (undated) DEVICE — ENDOCATCH GENERAL 10MM (PURPLE)

## (undated) DEVICE — DRAPE 3/4 SHEET 52X76"

## (undated) DEVICE — SOL ANTI FOG

## (undated) DEVICE — SOL IRR POUR H2O 500ML

## (undated) DEVICE — DRSG TEGADERM 4X4.75"

## (undated) DEVICE — SUT VICRYL 0 27" UR-6

## (undated) DEVICE — TROCAR COVIDIEN VERSAONE BLUNT TIP HASSAN 12MM

## (undated) DEVICE — SOL IRR BAG H2O 3000ML

## (undated) DEVICE — SUT VICRYL 0 36" CT-1 UNDYED

## (undated) DEVICE — ELCTR GROUNDING PAD ADULT COVIDIEN

## (undated) DEVICE — BASIN SET DOUBLE

## (undated) DEVICE — CANISTER DISPOSABLE THIN WALL 3000CC

## (undated) DEVICE — TAPE SILK 3"

## (undated) DEVICE — SUT SILK 0 18" TIES

## (undated) DEVICE — INSUFFLATION NDL COVIDIEN SURGINEEDLE VERESS 150MM LONG

## (undated) DEVICE — TROCAR COVIDIEN VERSAPORT BLADELESS OPTICAL 5MM STANDARD

## (undated) DEVICE — SUT CAPROSYN 4-0 P-12 UNDYED

## (undated) DEVICE — DISSECTOR ENDOSCOPIC KITTNER SINGLE TIP

## (undated) DEVICE — LABELS BLANK W PEN

## (undated) DEVICE — STAPLER ECHELON FLEX POWERED PLUS 340MM

## (undated) DEVICE — WARMING BLANKET FULL ADULT

## (undated) DEVICE — SUT VICRYL 2-0 27" UR-6

## (undated) DEVICE — PREP CHLORAPREP HI-LITE ORANGE 26ML

## (undated) DEVICE — VENODYNE/SCD SLEEVE CALF MEDIUM

## (undated) DEVICE — SUT VICRYL 3-0 27" SH UNDYED

## (undated) DEVICE — SUT BIOSYN 4-0 18" P-12

## (undated) DEVICE — TROCAR SURGIQUEST AIRSEAL 12MMX100MM

## (undated) DEVICE — DRSG CURITY GAUZE SPONGE 4 X 4" 12-PLY

## (undated) DEVICE — BLADE MEDTRONIC ENT SKIMMER NON-ROTATABLE 15 DEGREE 3.5MM X 22.5CM

## (undated) DEVICE — D HELP - CLEARVIEW CLEARIFY SYSTEM

## (undated) DEVICE — SUT VICRYL 0 27" CT-1 UNDYED

## (undated) DEVICE — GLV 7 PROTEXIS (WHITE)

## (undated) DEVICE — GOWN XL

## (undated) DEVICE — SPECIMEN CONTAINER 100ML

## (undated) DEVICE — INSUFFLATION NDL COVIDIEN SURGINEEDLE VERESS 120MM

## (undated) DEVICE — ENDOCATCH 10MM SPECIMEN POUCH

## (undated) DEVICE — ENDOCATCH GENERAL 15MM (PURPLE)

## (undated) DEVICE — DRSG EYE PAD OVAL STERILE

## (undated) DEVICE — TROCAR COVIDIEN VERSAONE BLADELESS FIXATION 12MM STANDARD

## (undated) DEVICE — ELCTR BOVIE TIP BLADE INSULATED 6.5" EDGE

## (undated) DEVICE — CHEST DRAIN OASIS DRY SUCTION WATER SEAL

## (undated) DEVICE — DRSG TELFA 3 X 8

## (undated) DEVICE — ELCTR HANDSWITCHING 5MM ABC

## (undated) DEVICE — BLADE SURGICAL #15 CARBON

## (undated) DEVICE — LIGASURE BLUNT TIP 37CM

## (undated) DEVICE — FOLEY TRAY 16FR 5CC LF UMETER CLOSED

## (undated) DEVICE — TROCAR COVIDIEN VERSAONE BLADED FIXATION 12MM STANDARD

## (undated) DEVICE — SUT PROLENE 0 30" CT-1

## (undated) DEVICE — SUT MONOCRYL 4-0 27" PS-2 UNDYED

## (undated) DEVICE — HANDPIECE INTERPULSE W/ COAXIAL FAN SPRAY TIP

## (undated) DEVICE — WARMING BLANKET LOWER ADULT

## (undated) DEVICE — ELCTR GROUNDING PAD ADULT CONMED

## (undated) DEVICE — FEMORAL CANAL SUCTION TIP IRR SYS

## (undated) DEVICE — CONNECTOR REDUCING STRAIGHT 3/8X0.25"

## (undated) DEVICE — GLV 7.5 PROTEXIS (CREAM) MICRO

## (undated) DEVICE — DRAPE IOBAN 33" X 23"

## (undated) DEVICE — POSITIONER STRAP ARMBOARD VELCRO TS-30

## (undated) DEVICE — SUT SILK 2-0 30" TIES

## (undated) DEVICE — DRSG BENZOIN 0.6CC

## (undated) DEVICE — SYR LUER LOK 10CC

## (undated) DEVICE — DRSG TEGADERM 2.5X3"

## (undated) DEVICE — GLV 7.5 PROTEXIS (WHITE)

## (undated) DEVICE — SYR SLIP 10CC

## (undated) DEVICE — SHEARS COVIDIEN ENDO SHEAR 5MM X 31CM W UNIPOLAR CAUTERY

## (undated) DEVICE — DRAPE MAYO STAND 23"

## (undated) DEVICE — TUBING STRYKEFLOW II SUCTION / IRRIGATOR

## (undated) DEVICE — ELCTR EXTENSION STRAIGHT

## (undated) DEVICE — PROTECTOR HEEL / ELBOW FLUFFY

## (undated) DEVICE — TRAP SPECIMEN SPUTUM 40CC

## (undated) DEVICE — ENDOCATCH II 15MM

## (undated) DEVICE — SUT VLOC 180 0 12" GS-21 GREEN

## (undated) DEVICE — TROCAR COVIDIEN VERSAONE BLADELESS FIXATION 5MM LONG

## (undated) DEVICE — PACK GENERAL LAPAROSCOPY

## (undated) DEVICE — BLADE MEDTRONIC ENT SKIMMER ROTATABLE 15 DEGREE 2.9MM X 22CM

## (undated) DEVICE — DURABLE MEDICAL EQUIPMENT: Type: DURABLE MEDICAL EQUIPMENT

## (undated) DEVICE — POSITIONER FOAM EGG CRATE ULNAR 2PCS (PINK)

## (undated) DEVICE — STAPLER ECHELON FLEX POWERED ADV PLCMT TIP

## (undated) DEVICE — DRSG STERISTRIPS 0.5 X 4"

## (undated) DEVICE — LIGASURE ATLAS 10MM 37CM

## (undated) DEVICE — SHEARS COVIDIEN ENDO SHEAR 5MM X 45CM LONG W MONOPOLAR CAUTERY

## (undated) DEVICE — PACK BASIC

## (undated) DEVICE — SUT CLIP LAPRA-TY ABSORBABLE SIZE 0.118 TO 0.12" VIOLET

## (undated) DEVICE — DRAIN RESERVOIR FOR JACKSON PRATT 100CC CARDINAL

## (undated) DEVICE — SUT VICRYL 1 36" CT-1 UNDYED

## (undated) DEVICE — VISITEC 4X4

## (undated) DEVICE — ELCTR BOVIE TIP BLADE INSULATED 2.75" EDGE

## (undated) DEVICE — PACK MAJOR ABDOMINAL W ENDO DRAPE

## (undated) DEVICE — SUT VICRYL 2-0 27" SH UNDYED

## (undated) DEVICE — RETRACTOR COVIDIEN ENDOPADDLE 12MM DISP

## (undated) DEVICE — LAP PAD 4 X 18"